# Patient Record
Sex: FEMALE | Race: WHITE | Employment: OTHER | URBAN - NONMETROPOLITAN AREA
[De-identification: names, ages, dates, MRNs, and addresses within clinical notes are randomized per-mention and may not be internally consistent; named-entity substitution may affect disease eponyms.]

---

## 2018-06-19 ENCOUNTER — HOSPITAL ENCOUNTER (OUTPATIENT)
Dept: WOMENS IMAGING | Age: 83
Discharge: HOME | End: 2018-06-19
Payer: MEDICARE

## 2018-06-19 DIAGNOSIS — Z12.31 VISIT FOR SCREENING MAMMOGRAM: ICD-10-CM

## 2018-06-19 DIAGNOSIS — Z78.0 ASYMPTOMATIC MENOPAUSAL STATE: ICD-10-CM

## 2018-06-19 PROCEDURE — 77067 SCR MAMMO BI INCL CAD: CPT

## 2018-06-19 PROCEDURE — 77080 DXA BONE DENSITY AXIAL: CPT

## 2018-06-22 ENCOUNTER — HOSPITAL ENCOUNTER (OUTPATIENT)
Dept: WOMENS IMAGING | Age: 83
Discharge: HOME | End: 2018-06-22
Payer: MEDICARE

## 2018-06-22 DIAGNOSIS — R92.8 ABNORMAL MAMMOGRAM: ICD-10-CM

## 2018-06-22 PROCEDURE — 76642 ULTRASOUND BREAST LIMITED: CPT

## 2018-06-22 PROCEDURE — G0279 TOMOSYNTHESIS, MAMMO: HCPCS

## 2020-09-18 ENCOUNTER — TRANSCRIBE ORDERS (OUTPATIENT)
Dept: ADMINISTRATIVE | Facility: HOSPITAL | Age: 85
End: 2020-09-18

## 2020-09-18 DIAGNOSIS — Z12.31 ENCOUNTER FOR SCREENING MAMMOGRAM FOR MALIGNANT NEOPLASM OF BREAST: Primary | ICD-10-CM

## 2020-09-18 DIAGNOSIS — Z78.0 POSTMENOPAUSAL STATUS: ICD-10-CM

## 2020-09-29 ENCOUNTER — APPOINTMENT (OUTPATIENT)
Dept: CT IMAGING | Facility: HOSPITAL | Age: 85
End: 2020-09-29

## 2020-09-29 ENCOUNTER — HOSPITAL ENCOUNTER (INPATIENT)
Facility: HOSPITAL | Age: 85
LOS: 1 days | Discharge: HOME | End: 2020-10-01
Attending: EMERGENCY MEDICINE | Admitting: FAMILY MEDICINE

## 2020-09-29 ENCOUNTER — APPOINTMENT (OUTPATIENT)
Dept: GENERAL RADIOLOGY | Facility: HOSPITAL | Age: 85
End: 2020-09-29

## 2020-09-29 DIAGNOSIS — I26.93 SINGLE SUBSEGMENTAL PULMONARY EMBOLISM WITHOUT ACUTE COR PULMONALE (HCC): ICD-10-CM

## 2020-09-29 DIAGNOSIS — R41.82 ALTERED MENTAL STATUS, UNSPECIFIED ALTERED MENTAL STATUS TYPE: ICD-10-CM

## 2020-09-29 DIAGNOSIS — R26.9 GAIT ABNORMALITY: ICD-10-CM

## 2020-09-29 DIAGNOSIS — R55 SYNCOPE, UNSPECIFIED SYNCOPE TYPE: Primary | ICD-10-CM

## 2020-09-29 LAB
ALBUMIN SERPL-MCNC: 4.4 G/DL (ref 3.5–5.2)
ALBUMIN/GLOB SERPL: 1.3 G/DL
ALP SERPL-CCNC: 58 U/L (ref 39–117)
ALT SERPL W P-5'-P-CCNC: 8 U/L (ref 1–33)
ANION GAP SERPL CALCULATED.3IONS-SCNC: 14 MMOL/L (ref 5–15)
AST SERPL-CCNC: 17 U/L (ref 1–32)
BASOPHILS # BLD AUTO: 0.06 10*3/MM3 (ref 0–0.2)
BASOPHILS NFR BLD AUTO: 0.7 % (ref 0–1.5)
BILIRUB SERPL-MCNC: 0.5 MG/DL (ref 0–1.2)
BILIRUB UR QL STRIP: NEGATIVE
BUN SERPL-MCNC: 21 MG/DL (ref 8–23)
BUN/CREAT SERPL: 21.2 (ref 7–25)
CALCIUM SPEC-SCNC: 10.2 MG/DL (ref 8.6–10.5)
CHLORIDE SERPL-SCNC: 99 MMOL/L (ref 98–107)
CLARITY UR: CLEAR
CO2 SERPL-SCNC: 25 MMOL/L (ref 22–29)
COLOR UR: YELLOW
CREAT SERPL-MCNC: 0.99 MG/DL (ref 0.57–1)
D-LACTATE SERPL-SCNC: 2.9 MMOL/L (ref 0.5–2)
DEPRECATED RDW RBC AUTO: 43.4 FL (ref 37–54)
EOSINOPHIL # BLD AUTO: 0.07 10*3/MM3 (ref 0–0.4)
EOSINOPHIL NFR BLD AUTO: 0.8 % (ref 0.3–6.2)
ERYTHROCYTE [DISTWIDTH] IN BLOOD BY AUTOMATED COUNT: 14.6 % (ref 12.3–15.4)
GFR SERPL CREATININE-BSD FRML MDRD: 53 ML/MIN/1.73
GLOBULIN UR ELPH-MCNC: 3.5 GM/DL
GLUCOSE SERPL-MCNC: 150 MG/DL (ref 65–99)
GLUCOSE UR STRIP-MCNC: NEGATIVE MG/DL
HCT VFR BLD AUTO: 41.2 % (ref 34–46.6)
HGB BLD-MCNC: 13.3 G/DL (ref 12–15.9)
HGB UR QL STRIP.AUTO: NEGATIVE
HOLD SPECIMEN: NORMAL
IMM GRANULOCYTES # BLD AUTO: 0.04 10*3/MM3 (ref 0–0.05)
IMM GRANULOCYTES NFR BLD AUTO: 0.5 % (ref 0–0.5)
INR PPP: 1.04 (ref 0.91–1.09)
KETONES UR QL STRIP: ABNORMAL
LACTATE HOLD SPECIMEN: NORMAL
LEUKOCYTE ESTERASE UR QL STRIP.AUTO: NEGATIVE
LYMPHOCYTES # BLD AUTO: 1.3 10*3/MM3 (ref 0.7–3.1)
LYMPHOCYTES NFR BLD AUTO: 14.9 % (ref 19.6–45.3)
MAGNESIUM SERPL-MCNC: 2 MG/DL (ref 1.6–2.4)
MCH RBC QN AUTO: 26.1 PG (ref 26.6–33)
MCHC RBC AUTO-ENTMCNC: 32.3 G/DL (ref 31.5–35.7)
MCV RBC AUTO: 80.9 FL (ref 79–97)
MONOCYTES # BLD AUTO: 0.85 10*3/MM3 (ref 0.1–0.9)
MONOCYTES NFR BLD AUTO: 9.8 % (ref 5–12)
NEUTROPHILS NFR BLD AUTO: 6.38 10*3/MM3 (ref 1.7–7)
NEUTROPHILS NFR BLD AUTO: 73.3 % (ref 42.7–76)
NITRITE UR QL STRIP: NEGATIVE
NRBC BLD AUTO-RTO: 0 /100 WBC (ref 0–0.2)
PH UR STRIP.AUTO: 6 [PH] (ref 5–8)
PLATELET # BLD AUTO: 295 10*3/MM3 (ref 140–450)
PMV BLD AUTO: 10.3 FL (ref 6–12)
POTASSIUM SERPL-SCNC: 3.6 MMOL/L (ref 3.5–5.2)
PROT SERPL-MCNC: 7.9 G/DL (ref 6–8.5)
PROT UR QL STRIP: NEGATIVE
PROTHROMBIN TIME: 13.2 SECONDS (ref 11.9–14.6)
RBC # BLD AUTO: 5.09 10*6/MM3 (ref 3.77–5.28)
SARS-COV-2 RDRP RESP QL NAA+PROBE: NOT DETECTED
SODIUM SERPL-SCNC: 138 MMOL/L (ref 136–145)
SP GR UR STRIP: 1.03 (ref 1–1.03)
TROPONIN T SERPL-MCNC: <0.01 NG/ML (ref 0–0.03)
TROPONIN T SERPL-MCNC: <0.01 NG/ML (ref 0–0.03)
UROBILINOGEN UR QL STRIP: ABNORMAL
WBC # BLD AUTO: 8.7 10*3/MM3 (ref 3.4–10.8)
WHOLE BLOOD HOLD SPECIMEN: NORMAL
WHOLE BLOOD HOLD SPECIMEN: NORMAL

## 2020-09-29 PROCEDURE — 85610 PROTHROMBIN TIME: CPT | Performed by: PHYSICIAN ASSISTANT

## 2020-09-29 PROCEDURE — 71045 X-RAY EXAM CHEST 1 VIEW: CPT

## 2020-09-29 PROCEDURE — 83036 HEMOGLOBIN GLYCOSYLATED A1C: CPT | Performed by: INTERNAL MEDICINE

## 2020-09-29 PROCEDURE — 25010000002 ONDANSETRON PER 1 MG: Performed by: PHYSICIAN ASSISTANT

## 2020-09-29 PROCEDURE — 87635 SARS-COV-2 COVID-19 AMP PRB: CPT | Performed by: PHYSICIAN ASSISTANT

## 2020-09-29 PROCEDURE — 83605 ASSAY OF LACTIC ACID: CPT | Performed by: PHYSICIAN ASSISTANT

## 2020-09-29 PROCEDURE — 85025 COMPLETE CBC W/AUTO DIFF WBC: CPT | Performed by: PHYSICIAN ASSISTANT

## 2020-09-29 PROCEDURE — 87040 BLOOD CULTURE FOR BACTERIA: CPT | Performed by: PHYSICIAN ASSISTANT

## 2020-09-29 PROCEDURE — 83735 ASSAY OF MAGNESIUM: CPT | Performed by: PHYSICIAN ASSISTANT

## 2020-09-29 PROCEDURE — 93005 ELECTROCARDIOGRAM TRACING: CPT | Performed by: EMERGENCY MEDICINE

## 2020-09-29 PROCEDURE — 93005 ELECTROCARDIOGRAM TRACING: CPT

## 2020-09-29 PROCEDURE — 93010 ELECTROCARDIOGRAM REPORT: CPT | Performed by: INTERNAL MEDICINE

## 2020-09-29 PROCEDURE — 80053 COMPREHEN METABOLIC PANEL: CPT | Performed by: PHYSICIAN ASSISTANT

## 2020-09-29 PROCEDURE — 81003 URINALYSIS AUTO W/O SCOPE: CPT | Performed by: PHYSICIAN ASSISTANT

## 2020-09-29 PROCEDURE — 71275 CT ANGIOGRAPHY CHEST: CPT

## 2020-09-29 PROCEDURE — 0 IOPAMIDOL PER 1 ML: Performed by: PHYSICIAN ASSISTANT

## 2020-09-29 PROCEDURE — 99285 EMERGENCY DEPT VISIT HI MDM: CPT

## 2020-09-29 PROCEDURE — 84484 ASSAY OF TROPONIN QUANT: CPT | Performed by: PHYSICIAN ASSISTANT

## 2020-09-29 PROCEDURE — 70450 CT HEAD/BRAIN W/O DYE: CPT

## 2020-09-29 PROCEDURE — P9612 CATHETERIZE FOR URINE SPEC: HCPCS

## 2020-09-29 RX ORDER — LOVASTATIN 40 MG/1
40 TABLET ORAL NIGHTLY
COMMUNITY

## 2020-09-29 RX ORDER — SODIUM CHLORIDE 0.9 % (FLUSH) 0.9 %
10 SYRINGE (ML) INJECTION AS NEEDED
Status: DISCONTINUED | OUTPATIENT
Start: 2020-09-29 | End: 2020-10-01 | Stop reason: HOSPADM

## 2020-09-29 RX ORDER — LEVOTHYROXINE SODIUM 0.1 MG/1
100 TABLET ORAL DAILY
COMMUNITY

## 2020-09-29 RX ORDER — ONDANSETRON 2 MG/ML
4 INJECTION INTRAMUSCULAR; INTRAVENOUS ONCE
Status: COMPLETED | OUTPATIENT
Start: 2020-09-29 | End: 2020-09-29

## 2020-09-29 RX ORDER — MELOXICAM 7.5 MG/1
7.5 TABLET ORAL DAILY
COMMUNITY
End: 2020-10-01 | Stop reason: HOSPADM

## 2020-09-29 RX ORDER — MEMANTINE HYDROCHLORIDE 5 MG/1
5 TABLET ORAL 2 TIMES DAILY
COMMUNITY

## 2020-09-29 RX ADMIN — ONDANSETRON HYDROCHLORIDE 4 MG: 2 SOLUTION INTRAMUSCULAR; INTRAVENOUS at 22:26

## 2020-09-29 RX ADMIN — SODIUM CHLORIDE 1000 ML: 9 INJECTION, SOLUTION INTRAVENOUS at 22:26

## 2020-09-29 RX ADMIN — IOPAMIDOL 59 ML: 755 INJECTION, SOLUTION INTRAVENOUS at 21:48

## 2020-09-30 ENCOUNTER — APPOINTMENT (OUTPATIENT)
Dept: ULTRASOUND IMAGING | Facility: HOSPITAL | Age: 85
End: 2020-09-30

## 2020-09-30 PROBLEM — R55 NEAR SYNCOPE: Status: ACTIVE | Noted: 2020-09-30

## 2020-09-30 PROBLEM — E03.9 HYPOTHYROIDISM: Status: ACTIVE | Noted: 2020-09-30

## 2020-09-30 PROBLEM — F01.50 VASCULAR DEMENTIA WITHOUT BEHAVIORAL DISTURBANCE: Chronic | Status: ACTIVE | Noted: 2020-09-30

## 2020-09-30 PROBLEM — I26.99 ACUTE PULMONARY EMBOLISM (HCC): Status: ACTIVE | Noted: 2020-09-30

## 2020-09-30 PROBLEM — E87.20 LACTIC ACIDOSIS: Status: ACTIVE | Noted: 2020-09-30

## 2020-09-30 LAB
ALBUMIN SERPL-MCNC: 3.8 G/DL (ref 3.5–5.2)
ALBUMIN/GLOB SERPL: 1.3 G/DL
ALP SERPL-CCNC: 50 U/L (ref 39–117)
ALT SERPL W P-5'-P-CCNC: 6 U/L (ref 1–33)
ANION GAP SERPL CALCULATED.3IONS-SCNC: 10 MMOL/L (ref 5–15)
AST SERPL-CCNC: 14 U/L (ref 1–32)
BASOPHILS # BLD AUTO: 0.04 10*3/MM3 (ref 0–0.2)
BASOPHILS NFR BLD AUTO: 0.3 % (ref 0–1.5)
BILIRUB SERPL-MCNC: 0.4 MG/DL (ref 0–1.2)
BUN SERPL-MCNC: 20 MG/DL (ref 8–23)
BUN/CREAT SERPL: 24.4 (ref 7–25)
CALCIUM SPEC-SCNC: 9.4 MG/DL (ref 8.6–10.5)
CHLORIDE SERPL-SCNC: 103 MMOL/L (ref 98–107)
CHOLEST SERPL-MCNC: 163 MG/DL (ref 0–200)
CK SERPL-CCNC: 69 U/L (ref 20–180)
CO2 SERPL-SCNC: 28 MMOL/L (ref 22–29)
CREAT SERPL-MCNC: 0.82 MG/DL (ref 0.57–1)
D-LACTATE SERPL-SCNC: 2.1 MMOL/L (ref 0.5–2)
DEPRECATED RDW RBC AUTO: 44.2 FL (ref 37–54)
EOSINOPHIL # BLD AUTO: 0.01 10*3/MM3 (ref 0–0.4)
EOSINOPHIL NFR BLD AUTO: 0.1 % (ref 0.3–6.2)
ERYTHROCYTE [DISTWIDTH] IN BLOOD BY AUTOMATED COUNT: 14.7 % (ref 12.3–15.4)
ERYTHROCYTE [SEDIMENTATION RATE] IN BLOOD: 14 MM/HR (ref 0–20)
FOLATE SERPL-MCNC: 6.8 NG/ML (ref 4.78–24.2)
GFR SERPL CREATININE-BSD FRML MDRD: 66 ML/MIN/1.73
GLOBULIN UR ELPH-MCNC: 3 GM/DL
GLUCOSE SERPL-MCNC: 119 MG/DL (ref 65–99)
HBA1C MFR BLD: 5.8 % (ref 4.8–5.6)
HCT VFR BLD AUTO: 36.6 % (ref 34–46.6)
HDLC SERPL-MCNC: 70 MG/DL (ref 40–60)
HGB BLD-MCNC: 11.5 G/DL (ref 12–15.9)
IMM GRANULOCYTES # BLD AUTO: 0.05 10*3/MM3 (ref 0–0.05)
IMM GRANULOCYTES NFR BLD AUTO: 0.4 % (ref 0–0.5)
LDLC SERPL CALC-MCNC: 83 MG/DL (ref 0–100)
LDLC/HDLC SERPL: 1.19 {RATIO}
LYMPHOCYTES # BLD AUTO: 1 10*3/MM3 (ref 0.7–3.1)
LYMPHOCYTES NFR BLD AUTO: 8.1 % (ref 19.6–45.3)
MCH RBC QN AUTO: 25.7 PG (ref 26.6–33)
MCHC RBC AUTO-ENTMCNC: 31.4 G/DL (ref 31.5–35.7)
MCV RBC AUTO: 81.7 FL (ref 79–97)
MONOCYTES # BLD AUTO: 0.99 10*3/MM3 (ref 0.1–0.9)
MONOCYTES NFR BLD AUTO: 8 % (ref 5–12)
NEUTROPHILS NFR BLD AUTO: 10.33 10*3/MM3 (ref 1.7–7)
NEUTROPHILS NFR BLD AUTO: 83.1 % (ref 42.7–76)
NRBC BLD AUTO-RTO: 0 /100 WBC (ref 0–0.2)
NT-PROBNP SERPL-MCNC: 750.1 PG/ML (ref 0–1800)
PHOSPHATE SERPL-MCNC: 3.7 MG/DL (ref 2.5–4.5)
PLATELET # BLD AUTO: 260 10*3/MM3 (ref 140–450)
PMV BLD AUTO: 10.7 FL (ref 6–12)
POTASSIUM SERPL-SCNC: 4.1 MMOL/L (ref 3.5–5.2)
PROT SERPL-MCNC: 6.8 G/DL (ref 6–8.5)
RBC # BLD AUTO: 4.48 10*6/MM3 (ref 3.77–5.28)
SODIUM SERPL-SCNC: 141 MMOL/L (ref 136–145)
T4 FREE SERPL-MCNC: 1.77 NG/DL (ref 0.93–1.7)
TRIGL SERPL-MCNC: 50 MG/DL (ref 0–150)
TROPONIN T SERPL-MCNC: <0.01 NG/ML (ref 0–0.03)
TSH SERPL DL<=0.05 MIU/L-ACNC: 0.07 UIU/ML (ref 0.27–4.2)
VIT B12 BLD-MCNC: 252 PG/ML (ref 211–946)
VLDLC SERPL-MCNC: 10 MG/DL
WBC # BLD AUTO: 12.42 10*3/MM3 (ref 3.4–10.8)

## 2020-09-30 PROCEDURE — 25810000003 SODIUM CHLORIDE 0.9 % WITH KCL 20 MEQ 20-0.9 MEQ/L-% SOLUTION: Performed by: INTERNAL MEDICINE

## 2020-09-30 PROCEDURE — 84439 ASSAY OF FREE THYROXINE: CPT | Performed by: INTERNAL MEDICINE

## 2020-09-30 PROCEDURE — 85651 RBC SED RATE NONAUTOMATED: CPT | Performed by: INTERNAL MEDICINE

## 2020-09-30 PROCEDURE — 84100 ASSAY OF PHOSPHORUS: CPT | Performed by: INTERNAL MEDICINE

## 2020-09-30 PROCEDURE — 93880 EXTRACRANIAL BILAT STUDY: CPT

## 2020-09-30 PROCEDURE — 83880 ASSAY OF NATRIURETIC PEPTIDE: CPT | Performed by: INTERNAL MEDICINE

## 2020-09-30 PROCEDURE — 84484 ASSAY OF TROPONIN QUANT: CPT | Performed by: INTERNAL MEDICINE

## 2020-09-30 PROCEDURE — 80053 COMPREHEN METABOLIC PANEL: CPT | Performed by: INTERNAL MEDICINE

## 2020-09-30 PROCEDURE — 93010 ELECTROCARDIOGRAM REPORT: CPT | Performed by: INTERNAL MEDICINE

## 2020-09-30 PROCEDURE — 93880 EXTRACRANIAL BILAT STUDY: CPT | Performed by: SURGERY

## 2020-09-30 PROCEDURE — 93005 ELECTROCARDIOGRAM TRACING: CPT | Performed by: INTERNAL MEDICINE

## 2020-09-30 PROCEDURE — 82550 ASSAY OF CK (CPK): CPT | Performed by: INTERNAL MEDICINE

## 2020-09-30 PROCEDURE — 82746 ASSAY OF FOLIC ACID SERUM: CPT | Performed by: INTERNAL MEDICINE

## 2020-09-30 PROCEDURE — 93970 EXTREMITY STUDY: CPT | Performed by: SURGERY

## 2020-09-30 PROCEDURE — 84443 ASSAY THYROID STIM HORMONE: CPT | Performed by: INTERNAL MEDICINE

## 2020-09-30 PROCEDURE — 25010000002 ENOXAPARIN PER 10 MG: Performed by: INTERNAL MEDICINE

## 2020-09-30 PROCEDURE — 85025 COMPLETE CBC W/AUTO DIFF WBC: CPT | Performed by: INTERNAL MEDICINE

## 2020-09-30 PROCEDURE — 82607 VITAMIN B-12: CPT | Performed by: INTERNAL MEDICINE

## 2020-09-30 PROCEDURE — 93970 EXTREMITY STUDY: CPT

## 2020-09-30 PROCEDURE — 80061 LIPID PANEL: CPT | Performed by: INTERNAL MEDICINE

## 2020-09-30 RX ORDER — ACETAMINOPHEN 325 MG/1
650 TABLET ORAL EVERY 4 HOURS PRN
Status: DISCONTINUED | OUTPATIENT
Start: 2020-09-30 | End: 2020-10-01 | Stop reason: HOSPADM

## 2020-09-30 RX ORDER — SODIUM CHLORIDE AND POTASSIUM CHLORIDE 150; 900 MG/100ML; MG/100ML
75 INJECTION, SOLUTION INTRAVENOUS CONTINUOUS
Status: DISCONTINUED | OUTPATIENT
Start: 2020-09-30 | End: 2020-10-01 | Stop reason: HOSPADM

## 2020-09-30 RX ORDER — NITROGLYCERIN 0.4 MG/1
0.4 TABLET SUBLINGUAL
Status: DISCONTINUED | OUTPATIENT
Start: 2020-09-30 | End: 2020-10-01 | Stop reason: HOSPADM

## 2020-09-30 RX ORDER — SODIUM CHLORIDE 0.9 % (FLUSH) 0.9 %
10 SYRINGE (ML) INJECTION AS NEEDED
Status: DISCONTINUED | OUTPATIENT
Start: 2020-09-30 | End: 2020-10-01 | Stop reason: HOSPADM

## 2020-09-30 RX ORDER — ATORVASTATIN CALCIUM 10 MG/1
10 TABLET, FILM COATED ORAL DAILY
Status: DISCONTINUED | OUTPATIENT
Start: 2020-09-30 | End: 2020-10-01 | Stop reason: HOSPADM

## 2020-09-30 RX ORDER — ACETAMINOPHEN 650 MG/1
650 SUPPOSITORY RECTAL EVERY 4 HOURS PRN
Status: DISCONTINUED | OUTPATIENT
Start: 2020-09-30 | End: 2020-10-01 | Stop reason: HOSPADM

## 2020-09-30 RX ORDER — SODIUM CHLORIDE 0.9 % (FLUSH) 0.9 %
10 SYRINGE (ML) INJECTION EVERY 12 HOURS SCHEDULED
Status: DISCONTINUED | OUTPATIENT
Start: 2020-09-30 | End: 2020-10-01 | Stop reason: HOSPADM

## 2020-09-30 RX ORDER — ASPIRIN 325 MG
325 TABLET ORAL DAILY
Status: DISCONTINUED | OUTPATIENT
Start: 2020-09-30 | End: 2020-09-30

## 2020-09-30 RX ORDER — ONDANSETRON 2 MG/ML
4 INJECTION INTRAMUSCULAR; INTRAVENOUS EVERY 6 HOURS PRN
Status: DISCONTINUED | OUTPATIENT
Start: 2020-09-30 | End: 2020-10-01 | Stop reason: HOSPADM

## 2020-09-30 RX ORDER — MEMANTINE HYDROCHLORIDE 5 MG/1
5 TABLET ORAL 2 TIMES DAILY
Status: DISCONTINUED | OUTPATIENT
Start: 2020-09-30 | End: 2020-10-01 | Stop reason: HOSPADM

## 2020-09-30 RX ORDER — LEVOTHYROXINE SODIUM 0.1 MG/1
100 TABLET ORAL
Status: DISCONTINUED | OUTPATIENT
Start: 2020-09-30 | End: 2020-10-01 | Stop reason: HOSPADM

## 2020-09-30 RX ORDER — ONDANSETRON 4 MG/1
4 TABLET, FILM COATED ORAL EVERY 6 HOURS PRN
Status: DISCONTINUED | OUTPATIENT
Start: 2020-09-30 | End: 2020-10-01 | Stop reason: HOSPADM

## 2020-09-30 RX ADMIN — APIXABAN 10 MG: 5 TABLET, FILM COATED ORAL at 22:15

## 2020-09-30 RX ADMIN — SODIUM CHLORIDE, PRESERVATIVE FREE 10 ML: 5 INJECTION INTRAVENOUS at 22:15

## 2020-09-30 RX ADMIN — LEVOTHYROXINE SODIUM 100 MCG: 100 TABLET ORAL at 05:13

## 2020-09-30 RX ADMIN — ENOXAPARIN SODIUM 60 MG: 60 INJECTION SUBCUTANEOUS at 05:13

## 2020-09-30 RX ADMIN — ASPIRIN 325 MG: 325 TABLET ORAL at 09:27

## 2020-09-30 RX ADMIN — POTASSIUM CHLORIDE AND SODIUM CHLORIDE 75 ML/HR: 900; 150 INJECTION, SOLUTION INTRAVENOUS at 05:44

## 2020-09-30 RX ADMIN — MEMANTINE HYDROCHLORIDE 5 MG: 5 TABLET, FILM COATED ORAL at 22:15

## 2020-09-30 RX ADMIN — MEMANTINE HYDROCHLORIDE 5 MG: 5 TABLET, FILM COATED ORAL at 09:27

## 2020-09-30 RX ADMIN — ATORVASTATIN CALCIUM 10 MG: 10 TABLET, FILM COATED ORAL at 09:27

## 2020-10-01 ENCOUNTER — APPOINTMENT (OUTPATIENT)
Dept: CARDIOLOGY | Facility: HOSPITAL | Age: 85
End: 2020-10-01

## 2020-10-01 VITALS
DIASTOLIC BLOOD PRESSURE: 77 MMHG | HEIGHT: 65 IN | HEART RATE: 72 BPM | TEMPERATURE: 98.1 F | OXYGEN SATURATION: 99 % | BODY MASS INDEX: 22.26 KG/M2 | RESPIRATION RATE: 16 BRPM | WEIGHT: 133.6 LBS | SYSTOLIC BLOOD PRESSURE: 156 MMHG

## 2020-10-01 LAB
BH CV ECHO MEAS - AO MAX PG (FULL): 8.4 MMHG
BH CV ECHO MEAS - AO MAX PG: 13.8 MMHG
BH CV ECHO MEAS - AO MEAN PG (FULL): 6 MMHG
BH CV ECHO MEAS - AO MEAN PG: 10 MMHG
BH CV ECHO MEAS - AO ROOT AREA (BSA CORRECTED): 1.9
BH CV ECHO MEAS - AO ROOT AREA: 8 CM^2
BH CV ECHO MEAS - AO ROOT DIAM: 3.2 CM
BH CV ECHO MEAS - AO V2 MAX: 186 CM/SEC
BH CV ECHO MEAS - AO V2 MEAN: 154 CM/SEC
BH CV ECHO MEAS - AO V2 VTI: 49.1 CM
BH CV ECHO MEAS - AVA(I,A): 2.1 CM^2
BH CV ECHO MEAS - AVA(I,D): 2.1 CM^2
BH CV ECHO MEAS - AVA(V,A): 2 CM^2
BH CV ECHO MEAS - AVA(V,D): 2 CM^2
BH CV ECHO MEAS - BSA(HAYCOCK): 1.7 M^2
BH CV ECHO MEAS - BSA: 1.7 M^2
BH CV ECHO MEAS - BZI_BMI: 22.1 KILOGRAMS/M^2
BH CV ECHO MEAS - BZI_METRIC_HEIGHT: 165.1 CM
BH CV ECHO MEAS - BZI_METRIC_WEIGHT: 60.3 KG
BH CV ECHO MEAS - EDV(CUBED): 63 ML
BH CV ECHO MEAS - EDV(MOD-SP4): 50.5 ML
BH CV ECHO MEAS - EDV(TEICH): 69.2 ML
BH CV ECHO MEAS - EF(CUBED): 72.1 %
BH CV ECHO MEAS - EF(MOD-SP4): 65.7 %
BH CV ECHO MEAS - EF(TEICH): 64.4 %
BH CV ECHO MEAS - ESV(CUBED): 17.6 ML
BH CV ECHO MEAS - ESV(MOD-SP4): 17.3 ML
BH CV ECHO MEAS - ESV(TEICH): 24.6 ML
BH CV ECHO MEAS - FS: 34.7 %
BH CV ECHO MEAS - IVS/LVPW: 0.86
BH CV ECHO MEAS - IVSD: 1.2 CM
BH CV ECHO MEAS - LA DIMENSION: 3.2 CM
BH CV ECHO MEAS - LA/AO: 1
BH CV ECHO MEAS - LAT PEAK E' VEL: 6.4 CM/SEC
BH CV ECHO MEAS - LV DIASTOLIC VOL/BSA (35-75): 30.4 ML/M^2
BH CV ECHO MEAS - LV MASS(C)D: 162.2 GRAMS
BH CV ECHO MEAS - LV MASS(C)DI: 97.5 GRAMS/M^2
BH CV ECHO MEAS - LV MAX PG: 5.5 MMHG
BH CV ECHO MEAS - LV MEAN PG: 4 MMHG
BH CV ECHO MEAS - LV SYSTOLIC VOL/BSA (12-30): 10.4 ML/M^2
BH CV ECHO MEAS - LV V1 MAX: 117 CM/SEC
BH CV ECHO MEAS - LV V1 MEAN: 89.3 CM/SEC
BH CV ECHO MEAS - LV V1 VTI: 32.9 CM
BH CV ECHO MEAS - LVIDD: 4 CM
BH CV ECHO MEAS - LVIDS: 2.6 CM
BH CV ECHO MEAS - LVLD AP4: 6.9 CM
BH CV ECHO MEAS - LVLS AP4: 5.5 CM
BH CV ECHO MEAS - LVOT AREA (M): 3.1 CM^2
BH CV ECHO MEAS - LVOT AREA: 3.1 CM^2
BH CV ECHO MEAS - LVOT DIAM: 2 CM
BH CV ECHO MEAS - LVPWD: 1.3 CM
BH CV ECHO MEAS - MED PEAK E' VEL: 5.7 CM/SEC
BH CV ECHO MEAS - MV A MAX VEL: 129 CM/SEC
BH CV ECHO MEAS - MV DEC SLOPE: 404 CM/SEC^2
BH CV ECHO MEAS - MV DEC TIME: 0.24 SEC
BH CV ECHO MEAS - MV E MAX VEL: 96.7 CM/SEC
BH CV ECHO MEAS - MV E/A: 0.75
BH CV ECHO MEAS - RAP SYSTOLE: 10 MMHG
BH CV ECHO MEAS - RVSP: 43.4 MMHG
BH CV ECHO MEAS - SI(AO): 237.4 ML/M^2
BH CV ECHO MEAS - SI(CUBED): 27.3 ML/M^2
BH CV ECHO MEAS - SI(LVOT): 62.1 ML/M^2
BH CV ECHO MEAS - SI(MOD-SP4): 20 ML/M^2
BH CV ECHO MEAS - SI(TEICH): 26.8 ML/M^2
BH CV ECHO MEAS - SV(AO): 394.9 ML
BH CV ECHO MEAS - SV(CUBED): 45.5 ML
BH CV ECHO MEAS - SV(LVOT): 103.4 ML
BH CV ECHO MEAS - SV(MOD-SP4): 33.2 ML
BH CV ECHO MEAS - SV(TEICH): 44.6 ML
BH CV ECHO MEAS - TR MAX VEL: 289 CM/SEC
BH CV ECHO MEASUREMENTS AVERAGE E/E' RATIO: 15.98
D-LACTATE SERPL-SCNC: 0.8 MMOL/L (ref 0.5–2)
DEPRECATED RDW RBC AUTO: 44.5 FL (ref 37–54)
ERYTHROCYTE [DISTWIDTH] IN BLOOD BY AUTOMATED COUNT: 14.9 % (ref 12.3–15.4)
HCT VFR BLD AUTO: 33.2 % (ref 34–46.6)
HGB BLD-MCNC: 10.5 G/DL (ref 12–15.9)
LEFT ATRIUM VOLUME INDEX: 21.3 ML/M2
LEFT ATRIUM VOLUME: 35.3 CM3
MAXIMAL PREDICTED HEART RATE: 134 BPM
MCH RBC QN AUTO: 25.7 PG (ref 26.6–33)
MCHC RBC AUTO-ENTMCNC: 31.6 G/DL (ref 31.5–35.7)
MCV RBC AUTO: 81.4 FL (ref 79–97)
PLATELET # BLD AUTO: 223 10*3/MM3 (ref 140–450)
PMV BLD AUTO: 10.4 FL (ref 6–12)
RBC # BLD AUTO: 4.08 10*6/MM3 (ref 3.77–5.28)
STRESS TARGET HR: 114 BPM
WBC # BLD AUTO: 6.74 10*3/MM3 (ref 3.4–10.8)

## 2020-10-01 PROCEDURE — 85027 COMPLETE CBC AUTOMATED: CPT | Performed by: NURSE PRACTITIONER

## 2020-10-01 PROCEDURE — 93356 MYOCRD STRAIN IMG SPCKL TRCK: CPT

## 2020-10-01 PROCEDURE — 97165 OT EVAL LOW COMPLEX 30 MIN: CPT

## 2020-10-01 PROCEDURE — 97161 PT EVAL LOW COMPLEX 20 MIN: CPT

## 2020-10-01 PROCEDURE — 25810000003 SODIUM CHLORIDE 0.9 % WITH KCL 20 MEQ 20-0.9 MEQ/L-% SOLUTION: Performed by: INTERNAL MEDICINE

## 2020-10-01 PROCEDURE — 93306 TTE W/DOPPLER COMPLETE: CPT | Performed by: INTERNAL MEDICINE

## 2020-10-01 PROCEDURE — 93356 MYOCRD STRAIN IMG SPCKL TRCK: CPT | Performed by: INTERNAL MEDICINE

## 2020-10-01 PROCEDURE — 93306 TTE W/DOPPLER COMPLETE: CPT

## 2020-10-01 PROCEDURE — 83605 ASSAY OF LACTIC ACID: CPT | Performed by: NURSE PRACTITIONER

## 2020-10-01 RX ORDER — METOPROLOL SUCCINATE 25 MG/1
12.5 TABLET, EXTENDED RELEASE ORAL
Status: DISCONTINUED | OUTPATIENT
Start: 2020-10-01 | End: 2020-10-01 | Stop reason: HOSPADM

## 2020-10-01 RX ORDER — METOPROLOL SUCCINATE 25 MG/1
12.5 TABLET, EXTENDED RELEASE ORAL
Qty: 15 TABLET | Refills: 0 | Status: SHIPPED | OUTPATIENT
Start: 2020-10-02 | End: 2020-11-01

## 2020-10-01 RX ADMIN — ATORVASTATIN CALCIUM 10 MG: 10 TABLET, FILM COATED ORAL at 09:04

## 2020-10-01 RX ADMIN — METOPROLOL SUCCINATE 12.5 MG: 25 TABLET, EXTENDED RELEASE ORAL at 12:03

## 2020-10-01 RX ADMIN — POTASSIUM CHLORIDE AND SODIUM CHLORIDE 75 ML/HR: 900; 150 INJECTION, SOLUTION INTRAVENOUS at 02:05

## 2020-10-01 RX ADMIN — APIXABAN 10 MG: 5 TABLET, FILM COATED ORAL at 09:04

## 2020-10-01 RX ADMIN — LEVOTHYROXINE SODIUM 100 MCG: 100 TABLET ORAL at 05:55

## 2020-10-01 RX ADMIN — MEMANTINE HYDROCHLORIDE 5 MG: 5 TABLET, FILM COATED ORAL at 09:04

## 2020-10-01 NOTE — PLAN OF CARE
Problem: Adult Inpatient Plan of Care  Goal: Plan of Care Review  Outcome: Ongoing, Progressing  Flowsheets (Taken 10/1/2020 1032)  Progress: no change  Plan of Care Reviewed With:   patient   spouse  Outcome Summary: OT eval completed. Pt awake and alert in fowlers, no apparent distress. Pt demos good static and dynamic sitting balance with all testing and ADL task. S for LB dressing. Demos 5/5 strength and good endurance amb in felix with no exacerbation of symptoms. She is mildly unsteady with mobility but self corrects appropriately to avoid LOB. Toilets and performs hand washing standing at sink with S. Pt is at her functional baseline with no further needs for skilled OT services. ANticiapte d/c home w assist.

## 2020-10-01 NOTE — THERAPY EVALUATION
Patient Name: Dina Michaels  : 1934    MRN: [id number]                              Today's Date: 10/1/2020       Admit Date: 2020    Visit Dx:     ICD-10-CM ICD-9-CM   1. Syncope, unspecified syncope type  R55 780.2   2. Altered mental status, unspecified altered mental status type  R41.82 780.97   3. Single subsegmental pulmonary embolism without acute cor pulmonale (CMS/HCC)  I26.93 415.19   4. Gait abnormality  R26.9 781.2     Patient Active Problem List   Diagnosis   • AMS (altered mental status)   • Vascular dementia without behavioral disturbance (CMS/HCC)   • Near syncope   • Acute pulmonary embolism (CMS/HCC)   • Hypothyroidism   • Lactic acidosis     Past Medical History:   Diagnosis Date   • Cancer (CMS/HCC)      History reviewed. No pertinent surgical history.  General Information     Row Name 10/01/20 1025          Physical Therapy Time and Intention    Document Type  evaluation;other (see comments) see MAR; pt admitted w/ AMS  -     Mode of Treatment  physical therapy  -     Row Name 10/01/20 1025          General Information    Patient Profile Reviewed  yes  -JE     Prior Level of Function  dependent:;driving has rwx but does not use this device  -     Existing Precautions/Restrictions  fall  -     Barriers to Rehab  hearing deficit;cognitive status  -     Row Name 10/01/20 1025          Living Environment    Lives With  spouse has tub shower combo  -     Row Name 10/01/20 1025          Home Main Entrance    Number of Stairs, Main Entrance  five  -     Stair Railings, Main Entrance  railings on both sides of stairs  -     Row Name 10/01/20 1025          Stairs Within Home, Primary    Number of Stairs, Within Home, Primary  none  -     Row Name 10/01/20 1025          Cognition    Orientation Status (Cognition)  oriented to;person;place;disoriented to;situation;time  -     Row Name 10/01/20 1025          Safety Issues, Functional Mobility    Safety Issues Affecting  Function (Mobility)  at risk behavior observed;safety precaution awareness  -     Impairments Affecting Function (Mobility)  balance [place]  -     Row Name 10/01/20 1025          Relationship/Environment    Name(s) of Who Lives With Patient  Bill  -       User Key  (r) = Recorded By, (t) = Taken By, (c) = Cosigned By    Initials Name Provider Type    Marie Garcia, PT Physical Therapist        Mobility     Row Name 10/01/20 1025          Bed Mobility    Bed Mobility  supine-sit;sit-supine  -SABINA     Supine-Sit [place] (Bed Mobility)  standby assist  -SABINA     Sit-Supine [place] (Bed Mobility)  standby assist  -     Row Name 10/01/20 1025          Transfers    Comment (Transfers)  on/off toilet w/ CGA to SBA  -     Row Name 10/01/20 1025          Sit-Stand Transfer    Sit-Stand [place] (Transfers)  standby assist;verbal cues  -     Row Name 10/01/20 1025          Gait/Stairs (Locomotion)    [place] Level (Gait)  contact guard;verbal cues  -     Distance in Feet (Gait)  150 ft  -     Deviations/Abnormal Patterns (Gait)  gait speed decreased;stride length decreased;other (see comments) BELEM varied w/ mild veering from straight path at times; no LOB  -     [place] Level (Stairs)  contact guard;verbal cues  -     Handrail Location (Stairs)  left side (ascending)  -     Number of Steps (Stairs)  5  -     Ascending Technique (Stairs)  step-over-step  -     Descending Technique (Stairs)  step-over-step;step-to-step  -       User Key  (r) = Recorded By, (t) = Taken By, (c) = Cosigned By    Initials Name Provider Type    Marie Garcia, PT Physical Therapist        Obj/Interventions     Row Name 10/01/20 1025          Range of Motion Comprehensive    Comment, General Range of Motion  AROM all 4 extremities WFLS  -     Row Name 10/01/20 1025          Strength Comprehensive (MMT)    Comment, General Manual Muscle Testing (MMT) Assessment  defer to OT for formal UE  strength assessment, however moves B UEs against gravity w/out difficulty; B LEs grossly 4+/5  -     Row Name 10/01/20 1025          Motor Skills    Therapeutic Exercise  other (see comments) education for repeated sit/stand exercise progressing from B UE assist to single UE assist and w/out UE assist when safely appropriate for hip and thigh strengthening  -     Row Name 10/01/20 1025          Balance    Balance Assessment  sitting static balance;sitting dynamic balance;standing static balance;standing dynamic balance  -     Static Sitting Balance  WFL  -     Dynamic Sitting Balance  WFL  -     Static Standing Balance  mild impairment;unsupported;standing  -     Dynamic Standing Balance  mild impairment;unsupported;standing  -     Comment, Balance  BELEM w/ gait varied from narrow w/ neer scissoring to wider w/ mild veering at times; no LOB  -     Row Name 10/01/20 1025          Sensory Assessment (Somatosensory)    Sensory Assessment (Somatosensory)  other (see comments) no reported or noted c/os N/T  -       User Key  (r) = Recorded By, (t) = Taken By, (c) = Cosigned By    Initials Name Provider Type    Marie Garcia, PT Physical Therapist        Goals/Plan     Row Name 10/01/20 1025          Bed Mobility Goal 1 (PT)    [place] Level/Cues Needed (Bed Mobility Goal 1, PT)  independent  -     Time Frame (Bed Mobility Goal 1, PT)  long term goal (LTG);10 days  -     Progress/Outcomes (Bed Mobility Goal 1, PT)  goal ongoing  -     Row Name 10/01/20 1025          Transfer Goal 1 (PT)    Activity/Assistive Device (Transfer Goal 1, PT)  sit-to-stand/stand-to-sit;bed-to-chair/chair-to-bed  -     [place] Level/Cues Needed (Transfer Goal 1, PT)  independent  -     Time Frame (Transfer Goal 1, PT)  long term goal (LTG);10 days  -     Progress/Outcome (Transfer Goal 1, PT)  goal ongoing  -     Row Name 10/01/20 1025          Gait Training Goal 1 (PT)    Activity/Assistive Device  (Gait Training Goal 1, PT)  gait (walking locomotion);decrease fall risk;diminish gait deviation;improve balance and speed;increase endurance/gait distance;other (see comments) w/ consistent BELEM w/out veering from straight path  -JE     [place] Level (Gait Training Goal 1, PT)  standby assist  -JE     Distance (Gait Training Goal 1, PT)  250 ft  -JE     Time Frame (Gait Training Goal 1, PT)  long term goal (LTG);10 days  -JE     Progress/Outcome (Gait Training Goal 1, PT)  goal ongoing  -     Row Name 10/01/20 1025          Stairs Goal 1 (PT)    Activity/Assistive Device (Stairs Goal 1, PT)  ascending stairs;descending stairs;using handrail, left;using handrail, right;step-over step;decrease fall risk;improve balance and speed  -     [place] Level/Cues Needed (Stairs Goal 1, PT)  standby assist  -JE     Number of Stairs (Stairs Goal 1, PT)  5  -JE     Time Frame (Stairs Goal 1, PT)  long term goal (LTG);10 days  -JE     Progress/Outcome (Stairs Goal 1, PT)  goal ongoing  -     Row Name 10/01/20 1025          Patient Education Goal (PT)    Activity (Patient Education Goal, PT)  HEP for LE strengthening and balance  -JE     [place]/Cues/Accuracy (Memory Goal 2, PT)  demonstrates adequately;verbalizes understanding;other (see comments) w/ caregiver assist  -JE     Time Frame (Patient Education Goal, PT)  long term goal (LTG);10 days  -JE     Progress/Outcome (Patient Education Goal, PT)  goal ongoing  -       User Key  (r) = Recorded By, (t) = Taken By, (c) = Cosigned By    Initials Name Provider Type    Marie Garcia, PT Physical Therapist        Clinical Impression     Row Name 10/01/20 1025          Pain    Additional Documentation  Pain Scale: Numbers Pre/Post-Treatment (Group)  -     Row Name 10/01/20 1025          Pain Scale: Numbers Pre/Post-Treatment    Pretreatment Pain Rating  0/10 - no pain  -     Posttreatment Pain Rating  0/10 - no pain  -     Row Name 10/01/20 1028           Plan of Care Review    Plan of Care Reviewed With  patient;spouse  -     Progress  improving  -     Outcome Summary  PT eval completed.  Pt very agreeable and pleasant w/ supportive spouse present.  Pt performs bed mobility/tfers from bed w/ SBA, on/off toilet w/ SB to CGA.  Pt performed gait 150 ft w/ CGA to include ascend/descending 5 steps w/ 1 HR w/ CGA.  Pt w/ noted mild gait deficits w/ increase fall risk.  Pt was aware of her unsteadiness reporting she felt a little off balance and feels like this is due to being in the bed more.  Pt's spouse reports pt is usually very cautious.  Pt will benefit from continued PT services to improve balance, safety awareness, activity tolerance, and I w/ functional mobility reducing fall risk.  Recommend home w/ assist at discharge.  -     Row Name 10/01/20 1025          Therapy Assessment/Plan (PT)    Patient/Family Therapy Goals Statement (PT)  return home  -     Rehab Potential (PT)  good, to achieve stated therapy goals  -     Criteria for Skilled Interventions Met (PT)  yes;meets criteria;skilled treatment is necessary  -     Predicted Duration of Therapy Intervention (PT)  until discharge or goals achieved  -     Row Name 10/01/20 1025          Vital Signs    O2 Delivery Pre Treatment  room air  -     O2 Delivery Intra Treatment  room air  -     O2 Delivery Post Treatment  room air  -     Pre Patient Position  Supine  -     Intra Patient Position  Standing  -     Post Patient Position  Supine  -     Row Name 10/01/20 1025          Positioning and Restraints    Pre-Treatment Position  in bed  -     Post Treatment Position  bed  -     In Bed  fowlers;call light within reach;encouraged to call for assist;exit alarm on;with family/caregiver;side rails up x2  -       User Key  (r) = Recorded By, (t) = Taken By, (c) = Cosigned By    Initials Name Provider Type    Marie Garcia, PT Physical Therapist        Outcome Measures     Row Name  10/01/20 1025          How much help from another person do you currently need...    Turning from your back to your side while in flat bed without using bedrails?  3  -JE     Moving from lying on back to sitting on the side of a flat bed without bedrails?  3  -JE     Moving to and from a bed to a chair (including a wheelchair)?  3  -JE     Standing up from a chair using your arms (e.g., wheelchair, bedside chair)?  3  -JE     Climbing 3-5 steps with a railing?  3  -JE     To walk in hospital room?  3  -JE     AM-PAC 6 Clicks Score (PT)  18  -     Row Name 10/01/20 1025          Functional Assessment    Outcome Measure Options  AM-PAC 6 Clicks Basic Mobility (PT)  -       User Key  (r) = Recorded By, (t) = Taken By, (c) = Cosigned By    Initials Name Provider Type    Marie Garcia, PT Physical Therapist        Physical Therapy Education                 Title: PT OT SLP Therapies (Done)     Topic: Physical Therapy (Done)     Point: Mobility training (Done)     Learning Progress Summary           Patient Acceptance, E,TB,CARMELO, TASNEEM,MARIANN,NR by  at 10/1/2020 1241    Comment: Education re: purpose of PT/importance of activity; education re: gait deficits & increase fall risk; education for safety/falls prevention w/ mob; education for repeated sit to/from stand w/ B/single & w/out UE assist as appropriate for LE strengthening                   Point: Home exercise program (Done)     Learning Progress Summary           Patient Acceptance, E,TB,D, TASNEEM,MARIANN,NR by  at 10/1/2020 1241    Comment: Education re: purpose of PT/importance of activity; education re: gait deficits & increase fall risk; education for safety/falls prevention w/ mob; education for repeated sit to/from stand w/ B/single & w/out UE assist as appropriate for LE strengthening                   Point: Precautions (Done)     Learning Progress Summary           Patient Acceptance, E,TB,D, TASNEEM,MARIANN,NR by  at 10/1/2020 1241    Comment: Education re: purpose  of PT/importance of activity; education re: gait deficits & increase fall risk; education for safety/falls prevention w/ mob; education for repeated sit to/from stand w/ B/single & w/out UE assist as appropriate for LE strengthening                               User Key     Initials Effective Dates Name Provider Type Silvia MUHAMMAD 08/02/18 -  Marie Griffith, PT Physical Therapist PT              PT Recommendation and Plan  Planned Therapy Interventions (PT): balance training, bed mobility training, gait training, home exercise program, patient/family education, stair training, strengthening, transfer training, other (see comments)(safety/falls prevention)  Plan of Care Reviewed With: patient, spouse  Progress: improving  Outcome Summary: PT eval completed.  Pt very agreeable and pleasant w/ supportive spouse present.  Pt performs bed mobility/tfers from bed w/ SBA, on/off toilet w/ SB to CGA.  Pt performed gait 150 ft w/ CGA to include ascend/descending 5 steps w/ 1 HR w/ CGA.  Pt w/ noted mild gait deficits w/ increase fall risk.  Pt was aware of her unsteadiness reporting she felt a little off balance and feels like this is due to being in the bed more.  Pt's spouse reports pt is usually very cautious.  Pt will benefit from continued PT services to improve balance, safety awareness, activity tolerance, and I w/ functional mobility reducing fall risk.  Recommend home w/ assist at discharge.     Time Calculation:   PT Charges     Row Name 10/01/20 1242             Time Calculation    Start Time  1025  -      Stop Time  1107  -      Time Calculation (min)  42 min  -      PT Received On  10/01/20  -      PT Goal Re-Cert Due Date  10/11/20  -        User Key  (r) = Recorded By, (t) = Taken By, (c) = Cosigned By    Initials Name Provider Type    Marie Garcia, PT Physical Therapist        Therapy Charges for Today     Code Description Service Date Service Provider Modifiers Qty    [id number]  PT  EVAL LOW COMPLEXITY 3 10/1/2020 Marie Griffith, PT GP 1          PT G-Codes  Outcome Measure Options: AM-PAC 6 Clicks Basic Mobility (PT)  AM-PAC 6 Clicks Score (PT): 18    Marie Griffith, PT  10/1/2020

## 2020-10-01 NOTE — PLAN OF CARE
Goal Outcome Evaluation:  Plan of Care Reviewed With: patient  Progress: improving  Outcome Summary: Pt disoriented to time only. Pleasant and cooperative. Denies any discomfort. Up x 1 assist. VSS. Tele-nsr. . bed alarm on. Safety maintained. Plan for dc home today.

## 2020-10-01 NOTE — PLAN OF CARE
Problem: Adult Inpatient Plan of Care  Goal: Plan of Care Review  Recent Flowsheet Documentation  Taken 10/1/2020 1025 by Marie Griffith, PT  Progress: improving  Plan of Care Reviewed With:   patient   spouse  Outcome Summary: PT eval completed.  Pt very agreeable and pleasant w/ supportive spouse present.  Pt performs bed mobility/tfers from bed w/ SBA, on/off toilet w/ SB to CGA.  Pt performed gait 150 ft w/ CGA to include ascend/descending 5 steps w/ 1 HR w/ CGA.  Pt w/ noted mild gait deficits w/ increase fall risk.  Pt was aware of her unsteadiness reporting she felt a little off balance and feels like this is due to being in the bed more.  Pt's spouse reports pt is usually very cautious.  Pt will benefit from continued PT services to improve balance, safety awareness, activity tolerance, and I w/ functional mobility reducing fall risk.  Recommend home w/ assist at discharge.

## 2020-10-01 NOTE — THERAPY DISCHARGE NOTE
Acute Care - Occupational Therapy Discharge  [facility]    Patient Name: Dina Michaels  : 1934    MRN: [id number]                              Today's Date: 10/1/2020       Admit Date: 2020    Visit Dx:     ICD-10-CM ICD-9-CM   1. Syncope, unspecified syncope type  R55 780.2   2. Altered mental status, unspecified altered mental status type  R41.82 780.97   3. Single subsegmental pulmonary embolism without acute cor pulmonale (CMS/HCC)  I26.93 415.19   4. Gait abnormality  R26.9 781.2     Patient Active Problem List   Diagnosis   • AMS (altered mental status)   • Vascular dementia without behavioral disturbance (CMS/HCC)   • Near syncope   • Acute pulmonary embolism (CMS/HCC)   • Hypothyroidism   • Lactic acidosis     Past Medical History:   Diagnosis Date   • Cancer (CMS/HCC)      History reviewed. No pertinent surgical history.  General Information     Row Name 10/01/20 1027          OT Time and Intention    Document Type  evaluation  -MW     Mode of Treatment  occupational therapy  -     Row Name 10/01/20 1027          General Information    Patient Profile Reviewed  yes  -MW     Prior Level of Function  independent:;dressing;bathing;all household mobility assists with dishes, some cleaning, never driven  -MW     Existing Precautions/Restrictions  fall  -MW     Barriers to Rehab  hearing deficit  -     Row Name 10/01/20 1027          Occupational Profile    Reason for Services/Referral (Occupational Profile)  s/p episode of syncope at home, vascular dementia, increased memory impairment over last couple of months  -MW     Environmental Supports and Barriers (Occupational Profile)  RW - not previously using; tub shower, has tub bench if needed  -     Row Name 10/01/20 1027          Living Environment    Lives With  spouse  -     Row Name 10/01/20 1027          Home Main Entrance    Number of Stairs, Main Entrance  five  -MW     Stair Railings, Main Entrance  railings on both sides of stairs   "-     Row Name 10/01/20 1027          Stairs Within Home, Primary    Number of Stairs, Within Home, Primary  none  -     Row Name 10/01/20 1027          Cognition    Orientation Status (Cognition)  oriented to;person;place;verbal cues/prompts needed for orientation \"fall\" - unble to state month or year  -     Row Name 10/01/20 1027          Safety Issues, Functional Mobility    Impairments Affecting Function (Mobility)  balance hard of hearing  -     Comment, Safety Issues/Impairments (Mobility)  demos good insight to self and deficits  -     Row Name 10/01/20 1027          Relationship/Environment    Name(s) of Who Lives With Patient  Bill  -       User Key  (r) = Recorded By, (t) = Taken By, (c) = Cosigned By    Initials Name Provider Type     Siria Alan, OTR/L Occupational Therapist        Mobility/ADL's     Row Name 10/01/20 1027          Bed Mobility    Bed Mobility  supine-sit;sit-supine  -     Supine-Sit [place] (Bed Mobility)  standby assist  -     Sit-Supine [place] (Bed Mobility)  standby assist  -     Row Name 10/01/20 1027          Transfers    Transfers  sit-stand transfer  -     Sit-Stand [place] (Transfers)  standby assist  -     Row Name 10/01/20 1027          Functional Mobility    Functional Mobility- Ind. Level  contact guard assist  -     Functional Mobility- Comment  amb to end of felix and back to room, some scissoring and unsteadiness obs but pt demos good self awareness for self correction with no LOB  -     Row Name 10/01/20 1027          Activities of Daily Living    BADL Assessment/Intervention  lower body dressing;toileting  -     Row Name 10/01/20 1027          Lower Body Dressing Assessment/Training    [place] Level (Lower Body Dressing)  don;socks;supervision  -     Position (Lower Body Dressing)  edge of bed sitting  -     Row Name 10/01/20 1027          Toileting Assessment/Training    [place] Level (Toileting)  " supervision  -MW     Assistive Devices (Toileting)  commode  -MW     Position (Toileting)  supported sitting;unsupported standing  -MW       User Key  (r) = Recorded By, (t) = Taken By, (c) = Cosigned By    Initials Name Provider Type    Siria Berman, OTR/L Occupational Therapist        Obj/Interventions     Row Name 10/01/20 1027          Sensory Assessment (Somatosensory)    Sensory Assessment (Somatosensory)  sensation intact  -MW     Row Name 10/01/20 1027          Vision Assessment/Intervention    Visual Impairment/Limitations  corrective lenses full-time  -MW     Row Name 10/01/20 1027          Range of Motion Comprehensive    Comment, General Range of Motion  AROM WFL BUE  -MW     Row Name 10/01/20 1027          Strength Comprehensive (MMT)    Comment, General Manual Muscle Testing (MMT) Assessment  BUE 5/5  -MW     Row Name 10/01/20 1027          Balance    Balance Assessment  sitting static balance;sitting dynamic balance;standing static balance  -MW     Static Sitting Balance  WFL  -MW     Dynamic Sitting Balance  WFL  -MW     Static Standing Balance  mild impairment  -MW       User Key  (r) = Recorded By, (t) = Taken By, (c) = Cosigned By    Initials Name Provider Type    Siria Berman, OTR/L Occupational Therapist        Goals/Plan    No documentation.       Clinical Impression     Row Name 10/01/20 1032          Pain Assessment    Additional Documentation  Pain Scale: Numbers Pre/Post-Treatment (Group)  -MW     Row Name 10/01/20 1032          Pain Scale: Numbers Pre/Post-Treatment    Pretreatment Pain Rating  0/10 - no pain  -MW     Posttreatment Pain Rating  0/10 - no pain  -MW     Pre/Posttreatment Pain Comment  no c/o subjectively  -MW     [facility] Name 10/01/20 1032          Plan of Care Review    Plan of Care Reviewed With  patient;spouse  -MW     Progress  no change  -MW     Outcome Summary  OT eval completed. Pt awake and alert in fowlers, no apparent distress. Pt demos good static and  dynamic sitting balance with all testing and ADL task. S for LB dressing. Demos 5/5 strength and good endurance amb in felix with no exacerbation of symptoms. She is mildly unsteady with mobility but self corrects appropriately to avoid LOB. Toilets and performs hand washing standing at sink with S. Pt is at her functional baseline with no further needs for skilled OT services. ANticiapte d/c home w assist.  -     Row Name 10/01/20 1032          Therapy Assessment/Plan (OT)    Criteria for Skilled Therapeutic Interventions Met (OT)  no;no problems identified which require skilled intervention  -     Therapy Frequency (OT)  evaluation only  -     Row Name 10/01/20 1032          Therapy Plan Review/Discharge Plan (OT)    Anticipated Discharge Disposition (OT)  home with 24/7 care;home with home health  -     Row Name 10/01/20 1032          Vital Signs    O2 Delivery Pre Treatment  room air  -MW     O2 Delivery Intra Treatment  room air  -MW     O2 Delivery Post Treatment  room air  -     Row Name 10/01/20 1032          Positioning and Restraints    Pre-Treatment Position  in bed  -MW     Post Treatment Position  bed  -MW     In Bed  notified nsg;fowlers;call light within reach;encouraged to call for assist;with family/caregiver  -       User Key  (r) = Recorded By, (t) = Taken By, (c) = Cosigned By    Initials Name Provider Type    MW Siria Alan, OTR/L Occupational Therapist        Outcome Measures     Row Name 10/01/20 1027          How much help from another is currently needed...    Putting on and taking off regular lower body clothing?  3  -MW     Bathing (including washing, rinsing, and drying)  3  -MW     Toileting (which includes using toilet bed pan or urinal)  3  -MW     Putting on and taking off regular upper body clothing  4  -MW     Taking care of personal grooming (such as brushing teeth)  4  -MW     Eating meals  4  -MW     AM-PAC 6 Clicks Score (OT)  21  -MW     Row Name 10/01/20 1029           Functional Assessment    Outcome Measure Options  AM-PAC 6 Clicks Daily Activity (OT)  -       User Key  (r) = Recorded By, (t) = Taken By, (c) = Cosigned By    Initials Name Provider Type     Siria Alan OTR/L Occupational Therapist        Occupational Therapy Education                 Title: PT OT SLP Therapies (Done)     Topic: Occupational Therapy (Done)     Point: ADL training (Done)     Description:   Instruct learner(s) on proper safety adaptation and remediation techniques during self care or transfers.   Instruct in proper use of assistive devices.              Learning Progress Summary           Patient Acceptance, E, VU by  at 10/1/2020 1420                   Point: Home exercise program (Done)     Description:   Instruct learner(s) on appropriate technique for monitoring, assisting and/or progressing therapeutic exercises/activities.              Learning Progress Summary           Patient Acceptance, E, VU by  at 10/1/2020 1420                               User Key     Initials Effective Dates Name Provider Type Discipline     08/28/18 -  Siria Alan OTR/L Occupational Therapist OT              OT Recommendation and Plan  Retired Outcome Summary/Treatment Plan (OT)  Anticipated Discharge Disposition (OT): home with 24/7 care, home with home health  Therapy Frequency (OT): evaluation only  Plan of Care Review  Plan of Care Reviewed With: patient, spouse  Progress: no change  Outcome Summary: OT eval completed. Pt awake and alert in fowlers, no apparent distress. Pt demos good static and dynamic sitting balance with all testing and ADL task. S for LB dressing. Demos 5/5 strength and good endurance amb in felix with no exacerbation of symptoms. She is mildly unsteady with mobility but self corrects appropriately to avoid LOB. Toilets and performs hand washing standing at sink with S. Pt is at her functional baseline with no further needs for skilled OT services. ANticiapte  d/c home w assist.  Plan of Care Reviewed With: patient, spouse  Outcome Summary: OT eval completed. Pt awake and alert in fowlers, no apparent distress. Pt demos good static and dynamic sitting balance with all testing and ADL task. S for LB dressing. Demos 5/5 strength and good endurance amb in felix with no exacerbation of symptoms. She is mildly unsteady with mobility but self corrects appropriately to avoid LOB. Toilets and performs hand washing standing at sink with S. Pt is at her functional baseline with no further needs for skilled OT services. ANticiapte d/c home w assist.     Time Calculation:   Time Calculation- OT     Row Name 10/01/20 1420             Time Calculation- OT    OT Start Time  1020  -MW      OT Stop Time  1100  -MW      OT Time Calculation (min)  40 min  -MW      OT Received On  10/01/20  -MW        User Key  (r) = Recorded By, (t) = Taken By, (c) = Cosigned By    Initials Name Provider Type    MW Siria Alan, OTR/L Occupational Therapist        Therapy Charges for Today     Code Description Service Date Service Provider Modifiers Qty    [id number]  OT EVAL LOW COMPLEXITY 3 10/1/2020 Siria Alan OTR/L GO 1               iSria Alan OTR/JOE  10/1/2020

## 2020-10-01 NOTE — DISCHARGE INSTR - ACTIVITY
2.  Check blood pressure at home and keep a log of readings to bring to follow-up apointment with primary care provider.

## 2020-10-01 NOTE — DISCHARGE SUMMARY
[facility] Medicine Services  DISCHARGE SUMMARY       Date of Admission: 9/29/2020  Date of Discharge:  10/1/2020  Primary Care Physician: Benedicto Hebert MD    Presenting Problem/History of Present Illness:  AMS (altered mental status) [R41.82]  AMS (altered mental status) [R41.82]     Final Discharge Diagnoses:  Active Hospital Problems    Diagnosis   • **AMS (altered mental status)   • Vascular dementia without behavioral disturbance (CMS/HCC)   • Near syncope   • Acute pulmonary embolism (CMS/HCC)   • Hypothyroidism   • Lactic acidosis     Consults: None.    Procedures Performed: None.    Pertinent Test Results:   Lab Results (last 72 hours)     Procedure Component Value Units Date/Time    Lactic Acid, Plasma [[id number]]  (Normal) Collected: 10/01/20 0639    Specimen: Blood Updated: 10/01/20 0703     Lactate 0.8 mmol/L     CBC (No Diff) [[id number]]  (Abnormal) Collected: 10/01/20 0639    Specimen: Blood Updated: 10/01/20 0651     WBC 6.74 10*3/mm3      RBC 4.08 10*6/mm3      Hemoglobin 10.5 g/dL      Hematocrit 33.2 %      MCV 81.4 fL      MCH 25.7 pg      MCHC 31.6 g/dL      RDW 14.9 %      RDW-SD 44.5 fl      MPV 10.4 fL      Platelets 223 10*3/mm3     Blood Culture - Blood, Arm, Right [[id number]] Collected: 09/29/20 2036    Specimen: Blood from Arm, Right Updated: 09/30/20 2100     Blood Culture No growth at 24 hours    Blood Culture - Blood, Arm, Right [[id number]] Collected: 09/29/20 1949    Specimen: Blood from Arm, Right Updated: 09/30/20 2015     Blood Culture No growth at 24 hours    Vitamin B12 [[id number]]  (Normal) Collected: 09/30/20 0557    Specimen: Blood Updated: 09/30/20 1229     Vitamin B-12 252 pg/mL     Narrative:      Results may be falsely increased if patient taking Biotin.      Folate [[id number]]  (Normal) Collected: 09/30/20 0557    Specimen: Blood Updated: 09/30/20 1229     Folate 6.80 ng/mL     Narrative:      Results may be falsely increased if  patient taking Biotin.      TSH [[id number]]  (Abnormal) Collected: 09/30/20 0557    Specimen: Blood Updated: 09/30/20 0639     TSH 0.065 uIU/mL     T4, Free [[id number]]  (Abnormal) Collected: 09/30/20 0557    Specimen: Blood Updated: 09/30/20 0638     Free T4 1.77 ng/dL     Narrative:      Results may be falsely increased if patient taking Biotin.      Lipid Panel [[id number]]  (Abnormal) Collected: 09/30/20 0557    Specimen: Blood Updated: 09/30/20 0637     Total Cholesterol 163 mg/dL      Triglycerides 50 mg/dL      HDL Cholesterol 70 mg/dL      LDL Cholesterol  83 mg/dL      VLDL Cholesterol 10 mg/dL      LDL/HDL Ratio 1.19    Narrative:      Cholesterol Reference Ranges  (U.S. Department of Health and Human Services ATP III Classifications)    Desirable          <200 mg/dL  Borderline High    200-239 mg/dL  High Risk          >240 mg/dL      Triglyceride Reference Ranges  (U.S. Department of Health and Human Services ATP III Classifications)    Normal           <150 mg/dL  Borderline High  150-199 mg/dL  High             200-499 mg/dL  Very High        >500 mg/dL    HDL Reference Ranges  (U.S. Department of Health and Human Services ATP III Classifcations)    Low     <40 mg/dl (major risk factor for CHD)  High    >60 mg/dl ('negative' risk factor for CHD)        LDL Reference Ranges  (U.S. Department of Health and Human Services ATP III Classifcations)    Optimal          <100 mg/dL  Near Optimal     100-129 mg/dL  Borderline High  130-159 mg/dL  High             160-189 mg/dL  Very High        >189 mg/dL    Sedimentation Rate [[id number]]  (Normal) Collected: 09/30/20 0557    Specimen: Blood Updated: 09/30/20 0633     Sed Rate 14 mm/hr     Comprehensive Metabolic Panel [[id number]]  (Abnormal) Collected: 09/30/20 0557    Specimen: Blood Updated: 09/30/20 0633     Glucose 119 mg/dL      BUN 20 mg/dL      Creatinine 0.82 mg/dL      Sodium 141 mmol/L      Potassium 4.1 mmol/L      Chloride 103 mmol/L      CO2 28.0  mmol/L      Calcium 9.4 mg/dL      Total Protein 6.8 g/dL      Albumin 3.80 g/dL      ALT (SGPT) 6 U/L      AST (SGOT) 14 U/L      Alkaline Phosphatase 50 U/L      Total Bilirubin 0.4 mg/dL      eGFR Non African Amer 66 mL/min/1.73      Globulin 3.0 gm/dL      A/G Ratio 1.3 g/dL      BUN/Creatinine Ratio 24.4     Anion Gap 10.0 mmol/L     Narrative:      GFR Normal >60  Chronic Kidney Disease <60  Kidney Failure <15      CK [[id number]]  (Normal) Collected: 09/30/20 0557    Specimen: Blood Updated: 09/30/20 0633     Creatine Kinase 69 U/L     Phosphorus [[id number]]  (Normal) Collected: 09/30/20 0557    Specimen: Blood Updated: 09/30/20 0630     Phosphorus 3.7 mg/dL     Troponin [[id number]]  (Normal) Collected: 09/30/20 0557    Specimen: Blood Updated: 09/30/20 0628     Troponin T <0.010 ng/mL     Narrative:      Troponin T Reference Range:  <= 0.03 ng/mL-   Negative for AMI  >0.03 ng/mL-     Abnormal for myocardial necrosis.  Clinicians would have to utilize clinical acumen, EKG, Troponin and serial changes to determine if it is an Acute Myocardial Infarction or myocardial injury due to an underlying chronic condition.       Results may be falsely decreased if patient taking Biotin.      BNP [[id number]]  (Normal) Collected: 09/30/20 0557    Specimen: Blood Updated: 09/30/20 0628     proBNP 750.1 pg/mL     Narrative:      Among patients with dyspnea, NT-proBNP is highly sensitive for the detection of acute congestive heart failure. In addition NT-proBNP of <300 pg/ml effectively rules out acute congestive heart failure with 99% negative predictive value.    Results may be falsely decreased if patient taking Biotin.      CBC Auto Differential [[id number]]  (Abnormal) Collected: 09/30/20 0557    Specimen: Blood Updated: 09/30/20 0615     WBC 12.42 10*3/mm3      RBC 4.48 10*6/mm3      Hemoglobin 11.5 g/dL      Hematocrit 36.6 %      MCV 81.7 fL      MCH 25.7 pg      MCHC 31.4 g/dL      RDW 14.7 %      RDW-SD 44.2 fl       MPV 10.7 fL      Platelets 260 10*3/mm3      Neutrophil % 83.1 %      Lymphocyte % 8.1 %      Monocyte % 8.0 %      Eosinophil % 0.1 %      Basophil % 0.3 %      Immature Grans % 0.4 %      Neutrophils, Absolute 10.33 10*3/mm3      Lymphocytes, Absolute 1.00 10*3/mm3      Monocytes, Absolute 0.99 10*3/mm3      Eosinophils, Absolute 0.01 10*3/mm3      Basophils, Absolute 0.04 10*3/mm3      Immature Grans, Absolute 0.05 10*3/mm3      nRBC 0.0 /100 WBC     Hemoglobin A1c [[id number]]  (Abnormal) Collected: 09/29/20 1949    Specimen: Blood Updated: 09/30/20 0415     Hemoglobin A1C 5.80 %     Narrative:      Hemoglobin A1C Ranges:    Increased Risk for Diabetes  5.7% to 6.4%  Diabetes                     >= 6.5%  Diabetic Goal                < 7.0%    Lactic Acid, Reflex [[id number]]  (Abnormal) Collected: 09/29/20 2358    Specimen: Blood Updated: 09/30/20 0020     Lactate 2.1 mmol/L     Urinalysis With Culture If Indicated - Urine, Catheter [[id number]]  (Abnormal) Collected: 09/29/20 2319    Specimen: Urine, Catheter Updated: 09/29/20 2336     Color, UA Yellow     Appearance, UA Clear     pH, UA 6.0     Specific Gravity, UA 1.027     Glucose, UA Negative     Ketones, UA Trace     Bilirubin, UA Negative     Blood, UA Negative     Protein, UA Negative     Leuk Esterase, UA Negative     Nitrite, UA Negative     Urobilinogen, UA 1.0 E.U./dL    Narrative:      Urine microscopic not indicated.    Lactic Acid, Reflex Timer (This will reflex a repeat order 3-3:15 hours after ordered.) [[id number]] Collected: 09/29/20 1949    Specimen: Blood Updated: 09/29/20 2330     Hold Tube Hold for add-ons.     Comment: Auto resulted.       COVID PRE-OP / PRE-PROCEDURE SCREENING ORDER (NO ISOLATION) - Swab, Nasal Cavity [[id number]]  (Normal) Collected: 09/29/20 2230    Specimen: Swab from Nasal Cavity Updated: 09/29/20 2259    Narrative:      The following orders were created for panel order COVID PRE-OP / PRE-PROCEDURE SCREENING ORDER (NO  ISOLATION) - Swab, Nasal Cavity.  Procedure                               Abnormality         Status                     ---------                               -----------         ------                     COVID-19, ABBOTT IN-HOUS...[[id number]]  Normal              Final result                 Please view results for these tests on the individual orders.    COVID-19, ABBOTT IN-HOUSE,NP Swab (NO TRANSPORT MEDIA) 2 HR TAT - Swab, Nasal Cavity [[id number]]  (Normal) Collected: 09/29/20 2230    Specimen: Swab from Nasal Cavity Updated: 09/29/20 2259     COVID19 Not Detected    Narrative:      Fact sheet for providers: https://www.fda.gov/media/239367/download     Fact sheet for patients: https://www.fda.gov/media/661956/download    Troponin [[id number]]  (Normal) Collected: 09/29/20 2201    Specimen: Blood Updated: 09/29/20 2223     Troponin T <0.010 ng/mL     Narrative:      Troponin T Reference Range:  <= 0.03 ng/mL-   Negative for AMI  >0.03 ng/mL-     Abnormal for myocardial necrosis.  Clinicians would have to utilize clinical acumen, EKG, Troponin and serial changes to determine if it is an Acute Myocardial Infarction or myocardial injury due to an underlying chronic condition.       Results may be falsely decreased if patient taking Biotin.      [place] Draw [[id number]] Collected: 09/29/20 1949    Specimen: Blood Updated: 09/29/20 2100    Narrative:      The following orders were created for panel order [place] Draw.  Procedure                               Abnormality         Status                     ---------                               -----------         ------                     Light Blue Top[[id number]]                                    Final result               Green Top (Gel)[[id number]]                                   Final result               Lavender Top[[id number]]                                      Final result               Red Top[[id number]]                                           Final  result               [place] Blood Culture Bot...[[id number]]                      Final result               Gray Top - Ice[[id number]]                                    Final result                 Please view results for these tests on the individual orders.    [place] Blood Culture Bottle Set [[id number]] Collected: 09/29/20 1949    Specimen: Blood from Arm, Right Updated: 09/29/20 2100     Extra Tube Hold for add-ons.     Comment: Auto resulted.       Light Blue Top [[id number]] Collected: 09/29/20 1949    Specimen: Blood Updated: 09/29/20 2100     Extra Tube hold for add-on     Comment: Auto resulted       Lavender Top [[id number]] Collected: 09/29/20 1949    Specimen: Blood Updated: 09/29/20 2100     Extra Tube hold for add-on     Comment: Auto resulted       Red Top [[id number]] Collected: 09/29/20 1949    Specimen: Blood Updated: 09/29/20 2100     Extra Tube Hold for add-ons.     Comment: Auto resulted.       Green Top (Gel) [[id number]] Collected: 09/29/20 1949    Specimen: Blood Updated: 09/29/20 2100     Extra Tube Hold for add-ons.     Comment: Auto resulted.       Gray Top - Ice [[id number]] Collected: 09/29/20 1949    Specimen: Blood Updated: 09/29/20 2100     Extra Tube Hold for add-ons.     Comment: Auto resulted.       Protime-INR [[id number]]  (Normal) Collected: 09/29/20 2036    Specimen: Blood Updated: 09/29/20 2050     Protime 13.2 Seconds      INR 1.04    Comprehensive Metabolic Panel [[id number]]  (Abnormal) Collected: 09/29/20 1949    Specimen: Blood Updated: 09/29/20 2028     Glucose 150 mg/dL      BUN 21 mg/dL      Creatinine 0.99 mg/dL      Sodium 138 mmol/L      Potassium 3.6 mmol/L      Comment: Slight hemolysis detected by analyzer. Results may be affected.        Chloride 99 mmol/L      CO2 25.0 mmol/L      Calcium 10.2 mg/dL      Total Protein 7.9 g/dL      Albumin 4.40 g/dL      ALT (SGPT) 8 U/L      AST (SGOT) 17 U/L      Comment: Slight hemolysis detected by analyzer. Results may be affected.           Alkaline Phosphatase 58 U/L      Total Bilirubin 0.5 mg/dL      eGFR Non African Amer 53 mL/min/1.73      Globulin 3.5 gm/dL      A/G Ratio 1.3 g/dL      BUN/Creatinine Ratio 21.2     Anion Gap 14.0 mmol/L     Narrative:      GFR Normal >60  Chronic Kidney Disease <60  Kidney Failure <15      Lactic Acid, Plasma [[id number]]  (Abnormal) Collected: 09/29/20 1949    Specimen: Blood Updated: 09/29/20 2027     Lactate 2.9 mmol/L     Magnesium [[id number]]  (Normal) Collected: 09/29/20 1949    Specimen: Blood Updated: 09/29/20 2021     Magnesium 2.0 mg/dL     Troponin [[id number]]  (Normal) Collected: 09/29/20 1949    Specimen: Blood Updated: 09/29/20 2020     Troponin T <0.010 ng/mL     Narrative:      Troponin T Reference Range:  <= 0.03 ng/mL-   Negative for AMI  >0.03 ng/mL-     Abnormal for myocardial necrosis.  Clinicians would have to utilize clinical acumen, EKG, Troponin and serial changes to determine if it is an Acute Myocardial Infarction or myocardial injury due to an underlying chronic condition.       Results may be falsely decreased if patient taking Biotin.      CBC & Differential [[id number]]  (Abnormal) Collected: 09/29/20 1949    Specimen: Blood Updated: 09/29/20 2001    Narrative:      The following orders were created for panel order CBC & Differential.  Procedure                               Abnormality         Status                     ---------                               -----------         ------                     CBC Auto Differential[[id number]]        Abnormal            Final result                 Please view results for these tests on the individual orders.    CBC Auto Differential [[id number]]  (Abnormal) Collected: 09/29/20 1949    Specimen: Blood Updated: 09/29/20 2001     WBC 8.70 10*3/mm3      RBC 5.09 10*6/mm3      Hemoglobin 13.3 g/dL      Hematocrit 41.2 %      MCV 80.9 fL      MCH 26.1 pg      MCHC 32.3 g/dL      RDW 14.6 %      RDW-SD 43.4 fl      MPV 10.3 fL      Platelets  295 10*3/mm3      Neutrophil % 73.3 %      Lymphocyte % 14.9 %      Monocyte % 9.8 %      Eosinophil % 0.8 %      Basophil % 0.7 %      Immature Grans % 0.5 %      Neutrophils, Absolute 6.38 10*3/mm3      Lymphocytes, Absolute 1.30 10*3/mm3      Monocytes, Absolute 0.85 10*3/mm3      Eosinophils, Absolute 0.07 10*3/mm3      Basophils, Absolute 0.06 10*3/mm3      Immature Grans, Absolute 0.04 10*3/mm3      nRBC 0.0 /100 WBC         Imaging Results (Last 72 Hours)     Procedure Component Value Units Date/Time    US Venous Doppler Lower Extremity Bilateral (duplex) [[id number]] Collected: 09/30/20 1425     Updated: 09/30/20 1428    Narrative:      History: PE       Impression:      Impression: There is no evidence of deep venous thrombosis or  superficial thrombophlebitis of right or left lower extremities.     Comments: Bilateral lower extremity venous duplex exam was performed  using color Doppler flow, Doppler waveform analysis, and grayscale  imaging, with and without compression. There is no evidence of deep  venous thrombosis in the common femoral, superficial femoral, popliteal,  peroneal, anterior tibial, and posterior tibial veins bilaterally. No  thrombus is identified in the saphenofemoral junctions and greater  saphenous veins bilaterally.         This report was finalized on 09/30/2020 14:25 by Dr. Olayinka Monterroso MD.    US Carotid Bilateral [[id number]] Collected: 09/30/20 1416     Updated: 09/30/20 1420    Narrative:      History: Carotid occlusive disease       Impression:      Impression:  1. There is less than 50% stenosis of the right internal carotid artery.  2. There is 50-69% stenosis of the left internal carotid artery.  3. Antegrade flow is demonstrated in bilateral vertebral arteries.     Comments: Bilateral carotid vertebral arterial duplex scan was  performed.     Grayscale imaging shows intimal thickening and calcified elements at the  carotid bifurcation. The right internal carotid artery  peak systolic  velocity is 108 cm/sec. The end-diastolic velocity is 29.9 cm/sec. The  right ICA/CCA ratio is approximately 1.14 . These findings correlate  with less than 50% stenosis of the right internal carotid artery.     Grayscale imaging shows intimal thickening and calcified elements at the  carotid bifurcation. The left internal carotid artery peak systolic  velocity is 273 cm/sec. The end-diastolic velocity is 37.4 cm/sec. The  left ICA/CCA ratio is approximately 2.29 . These findings correlate with  50-69% stenosis of the left internal carotid artery.     Antegrade flow is demonstrated in bilateral vertebral arteries.  There is greater than 50% stenosis of the left external carotid artery.  This report was finalized on 09/30/2020 14:17 by Dr. Olayinka Monterroso MD.    CT Head Without Contrast [[id number]] Collected: 09/29/20 2158     Updated: 09/29/20 2244    Narrative:      CT HEAD WO CONTRAST- 9/29/2020 9:42 PM CDT     HISTORY: sudden syncope followed by [facility]      COMPARISON: None     DOSE LENGTH PRODUCT: 513 mGy cm. Automated exposure control was also  utilized to decrease patient radiation dose.     TECHNIQUE: Axial images of brain obtained without contrast.     FINDINGS:  There is mild cerebral volume loss. 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 Mild chronic  small vessel ischemic changes. Physiologic calcification left basal  ganglia. No intracranial hemorrhage or mass effect. No convincing acute  signs of ischemia. No extra-axial hematoma or subarachnoid hemorrhage.     Mucosal thickening and air-fluid level left sphenoid sinus. Correlation  for left sphenoid sinusitis recommended. Partial opacification inferior  left mastoid air cells. No depressed skull fracture. Bony calvarium  appears intact.       Impression:      1.No acute intracranial process. Old ischemic changes/lacunar type  infarct along the frontal horn of the left lateral ventricle.  Mild  chronic small vessel ischemia and mild cerebral volume loss.   2. Left sphenoid sinusitis.   This report was finalized on 09/29/2020 22:00 by Dr. Afia Renner MD.    CT Angiogram Chest [[id number]] Collected: 09/29/20 2201     Updated: 09/29/20 2244    Narrative:      CT ANGIOGRAM CHEST- 9/29/2020 9:42 PM CDT     HISTORY: Altered mental status, syncope      COMPARISON: None     DOSE LENGTH PRODUCT: 72 mGy cm. Automated exposure control was also  utilized to decrease patient radiation dose.     TECHNIQUE: Axial images the chest are obtained following IV contrast.  2-D and maximal intensity projection images reconstructed and reviewed.     FINDINGS:  There are subsegmental right middle lobe pulmonary emboli. No  central pulmonary emboli present. RV:LV ratio measures 1.3, however is  likely falsely elevated due to left ventricular contraction and/or  ventricular hypertrophy. Mild thoracic aortic calcification with  moderate coronary calcification. No pericardial or pleural effusions.  Calcified mediastinal and right hilar lymph nodes.     Images the upper abdomen demonstrate no suspicious adrenal nodules.     No pulmonary consolidation. 5 mm subpleural left lower lobe pulmonary  nodule, nonspecific. Calcified granuloma right lower lobe. Dependent  atelectasis. No pneumothorax.     Degenerative change of the thoracic spine.       Impression:      1. Subsegmental right middle lobe pulmonary emboli. No additional  pulmonary emboli identified. No evidence of right ventricular heart  strain. Findings discussed with Dr. Irvin in the ER at 10:13 PM on  9/29/2020.  2. 5 mm nonspecific subpleural left lower lobe nodule. Calcified  granuloma right lower lobe. Follow-up CT chest in 12 months could be  performed to document stability of the left lower lobe nodule if no  outside studies to document long-term stability.  This report was finalized on 09/29/2020 22:13 by Dr. Afia Renner MD.    XR Chest 1 View [[id number]]  "Collected: 09/29/20 2101     Updated: 09/29/20 2105    Narrative:      HISTORY: Syncope, altered mental status     CXR: Frontal view the chest obtained     COMPARISON: 4/1/2013     FINDINGS: Lungs are hyperinflated. No acute consolidation. Calcified  right hilar lymph nodes. Cardiomediastinal contours unremarkable. No  pleural effusion or pneumothorax. Left hand projects over the left  costophrenic sulcus. Mild degenerative change of the thoracic spine.       Impression:      1. Hyperinflated lungs with no convincing acute pulmonary process.  This report was finalized on 09/29/2020 21:01 by Dr. Afia Renner MD.        Chief Complaint on Day of Discharge: Overall, patient reports feeling well.  She is neurologically back at baseline per her  Bill.  She denies shortness of breath, chest pain or pressure prior.  She feels ready to return home.    Hospital Course:  The patient is a 86 y.o. female who presented to [facility] with altered mental status.  She has a past medical history significant for hypothyroidism, hyperlipidemia, osteoporosis, and non-hodgkin's lymphoma.  Her  Bill provided the history of present illness as the patient does not recall recent events.  The  reports that she has had progressively worsening short-term memory and was recently placed on Namenda by her primary care provider.  Otherwise, patient was in her usual state of health until the day of admit.  She had just finished eating dinner with her  when she suddenly slumped over.  He reports that she did not lose consciousness but was very lethargic and difficult to arouse.  EMS was activated.  Her  tells me that she had a similar occurence in February 2020 and it was noted that her blood pressure was low during that time.  She has not had difficulty with her blood pressure since.  She was recently seen by her primary care provider Dr. Hebert on 9/16/2020 and was told that \"everything was okay\" " "and labs were told to be \"okay.\"  Upon evaluation in the emergency department, CT of the head shows chronic microvascular changes and old ischemic changes/lacunar type infarcts along the frontal horn of the left lateral ventricle.  Also mild cerebral volume loss.  CT scan of the chest shows a subsegmental right middle lobe pulmonary embolism without any additional pulmonary emboli identified.  No evidence of right ventricular heart strain.  She was admitted to the hospitalist service for further evaluation and management.    She was started on full anticoagulation with Lovenox.  She has been transitioned to Eliquis.  She does not have a history of bleeds.  Serial troponins negative.  BNP normal.  CK 69.  Venous Doppler lower extremities negative for thrombi.  Transthoracic echocardiogram today preliminary result showed ejection fraction of 65% and RVSP of 43.  She has remained normal sinus rhythm on continuous telemetry.  Orthostatic vitals were obtained and were negative.  Carotid ultrasound showed less than 50% stenosis of the right internal carotid artery, 50 to 69% stenosis left internal carotid artery, antegrade flow.  Hemoglobin A1c 5.8%.  LDL 83.  She is on Lipitor on outpatient basis.  Neurologically, she is back to baseline.  She is alert and oriented to person, place, and situation.  She is disoriented to time and does not recall recent events leading up to hospitalization.  She denies dizziness, syncope or near syncope.  Blood cultures with no growth thus far.  She has remained afebrile and no leukocytosis.  Lactic acidosis -2.9 on admission with reflex 0.8.  Chest x-ray negative for acute pulmonary process.  Urinalysis unremarkable.  She has a history of hypothyroidism on Synthroid.  TSH - 0.065 and free T4 -1.77.  She will need repeat thyroid function tests in 4 to 6 weeks per her primary care provider.  CT angiogram of the chest showed 5 mm nonspecific subpleural left lower lobe nodule. Calcified " "granuloma right lower lobe. Follow-up CT chest in 12 months is recommended per her primary care provider.  Her blood pressure has been elevated during hospitalization.  She will be started on low-dose Toprol-XL.  I have instructed her  [place] to check her blood pressure at home and keep a log of readings to bring to follow-up appointment with primary care provider.  Overall, patient reports feeling well.  She has denied shortness of breath, chest pain or pressure.  She is ambulating with minimal assistance.  She is appropriate for discharge home today.  She is to follow-up with her primary care provider Dr. Hebert within 1 week.    Condition on Discharge:  Medically stable.    Physical Exam on Discharge:  /87 (BP Location: Right arm, Patient Position: Sitting) Comment: will notify the nurse  Pulse 72   Temp 98.5 °F (36.9 °C) (Oral)   Resp 16   Ht 165.1 cm (65\")   Wt 60.6 kg (133 lb 9.6 oz)   SpO2 100%   BMI 22.23 kg/m²   Physical Exam  Constitutional:       Appearance: She is well-developed.      Comments: Chronically ill-appearing.  Tolerating room air.   Jefferson at bedside.  Discussed with her nurse Bing DORADO:      Head: Normocephalic and atraumatic.   Eyes:      General: No scleral icterus.     Conjunctiva/sclera: Conjunctivae normal.      Pupils: Pupils are equal, round, and reactive to light.   Neck:      Musculoskeletal: Neck supple.      Vascular: No JVD.   Cardiovascular:      Rate and Rhythm: Normal rate and regular rhythm.      Heart sounds: Normal heart sounds. No murmur. No friction rub. No gallop.       Comments: Sinus 71-88  Pulmonary:      Effort: Pulmonary effort is normal.      Breath sounds: Normal breath sounds. No wheezing or rales.   Abdominal:      General: Bowel sounds are normal. There is no distension.      Palpations: Abdomen is soft. There is no mass.      Tenderness: There is no abdominal tenderness. There is no guarding or rebound.   Musculoskeletal: Normal range " of motion.   Lymphadenopathy:      Cervical: No cervical adenopathy.   Skin:     General: Skin is warm and dry.   Neurological:      Mental Status: She is alert and oriented to person and place.      Cranial Nerves: No cranial nerve deficit.   Psychiatric:         Behavior: Behavior normal.     Discharge Disposition:    Home    Discharge Medications:     Discharge Medications      New Medications      Instructions Start Date   Eliquis DVT/PE Starter Pack tablet   5 mg, Oral, Take As Directed      metoprolol succinate XL 25 MG 24 hr tablet  Commonly known as: TOPROL-XL   12.5 mg, Oral, Every 24 Hours Scheduled   Start Date: October 2, 2020        Continue These Medications      Instructions Start Date   levothyroxine 100 MCG tablet  Commonly known as: SYNTHROID, LEVOTHROID   100 mcg, Oral, Daily      memantine 5 MG tablet  Commonly known as: NAMENDA   5 mg, Oral, 2 Times Daily      Mevacor 40 MG tablet  Generic drug: lovastatin   40 mg, Oral, Nightly         Stop These Medications    meloxicam 7.5 MG tablet  Commonly known as: MOBIC          Discharge Diet:   Diet Instructions     Diet: Regular, Cardiac      Discharge Diet:  Regular  Cardiac           Activity at Discharge:   Activity Instructions     Activity as Tolerated          Discharge Care Plan/Instructions:   1.  Follow-up with her primary care provider Dr. Hebert within 1 week  2.  Check blood pressure at home and keep a log of readings to bring to follow-up apointment with primary care provider.  3.  CT angiogram of the chest showed 5 mm nonspecific subpleural left lower lobe nodule. Calcified granuloma right lower lobe. Follow-up CT chest in 12 months is recommended per her primary care provider.   4.  She will need repeat thyroid function tests in 4 to 6 weeks per her primary care provider.    Test Results Pending at Discharge: Blood cultures with no growth thus far, will follow until completion.    Electronically signed by EDGARDO Edge, 10/01/20,  15:07 CDT.    Time: 35 minutes.    .I personally evaluated and examined the patient in conjunction with EDGARDO Chairez and agree with the assessment, treatment plan, and disposition of the patient as recorded by her. My history, exam, and further recommendations are: I have reviewed and agree with the plans.KT.      Electronically signed by Kayden Clemente MD, 10/1/2020, 17:24 CDT.

## 2020-10-02 ENCOUNTER — READMISSION MANAGEMENT (OUTPATIENT)
Dept: CALL CENTER | Facility: HOSPITAL | Age: 85
End: 2020-10-02

## 2020-10-02 NOTE — PAYOR COMM NOTE
"AR HOME 10-1-20  [id number]    Behzad Katz (86 y.o. Female)     Date of Birth Social Security Number Address Home Phone MRN    [id number]  [id number] [facility] [address] [phone] [id number]    [employer] Mar[employer] Status          M[employer]onism        Admission Date Admission Type Admitting Provider Attending Provider Department, Room/Bed    9/29/20 Emergency Kayden Clemente MD  [facility] 3A, 325/1    Discharge Date Discharge Disposition Discharge Destination        10/1/2020 Home or Self Care              Attending Provider: (none)   Allergies: Nisoldipine Er    Isolation: None   Infection: None   Code Status: Prior    Ht: 165.1 cm (65\")   Wt: 60.6 kg (133 lb 9.6 oz)    Admission Cmt: None   Principal Problem: AMS (altered mental status) [R41.82]                 Active Insurance as of 9/29/2020     Primary Coverage     Payor Plan Insurance Group Employer/Plan Group    HUMANA MEDICARE REPLACEMENT HUMANA MEDICARE REPLACEMENT [id number]     Payor Plan Address Payor Plan Phone Number Payor Plan Fax Number Effective Dates    [address] [address] [phone]  1/1/2018 - None Entered    [facility] [id number]       Subscriber Name Subscriber Birth Date Member ID       BEHZAD KATZ 1934 [id number]                 Emergency Contacts      (Rel.) Home Phone Work Phone Mobile Phone    OLLIE KATZ (Spouse) [phone] -- [phone]               Discharge Summary      Kayden Clemente MD at 10/01/20 0754              [facility]  DISCHARGE SUMMARY       Date of Admission: 9/29/2020  Date of Discharge:  10/1/2020  Primary Care Physician: Benedicto Hebert MD    Presenting Problem/History of Present Illness:  AMS (altered mental status) [R41.82]  AMS (altered mental status) [R41.82]     Final Discharge Diagnoses:  Active Hospital Problems    Diagnosis   • **AMS (altered mental status)   • Vascular dementia without behavioral " disturbance (CMS/HCC)   • Near syncope   • Acute pulmonary embolism (CMS/HCC)   • Hypothyroidism   • Lactic acidosis     Consults: None.    Procedures Performed: None.    Pertinent Test Results:   Lab Results (last 72 hours)     Procedure Component Value Units Date/Time    Lactic Acid, Plasma [[id number]]  (Normal) Collected: 10/01/20 0639    Specimen: Blood Updated: 10/01/20 0703     Lactate 0.8 mmol/L     CBC (No Diff) [[id number]]  (Abnormal) Collected: 10/01/20 0639    Specimen: Blood Updated: 10/01/20 0651     WBC 6.74 10*3/mm3      RBC 4.08 10*6/mm3      Hemoglobin 10.5 g/dL      Hematocrit 33.2 %      MCV 81.4 fL      MCH 25.7 pg      MCHC 31.6 g/dL      RDW 14.9 %      RDW-SD 44.5 fl      MPV 10.4 fL      Platelets 223 10*3/mm3     Blood Culture - Blood, Arm, Right [[id number]] Collected: 09/29/20 2036    Specimen: Blood from Arm, Right Updated: 09/30/20 2100     Blood Culture No growth at 24 hours    Blood Culture - Blood, Arm, Right [[id number]] Collected: 09/29/20 1949    Specimen: Blood from Arm, Right Updated: 09/30/20 2015     Blood Culture No growth at 24 hours    Vitamin B12 [[id number]]  (Normal) Collected: 09/30/20 0557    Specimen: Blood Updated: 09/30/20 1229     Vitamin B-12 252 pg/mL     Narrative:      Results may be falsely increased if patient taking Biotin.      Folate [[id number]]  (Normal) Collected: 09/30/20 0557    Specimen: Blood Updated: 09/30/20 1229     Folate 6.80 ng/mL     Narrative:      Results may be falsely increased if patient taking Biotin.      TSH [[id number]]  (Abnormal) Collected: 09/30/20 0557    Specimen: Blood Updated: 09/30/20 0639     TSH 0.065 uIU/mL     T4, Free [[id number]]  (Abnormal) Collected: 09/30/20 0557    Specimen: Blood Updated: 09/30/20 0638     Free T4 1.77 ng/dL     Narrative:      Results may be falsely increased if patient taking Biotin.      Lipid Panel [[id number]]  (Abnormal) Collected: 09/30/20 0557    Specimen: Blood Updated: 09/30/20 0637     Total  Cholesterol 163 mg/dL      Triglycerides 50 mg/dL      HDL Cholesterol 70 mg/dL      LDL Cholesterol  83 mg/dL      VLDL Cholesterol 10 mg/dL      LDL/HDL Ratio 1.19    Narrative:      Cholesterol Reference Ranges  (U.S. Department of Health and Human Services ATP III Classifications)    Desirable          <200 mg/dL  Borderline High    200-239 mg/dL  High Risk          >240 mg/dL      Triglyceride Reference Ranges  (U.S. Department of Health and Human Services ATP III Classifications)    Normal           <150 mg/dL  Borderline High  150-199 mg/dL  High             200-499 mg/dL  Very High        >500 mg/dL    HDL Reference Ranges  (U.S. Department of Health and Human Services ATP III Classifcations)    Low     <40 mg/dl (major risk factor for CHD)  High    >60 mg/dl ('negative' risk factor for CHD)        LDL Reference Ranges  (U.S. Department of Health and Human Services ATP III Classifcations)    Optimal          <100 mg/dL  Near Optimal     100-129 mg/dL  Borderline High  130-159 mg/dL  High             160-189 mg/dL  Very High        >189 mg/dL    Sedimentation Rate [[id number]]  (Normal) Collected: 09/30/20 0557    Specimen: Blood Updated: 09/30/20 0633     Sed Rate 14 mm/hr     Comprehensive Metabolic Panel [[id number]]  (Abnormal) Collected: 09/30/20 0557    Specimen: Blood Updated: 09/30/20 0633     Glucose 119 mg/dL      BUN 20 mg/dL      Creatinine 0.82 mg/dL      Sodium 141 mmol/L      Potassium 4.1 mmol/L      Chloride 103 mmol/L      CO2 28.0 mmol/L      Calcium 9.4 mg/dL      Total Protein 6.8 g/dL      Albumin 3.80 g/dL      ALT (SGPT) 6 U/L      AST (SGOT) 14 U/L      Alkaline Phosphatase 50 U/L      Total Bilirubin 0.4 mg/dL      eGFR Non African Amer 66 mL/min/1.73      Globulin 3.0 gm/dL      A/G Ratio 1.3 g/dL      BUN/Creatinine Ratio 24.4     Anion Gap 10.0 mmol/L     Narrative:      GFR Normal >60  Chronic Kidney Disease <60  Kidney Failure <15      CK [[id number]]  (Normal) Collected: 09/30/20  0557    Specimen: Blood Updated: 09/30/20 0633     Creatine Kinase 69 U/L     Phosphorus [[id number]]  (Normal) Collected: 09/30/20 0557    Specimen: Blood Updated: 09/30/20 0630     Phosphorus 3.7 mg/dL     Troponin [[id number]]  (Normal) Collected: 09/30/20 0557    Specimen: Blood Updated: 09/30/20 0628     Troponin T <0.010 ng/mL     Narrative:      Troponin T Reference Range:  <= 0.03 ng/mL-   Negative for AMI  >0.03 ng/mL-     Abnormal for myocardial necrosis.  Clinicians would have to utilize clinical acumen, EKG, Troponin and serial changes to determine if it is an Acute Myocardial Infarction or myocardial injury due to an underlying chronic condition.       Results may be falsely decreased if patient taking Biotin.      BNP [[id number]]  (Normal) Collected: 09/30/20 0557    Specimen: Blood Updated: 09/30/20 0628     proBNP 750.1 pg/mL     Narrative:      Among patients with dyspnea, NT-proBNP is highly sensitive for the detection of acute congestive heart failure. In addition NT-proBNP of <300 pg/ml effectively rules out acute congestive heart failure with 99% negative predictive value.    Results may be falsely decreased if patient taking Biotin.      CBC Auto Differential [[id number]]  (Abnormal) Collected: 09/30/20 0557    Specimen: Blood Updated: 09/30/20 0615     WBC 12.42 10*3/mm3      RBC 4.48 10*6/mm3      Hemoglobin 11.5 g/dL      Hematocrit 36.6 %      MCV 81.7 fL      MCH 25.7 pg      MCHC 31.4 g/dL      RDW 14.7 %      RDW-SD 44.2 fl      MPV 10.7 fL      Platelets 260 10*3/mm3      Neutrophil % 83.1 %      Lymphocyte % 8.1 %      Monocyte % 8.0 %      Eosinophil % 0.1 %      Basophil % 0.3 %      Immature Grans % 0.4 %      Neutrophils, Absolute 10.33 10*3/mm3      Lymphocytes, Absolute 1.00 10*3/mm3      Monocytes, Absolute 0.99 10*3/mm3      Eosinophils, Absolute 0.01 10*3/mm3      Basophils, Absolute 0.04 10*3/mm3      Immature Grans, Absolute 0.05 10*3/mm3      nRBC 0.0 /100 WBC     Hemoglobin  A1c [[id number]]  (Abnormal) Collected: 09/29/20 1949    Specimen: Blood Updated: 09/30/20 0415     Hemoglobin A1C 5.80 %     Narrative:      Hemoglobin A1C Ranges:    Increased Risk for Diabetes  5.7% to 6.4%  Diabetes                     >= 6.5%  Diabetic Goal                < 7.0%    Lactic Acid, Reflex [[id number]]  (Abnormal) Collected: 09/29/20 2358    Specimen: Blood Updated: 09/30/20 0020     Lactate 2.1 mmol/L     Urinalysis With Culture If Indicated - Urine, Catheter [[id number]]  (Abnormal) Collected: 09/29/20 2319    Specimen: Urine, Catheter Updated: 09/29/20 2336     Color, UA Yellow     Appearance, UA Clear     pH, UA 6.0     Specific Gravity, UA 1.027     Glucose, UA Negative     Ketones, UA Trace     Bilirubin, UA Negative     Blood, UA Negative     Protein, UA Negative     Leuk Esterase, UA Negative     Nitrite, UA Negative     Urobilinogen, UA 1.0 E.U./dL    Narrative:      Urine microscopic not indicated.    Lactic Acid, Reflex Timer (This will reflex a repeat order 3-3:15 hours after ordered.) [[id number]] Collected: 09/29/20 1949    Specimen: Blood Updated: 09/29/20 2330     Hold Tube Hold for add-ons.     Comment: Auto resulted.       COVID PRE-OP / PRE-PROCEDURE SCREENING ORDER (NO ISOLATION) - Swab, Nasal Cavity [[id number]]  (Normal) Collected: 09/29/20 2230    Specimen: Swab from Nasal Cavity Updated: 09/29/20 2259    Narrative:      The following orders were created for panel order COVID PRE-OP / PRE-PROCEDURE SCREENING ORDER (NO ISOLATION) - Swab, Nasal Cavity.  Procedure                               Abnormality         Status                     ---------                               -----------         ------                     COVID-19, ABBOTT IN-HOUS...[[id number]]  Normal              Final result                 Please view results for these tests on the individual orders.    COVID-19, ABBOTT IN-HOUSE,NP Swab (NO TRANSPORT MEDIA) 2 HR TAT - Swab, Nasal Cavity [[id number]]  (Normal)  Collected: 09/29/20 2230    Specimen: Swab from Nasal Cavity Updated: 09/29/20 2259     COVID19 Not Detected    Narrative:      Fact sheet for providers: https://www.fda.gov/media/914191/download     Fact sheet for patients: https://www.fda.gov/media/009542/download    Troponin [[id number]]  (Normal) Collected: 09/29/20 2201    Specimen: Blood Updated: 09/29/20 2223     Troponin T <0.010 ng/mL     Narrative:      Troponin T Reference Range:  <= 0.03 ng/mL-   Negative for AMI  >0.03 ng/mL-     Abnormal for myocardial necrosis.  Clinicians would have to utilize clinical acumen, EKG, Troponin and serial changes to determine if it is an Acute Myocardial Infarction or myocardial injury due to an underlying chronic condition.       Results may be falsely decreased if patient taking Biotin.      [place] Draw [[id number]] Collected: 09/29/20 1949    Specimen: Blood Updated: 09/29/20 2100    Narrative:      The following orders were created for panel order [place] Draw.  Procedure                               Abnormality         Status                     ---------                               -----------         ------                     Light Blue Top[[id number]]                                    Final result               Green Top (Gel)[[id number]]                                   Final result               Lavender Top[[id number]]                                      Final result               Red Top[[id number]]                                           Final result               [place] Blood Culture Bot...[[id number]]                      Final result               Gray Top - Ice[[id number]]                                    Final result                 Please view results for these tests on the individual orders.    [facility] Blood Culture Bottle Set [[id number]] Collected: 09/29/20 1949    Specimen: Blood from Arm, Right Updated: 09/29/20 2100     Extra Tube Hold for add-ons.     Comment: Auto resulted.       Light Blue Top  [[id number]] Collected: 09/29/20 1949    Specimen: Blood Updated: 09/29/20 2100     Extra Tube hold for add-on     Comment: Auto resulted       Lavender Top [[id number]] Collected: 09/29/20 1949    Specimen: Blood Updated: 09/29/20 2100     Extra Tube hold for add-on     Comment: Auto resulted       Red Top [[id number]] Collected: 09/29/20 1949    Specimen: Blood Updated: 09/29/20 2100     Extra Tube Hold for add-ons.     Comment: Auto resulted.       Green Top (Gel) [[id number]] Collected: 09/29/20 1949    Specimen: Blood Updated: 09/29/20 2100     Extra Tube Hold for add-ons.     Comment: Auto resulted.       Gray Top - Ice [[id number]] Collected: 09/29/20 1949    Specimen: Blood Updated: 09/29/20 2100     Extra Tube Hold for add-ons.     Comment: Auto resulted.       Protime-INR [[id number]]  (Normal) Collected: 09/29/20 2036    Specimen: Blood Updated: 09/29/20 2050     Protime 13.2 Seconds      INR 1.04    Comprehensive Metabolic Panel [[id number]]  (Abnormal) Collected: 09/29/20 1949    Specimen: Blood Updated: 09/29/20 2028     Glucose 150 mg/dL      BUN 21 mg/dL      Creatinine 0.99 mg/dL      Sodium 138 mmol/L      Potassium 3.6 mmol/L      Comment: Slight hemolysis detected by analyzer. Results may be affected.        Chloride 99 mmol/L      CO2 25.0 mmol/L      Calcium 10.2 mg/dL      Total Protein 7.9 g/dL      Albumin 4.40 g/dL      ALT (SGPT) 8 U/L      AST (SGOT) 17 U/L      Comment: Slight hemolysis detected by analyzer. Results may be affected.        Alkaline Phosphatase 58 U/L      Total Bilirubin 0.5 mg/dL      eGFR Non African Amer 53 mL/min/1.73      Globulin 3.5 gm/dL      A/G Ratio 1.3 g/dL      BUN/Creatinine Ratio 21.2     Anion Gap 14.0 mmol/L     Narrative:      GFR Normal >60  Chronic Kidney Disease <60  Kidney Failure <15      Lactic Acid, Plasma [[id number]]  (Abnormal) Collected: 09/29/20 1949    Specimen: Blood Updated: 09/29/20 2027     Lactate 2.9 mmol/L     Magnesium [[id number]]  (Normal)  Collected: 09/29/20 1949    Specimen: Blood Updated: 09/29/20 2021     Magnesium 2.0 mg/dL     Troponin [[id number]]  (Normal) Collected: 09/29/20 1949    Specimen: Blood Updated: 09/29/20 2020     Troponin T <0.010 ng/mL     Narrative:      Troponin T Reference Range:  <= 0.03 ng/mL-   Negative for AMI  >0.03 ng/mL-     Abnormal for myocardial necrosis.  Clinicians would have to utilize clinical acumen, EKG, Troponin and serial changes to determine if it is an Acute Myocardial Infarction or myocardial injury due to an underlying chronic condition.       Results may be falsely decreased if patient taking Biotin.      CBC & Differential [[id number]]  (Abnormal) Collected: 09/29/20 1949    Specimen: Blood Updated: 09/29/20 2001    Narrative:      The following orders were created for panel order CBC & Differential.  Procedure                               Abnormality         Status                     ---------                               -----------         ------                     CBC Auto Differential[[id number]]        Abnormal            Final result                 Please view results for these tests on the individual orders.    CBC Auto Differential [[id number]]  (Abnormal) Collected: 09/29/20 1949    Specimen: Blood Updated: 09/29/20 2001     WBC 8.70 10*3/mm3      RBC 5.09 10*6/mm3      Hemoglobin 13.3 g/dL      Hematocrit 41.2 %      MCV 80.9 fL      MCH 26.1 pg      MCHC 32.3 g/dL      RDW 14.6 %      RDW-SD 43.4 fl      MPV 10.3 fL      Platelets 295 10*3/mm3      Neutrophil % 73.3 %      Lymphocyte % 14.9 %      Monocyte % 9.8 %      Eosinophil % 0.8 %      Basophil % 0.7 %      Immature Grans % 0.5 %      Neutrophils, Absolute 6.38 10*3/mm3      Lymphocytes, Absolute 1.30 10*3/mm3      Monocytes, Absolute 0.85 10*3/mm3      Eosinophils, Absolute 0.07 10*3/mm3      Basophils, Absolute 0.06 10*3/mm3      Immature Grans, Absolute 0.04 10*3/mm3      nRBC 0.0 /100 WBC         Imaging Results (Last 72 Hours)       Procedure Component Value Units Date/Time    US Venous Doppler Lower Extremity Bilateral (duplex) [[id number]] Collected: 09/30/20 1425     Updated: 09/30/20 1428    Narrative:      History: PE       Impression:      Impression: There is no evidence of deep venous thrombosis or  superficial thrombophlebitis of right or left lower extremities.     Comments: Bilateral lower extremity venous duplex exam was performed  using color Doppler flow, Doppler waveform analysis, and grayscale  imaging, with and without compression. There is no evidence of deep  venous thrombosis in the common femoral, superficial femoral, popliteal,  peroneal, anterior tibial, and posterior tibial veins bilaterally. No  thrombus is identified in the saphenofemoral junctions and greater  saphenous veins bilaterally.         This report was finalized on 09/30/2020 14:25 by Dr. Olayinka Monterroso MD.    US Carotid Bilateral [[id number]] Collected: 09/30/20 1416     Updated: 09/30/20 1420    Narrative:      History: Carotid occlusive disease       Impression:      Impression:  1. There is less than 50% stenosis of the right internal carotid artery.  2. There is 50-69% stenosis of the left internal carotid artery.  3. Antegrade flow is demonstrated in bilateral vertebral arteries.     Comments: Bilateral carotid vertebral arterial duplex scan was  performed.     Grayscale imaging shows intimal thickening and calcified elements at the  carotid bifurcation. The right internal carotid artery peak systolic  velocity is 108 cm/sec. The end-diastolic velocity is 29.9 cm/sec. The  right ICA/CCA ratio is approximately 1.14 . These findings correlate  with less than 50% stenosis of the right internal carotid artery.     Grayscale imaging shows intimal thickening and calcified elements at the  carotid bifurcation. The left internal carotid artery peak systolic  velocity is 273 cm/sec. The end-diastolic velocity is 37.4 cm/sec. The  left ICA/CCA ratio is  approximately 2.29 . These findings correlate with  50-69% stenosis of the left internal carotid artery.     Antegrade flow is demonstrated in bilateral vertebral arteries.  There is greater than 50% stenosis of the left external carotid artery.  This report was finalized on 09/30/2020 14:17 by Dr. Olayinka Monterroso MD.    CT Head Without Contrast [[id number]] Collected: 09/29/20 2158     Updated: 09/29/20 2244    Narrative:      CT HEAD WO CONTRAST- 9/29/2020 9:42 PM CDT     HISTORY: sudden syncope followed by [facility]      COMPARISON: None     DOSE LENGTH PRODUCT: 513 mGy cm. Automated exposure control was also  utilized to decrease patient radiation dose.     TECHNIQUE: Axial images of brain obtained without contrast.     FINDINGS:  There is mild cerebral volume loss. 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 Mild chronic  small vessel ischemic changes. Physiologic calcification left basal  ganglia. No intracranial hemorrhage or mass effect. No convincing acute  signs of ischemia. No extra-axial hematoma or subarachnoid hemorrhage.     Mucosal thickening and air-fluid level left sphenoid sinus. Correlation  for left sphenoid sinusitis recommended. Partial opacification inferior  left mastoid air cells. No depressed skull fracture. Bony calvarium  appears intact.       Impression:      1.No acute intracranial process. Old ischemic changes/lacunar type  infarct along the frontal horn of the left lateral ventricle. Mild  chronic small vessel ischemia and mild cerebral volume loss.   2. Left sphenoid sinusitis.   This report was finalized on 09/29/2020 22:00 by Dr. Afia Renner MD.    CT Angiogram Chest [[id number]] Collected: 09/29/20 2201     Updated: 09/29/20 2244    Narrative:      CT ANGIOGRAM CHEST- 9/29/2020 9:42 PM CDT     HISTORY: Altered mental status, syncope      COMPARISON: None     DOSE LENGTH PRODUCT: 72 mGy cm. Automated exposure control was also  utilized to  decrease patient radiation dose.     TECHNIQUE: Axial images the chest are obtained following IV contrast.  2-D and maximal intensity projection images reconstructed and reviewed.     FINDINGS:  There are subsegmental right middle lobe pulmonary emboli. No  central pulmonary emboli present. RV:LV ratio measures 1.3, however is  likely falsely elevated due to left ventricular contraction and/or  ventricular hypertrophy. Mild thoracic aortic calcification with  moderate coronary calcification. No pericardial or pleural effusions.  Calcified mediastinal and right hilar lymph nodes.     Images the upper abdomen demonstrate no suspicious adrenal nodules.     No pulmonary consolidation. 5 mm subpleural left lower lobe pulmonary  nodule, nonspecific. Calcified granuloma right lower lobe. Dependent  atelectasis. No pneumothorax.     Degenerative change of the thoracic spine.       Impression:      1. Subsegmental right middle lobe pulmonary emboli. No additional  pulmonary emboli identified. No evidence of right ventricular heart  strain. Findings discussed with Dr. Irvin in the ER at 10:13 PM on  9/29/2020.  2. 5 mm nonspecific subpleural left lower lobe nodule. Calcified  granuloma right lower lobe. Follow-up CT chest in 12 months could be  performed to document stability of the left lower lobe nodule if no  outside studies to document long-term stability.  This report was finalized on 09/29/2020 22:13 by Dr. Afia Renner MD.    XR Chest 1 View [[id number]] Collected: 09/29/20 2101     Updated: 09/29/20 2105    Narrative:      HISTORY: Syncope, altered mental status     CXR: Frontal view the chest obtained     COMPARISON: 4/1/2013     FINDINGS: Lungs are hyperinflated. No acute consolidation. Calcified  right hilar lymph nodes. Cardiomediastinal contours unremarkable. No  pleural effusion or pneumothorax. Left hand projects over the left  costophrenic sulcus. Mild degenerative change of the thoracic spine.        "Impression:      1. Hyperinflated lungs with no convincing acute pulmonary process.  This report was finalized on 09/29/2020 21:01 by Dr. Afia Renner MD.        Chief Complaint on Day of Discharge: Overall, patient reports feeling well.  She is neurologically back at baseline per her  Bill.  She denies shortness of breath, chest pain or pressure prior.  She feels ready to return home.    Hospital Course:  The patient is a 86 y.o. female who presented to [facility] with altered mental status.  She has a past medical history significant for hypothyroidism, hyperlipidemia, osteoporosis, and non-hodgkin's lymphoma.  Her  Bill provided the history of present illness as the patient does not recall recent events.  The  reports that she has had progressively worsening short-term memory and was recently placed on Namenda by her primary care provider.  Otherwise, patient was in her usual state of health until the day of admit.  She had just finished eating dinner with her  when she suddenly slumped over.  He reports that she did not lose consciousness but was very lethargic and difficult to arouse.  EMS was activated.  Her  tells me that she had a similar occurence in February 2020 and it was noted that her blood pressure was low during that time.  She has not had difficulty with her blood pressure since.  She was recently seen by her primary care provider Dr. Hebert on 9/16/2020 and was told that \"everything was okay\" and labs were told to be \"okay.\"  Upon evaluation in the emergency department, CT of the head shows chronic microvascular changes and old ischemic changes/lacunar type infarcts along the frontal horn of the left lateral ventricle.  Also mild cerebral volume loss.  CT scan of the chest shows a subsegmental right middle lobe pulmonary embolism without any additional pulmonary emboli identified.  No evidence of right ventricular heart strain.  She was admitted to " the hospitalist service for further evaluation and management.    She was started on full anticoagulation with Lovenox.  She has been transitioned to Eliquis.  She does not have a history of bleeds.  Serial troponins negative.  BNP normal.  CK 69.  Venous Doppler lower extremities negative for thrombi.  Transthoracic echocardiogram today preliminary result showed ejection fraction of 65% and RVSP of 43.  She has remained normal sinus rhythm on continuous telemetry.  Orthostatic vitals were obtained and were negative.  Carotid ultrasound showed less than 50% stenosis of the right internal carotid artery, 50 to 69% stenosis left internal carotid artery, antegrade flow.  Hemoglobin A1c 5.8%.  LDL 83.  She is on Lipitor on outpatient basis.  Neurologically, she is back to baseline.  She is alert and oriented to person, place, and situation.  She is disoriented to time and does not recall recent events leading up to hospitalization.  She denies dizziness, syncope or near syncope.  Blood cultures with no growth thus far.  She has remained afebrile and no leukocytosis.  Lactic acidosis -2.9 on admission with reflex 0.8.  Chest x-ray negative for acute pulmonary process.  Urinalysis unremarkable.  She has a history of hypothyroidism on Synthroid.  TSH - 0.065 and free T4 -1.77.  She will need repeat thyroid function tests in 4 to 6 weeks per her primary care provider.  CT angiogram of the chest showed 5 mm nonspecific subpleural left lower lobe nodule. Calcified granuloma right lower lobe. Follow-up CT chest in 12 months is recommended per her primary care provider.  Her blood pressure has been elevated during hospitalization.  She will be started on low-dose Toprol-XL.  I have instructed her  Bill to check her blood pressure at home and keep a log of readings to bring to follow-up appointment with primary care provider.  Overall, patient reports feeling well.  She has denied shortness of breath, chest pain or  "pressure.  She is ambulating with minimal assistance.  She is appropriate for discharge home today.  She is to follow-up with her primary care provider Dr. Hebert within 1 week.    Condition on Discharge:  Medically stable.    Physical Exam on Discharge:  /87 (BP Location: Right arm, Patient Position: Sitting) Comment: will notify the nurse  Pulse 72   Temp 98.5 °F (36.9 °C) (Oral)   Resp 16   Ht 165.1 cm (65\")   Wt 60.6 kg (133 lb 9.6 oz)   SpO2 100%   BMI 22.23 kg/m²   Physical Exam  Constitutional:       Appearance: She is well-developed.      Comments: Chronically ill-appearing.  Tolerating room air.   Bill at bedside.  Discussed with her nurse Bing DORADO:      Head: Normocephalic and atraumatic.   Eyes:      General: No scleral icterus.     Conjunctiva/sclera: Conjunctivae normal.      Pupils: Pupils are equal, round, and reactive to light.   Neck:      Musculoskeletal: Neck supple.      Vascular: No JVD.   Cardiovascular:      Rate and Rhythm: Normal rate and regular rhythm.      Heart sounds: Normal heart sounds. No murmur. No friction rub. No gallop.       Comments: Sinus 71-88  Pulmonary:      Effort: Pulmonary effort is normal.      Breath sounds: Normal breath sounds. No wheezing or rales.   Abdominal:      General: Bowel sounds are normal. There is no distension.      Palpations: Abdomen is soft. There is no mass.      Tenderness: There is no abdominal tenderness. There is no guarding or rebound.   Musculoskeletal: Normal range of motion.   Lymphadenopathy:      Cervical: No cervical adenopathy.   Skin:     General: Skin is warm and dry.   Neurological:      Mental Status: She is alert and oriented to person and place.      Cranial Nerves: No cranial nerve deficit.   Psychiatric:         Behavior: Behavior normal.     Discharge Disposition:    Home    Discharge Medications:     Discharge Medications      New Medications      Instructions Start Date   Eliquis DVT/PE Starter Pack " tablet   5 mg, Oral, Take As Directed      metoprolol succinate XL 25 MG 24 hr tablet  Commonly known as: TOPROL-XL   12.5 mg, Oral, Every 24 Hours Scheduled   Start Date: October 2, 2020        Continue These Medications      Instructions Start Date   levothyroxine 100 MCG tablet  Commonly known as: SYNTHROID, LEVOTHROID   100 mcg, Oral, Daily      memantine 5 MG tablet  Commonly known as: NAMENDA   5 mg, Oral, 2 Times Daily      Mevacor 40 MG tablet  Generic drug: lovastatin   40 mg, Oral, Nightly         Stop These Medications    meloxicam 7.5 MG tablet  Commonly known as: MOBIC          Discharge Diet:   Diet Instructions     Diet: Regular, Cardiac      Discharge Diet:  Regular  Cardiac           Activity at Discharge:   Activity Instructions     Activity as Tolerated          Discharge Care Plan/Instructions:   1.  Follow-up with her primary care provider Dr. Hebert within 1 week  2.  Check blood pressure at home and keep a log of readings to bring to follow-up apointment with primary care provider.  3.  CT angiogram of the chest showed 5 mm nonspecific subpleural left lower lobe nodule. Calcified granuloma right lower lobe. Follow-up CT chest in 12 months is recommended per her primary care provider.   4.  She will need repeat thyroid function tests in 4 to 6 weeks per her primary care provider.    Test Results Pending at Discharge: Blood cultures with no growth thus far, will follow until completion.    Electronically signed by EDGARDO Edge, 10/01/20, 15:07 CDT.    Time: 35 minutes.    .I personally evaluated and examined the patient in conjunction with EDGARDO Chairez and agree with the assessment, treatment plan, and disposition of the patient as recorded by her. My history, exam, and further recommendations are: I have reviewed and agree with the plans.KT.      Electronically signed by Kayden Clemente MD, 10/1/2020, 17:24 CDT.      Electronically signed by Kayden Clemente MD at 10/01/20 3246

## 2020-10-02 NOTE — OUTREACH NOTE
Prep Survey      Responses   [facility] facility patient discharged from?  [place]   Is LACE score < 7 ?  No   Eligibility  Readm Mgmt   Discharge diagnosis  AMS, acute PE, vascular dementia   Does the patient have one of the following disease processes/diagnoses(primary or secondary)?  Other   Does the patient have Home health ordered?  No   Is there a DME ordered?  No   Comments regarding appointments  needs f/u appt with PCP   Medication alerts for this patient  started on eliquis   Prep survey completed?  Yes          Susan Garcia RN

## 2020-10-02 NOTE — THERAPY DISCHARGE NOTE
Acute Care - Physical Therapy Discharge Summary  [facility]       Patient Name: Dina Michaels  : 1934  MRN: [id number]    Today's Date: 10/2/2020                 Admit Date: 2020      PT Recommendation and Plan    Visit Dx:    ICD-10-CM ICD-9-CM   1. Syncope, unspecified syncope type  R55 780.2   2. Altered mental status, unspecified altered mental status type  R41.82 780.97   3. Single subsegmental pulmonary embolism without acute cor pulmonale (CMS/HCC)  I26.93 415.19   4. Gait abnormality  R26.9 781.2               Rehab Goal Summary     Row Name 10/02/20 1347             Bed Mobility Goal 1 (PT)    [place] Level/Cues Needed (Bed Mobility Goal 1, PT)  independent  -AB      Time Frame (Bed Mobility Goal 1, PT)  long term goal (LTG);10 days  -AB      Progress/Outcomes (Bed Mobility Goal 1, PT)  goal not met  -AB         Transfer Goal 1 (PT)    Activity/Assistive Device (Transfer Goal 1, PT)  sit-to-stand/stand-to-sit;bed-to-chair/chair-to-bed  -AB      [place] Level/Cues Needed (Transfer Goal 1, PT)  independent  -AB      Time Frame (Transfer Goal 1, PT)  long term goal (LTG);10 days  -AB      Progress/Outcome (Transfer Goal 1, PT)  goal not met  -AB         Gait Training Goal 1 (PT)    Activity/Assistive Device (Gait Training Goal 1, PT)  gait (walking locomotion);decrease fall risk;diminish gait deviation;improve balance and speed;increase endurance/gait distance;other (see comments) w/ consistent BELEM w/out veering from straight path  -AB      [place] Level (Gait Training Goal 1, PT)  standby assist  -AB      Distance (Gait Training Goal 1, PT)  250 ft  -AB      Time Frame (Gait Training Goal 1, PT)  long term goal (LTG);10 days  -AB      Progress/Outcome (Gait Training Goal 1, PT)  goal not met  -AB         Stairs Goal 1 (PT)    Activity/Assistive Device (Stairs Goal 1, PT)  ascending stairs;descending stairs;using handrail, left;using handrail, right;step-over step;decrease fall  risk;improve balance and speed  -AB      [place] Level/Cues Needed (Stairs Goal 1, PT)  standby assist  -AB      Number of Stairs (Stairs Goal 1, PT)  5  -AB      Time Frame (Stairs Goal 1, PT)  long term goal (LTG);10 days  -AB      Progress/Outcome (Stairs Goal 1, PT)  goal not met  -AB         Patient Education Goal (PT)    Activity (Patient Education Goal, PT)  HEP for LE strengthening and balance  -AB      [place]/Cues/Accuracy (Memory Goal 2, PT)  demonstrates adequately;verbalizes understanding;other (see comments) w/ caregiver assist  -AB      Time Frame (Patient Education Goal, PT)  long term goal (LTG);10 days  -AB      Progress/Outcome (Patient Education Goal, PT)  goal not met  -AB        User Key  (r) = Recorded By, (t) = Taken By, (c) = Cosigned By    Initials Name Provider Type Discipline    Jodi Gonzales PTA Physical Therapy Assistant PT              PT Discharge Summary  Anticipated Discharge Disposition (PT): home with assist  Reason for Discharge: Discharge from facility  Outcomes Achieved: Refer to plan of care for updates on goals achieved, Discharge from facility occurred on same date as evluation  Discharge Destination: Home with assist      Jodi Armstrong PTA   10/2/2020

## 2020-10-04 LAB
BACTERIA SPEC AEROBE CULT: NORMAL
BACTERIA SPEC AEROBE CULT: NORMAL

## 2020-10-06 ENCOUNTER — READMISSION MANAGEMENT (OUTPATIENT)
Dept: CALL CENTER | Facility: HOSPITAL | Age: 85
End: 2020-10-06

## 2020-10-06 NOTE — OUTREACH NOTE
Medical Week 1 Survey      Responses   [facility] patient discharged from?  [place]   Does the patient have one of the following disease processes/diagnoses(primary or secondary)?  Other   Week 1 attempt successful?  Yes   Call start time  1709   Rescheduled  Rescheduled-pt requested   Discharge diagnosis  AMS, acute PE, vascular dementia          Radha Muniz RN

## 2020-10-08 ENCOUNTER — READMISSION MANAGEMENT (OUTPATIENT)
Dept: CALL CENTER | Facility: HOSPITAL | Age: 85
End: 2020-10-08

## 2020-10-08 NOTE — OUTREACH NOTE
Medical Week 1 Survey      Responses   [facility] patient discharged from?  [place]   Does the patient have one of the following disease processes/diagnoses(primary or secondary)?  Other   Week 1 attempt successful?  No   Unsuccessful attempts  Attempt 2          Angelique Barrios RN

## 2020-10-29 ENCOUNTER — NURSE TRIAGE (OUTPATIENT)
Dept: CALL CENTER | Facility: HOSPITAL | Age: 85
End: 2020-10-29

## 2020-10-29 NOTE — TELEPHONE ENCOUNTER
"    Reason for Disposition  • [1] Caller requesting NON-URGENT health information AND [2] PCP's office is the best resource    Additional Information  • Negative: [1] Caller is not with the adult (patient) AND [2] reporting urgent symptoms  • Negative: Lab result questions  • Negative: Medication questions  • Negative: Caller can't be reached by phone  • Negative: Caller has already spoken to PCP or another triager  • Negative: RN needs further essential information from caller in order to complete triage  • Negative: Requesting regular office appointment    Answer Assessment - Initial Assessment Questions  1. REASON FOR CALL or QUESTION: \"What is your reason for calling today?\" or \"How can I best help you?\" or \"What question do you have that I can help answer?\"      Calling that wife is out of medication that was prescribed at hospital stay. Explained that this would need to be refilled by PCP.    Protocols used: INFORMATION ONLY CALL - NO TRIAGE-ADULT-      "

## 2021-04-07 ENCOUNTER — TRANSCRIBE ORDERS (OUTPATIENT)
Dept: ADMINISTRATIVE | Facility: HOSPITAL | Age: 86
End: 2021-04-07

## 2021-04-07 ENCOUNTER — HOSPITAL ENCOUNTER (OUTPATIENT)
Dept: GENERAL RADIOLOGY | Facility: HOSPITAL | Age: 86
Discharge: HOME | End: 2021-04-07
Admitting: PHYSICIAN ASSISTANT

## 2021-04-07 DIAGNOSIS — M25.572 PAIN IN LEFT ANKLE AND JOINTS OF LEFT FOOT: ICD-10-CM

## 2021-04-07 DIAGNOSIS — M25.572 PAIN IN LEFT ANKLE AND JOINTS OF LEFT FOOT: Primary | ICD-10-CM

## 2021-04-07 PROCEDURE — 73610 X-RAY EXAM OF ANKLE: CPT

## 2022-12-05 ENCOUNTER — HOSPITAL ENCOUNTER (OUTPATIENT)
Age: 87
Setting detail: OBSERVATION
Discharge: LEFT BEFORE DISCHARGE | End: 2022-12-05
Attending: EMERGENCY MEDICINE | Admitting: HOSPITALIST
Payer: MEDICARE

## 2022-12-05 ENCOUNTER — APPOINTMENT (OUTPATIENT)
Dept: CT IMAGING | Age: 87
End: 2022-12-05
Payer: MEDICARE

## 2022-12-05 ENCOUNTER — APPOINTMENT (OUTPATIENT)
Dept: GENERAL RADIOLOGY | Age: 87
End: 2022-12-05
Payer: MEDICARE

## 2022-12-05 VITALS
OXYGEN SATURATION: 100 % | HEART RATE: 70 BPM | BODY MASS INDEX: 25.82 KG/M2 | DIASTOLIC BLOOD PRESSURE: 62 MMHG | SYSTOLIC BLOOD PRESSURE: 126 MMHG | RESPIRATION RATE: 20 BRPM | TEMPERATURE: 97.6 F | WEIGHT: 160 LBS

## 2022-12-05 DIAGNOSIS — R41.82 ALTERED MENTAL STATUS, UNSPECIFIED ALTERED MENTAL STATUS TYPE: ICD-10-CM

## 2022-12-05 DIAGNOSIS — R55 SYNCOPE AND COLLAPSE: Primary | ICD-10-CM

## 2022-12-05 PROBLEM — G45.9 TIA (TRANSIENT ISCHEMIC ATTACK): Status: ACTIVE | Noted: 2022-12-05

## 2022-12-05 LAB
ALBUMIN SERPL-MCNC: 3.8 G/DL (ref 3.5–5.2)
ALP BLD-CCNC: 53 U/L (ref 35–104)
ALT SERPL-CCNC: 11 U/L (ref 5–33)
ANION GAP SERPL CALCULATED.3IONS-SCNC: 13 MMOL/L (ref 7–19)
AST SERPL-CCNC: 18 U/L (ref 5–32)
BASOPHILS ABSOLUTE: 0.1 K/UL (ref 0–0.2)
BASOPHILS RELATIVE PERCENT: 1.1 % (ref 0–1)
BILIRUB SERPL-MCNC: <0.2 MG/DL (ref 0.2–1.2)
BILIRUBIN URINE: NEGATIVE
BLOOD, URINE: NEGATIVE
BUN BLDV-MCNC: 25 MG/DL (ref 8–23)
CALCIUM SERPL-MCNC: 9.3 MG/DL (ref 8.8–10.2)
CHLORIDE BLD-SCNC: 100 MMOL/L (ref 98–111)
CLARITY: CLEAR
CO2: 25 MMOL/L (ref 22–29)
COLOR: YELLOW
CREAT SERPL-MCNC: 1 MG/DL (ref 0.5–0.9)
EOSINOPHILS ABSOLUTE: 0.2 K/UL (ref 0–0.6)
EOSINOPHILS RELATIVE PERCENT: 2.5 % (ref 0–5)
GFR SERPL CREATININE-BSD FRML MDRD: 54 ML/MIN/{1.73_M2}
GLUCOSE BLD-MCNC: 104 MG/DL (ref 74–109)
GLUCOSE URINE: NEGATIVE MG/DL
HCT VFR BLD CALC: 41.1 % (ref 37–47)
HEMOGLOBIN: 13.2 G/DL (ref 12–16)
IMMATURE GRANULOCYTES #: 0 K/UL
KETONES, URINE: NEGATIVE MG/DL
LEUKOCYTE ESTERASE, URINE: NEGATIVE
LYMPHOCYTES ABSOLUTE: 1.9 K/UL (ref 1.1–4.5)
LYMPHOCYTES RELATIVE PERCENT: 20.7 % (ref 20–40)
MAGNESIUM: 1.9 MG/DL (ref 1.6–2.4)
MCH RBC QN AUTO: 28 PG (ref 27–31)
MCHC RBC AUTO-ENTMCNC: 32.1 G/DL (ref 33–37)
MCV RBC AUTO: 87.1 FL (ref 81–99)
MONOCYTES ABSOLUTE: 1.1 K/UL (ref 0–0.9)
MONOCYTES RELATIVE PERCENT: 11.9 % (ref 0–10)
NEUTROPHILS ABSOLUTE: 5.7 K/UL (ref 1.5–7.5)
NEUTROPHILS RELATIVE PERCENT: 63.4 % (ref 50–65)
NITRITE, URINE: NEGATIVE
PDW BLD-RTO: 14.4 % (ref 11.5–14.5)
PH UA: 6.5 (ref 5–8)
PLATELET # BLD: 243 K/UL (ref 130–400)
PMV BLD AUTO: 10.5 FL (ref 9.4–12.3)
POTASSIUM SERPL-SCNC: 4.2 MMOL/L (ref 3.5–5)
PROTEIN UA: NEGATIVE MG/DL
RBC # BLD: 4.72 M/UL (ref 4.2–5.4)
SARS-COV-2, NAAT: NOT DETECTED
SODIUM BLD-SCNC: 138 MMOL/L (ref 136–145)
SPECIFIC GRAVITY UA: 1.02 (ref 1–1.03)
TOTAL PROTEIN: 6.5 G/DL (ref 6.6–8.7)
TROPONIN: <0.01 NG/ML (ref 0–0.03)
TSH REFLEX FT4: 7.7 UIU/ML (ref 0.35–5.5)
UROBILINOGEN, URINE: 1 E.U./DL
WBC # BLD: 9 K/UL (ref 4.8–10.8)

## 2022-12-05 PROCEDURE — 71045 X-RAY EXAM CHEST 1 VIEW: CPT | Performed by: RADIOLOGY

## 2022-12-05 PROCEDURE — 70450 CT HEAD/BRAIN W/O DYE: CPT | Performed by: RADIOLOGY

## 2022-12-05 PROCEDURE — G0378 HOSPITAL OBSERVATION PER HR: HCPCS

## 2022-12-05 PROCEDURE — 36415 COLL VENOUS BLD VENIPUNCTURE: CPT

## 2022-12-05 PROCEDURE — 93005 ELECTROCARDIOGRAM TRACING: CPT | Performed by: EMERGENCY MEDICINE

## 2022-12-05 PROCEDURE — 2580000003 HC RX 258: Performed by: EMERGENCY MEDICINE

## 2022-12-05 PROCEDURE — 96360 HYDRATION IV INFUSION INIT: CPT

## 2022-12-05 PROCEDURE — 80053 COMPREHEN METABOLIC PANEL: CPT

## 2022-12-05 PROCEDURE — 85025 COMPLETE CBC W/AUTO DIFF WBC: CPT

## 2022-12-05 PROCEDURE — 84484 ASSAY OF TROPONIN QUANT: CPT

## 2022-12-05 PROCEDURE — 99285 EMERGENCY DEPT VISIT HI MDM: CPT

## 2022-12-05 PROCEDURE — 87635 SARS-COV-2 COVID-19 AMP PRB: CPT

## 2022-12-05 PROCEDURE — 83036 HEMOGLOBIN GLYCOSYLATED A1C: CPT

## 2022-12-05 PROCEDURE — 71045 X-RAY EXAM CHEST 1 VIEW: CPT

## 2022-12-05 PROCEDURE — 83735 ASSAY OF MAGNESIUM: CPT

## 2022-12-05 PROCEDURE — 70450 CT HEAD/BRAIN W/O DYE: CPT

## 2022-12-05 PROCEDURE — 84443 ASSAY THYROID STIM HORMONE: CPT

## 2022-12-05 PROCEDURE — 81003 URINALYSIS AUTO W/O SCOPE: CPT

## 2022-12-05 RX ORDER — EZETIMIBE 10 MG/1
10 TABLET ORAL DAILY
Status: DISCONTINUED | OUTPATIENT
Start: 2022-12-06 | End: 2022-12-06 | Stop reason: HOSPADM

## 2022-12-05 RX ORDER — ENOXAPARIN SODIUM 100 MG/ML
40 INJECTION SUBCUTANEOUS DAILY
OUTPATIENT
Start: 2022-12-06

## 2022-12-05 RX ORDER — HYDROCORTISONE ACETATE 25 MG/1
25 SUPPOSITORY RECTAL 2 TIMES DAILY PRN
Status: DISCONTINUED | OUTPATIENT
Start: 2022-12-05 | End: 2022-12-06 | Stop reason: HOSPADM

## 2022-12-05 RX ORDER — ONDANSETRON 2 MG/ML
4 INJECTION INTRAMUSCULAR; INTRAVENOUS EVERY 6 HOURS PRN
OUTPATIENT
Start: 2022-12-05

## 2022-12-05 RX ORDER — SODIUM CHLORIDE, SODIUM LACTATE, POTASSIUM CHLORIDE, AND CALCIUM CHLORIDE .6; .31; .03; .02 G/100ML; G/100ML; G/100ML; G/100ML
1000 INJECTION, SOLUTION INTRAVENOUS ONCE
Status: COMPLETED | OUTPATIENT
Start: 2022-12-05 | End: 2022-12-05

## 2022-12-05 RX ORDER — CALCIUM CARBONATE 200(500)MG
500 TABLET,CHEWABLE ORAL 3 TIMES DAILY PRN
OUTPATIENT
Start: 2022-12-05

## 2022-12-05 RX ORDER — POLYETHYLENE GLYCOL 3350 17 G/17G
17 POWDER, FOR SOLUTION ORAL DAILY PRN
OUTPATIENT
Start: 2022-12-05

## 2022-12-05 RX ORDER — ONDANSETRON 4 MG/1
4 TABLET, ORALLY DISINTEGRATING ORAL EVERY 8 HOURS PRN
OUTPATIENT
Start: 2022-12-05

## 2022-12-05 RX ORDER — LABETALOL HYDROCHLORIDE 5 MG/ML
10 INJECTION, SOLUTION INTRAVENOUS EVERY 10 MIN PRN
OUTPATIENT
Start: 2022-12-05

## 2022-12-05 RX ORDER — SODIUM CHLORIDE 9 MG/ML
INJECTION, SOLUTION INTRAVENOUS CONTINUOUS
OUTPATIENT
Start: 2022-12-05

## 2022-12-05 RX ORDER — ASPIRIN 300 MG/1
300 SUPPOSITORY RECTAL DAILY
OUTPATIENT
Start: 2022-12-06

## 2022-12-05 RX ORDER — ROSUVASTATIN CALCIUM 20 MG/1
40 TABLET, COATED ORAL NIGHTLY
OUTPATIENT
Start: 2022-12-05

## 2022-12-05 RX ORDER — LEVOTHYROXINE SODIUM 0.1 MG/1
100 TABLET ORAL
Status: DISCONTINUED | OUTPATIENT
Start: 2022-12-06 | End: 2022-12-06 | Stop reason: HOSPADM

## 2022-12-05 RX ORDER — MECOBALAMIN 5000 MCG
5 TABLET,DISINTEGRATING ORAL NIGHTLY PRN
OUTPATIENT
Start: 2022-12-06

## 2022-12-05 RX ORDER — ASPIRIN 81 MG/1
81 TABLET ORAL DAILY
OUTPATIENT
Start: 2022-12-06

## 2022-12-05 RX ADMIN — SODIUM CHLORIDE, SODIUM LACTATE, POTASSIUM CHLORIDE, AND CALCIUM CHLORIDE 1000 ML: 600; 310; 30; 20 INJECTION, SOLUTION INTRAVENOUS at 21:47

## 2022-12-06 LAB — HBA1C MFR BLD: 5.5 % (ref 4–6)

## 2022-12-06 NOTE — ED PROVIDER NOTES
[facility]  eMERGENCY dEPARTMENT eNCOUnter      Pt Name: Norman Marks  MRN: [id number]  [place] 1934  Date of evaluation: 12/5/2022  Provider: Marisol Roy MD    CHIEF COMPLAINT       Chief Complaint   Patient presents with    Loss of Consciousness     PT had loss of consciousness at home after dinner, PT had one episode of vomiting, PT alert to person, disoriented to place, time, situation          HISTORY OF PRESENT ILLNESS   (Location/Symptom, Timing/Onset,Context/Setting, Quality, Duration, Modifying Factors, Severity)  Note limiting factors. Norman Marks is a 80 y.o. female who presents to the emergency department after a syncopal event. Patient supposedly was sitting down eating dinner and had a syncopal event and then vomited and has been confused since. Per report this was witnessed by family there was no seizure activity. She is pleasantly confused currently alert to person and place but not to year. She denies any pain anywhere. HPI    NursingNotes were reviewed.     REVIEW OF SYSTEMS    (2-9 systems for level 4, 10 or more for level 5)     Review of Systems   Unable to perform ROS: Mental status change          PAST MEDICALHISTORY     Past Medical History:   Diagnosis Date    Colon polyps     Diverticulitis     prior colon    Diverticulosis     sigmoid    Elevated lipids     Hypothyroidism     Lymphoma [facility])          SURGICAL HISTORY       Past Surgical History:   Procedure Laterality Date    COLONOSCOPY  09-    Dr Burr Me    COLONOSCOPY  7-23-08    Dr Burr Me    COLONOSCOPY  5-31-13    Dr Juan Pablo Duncan ([address] [place] [address])      KNEE ARTHROSCOPY Left     PIOTR AND BSO (CERVIX REMOVED)      TONSILLECTOMY           CURRENT MEDICATIONS     Discharge Medication List as of 12/6/2022 12:25 AM        CONTINUE these medications which have NOT CHANGED    Details   losartan-hydrochlorothiazide (HYZAAR) 100-12.5 MG per tablet Historical Med      naproxen (NAPROSYN) 500 MG tablet Historical Med      hydrocortisone (ANUSOL-HC) 25 MG suppository Historical Med      olmesartan-hydrochlorothiazide (BENICAR HCT) 40-12.5 MG per tablet Take 1 tablet by mouth daily. Historical Med      ezetimibe (ZETIA) 10 MG tablet Take 10 mg by mouth daily. Historical Med      aspirin 81 MG EC tablet Take 81 mg by mouth daily. Historical Med      levothyroxine (SYNTHROID) 100 MCG tablet Historical Med             ALLERGIES     Sular [nisoldipine er]    FAMILY HISTORY       Family History   Problem Relation Age of Onset    Breast Cancer Sister           SOCIAL HISTORY       Social History     Socioeconomic History    Marital status:    Tobacco Use    Smoking status: Never   Substance and Sexual Activity    Alcohol use: Yes     Comment: occ wine    Drug use: No       SCREENINGS    [place] Coma Scale  Eye Opening: Spontaneous  Best Verbal Response: Oriented  Best Motor Response: Obeys commands  Patricia Coma Scale Score: 15        PHYSICAL EXAM    (up to 7 for level 4, 8 or more for level 5)     ED Triage Vitals [12/05/22 2030]   BP Temp Temp Source Heart Rate Resp SpO2 Height Weight   120/68 97.5 °F (36.4 °C) Oral 68 20 98 % -- 160 lb (72.6 kg)       Physical Exam  Vitals and nursing note reviewed. Constitutional:       General: She is not in acute distress. Appearance: She is well-developed. She is not ill-appearing or diaphoretic. HENT:      Head: Normocephalic and atraumatic. Right Ear: External ear normal.      Left Ear: External ear normal.      Nose: Nose normal.      Mouth/Throat:      Mouth: Mucous membranes are moist.   Eyes:      Extraocular Movements: Extraocular movements intact. Conjunctiva/sclera: Conjunctivae normal.      Pupils: Pupils are equal, round, and reactive to light. Neck:      Trachea: No tracheal deviation. Cardiovascular:      Rate and Rhythm: Normal rate and regular rhythm. Pulses: Normal pulses. Heart sounds: Normal heart sounds.  No murmur heard.  Pulmonary:      Effort: No respiratory distress. Breath sounds: Normal breath sounds. No wheezing or rales. Abdominal:      Palpations: Abdomen is soft. Tenderness: There is no abdominal tenderness. Musculoskeletal:         General: Normal range of motion. Cervical back: Normal range of motion. Skin:     General: Skin is warm and dry. Neurological:      Mental Status: She is alert. GCS: GCS eye subscore is 4. GCS verbal subscore is 4. GCS motor subscore is 6. Cranial Nerves: No dysarthria or facial asymmetry. Sensory: Sensation is intact. Motor: No weakness or abnormal muscle tone. Coordination: Coordination is intact. DIAGNOSTIC RESULTS     EKG: All EKG's areinterpreted by the Emergency Department Physician who either signs or Co-signs this chart in the absence of a cardiologist.    67 normal sinus rhythm some hyperacute T waves noted in the anterior and lateral leads  no obvious elevations or depressions old EKGs for comparison nondiagnostic EKG    RADIOLOGY:  Non-plain film images such as CT, Ultrasound and MRI are read by the radiologist. Plain radiographic images are visualized and preliminarily interpreted bythe emergency physician with the below findings:      CT HEAD WO CONTRAST   Final Result   1. Age-appropriate brain parenchymal atrophy and chronic ischemic changes due to microangiopathy. 2. Few small eccentric calcified plaques in cavernous segment of bilateral ICA. 3. No acute intracranial abnormality. Recommendation: Follow up as clinically indicated. All CT scans at this facility utilize dose modulation, iterative reconstruction, and/or weight based dosing when appropriate to reduce radiation dose to as low as reasonably achievable. Electronically Signed by Jean-Paul Reyes DO at 05-Dec-2022 11:07:39 PM               XR CHEST PORTABLE   Final Result   No acute pulmonary process. COPD.     Recommendation: Follow up as clinically indicated. Electronically Signed by Citlaly Fernandez MD, SA CERTIFIED at [id number] 10:25:49 PM                       LABS:  Labs Reviewed   CBC WITH AUTO DIFFERENTIAL - Abnormal; Notable for the following components:       Result Value    MCHC 32.1 (*)     Monocytes % 11.9 (*)     Basophils % 1.1 (*)     Monocytes Absolute 1.10 (*)     All other components within normal limits   COMPREHENSIVE METABOLIC PANEL - Abnormal; Notable for the following components:    BUN 25 (*)     Creatinine 1.0 (*)     Est, Glom Filt Rate 54 (*)     Total Protein 6.5 (*)     All other components within normal limits   TSH WITH REFLEX TO FT4 - Abnormal; Notable for the following components:    TSH Reflex FT4 7.70 (*)     All other components within normal limits   COVID-19, RAPID   TROPONIN   URINALYSIS WITH REFLEX TO CULTURE   HEMOGLOBIN A1C   MAGNESIUM       All other labs were within normal range or not returned as of this dictation. EMERGENCY DEPARTMENT COURSE and DIFFERENTIAL DIAGNOSIS/MDM:   Vitals:    Vitals:    12/05/22 2030 12/05/22 2230   BP: 120/68 126/62   Pulse: 68 70   Resp: 20 20   Temp: 97.5 °F (36.4 °C) 97.6 °F (36.4 °C)   TempSrc: Oral    SpO2: 98% 100%   Weight: 160 lb (72.6 kg)        MDM     Amount and/or Complexity of Data Reviewed  Clinical lab tests: ordered and reviewed  Tests in the radiology section of CPT®: ordered and reviewed  Independent visualization of images, tracings, or specimens: yes      Patient presents after a syncopal event at home and is pleasantly confused here. There is no seizure activity. Laboratory evaluation as well as CT imaging and chest x-ray no obvious acute findings. Mental status changes occurred after the syncopal event I am told. Nonfocal neurologic exam here mild confusion will follow commands and is alert to person and place on my evaluation. Discussed case with Dr. Mari Mei for admission.   Later in the patient's stay ultimately  arrived and supposedly this had happened in the past and he wished to take her home as he feels she likely has beginning stages of dementia and they did not wish to have a repeat work-up. I was unaware of this as the department was extremely busy but Dr. Kassandra Goodman discussed with them and ultimately they signed [address] and left. CONSULTS:  IP CONSULT TO NEUROLOGY    PROCEDURES:  Unless otherwise noted below, none     Procedures    FINAL IMPRESSION      1. Syncope and collapse    2. Altered mental status, unspecified altered mental status type          DISPOSITION/PLAN   DISPOSITION [place] 12/05/2022 11:24:59 PM      PATIENT REFERRED TO:  No follow-up provider specified.     DISCHARGE MEDICATIONS:  Discharge Medication List as of 12/6/2022 12:25 AM             (Please note that portions of this note were completed with a voice recognition program.  Efforts were made to edit thedictations but occasionally words are mis-transcribed.)    Bekah Jackson MD (electronically signed)  Attending Emergency Physician        Bev Thomas MD  12/06/22 9132

## 2022-12-06 NOTE — ED NOTES
Dr. Lynda Thomas at the bedside      [facility], [address] [place] [address]  12/05/22 2082

## 2022-12-06 NOTE — ED NOTES
PT requesting to leave, states she does not want to be admitted.  Dr. Zavaleta Stands aware     [facility]  12/05/22 6899

## 2022-12-06 NOTE — SIGNIFICANT EVENT
Informed that Patient wants to leave AMA    Patient seen and examined at the bedside in the ED  Patient  states that they had the exact same thing happen two years ago and they do not want a repeat work up  Patient smiled and nodded when asked if that is what she also wants  She was not oriented to day or date,  states that she has beginning dementia but he knows what she wants    They were asked to please come back if they change their mind      Patient NOT discharged, rather left AMA  AMA papers signed by

## 2022-12-06 NOTE — PLAN OF CARE
EP Sign Out:  Witnessed syncope  Head CT still pending      Preliminary Assessments & Plans: (ordered)    Syncope with TIA suspected as part of differential:  \"admit\" to [facility] under hospitalist team  consult neuro in AM  PT/OT/SpeechTherapy as per protocol  NPO until/unless passes RN swallow screen, then Cardiac Diet with diabetic modifier for now   HbA1c, TSH, Lipid Panel all to be added on  Fall Precuations  Bed Alarm  ASA and Statin as per protocol  Defer on MRI to Neuro  Labetalol PRN for now, will allow permissive HTN  TTE in AM to look for thromboembolic causes  Carotid US to look for stenotic disease as indicated as CTA not having been done    Chronic Medical Problems:  Continue home regimen as indicated    Supportive and Prophylactic Txx:  DVT PPx: Lovenox SQ  GI (PUD) PPx: not indicated  PT: as per above

## 2022-12-06 NOTE — ED NOTES
Called the patient per request of  Dr. Rossy Cartwright at [phone]. I talked to her  Mike Daniel ( her POA) and let him know that the patient's TSH was slightly evaluated and to please make sure they follow up with her primary care doctor. Mike Daniel stated understanding and said he would make her an appointment.       [facility]  12/06/22 0030       [facility]  12/06/22 0030

## 2022-12-07 LAB
EKG P AXIS: 67 DEGREES
EKG P-R INTERVAL: 160 MS
EKG Q-T INTERVAL: 444 MS
EKG QRS DURATION: 76 MS
EKG QTC CALCULATION (BAZETT): 454 MS
EKG T AXIS: 56 DEGREES

## 2023-04-13 ENCOUNTER — APPOINTMENT (OUTPATIENT)
Dept: GENERAL RADIOLOGY | Facility: HOSPITAL | Age: 88
DRG: 378 | End: 2023-04-13
Payer: MEDICARE

## 2023-04-13 ENCOUNTER — HOSPITAL ENCOUNTER (INPATIENT)
Facility: HOSPITAL | Age: 88
LOS: 2 days | Discharge: HOME | DRG: 378 | End: 2023-04-15
Attending: EMERGENCY MEDICINE | Admitting: INTERNAL MEDICINE
Payer: MEDICARE

## 2023-04-13 ENCOUNTER — APPOINTMENT (OUTPATIENT)
Dept: CT IMAGING | Facility: HOSPITAL | Age: 88
DRG: 378 | End: 2023-04-13
Payer: MEDICARE

## 2023-04-13 DIAGNOSIS — M25.551 RIGHT HIP PAIN: ICD-10-CM

## 2023-04-13 DIAGNOSIS — K92.2 GASTROINTESTINAL HEMORRHAGE, UNSPECIFIED GASTROINTESTINAL HEMORRHAGE TYPE: Primary | ICD-10-CM

## 2023-04-13 LAB
ABO GROUP BLD: NORMAL
ANION GAP SERPL CALCULATED.3IONS-SCNC: 13 MMOL/L (ref 5–15)
BASOPHILS # BLD AUTO: 0.08 10*3/MM3 (ref 0–0.2)
BASOPHILS NFR BLD AUTO: 1.2 % (ref 0–1.5)
BLD GP AB SCN SERPL QL: NEGATIVE
BUN SERPL-MCNC: 39 MG/DL (ref 8–23)
BUN/CREAT SERPL: 38.6 (ref 7–25)
CALCIUM SPEC-SCNC: 8.5 MG/DL (ref 8.6–10.5)
CHLORIDE SERPL-SCNC: 108 MMOL/L (ref 98–107)
CO2 SERPL-SCNC: 19 MMOL/L (ref 22–29)
CREAT SERPL-MCNC: 1.01 MG/DL (ref 0.57–1)
DEPRECATED RDW RBC AUTO: 48.9 FL (ref 37–54)
EGFRCR SERPLBLD CKD-EPI 2021: 53.3 ML/MIN/1.73
EOSINOPHIL # BLD AUTO: 0.07 10*3/MM3 (ref 0–0.4)
EOSINOPHIL NFR BLD AUTO: 1 % (ref 0.3–6.2)
ERYTHROCYTE [DISTWIDTH] IN BLOOD BY AUTOMATED COUNT: 15.3 % (ref 12.3–15.4)
GLUCOSE SERPL-MCNC: 139 MG/DL (ref 65–99)
HCT VFR BLD AUTO: 21.8 % (ref 34–46.6)
HCT VFR BLD AUTO: 25 % (ref 34–46.6)
HGB BLD-MCNC: 6.5 G/DL (ref 12–15.9)
HGB BLD-MCNC: 7.7 G/DL (ref 12–15.9)
HOLD SPECIMEN: NORMAL
HOLD SPECIMEN: NORMAL
IMM GRANULOCYTES # BLD AUTO: 0.06 10*3/MM3 (ref 0–0.05)
IMM GRANULOCYTES NFR BLD AUTO: 0.9 % (ref 0–0.5)
INR PPP: 1.06 (ref 0.91–1.09)
LYMPHOCYTES # BLD AUTO: 1.14 10*3/MM3 (ref 0.7–3.1)
LYMPHOCYTES NFR BLD AUTO: 16.9 % (ref 19.6–45.3)
MCH RBC QN AUTO: 26.4 PG (ref 26.6–33)
MCHC RBC AUTO-ENTMCNC: 29.8 G/DL (ref 31.5–35.7)
MCV RBC AUTO: 88.6 FL (ref 79–97)
MONOCYTES # BLD AUTO: 0.58 10*3/MM3 (ref 0.1–0.9)
MONOCYTES NFR BLD AUTO: 8.6 % (ref 5–12)
NEUTROPHILS NFR BLD AUTO: 4.81 10*3/MM3 (ref 1.7–7)
NEUTROPHILS NFR BLD AUTO: 71.4 % (ref 42.7–76)
NRBC BLD AUTO-RTO: 0 /100 WBC (ref 0–0.2)
PLATELET # BLD AUTO: 194 10*3/MM3 (ref 140–450)
PMV BLD AUTO: 10.2 FL (ref 6–12)
POTASSIUM SERPL-SCNC: 4.3 MMOL/L (ref 3.5–5.2)
PROTHROMBIN TIME: 14 SECONDS (ref 11.8–14.8)
RBC # BLD AUTO: 2.46 10*6/MM3 (ref 3.77–5.28)
RH BLD: POSITIVE
SODIUM SERPL-SCNC: 140 MMOL/L (ref 136–145)
T&S EXPIRATION DATE: NORMAL
WBC NRBC COR # BLD: 6.74 10*3/MM3 (ref 3.4–10.8)
WHOLE BLOOD HOLD COAG: NORMAL
WHOLE BLOOD HOLD SPECIMEN: NORMAL

## 2023-04-13 PROCEDURE — 85025 COMPLETE CBC W/AUTO DIFF WBC: CPT | Performed by: EMERGENCY MEDICINE

## 2023-04-13 PROCEDURE — 36415 COLL VENOUS BLD VENIPUNCTURE: CPT

## 2023-04-13 PROCEDURE — 86900 BLOOD TYPING SEROLOGIC ABO: CPT | Performed by: EMERGENCY MEDICINE

## 2023-04-13 PROCEDURE — 25510000001 IOPAMIDOL 61 % SOLUTION: Performed by: EMERGENCY MEDICINE

## 2023-04-13 PROCEDURE — 25010000002 FENTANYL CITRATE (PF) 50 MCG/ML SOLUTION: Performed by: EMERGENCY MEDICINE

## 2023-04-13 PROCEDURE — 80048 BASIC METABOLIC PNL TOTAL CA: CPT | Performed by: EMERGENCY MEDICINE

## 2023-04-13 PROCEDURE — 73560 X-RAY EXAM OF KNEE 1 OR 2: CPT

## 2023-04-13 PROCEDURE — 99285 EMERGENCY DEPT VISIT HI MDM: CPT

## 2023-04-13 PROCEDURE — 73502 X-RAY EXAM HIP UNI 2-3 VIEWS: CPT

## 2023-04-13 PROCEDURE — 86901 BLOOD TYPING SEROLOGIC RH(D): CPT

## 2023-04-13 PROCEDURE — 85014 HEMATOCRIT: CPT | Performed by: INTERNAL MEDICINE

## 2023-04-13 PROCEDURE — 36430 TRANSFUSION BLD/BLD COMPNT: CPT

## 2023-04-13 PROCEDURE — 74177 CT ABD & PELVIS W/CONTRAST: CPT

## 2023-04-13 PROCEDURE — 86901 BLOOD TYPING SEROLOGIC RH(D): CPT | Performed by: EMERGENCY MEDICINE

## 2023-04-13 PROCEDURE — 85610 PROTHROMBIN TIME: CPT | Performed by: EMERGENCY MEDICINE

## 2023-04-13 PROCEDURE — 85018 HEMOGLOBIN: CPT | Performed by: INTERNAL MEDICINE

## 2023-04-13 PROCEDURE — 86850 RBC ANTIBODY SCREEN: CPT | Performed by: EMERGENCY MEDICINE

## 2023-04-13 PROCEDURE — 86900 BLOOD TYPING SEROLOGIC ABO: CPT

## 2023-04-13 PROCEDURE — 73552 X-RAY EXAM OF FEMUR 2/>: CPT

## 2023-04-13 PROCEDURE — 86920 COMPATIBILITY TEST SPIN: CPT

## 2023-04-13 PROCEDURE — 25010000002 ONDANSETRON PER 1 MG: Performed by: EMERGENCY MEDICINE

## 2023-04-13 PROCEDURE — P9016 RBC LEUKOCYTES REDUCED: HCPCS

## 2023-04-13 RX ORDER — LEVOTHYROXINE SODIUM 0.1 MG/1
100 TABLET ORAL
Status: DISCONTINUED | OUTPATIENT
Start: 2023-04-14 | End: 2023-04-15 | Stop reason: HOSPADM

## 2023-04-13 RX ORDER — SODIUM CHLORIDE 0.9 % (FLUSH) 0.9 %
10 SYRINGE (ML) INJECTION EVERY 12 HOURS SCHEDULED
Status: DISCONTINUED | OUTPATIENT
Start: 2023-04-13 | End: 2023-04-15 | Stop reason: HOSPADM

## 2023-04-13 RX ORDER — HYDROCORTISONE ACETATE 25 MG/1
25 SUPPOSITORY RECTAL 2 TIMES DAILY
Status: DISCONTINUED | OUTPATIENT
Start: 2023-04-13 | End: 2023-04-15 | Stop reason: HOSPADM

## 2023-04-13 RX ORDER — MELOXICAM 7.5 MG/1
7.5 TABLET ORAL DAILY PRN
COMMUNITY
End: 2023-04-15 | Stop reason: HOSPADM

## 2023-04-13 RX ORDER — MEMANTINE HYDROCHLORIDE 5 MG/1
5 TABLET ORAL 2 TIMES DAILY
Status: DISCONTINUED | OUTPATIENT
Start: 2023-04-13 | End: 2023-04-15 | Stop reason: HOSPADM

## 2023-04-13 RX ORDER — METOPROLOL SUCCINATE 25 MG/1
12.5 TABLET, EXTENDED RELEASE ORAL
Status: DISCONTINUED | OUTPATIENT
Start: 2023-04-13 | End: 2023-04-15 | Stop reason: HOSPADM

## 2023-04-13 RX ORDER — SODIUM CHLORIDE 9 MG/ML
40 INJECTION, SOLUTION INTRAVENOUS AS NEEDED
Status: DISCONTINUED | OUTPATIENT
Start: 2023-04-13 | End: 2023-04-15 | Stop reason: HOSPADM

## 2023-04-13 RX ORDER — SODIUM CHLORIDE, SODIUM LACTATE, POTASSIUM CHLORIDE, CALCIUM CHLORIDE 600; 310; 30; 20 MG/100ML; MG/100ML; MG/100ML; MG/100ML
50 INJECTION, SOLUTION INTRAVENOUS CONTINUOUS
Status: DISCONTINUED | OUTPATIENT
Start: 2023-04-13 | End: 2023-04-15 | Stop reason: HOSPADM

## 2023-04-13 RX ORDER — SODIUM CHLORIDE 0.9 % (FLUSH) 0.9 %
10 SYRINGE (ML) INJECTION AS NEEDED
Status: DISCONTINUED | OUTPATIENT
Start: 2023-04-13 | End: 2023-04-13 | Stop reason: SDUPTHER

## 2023-04-13 RX ORDER — ALENDRONATE SODIUM 70 MG/1
70 TABLET ORAL
COMMUNITY

## 2023-04-13 RX ORDER — FENTANYL CITRATE 50 UG/ML
50 INJECTION, SOLUTION INTRAMUSCULAR; INTRAVENOUS ONCE
Status: COMPLETED | OUTPATIENT
Start: 2023-04-13 | End: 2023-04-13

## 2023-04-13 RX ORDER — ACETAMINOPHEN 325 MG/1
650 TABLET ORAL EVERY 4 HOURS PRN
Status: DISCONTINUED | OUTPATIENT
Start: 2023-04-13 | End: 2023-04-15 | Stop reason: HOSPADM

## 2023-04-13 RX ORDER — ATORVASTATIN CALCIUM 10 MG/1
10 TABLET, FILM COATED ORAL DAILY
Status: DISCONTINUED | OUTPATIENT
Start: 2023-04-14 | End: 2023-04-15 | Stop reason: HOSPADM

## 2023-04-13 RX ORDER — ONDANSETRON 2 MG/ML
4 INJECTION INTRAMUSCULAR; INTRAVENOUS EVERY 6 HOURS PRN
Status: DISCONTINUED | OUTPATIENT
Start: 2023-04-13 | End: 2023-04-15 | Stop reason: HOSPADM

## 2023-04-13 RX ORDER — SODIUM CHLORIDE 0.9 % (FLUSH) 0.9 %
10 SYRINGE (ML) INJECTION AS NEEDED
Status: DISCONTINUED | OUTPATIENT
Start: 2023-04-13 | End: 2023-04-15 | Stop reason: HOSPADM

## 2023-04-13 RX ORDER — ONDANSETRON 2 MG/ML
4 INJECTION INTRAMUSCULAR; INTRAVENOUS ONCE
Status: COMPLETED | OUTPATIENT
Start: 2023-04-13 | End: 2023-04-13

## 2023-04-13 RX ADMIN — IOPAMIDOL 100 ML: 612 INJECTION, SOLUTION INTRAVENOUS at 10:55

## 2023-04-13 RX ADMIN — POLYETHYLENE GLYCOL 3350, SODIUM SULFATE ANHYDROUS, SODIUM BICARBONATE, SODIUM CHLORIDE, POTASSIUM CHLORIDE 3000 ML: 236; 22.74; 6.74; 5.86; 2.97 POWDER, FOR SOLUTION ORAL at 17:56

## 2023-04-13 RX ADMIN — HYDROCORTISONE ACETATE 25 MG: 25 SUPPOSITORY RECTAL at 20:15

## 2023-04-13 RX ADMIN — SODIUM CHLORIDE, POTASSIUM CHLORIDE, SODIUM LACTATE AND CALCIUM CHLORIDE 50 ML/HR: 600; 310; 30; 20 INJECTION, SOLUTION INTRAVENOUS at 15:27

## 2023-04-13 RX ADMIN — METOPROLOL SUCCINATE 12.5 MG: 25 TABLET, EXTENDED RELEASE ORAL at 15:27

## 2023-04-13 RX ADMIN — MEMANTINE HYDROCHLORIDE 5 MG: 5 TABLET, FILM COATED ORAL at 20:15

## 2023-04-13 RX ADMIN — ACETAMINOPHEN 650 MG: 325 TABLET ORAL at 15:48

## 2023-04-13 RX ADMIN — FENTANYL CITRATE 50 MCG: 50 INJECTION INTRAMUSCULAR; INTRAVENOUS at 12:07

## 2023-04-13 RX ADMIN — ONDANSETRON 4 MG: 2 INJECTION INTRAMUSCULAR; INTRAVENOUS at 12:03

## 2023-04-13 RX ADMIN — Medication 10 ML: at 20:16

## 2023-04-13 RX ADMIN — ACETAMINOPHEN 650 MG: 325 TABLET ORAL at 23:40

## 2023-04-13 NOTE — H&P (VIEW-ONLY)
[facility]  Inpatient Consult Note  Today's date:  04/13/23    Dina CALVERT Link  1934       Referring Provider: No Known Provider  Primary Physician: Benedicto Hebert MD   Primary Gastroenterologist: Dr. Bubba Asher    Date of Admission: 4/13/2023  Date of Service:  04/13/23    Reason for Consultation/Chief Complaint: Hematochezia; anemia    History of present illness:    Patient is a very sweet 89-year-old female without significant past medical history who presents with 2 days of painless  bright red blood per rectum.  Initially it was mixed in with stool and then she had an episode of bloody incontinence, and decided to come to the hospital.  Last episode at 9 AM today.  She is not taking blood thinners.  No history of same.  No NSAIDs/aspirin use.  She had 2 colonoscopies within the last decade with the last one approximately 3 years ago without significant findings.   unsure whether diverticulosis was noted.  Denies upper GI symptoms.  No abdominal pain.  Hgb of 6.5 with MCV of 88.6, compared with 14.4 on 12/5/2022.  BUN/creatinine of 39/1.01.  CT abdomen/pelvis without acute abnormality.  Few scattered diverticula were noted within a colon full of stool.  Patient denies CP/SOB/fever.    Past Medical History:   Diagnosis Date   • Cancer        History reviewed. No pertinent surgical history.     Allergies   Allergen Reactions   • Nisoldipine Er Other (See Comments)     Feelings of passing out, tired       Medications Prior to Admission   Medication Sig Dispense Refill Last Dose   • Apixaban (Eliquis DVT/PE Starter Pack) tablet Take two 5 mg tablets by mouth every 12 hours for 7 days. Followed by one 5 mg tablet every 12 hours. 74 tablet 0    • levothyroxine (SYNTHROID, LEVOTHROID) 100 MCG tablet Take 100 mcg by mouth Daily.      • lovastatin (Mevacor) 40 MG tablet Take 40 mg by mouth Every Night.      • memantine (NAMENDA) 5 MG tablet Take 5 mg by mouth 2 (Two) Times a  Day.      • metoprolol succinate XL (TOPROL-XL) 25 MG 24 hr tablet Take 0.5 tablets by mouth Daily for 30 days. 15 tablet 0        Hospital Medications (active)       Dose Frequency Start End    acetaminophen (TYLENOL) tablet 650 mg 650 mg Every 4 Hours PRN 4/13/2023     Admin Instructions: Based on patient request - if ordered for moderate or severe pain, provider allows for administration of a medication prescribed for a lower pain scale.  Do not exceed 4 grams of acetaminophen in a 24 hr period. Max dose of 2gm for AST/ALT greater than 120 units/L    If given for fever, use fever parameter: fever greater than 100.4 °F.    If given for pain, use the following pain scale:   Mild Pain = Pain Score of 1-3, CPOT 1-2  Moderate Pain = Pain Score of 4-6, CPOT 3-4  Severe Pain = Pain Score of 7-10, CPOT 5-8    Route: Oral    atorvastatin (LIPITOR) tablet 10 mg 10 mg Daily 4/14/2023     Admin Instructions: Avoid grapefruit juice.    Route: Oral    hydrocortisone (ANUSOL-HC) suppository 25 mg 25 mg 2 Times Daily 4/13/2023     Route: Rectal    lactated ringers infusion 50 mL/hr Continuous 4/13/2023     Route: Intravenous    levothyroxine (SYNTHROID, LEVOTHROID) tablet 100 mcg 100 mcg Every Early Morning 4/14/2023     Admin Instructions: Take on empty stomach.    Route: Oral    memantine (NAMENDA) tablet 5 mg 5 mg 2 Times Daily 4/13/2023     Route: Oral    metoprolol succinate XL (TOPROL-XL) 24 hr tablet 12.5 mg 12.5 mg Every 24 Hours Scheduled 4/13/2023     Admin Instructions: Do not crush or chew.    Route: Oral    ondansetron (ZOFRAN) injection 4 mg 4 mg Every 6 Hours PRN 4/13/2023     Admin Instructions: If BOTH ondansetron (ZOFRAN) and promethazine (PHENERGAN) are ordered use ondansetron first and THEN promethazine IF ondansetron is ineffective.    Route: Intravenous    sodium chloride 0.9 % flush 10 mL 10 mL Every 12 Hours Scheduled 4/13/2023     Route: Intravenous    sodium chloride 0.9 % flush 10 mL 10 mL As Needed  4/13/2023     Route: Intravenous    sodium chloride 0.9 % infusion 40 mL 40 mL As Needed 4/13/2023     Admin Instructions: Following administration of an IV intermittent medication, flush line with 40mL NS at 100mL/hr.    Route: Intravenous          Social History     Tobacco Use   • Smoking status: Never   • Smokeless tobacco: Never   Substance Use Topics   • Alcohol use: Yes        Past Family History:  History reviewed. No pertinent family history.    Review of Systems:  12 point inspection performed with pertinent positives in HPI.      Physical Exam:  Temp:  [97.4 °F (36.3 °C)-98.6 °F (37 °C)] 97.6 °F (36.4 °C)  Heart Rate:  [78-86] 81  Resp:  [13-20] 18  BP: (103-155)/(43-98) 155/78  Body mass index is 24.21 kg/m².    Intake/Output Summary (Last 24 hours) at 4/13/2023 1605  Last data filed at 4/13/2023 1449  Gross per 24 hour   Intake 300 ml   Output --   Net 300 ml     I/O this shift:  In: 300 [Blood:300]  Out: -     General appearance:   HEENT: Nonicteric sclerae.  Moist oral mucosa.  PERRLA.  EOMI.    Lungs: Clear to auscultation bilaterally.    Heart: Regular rate and rhythm.    Abdomen: Soft, nondistended, nontender to palpation, with normoactive bowel sounds.  Extremities: No cyanosis, edema or pulse deficits.  Skin: No rash or jaundice.    Results Review:  Lab Results (last 24 hours)     Procedure Component Value Units Date/Time    [place] Draw [[id number]] Collected: 04/13/23 0857    Specimen: Blood Updated: 04/13/23 1002    Narrative:      The following orders were created for panel order [place] Draw.  Procedure                               Abnormality         Status                     ---------                               -----------         ------                     Green Top (Gel)[[id number]]                                  Final result               Lavender Top[[id number]]                                     Final result               Red Top[[id number]]                                           Final result               Light Blue Top[[id number]]                                   Final result                 Please view results for these tests on the individual orders.    Green Top (Gel) [[id number]] Collected: 04/13/23 0857    Specimen: Blood Updated: 04/13/23 1002     Extra Tube Hold for add-ons.     Comment: Auto resulted.       Lavender Top [[id number]] Collected: 04/13/23 0857    Specimen: Blood Updated: 04/13/23 1002     Extra Tube hold for add-on     Comment: Auto resulted       Red Top [[id number]] Collected: 04/13/23 0857    Specimen: Blood Updated: 04/13/23 1002     Extra Tube Hold for add-ons.     Comment: Auto resulted.       Light Blue Top [[id number]] Collected: 04/13/23 0857    Specimen: Blood Updated: 04/13/23 1001     Extra Tube Hold for add-ons.     Comment: Auto resulted       Basic Metabolic Panel [[id number]]  (Abnormal) Collected: 04/13/23 0857    Specimen: Blood Updated: 04/13/23 0934     Glucose 139 mg/dL      BUN 39 mg/dL      Creatinine 1.01 mg/dL      Sodium 140 mmol/L      Potassium 4.3 mmol/L      Chloride 108 mmol/L      CO2 19.0 mmol/L      Calcium 8.5 mg/dL      BUN/Creatinine Ratio 38.6     Anion Gap 13.0 mmol/L      eGFR 53.3 mL/min/1.73     Narrative:      GFR Normal >60  Chronic Kidney Disease <60  Kidney Failure <15    The GFR formula is only valid for adults with stable renal function between ages 18 and 70.    Protime-INR [[id number]]  (Normal) Collected: 04/13/23 0857    Specimen: Blood Updated: 04/13/23 0920     Protime 14.0 Seconds      INR 1.06    CBC & Differential [[id number]]  (Abnormal) Collected: 04/13/23 0857    Specimen: Blood Updated: 04/13/23 0917    Narrative:      The following orders were created for panel order CBC & Differential.  Procedure                               Abnormality         Status                     ---------                               -----------         ------                     CBC Auto Differential[[id number]]        Abnormal             Final result                 Please view results for these tests on the individual orders.    CBC Auto Differential [[id number]]  (Abnormal) Collected: 04/13/23 0857    Specimen: Blood Updated: 04/13/23 0917     WBC 6.74 10*3/mm3      RBC 2.46 10*6/mm3      Hemoglobin 6.5 g/dL      Hematocrit 21.8 %      MCV 88.6 fL      MCH 26.4 pg      MCHC 29.8 g/dL      RDW 15.3 %      RDW-SD 48.9 fl      MPV 10.2 fL      Platelets 194 10*3/mm3      Neutrophil % 71.4 %      Lymphocyte % 16.9 %      Monocyte % 8.6 %      Eosinophil % 1.0 %      Basophil % 1.2 %      Immature Grans % 0.9 %      Neutrophils, Absolute 4.81 10*3/mm3      Lymphocytes, Absolute 1.14 10*3/mm3      Monocytes, Absolute 0.58 10*3/mm3      Eosinophils, Absolute 0.07 10*3/mm3      Basophils, Absolute 0.08 10*3/mm3      Immature Grans, Absolute 0.06 10*3/mm3      nRBC 0.0 /100 WBC           Radiology Review:  Imaging Results (Last 72 Hours)     Procedure Component Value Units Date/Time    XR Knee 1 or 2 View Right [[id number]] Collected: 04/13/23 1430     Updated: 04/13/23 1434    Narrative:      EXAMINATION: XR KNEE 1 OR 2 VW RIGHT-     4/13/2023 2:11 PM CDT     HISTORY: severe pain; K92.2-Gastrointestinal hemorrhage, unspecified;  M25.551-Pain in right hip     Right knee, 2 views.     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     Diffuse tibiofemoral chondrocalcinosis.     Diffuse arterial calcification.     Summary:  1. Degenerative joint changes greatest at the lateral and anterior  compartment.  2. No fracture  This report was finalized on 04/13/2023 14:31 by Dr. Michael Barry MD.    XR Femur 2 View Right [[id number]] Collected: 04/13/23 1430     Updated: 04/13/23 1433    Narrative:      EXAMINATION: XR FEMUR 2 VW RIGHT-     4/13/2023 2:10 PM CDT     HISTORY: severe pain; K92.2-Gastrointestinal hemorrhage, unspecified;  M25.551-Pain in right hip     Right femur, 2 views.     No femur fracture  is seen.     The hip and knee are normal, to the extent visualized.     No soft tissue abnormality is seen.       Impression:      1. No acute bony abnormality.              This report was finalized on 04/13/2023 14:30 by Dr. Michael Barry MD.    CT Abdomen Pelvis With Contrast [[id number]] Collected: 04/13/23 1107     Updated: 04/13/23 1113    Narrative:      EXAMINATION: CT ABDOMEN PELVIS W CONTRAST-      4/13/2023 10:52 AM CDT     HISTORY: Abdominal pain, acute, nonlocalized. Rectal bleeding. Diarrhea.     In order to have a CT radiation dose as low as reasonably achievable  Automated Exposure Control was utilized for adjustment of the mA and/or  KV according to patient size.     DLP in mGycm= 531.     Abdomen/pelvis CT with IV contrast.  Axial, sagittal, and coronal sequences.     No comparison.     Heart size is normal.  There is no acute abnormality at the lung bases.     Normal CT appearance of the liver, gallbladder, pancreas, spleen,  adrenal glands, and kidneys.     Aortic calcification with no aneurysm.     No small bowel dilation.  There is moderate fecal material throughout the colon.  A few scattered sigmoid diverticula.  No diverticulitis or colitis.     Summary:  1. No acute abnormality is seen.                                   This report was finalized on 04/13/2023 11:10 by Dr. Michael Barry MD.    XR Hip With or Without Pelvis 2 - 3 View Right [[id number]] Collected: 04/13/23 1057     Updated: 04/13/23 1101    Narrative:      EXAMINATION: XR HIP W OR WO PELVIS 2-3 VIEW RIGHT-     4/13/2023 10:17 AM CDT     HISTORY: Pelvis and right hip pain.     Pelvis and right hip, 3 views.     Osteopenia.     The pelvic ring is intact.   No pubic symphysis or sacroiliac joint diastasis.     The right proximal femur and acetabulum are intact.   Normal appearance of the hip joint space.  Soft tissues are appropriate.     Summary:  1. No acute bony abnormality is seen.        This report was finalized on  04/13/2023 10:57 by Dr. Michael Barry MD.          Impression/Plan:  Patient Active Problem List   Diagnosis Code   • AMS (altered mental status) R41.82   • Vascular dementia without behavioral disturbance F01.50   • Near syncope R55   • Acute pulmonary embolism I26.99   • Hypothyroidism E03.9   • Lactic acidosis E87.20   • Gastrointestinal hemorrhage, unspecified gastrointestinal hemorrhage type K92.2       Hematochezia/suspected acute blood loss anemia.  Diverticulosis noted on CT.  Suspect diverticular bleed.  Elevated BUN possibly secondary to dehydration but consider upper GI source especially with hemoglobin level.    -Transfuse as per protocol  -EGD/colonoscopy in a.m.  -Clear liquid diet    Risk-benefit and alternatives were discussed with the patient in the presence of her  and they elect to have the procedure performed.  All questions were answered to their satisfaction.    Bubba Asher MD  04/13/23   16:05 CDT

## 2023-04-13 NOTE — H&P
[facility] Medicine Services  HISTORY AND PHYSICAL    Date of Admission: 4/13/2023  Primary Care Physician: Benedicto Hebert MD    Subjective   Primary Historian:  who goes by the name Will  Chief Complaint: Rectal bleeding    History of Present Illness  I am asked to admit this 89-year-old woman for GI bleed.  Primary care provider on record is Dr. Benedicto Hebert.  ER requested admission for GI bleed and right hip pain.    She is hemodynamically stable and not tachycardic.  Her hemoglobin is 6.5 while hematocrit 21.8.  MCV 89.  BUN 39 with creatinine of 1.01.  BUN/creatinine ratio 38.6.  CO2 of 19.  In order to transfuse 1 unit of packed RBC has been in place.  There is no record of prior colonoscopy or endoscopy in our hospital.  Based on medication listed she has prior history of VTE in 2020 and dementia.  Other record indicates hypothyroidism, internal hemorrhoids    Prior hospitalization dating back to September 29 to October 1, 2020 indicates admission for altered mental status, vascular dementia, near syncope, acute pulmonary embolism, hypothyroidism and lactic acidosis.      CT of the abdomen and pelvis showed no acute abnormality seen.  There is moderate fecal material throughout the colon.  Presence of sigmoid diverticula without diverticulitis.    X-ray of hip with and without pelvis showed no acute abnormality.  Intact right proximal femur and acetabulum.  Osteopenia    Patient unable to give any historical details due to dementia  Has been showed me this morning the track of blood she created as she went to the bathroom.  There were blood clots.  It appears to be acid bright red blood per rectum.    There has not been any mention of abdominal pain, rectal pain.  He does not know of prior episodes of bleeding.  He told me that she is not on any anticoagulant or other medication  He does not have good grasp of patient's medications.    Bleeding has been going  on for the past 3 days.  Started as spotting  No mention of any constipation      Review of Systems   Unable to give any historical details due to dementia   states that no known fall    Past Medical History:   Past Medical History:   Diagnosis Date   • Cancer     said he likely had throat cancer but he is not certain.  He told me also that she has had colonoscopy.  He thought it was here.  I did not see any record    Past Surgical History:  is not quite sure  Social History:  reports that she has never smoked. She has never used smokeless tobacco. She reports current alcohol use. She reports that she does not use drugs.    Family History: The  does not know.  Patient unable to give any historical details due to dementia  Allergies:  Allergies   Allergen Reactions   • Nisoldipine Er Other (See Comments)     Feelings of passing out, tired       Medications:  Prior to Admission medications    Medication Sig Start Date End Date Taking? Authorizing Provider   Apixaban (Eliquis DVT/PE Starter Pack) tablet Take two 5 mg tablets by mouth every 12 hours for 7 days. Followed by one 5 mg tablet every 12 hours. 10/1/20   Radha Moran APRN   levothyroxine (SYNTHROID, LEVOTHROID) 100 MCG tablet Take 100 mcg by mouth Daily.    Provider, MD Jv   lovastatin (Mevacor) 40 MG tablet Take 40 mg by mouth Every Night.    ProviderJv MD   memantine (NAMENDA) 5 MG tablet Take 5 mg by mouth 2 (Two) Times a Day.    ProviderJv MD   metoprolol succinate XL (TOPROL-XL) 25 MG 24 hr tablet Take 0.5 tablets by mouth Daily for 30 days. 10/2/20 11/1/20  Radha Moran APRN     I have utilized all available immediate resources to obtain, update, or review the patient's current medications (including all prescriptions, over-the-counter products, herbals, cannabis/cannabidiol products, and vitamin/mineral/dietary (nutritional) supplements).    Objective     Vital Signs: /62   Pulse 78    "Temp 97.4 °F (36.3 °C)   Resp 17   Ht 167.6 cm (66\")   Wt 68 kg (150 lb)   SpO2 100%   BMI 24.21 kg/m²   Physical Exam   Patient would intermittently smile.  She has blank face most of the time  No distress  Receiving 1 unit of blood  I did not do a rectal exam but I did look at her bottom in the presence of ER nurse.  Patient noted to have a huge external hemorrhoid.  Does not appear to have any thrombosed hemorrhoid.  Obvious blood noted (bright red blood).  No obvious bleeding at present time  Even her feet has dried blood (bright red in color  The smell is not something typical of an upper GI bleed  Normocephalic and atraumatic head  Anicteric sclera  No thyromegaly  Lungs clear to auscultation with good air exchange  S1-S2 regular rate and rhythm no gallop or murmur  Soft abdomen, nontender, no organomegaly  No cyanosis  Positive edema  She has a drying but still raw appearing without any discharge skin erosion on left leg anteriorly probably the size of a dollar.  No gross surrounding erythema  She has difficulty bending/flexing her knee joint, she has pain on palpation of her right thigh and hip joint.  There is no gross bruise in this area  She was able to tolerate turning her to the right side when I examine her bottom.  She did not complain of any significant discomfort on right hip.    Results Reviewed:  Lab Results (last 24 hours)     Procedure Component Value Units Date/Time    [place] Draw [[id number]] Collected: 04/13/23 0857    Specimen: Blood Updated: 04/13/23 1002    Narrative:      The following orders were created for panel order [place] Draw.  Procedure                               Abnormality         Status                     ---------                               -----------         ------                     Green Top (Gel)[[id number]]                                  Final result               Lavender Top[[id number]]                                     Final result               Red " Top[[id number]]                                          Final result               Light Blue Top[[id number]]                                   Final result                 Please view results for these tests on the individual orders.    Green Top (Gel) [[id number]] Collected: 04/13/23 0857    Specimen: Blood Updated: 04/13/23 1002     Extra Tube Hold for add-ons.     Comment: Auto resulted.       Lavender Top [[id number]] Collected: 04/13/23 0857    Specimen: Blood Updated: 04/13/23 1002     Extra Tube hold for add-on     Comment: Auto resulted       Red Top [[id number]] Collected: 04/13/23 0857    Specimen: Blood Updated: 04/13/23 1002     Extra Tube Hold for add-ons.     Comment: Auto resulted.       Light Blue Top [[id number]] Collected: 04/13/23 0857    Specimen: Blood Updated: 04/13/23 1001     Extra Tube Hold for add-ons.     Comment: Auto resulted       Basic Metabolic Panel [[id number]]  (Abnormal) Collected: 04/13/23 0857    Specimen: Blood Updated: 04/13/23 0934     Glucose 139 mg/dL      BUN 39 mg/dL      Creatinine 1.01 mg/dL      Sodium 140 mmol/L      Potassium 4.3 mmol/L      Chloride 108 mmol/L      CO2 19.0 mmol/L      Calcium 8.5 mg/dL      BUN/Creatinine Ratio 38.6     Anion Gap 13.0 mmol/L      eGFR 53.3 mL/min/1.73     Narrative:      GFR Normal >60  Chronic Kidney Disease <60  Kidney Failure <15    The GFR formula is only valid for adults with stable renal function between ages 18 and 70.    Protime-INR [[id number]]  (Normal) Collected: 04/13/23 0857    Specimen: Blood Updated: 04/13/23 0920     Protime 14.0 Seconds      INR 1.06    CBC & Differential [[id number]]  (Abnormal) Collected: 04/13/23 0857    Specimen: Blood Updated: 04/13/23 0917    Narrative:      The following orders were created for panel order CBC & Differential.  Procedure                               Abnormality         Status                     ---------                               -----------         ------                      CBC Auto Differential[[id number]]        Abnormal            Final result                 Please view results for these tests on the individual orders.    CBC Auto Differential [[id number]]  (Abnormal) Collected: 04/13/23 0857    Specimen: Blood Updated: 04/13/23 0917     WBC 6.74 10*3/mm3      RBC 2.46 10*6/mm3      Hemoglobin 6.5 g/dL      Hematocrit 21.8 %      MCV 88.6 fL      MCH 26.4 pg      MCHC 29.8 g/dL      RDW 15.3 %      RDW-SD 48.9 fl      MPV 10.2 fL      Platelets 194 10*3/mm3      Neutrophil % 71.4 %      Lymphocyte % 16.9 %      Monocyte % 8.6 %      Eosinophil % 1.0 %      Basophil % 1.2 %      Immature Grans % 0.9 %      Neutrophils, Absolute 4.81 10*3/mm3      Lymphocytes, Absolute 1.14 10*3/mm3      Monocytes, Absolute 0.58 10*3/mm3      Eosinophils, Absolute 0.07 10*3/mm3      Basophils, Absolute 0.08 10*3/mm3      Immature Grans, Absolute 0.06 10*3/mm3      nRBC 0.0 /100 WBC         Imaging Results (Last 24 Hours)     Procedure Component Value Units Date/Time    CT Abdomen Pelvis With Contrast [[id number]] Collected: 04/13/23 1107     Updated: 04/13/23 1113    Narrative:      EXAMINATION: CT ABDOMEN PELVIS W CONTRAST-      4/13/2023 10:52 AM CDT     HISTORY: Abdominal pain, acute, nonlocalized. Rectal bleeding. Diarrhea.     In order to have a CT radiation dose as low as reasonably achievable  Automated Exposure Control was utilized for adjustment of the mA and/or  KV according to patient size.     DLP in mGycm= 531.     Abdomen/pelvis CT with IV contrast.  Axial, sagittal, and coronal sequences.     No comparison.     Heart size is normal.  There is no acute abnormality at the lung bases.     Normal CT appearance of the liver, gallbladder, pancreas, spleen,  adrenal glands, and kidneys.     Aortic calcification with no aneurysm.     No small bowel dilation.  There is moderate fecal material throughout the colon.  A few scattered sigmoid diverticula.  No diverticulitis or colitis.      Summary:  1. No acute abnormality is seen.                                   This report was finalized on 04/13/2023 11:10 by Dr. Michael Barry MD.    XR Hip With or Without Pelvis 2 - 3 View Right [[id number]] Collected: 04/13/23 1057     Updated: 04/13/23 1101    Narrative:      EXAMINATION: XR HIP W OR WO PELVIS 2-3 VIEW RIGHT-     4/13/2023 10:17 AM CDT     HISTORY: Pelvis and right hip pain.     Pelvis and right hip, 3 views.     Osteopenia.     The pelvic ring is intact.   No pubic symphysis or sacroiliac joint diastasis.     The right proximal femur and acetabulum are intact.   Normal appearance of the hip joint space.  Soft tissues are appropriate.     Summary:  1. No acute bony abnormality is seen.        This report was finalized on 04/13/2023 10:57 by Dr. Michael Barry MD.        I have personally reviewed and interpreted the radiology studies and ECG obtained at time of admission.     Assessment / Plan   Assessment:   Active Hospital Problems    Diagnosis    • **Gastrointestinal hemorrhage, unspecified gastrointestinal hemorrhage type            Medical Decision Making  Number and Complexity of problems  · Anemia of acute blood loss secondary to below stated suspected conditions  · Bright red blood per rectum in the setting of diverticulosis (considering diverticular bleed) and possibly  with coexisting with a hemorrhoidal bleed.  Both are painless bleeding.  · Vascular dementia  · Right hip, thigh pain with negative x-ray-requested for femur x-ray  · Left leg wound-no gross signs of systemic infection.  Will consult wound care nurse      Treatment Plan  The patient will be admitted to my service here at [facility].  Agree with transfusion of 1 unit  Serial H&H  Monitor for any active bleeding and transfuse as needed to maintain hemoglobin greater than 7  I think it would not be unreasonable to do a clear liquid diet  GI consult  Verify if any home medications care pharmacist/nurse  Wound  care nurse regarding the left leg wound which I think with only require local management  Anusol HC  Supportive care  Avoidance of constipation  Other plans per orders  Avoidance of constipation     Conditions and Status       Fair    MDM Data  External documents reviewed: Attempt to look at prior colonoscopy but had not seen any document (procedure) nor from care everywhere  Cardiac tracing (EKG, telemetry) interpretation: telemetry showed normal sinus rhythm rate of 87  Radiology interpretation: Reviewed  Labs reviewed: Yes  Any tests that were considered but not ordered: None     Decision rules/scores evaluated (example [id number], [place], etc): None  Discussed with: ER nurse and  will     Care Planning  Shared decision making:   Code status and discussions: Full code  Disposition  Social Determinants of Health that impact treatment or disposition none identified at this time  Estimated length of stay is to be determined.     I confirmed that the patient's advanced care plan is present, code status is documented, and a surrogate decision maker is listed in the patient's medical record.     The patient's surrogate decision maker is  will  The patient was seen and examined by me on  Electronically signed by Art Sanchez MD, 04/13/23, 12:43 CDT.

## 2023-04-13 NOTE — ED PROVIDER NOTES
Subjective   History of Present Illness  89-year-old patient with a lower GI bleeding    Rectal Bleeding  Quality:  Bright red  Amount:  Moderate  Timing:  Constant  Chronicity:  New  Context: not anal penetration, not diarrhea, not hemorrhoids and not rectal injury    Similar prior episodes: no    Relieved by:  Nothing  Worsened by:  Nothing  Ineffective treatments:  None tried  Associated symptoms: abdominal pain and light-headedness    Associated symptoms: no dizziness, no epistaxis, no hematemesis and no loss of consciousness    Risk factors: no anticoagulant use, no hx of colorectal cancer, no hx of IBD and no liver disease        Review of Systems   Constitutional: Negative.    HENT: Negative.  Negative for nosebleeds.    Eyes: Negative.    Respiratory: Negative.    Cardiovascular: Negative.    Gastrointestinal: Positive for abdominal pain and hematochezia. Negative for hematemesis.   Endocrine: Negative.    Genitourinary: Negative.    Skin: Negative.    Neurological: Positive for light-headedness. Negative for dizziness and loss of consciousness.   Hematological: Negative.    All other systems reviewed and are negative.      Past Medical History:   Diagnosis Date   • Cancer        Allergies   Allergen Reactions   • Nisoldipine Er Other (See Comments)     Feelings of passing out, tired       No past surgical history on file.    No family history on file.    Social History     Socioeconomic History   • Marital status:    Tobacco Use   • Smoking status: Never   • Smokeless tobacco: Never   Vaping Use   • Vaping Use: Never used   Substance and Sexual Activity   • Alcohol use: Yes   • Drug use: Never           Objective   Physical Exam  Vitals and nursing note reviewed. Exam conducted with a chaperone present.   Constitutional:       General: She is awake. She is not in acute distress.     Appearance: Normal appearance. She is well-developed. She is not toxic-appearing.   HENT:      Head: Normocephalic and  atraumatic.      Nose:      Right Nostril: No epistaxis.      Left Nostril: No epistaxis.   Eyes:      General: Lids are normal. No scleral icterus.     Conjunctiva/sclera: Conjunctivae normal.      Pupils: Pupils are equal, round, and reactive to light.   Neck:      Vascular: No hepatojugular reflux or JVD.   Cardiovascular:      Rate and Rhythm: Normal rate and regular rhythm.      Chest Wall: PMI is not displaced.      Pulses: Normal pulses. No decreased pulses.      Heart sounds: Normal heart sounds. No murmur heard.  Pulmonary:      Effort: Pulmonary effort is normal. No accessory muscle usage or respiratory distress.      Breath sounds: Normal breath sounds. No decreased breath sounds or wheezing.   Abdominal:      General: Abdomen is flat. Bowel sounds are normal. There is no distension or abdominal bruit.      Palpations: Abdomen is soft. There is no shifting dullness, fluid wave, mass or pulsatile mass.      Tenderness: There is abdominal tenderness in the right lower quadrant and left lower quadrant. There is no right CVA tenderness, left CVA tenderness, guarding or rebound.      Hernia: No hernia is present.      Comments: Patient complaining of right hip pain neuro vas examination negative.  Femoral pulse are palpable.  Distal pulse are palpable.   Musculoskeletal:         General: Normal range of motion.      Cervical back: Normal range of motion and neck supple. No rigidity.      Right lower leg: No edema.      Left lower leg: No edema.   Skin:     General: Skin is warm and dry.      Capillary Refill: Capillary refill takes less than 2 seconds.      Coloration: Skin is not cyanotic, jaundiced, mottled or pale.   Neurological:      General: No focal deficit present.      Mental Status: She is alert and oriented to person, place, and time. Mental status is at baseline. She is confused.      GCS: GCS eye subscore is 4. GCS verbal subscore is 5. GCS motor subscore is 6.      Cranial Nerves: No cranial  nerve deficit.      Sensory: Sensation is intact.      Motor: Motor function is intact.      Deep Tendon Reflexes: Reflexes are normal and symmetric.   Psychiatric:         Behavior: Behavior normal. Behavior is cooperative.         Procedures           ED Course  ED Course as of 04/13/23 1236   Thu Apr 13, 2023   1234 Patient came in with the GI bleeding has got low hemoglobin with elevated BUN.  No anion gap has got a RTA was given blood is going be admitted she has been having right hip pain her hip x-rays are negative there is no evidence of any rash on the hip femoral pulse are palpable there is no tenderness of the thigh there is no evidence cellulitis we will give her some pain medication. [TS]      ED Course User Index  [TS] Toro Nunez MD                                           Medical Decision Making  DD   mesenteric lymphadenitis, diverticulitis, intussusception, small bowel obstruction, adhesions, bowel ischemia,intraabdominal abscess, spontaneous bacterial peritonitis, hernia, urinary tract infection, ureterolithiasis,acute appendicitis, abdominal aortic aneurysm, pneumonia, porphyria and diabetic ketoacidosis as a possible cause of abdominal pain in this patient. This is a partial list of diagnoses considered.      Gastrointestinal hemorrhage, unspecified gastrointestinal hemorrhage type: acute illness or injury     Details: Significant GI bleeding with low hemoglobin was transfused 1 unit of blood and IV fluids hemodynamically she is stable at this time.  Right hip pain: acute illness or injury     Details: Having some right hip pain no history of trauma no history of fall.  The etiology just under to mind x-rays are negative there is no rashes no cellulitis neuro vas examination is negative.  Amount and/or Complexity of Data Reviewed  Labs: ordered.     Details: Labs reviewed  Radiology: ordered.     Details: CT scan of the abdomen pelvis report was negative.  Discussion of management or test  interpretation with external provider(s): I have discussed this case with the hospitalist    Risk  Decision regarding hospitalization.    Risk Details: Patient will be admitted to medicine service for further evaluation and treatment of condition.  I have discussed with family members.        Final diagnoses:   Gastrointestinal hemorrhage, unspecified gastrointestinal hemorrhage type   Right hip pain       ED Disposition  ED Disposition     ED Disposition   Decision to Admit    Condition   --    Comment   Level of Care: Telemetry [5]   Diagnosis: Gastrointestinal hemorrhage, unspecified gastrointestinal hemorrhage type [[id number]]   Admitting Physician: EVELIO SOLIS [[id number]]   Attending Physician: EVELIO SOLIS [[id number]]   Certification: I Certify That Inpatient Hospital Services Are Medically Necessary For Greater Than 2 Midnights               No follow-up provider specified.       Medication List      No changes were made to your prescriptions during this visit.          Toro Nunez MD  04/13/23 9631

## 2023-04-13 NOTE — PLAN OF CARE
Goal Outcome Evaluation:  Plan of Care Reviewed With: patient        Progress: no change  Outcome Evaluation: Admit from ER with GI bleed. She co pain in right hip, medication given. No bleeding noted. 1 unit of blood given. monitor H&H. She will have endo and c-scope in am. cont to monitor.

## 2023-04-13 NOTE — CONSULTS
[facility]  Inpatient Consult Note  Today's date:  04/13/23    Dina CALVERT Link  1934       Referring Provider: No Known Provider  Primary Physician: Benedicto Hebert MD   Primary Gastroenterologist: Dr. Bubba Asher    Date of Admission: 4/13/2023  Date of Service:  04/13/23    Reason for Consultation/Chief Complaint: Hematochezia; anemia    History of present illness:    Patient is a very sweet 89-year-old female without significant past medical history who presents with 2 days of painless  bright red blood per rectum.  Initially it was mixed in with stool and then she had an episode of bloody incontinence, and decided to come to the hospital.  Last episode at 9 AM today.  She is not taking blood thinners.  No history of same.  No NSAIDs/aspirin use.  She had 2 colonoscopies within the last decade with the last one approximately 3 years ago without significant findings.   unsure whether diverticulosis was noted.  Denies upper GI symptoms.  No abdominal pain.  Hgb of 6.5 with MCV of 88.6, compared with 14.4 on 12/5/2022.  BUN/creatinine of 39/1.01.  CT abdomen/pelvis without acute abnormality.  Few scattered diverticula were noted within a colon full of stool.  Patient denies CP/SOB/fever.    Past Medical History:   Diagnosis Date   • Cancer        History reviewed. No pertinent surgical history.     Allergies   Allergen Reactions   • Nisoldipine Er Other (See Comments)     Feelings of passing out, tired       Medications Prior to Admission   Medication Sig Dispense Refill Last Dose   • Apixaban (Eliquis DVT/PE Starter Pack) tablet Take two 5 mg tablets by mouth every 12 hours for 7 days. Followed by one 5 mg tablet every 12 hours. 74 tablet 0    • levothyroxine (SYNTHROID, LEVOTHROID) 100 MCG tablet Take 100 mcg by mouth Daily.      • lovastatin (Mevacor) 40 MG tablet Take 40 mg by mouth Every Night.      • memantine (NAMENDA) 5 MG tablet Take 5 mg by mouth 2 (Two) Times a  Day.      • metoprolol succinate XL (TOPROL-XL) 25 MG 24 hr tablet Take 0.5 tablets by mouth Daily for 30 days. 15 tablet 0        Hospital Medications (active)       Dose Frequency Start End    acetaminophen (TYLENOL) tablet 650 mg 650 mg Every 4 Hours PRN 4/13/2023     Admin Instructions: Based on patient request - if ordered for moderate or severe pain, provider allows for administration of a medication prescribed for a lower pain scale.  Do not exceed 4 grams of acetaminophen in a 24 hr period. Max dose of 2gm for AST/ALT greater than 120 units/L    If given for fever, use fever parameter: fever greater than 100.4 °F.    If given for pain, use the following pain scale:   Mild Pain = Pain Score of 1-3, CPOT 1-2  Moderate Pain = Pain Score of 4-6, CPOT 3-4  Severe Pain = Pain Score of 7-10, CPOT 5-8    Route: Oral    atorvastatin (LIPITOR) tablet 10 mg 10 mg Daily 4/14/2023     Admin Instructions: Avoid grapefruit juice.    Route: Oral    hydrocortisone (ANUSOL-HC) suppository 25 mg 25 mg 2 Times Daily 4/13/2023     Route: Rectal    lactated ringers infusion 50 mL/hr Continuous 4/13/2023     Route: Intravenous    levothyroxine (SYNTHROID, LEVOTHROID) tablet 100 mcg 100 mcg Every Early Morning 4/14/2023     Admin Instructions: Take on empty stomach.    Route: Oral    memantine (NAMENDA) tablet 5 mg 5 mg 2 Times Daily 4/13/2023     Route: Oral    metoprolol succinate XL (TOPROL-XL) 24 hr tablet 12.5 mg 12.5 mg Every 24 Hours Scheduled 4/13/2023     Admin Instructions: Do not crush or chew.    Route: Oral    ondansetron (ZOFRAN) injection 4 mg 4 mg Every 6 Hours PRN 4/13/2023     Admin Instructions: If BOTH ondansetron (ZOFRAN) and promethazine (PHENERGAN) are ordered use ondansetron first and THEN promethazine IF ondansetron is ineffective.    Route: Intravenous    sodium chloride 0.9 % flush 10 mL 10 mL Every 12 Hours Scheduled 4/13/2023     Route: Intravenous    sodium chloride 0.9 % flush 10 mL 10 mL As Needed  4/13/2023     Route: Intravenous    sodium chloride 0.9 % infusion 40 mL 40 mL As Needed 4/13/2023     Admin Instructions: Following administration of an IV intermittent medication, flush line with 40mL NS at 100mL/hr.    Route: Intravenous          Social History     Tobacco Use   • Smoking status: Never   • Smokeless tobacco: Never   Substance Use Topics   • Alcohol use: Yes        Past Family History:  History reviewed. No pertinent family history.    Review of Systems:  12 point inspection performed with pertinent positives in HPI.      Physical Exam:  Temp:  [97.4 °F (36.3 °C)-98.6 °F (37 °C)] 97.6 °F (36.4 °C)  Heart Rate:  [78-86] 81  Resp:  [13-20] 18  BP: (103-155)/(43-98) 155/78  Body mass index is 24.21 kg/m².    Intake/Output Summary (Last 24 hours) at 4/13/2023 1605  Last data filed at 4/13/2023 1449  Gross per 24 hour   Intake 300 ml   Output --   Net 300 ml     I/O this shift:  In: 300 [Blood:300]  Out: -     General appearance:   HEENT: Nonicteric sclerae.  Moist oral mucosa.  PERRLA.  EOMI.    Lungs: Clear to auscultation bilaterally.    Heart: Regular rate and rhythm.    Abdomen: Soft, nondistended, nontender to palpation, with normoactive bowel sounds.  Extremities: No cyanosis, edema or pulse deficits.  Skin: No rash or jaundice.    Results Review:  Lab Results (last 24 hours)     Procedure Component Value Units Date/Time    [place] Draw [[id number]] Collected: 04/13/23 0857    Specimen: Blood Updated: 04/13/23 1002    Narrative:      The following orders were created for panel order [place] Draw.  Procedure                               Abnormality         Status                     ---------                               -----------         ------                     Green Top (Gel)[[id number]]                                  Final result               Lavender Top[[id number]]                                     Final result               Red Top[[id number]]                                           Final result               Light Blue Top[[id number]]                                   Final result                 Please view results for these tests on the individual orders.    Green Top (Gel) [[id number]] Collected: 04/13/23 0857    Specimen: Blood Updated: 04/13/23 1002     Extra Tube Hold for add-ons.     Comment: Auto resulted.       Lavender Top [[id number]] Collected: 04/13/23 0857    Specimen: Blood Updated: 04/13/23 1002     Extra Tube hold for add-on     Comment: Auto resulted       Red Top [[id number]] Collected: 04/13/23 0857    Specimen: Blood Updated: 04/13/23 1002     Extra Tube Hold for add-ons.     Comment: Auto resulted.       Light Blue Top [[id number]] Collected: 04/13/23 0857    Specimen: Blood Updated: 04/13/23 1001     Extra Tube Hold for add-ons.     Comment: Auto resulted       Basic Metabolic Panel [[id number]]  (Abnormal) Collected: 04/13/23 0857    Specimen: Blood Updated: 04/13/23 0934     Glucose 139 mg/dL      BUN 39 mg/dL      Creatinine 1.01 mg/dL      Sodium 140 mmol/L      Potassium 4.3 mmol/L      Chloride 108 mmol/L      CO2 19.0 mmol/L      Calcium 8.5 mg/dL      BUN/Creatinine Ratio 38.6     Anion Gap 13.0 mmol/L      eGFR 53.3 mL/min/1.73     Narrative:      GFR Normal >60  Chronic Kidney Disease <60  Kidney Failure <15    The GFR formula is only valid for adults with stable renal function between ages 18 and 70.    Protime-INR [[id number]]  (Normal) Collected: 04/13/23 0857    Specimen: Blood Updated: 04/13/23 0920     Protime 14.0 Seconds      INR 1.06    CBC & Differential [[id number]]  (Abnormal) Collected: 04/13/23 0857    Specimen: Blood Updated: 04/13/23 0917    Narrative:      The following orders were created for panel order CBC & Differential.  Procedure                               Abnormality         Status                     ---------                               -----------         ------                     CBC Auto Differential[[id number]]        Abnormal             Final result                 Please view results for these tests on the individual orders.    CBC Auto Differential [[id number]]  (Abnormal) Collected: 04/13/23 0857    Specimen: Blood Updated: 04/13/23 0917     WBC 6.74 10*3/mm3      RBC 2.46 10*6/mm3      Hemoglobin 6.5 g/dL      Hematocrit 21.8 %      MCV 88.6 fL      MCH 26.4 pg      MCHC 29.8 g/dL      RDW 15.3 %      RDW-SD 48.9 fl      MPV 10.2 fL      Platelets 194 10*3/mm3      Neutrophil % 71.4 %      Lymphocyte % 16.9 %      Monocyte % 8.6 %      Eosinophil % 1.0 %      Basophil % 1.2 %      Immature Grans % 0.9 %      Neutrophils, Absolute 4.81 10*3/mm3      Lymphocytes, Absolute 1.14 10*3/mm3      Monocytes, Absolute 0.58 10*3/mm3      Eosinophils, Absolute 0.07 10*3/mm3      Basophils, Absolute 0.08 10*3/mm3      Immature Grans, Absolute 0.06 10*3/mm3      nRBC 0.0 /100 WBC           Radiology Review:  Imaging Results (Last 72 Hours)     Procedure Component Value Units Date/Time    XR Knee 1 or 2 View Right [[id number]] Collected: 04/13/23 1430     Updated: 04/13/23 1434    Narrative:      EXAMINATION: XR KNEE 1 OR 2 VW RIGHT-     4/13/2023 2:11 PM CDT     HISTORY: severe pain; K92.2-Gastrointestinal hemorrhage, unspecified;  M25.551-Pain in right hip     Right knee, 2 views.     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     Diffuse tibiofemoral chondrocalcinosis.     Diffuse arterial calcification.     Summary:  1. Degenerative joint changes greatest at the lateral and anterior  compartment.  2. No fracture  This report was finalized on 04/13/2023 14:31 by Dr. Michael Barry MD.    XR Femur 2 View Right [[id number]] Collected: 04/13/23 1430     Updated: 04/13/23 1433    Narrative:      EXAMINATION: XR FEMUR 2 VW RIGHT-     4/13/2023 2:10 PM CDT     HISTORY: severe pain; K92.2-Gastrointestinal hemorrhage, unspecified;  M25.551-Pain in right hip     Right femur, 2 views.     No femur fracture  is seen.     The hip and knee are normal, to the extent visualized.     No soft tissue abnormality is seen.       Impression:      1. No acute bony abnormality.              This report was finalized on 04/13/2023 14:30 by Dr. Michael Barry MD.    CT Abdomen Pelvis With Contrast [[id number]] Collected: 04/13/23 1107     Updated: 04/13/23 1113    Narrative:      EXAMINATION: CT ABDOMEN PELVIS W CONTRAST-      4/13/2023 10:52 AM CDT     HISTORY: Abdominal pain, acute, nonlocalized. Rectal bleeding. Diarrhea.     In order to have a CT radiation dose as low as reasonably achievable  Automated Exposure Control was utilized for adjustment of the mA and/or  KV according to patient size.     DLP in mGycm= 531.     Abdomen/pelvis CT with IV contrast.  Axial, sagittal, and coronal sequences.     No comparison.     Heart size is normal.  There is no acute abnormality at the lung bases.     Normal CT appearance of the liver, gallbladder, pancreas, spleen,  adrenal glands, and kidneys.     Aortic calcification with no aneurysm.     No small bowel dilation.  There is moderate fecal material throughout the colon.  A few scattered sigmoid diverticula.  No diverticulitis or colitis.     Summary:  1. No acute abnormality is seen.                                   This report was finalized on 04/13/2023 11:10 by Dr. Michael Barry MD.    XR Hip With or Without Pelvis 2 - 3 View Right [[id number]] Collected: 04/13/23 1057     Updated: 04/13/23 1101    Narrative:      EXAMINATION: XR HIP W OR WO PELVIS 2-3 VIEW RIGHT-     4/13/2023 10:17 AM CDT     HISTORY: Pelvis and right hip pain.     Pelvis and right hip, 3 views.     Osteopenia.     The pelvic ring is intact.   No pubic symphysis or sacroiliac joint diastasis.     The right proximal femur and acetabulum are intact.   Normal appearance of the hip joint space.  Soft tissues are appropriate.     Summary:  1. No acute bony abnormality is seen.        This report was finalized on  04/13/2023 10:57 by Dr. Michael Barry MD.          Impression/Plan:  Patient Active Problem List   Diagnosis Code   • AMS (altered mental status) R41.82   • Vascular dementia without behavioral disturbance F01.50   • Near syncope R55   • Acute pulmonary embolism I26.99   • Hypothyroidism E03.9   • Lactic acidosis E87.20   • Gastrointestinal hemorrhage, unspecified gastrointestinal hemorrhage type K92.2       Hematochezia/suspected acute blood loss anemia.  Diverticulosis noted on CT.  Suspect diverticular bleed.  Elevated BUN possibly secondary to dehydration but consider upper GI source especially with hemoglobin level.    -Transfuse as per protocol  -EGD/colonoscopy in a.m.  -Clear liquid diet    Risk-benefit and alternatives were discussed with the patient in the presence of her  and they elect to have the procedure performed.  All questions were answered to their satisfaction.    Bubba Asher MD  04/13/23   16:05 CDT

## 2023-04-14 ENCOUNTER — ANESTHESIA (OUTPATIENT)
Dept: GASTROENTEROLOGY | Facility: HOSPITAL | Age: 88
DRG: 378 | End: 2023-04-14
Payer: MEDICARE

## 2023-04-14 ENCOUNTER — ANESTHESIA EVENT (OUTPATIENT)
Dept: GASTROENTEROLOGY | Facility: HOSPITAL | Age: 88
DRG: 378 | End: 2023-04-14
Payer: MEDICARE

## 2023-04-14 PROBLEM — D50.0 NORMOCYTIC ANEMIA DUE TO BLOOD LOSS: Status: ACTIVE | Noted: 2023-04-14

## 2023-04-14 LAB
BH BB BLOOD EXPIRATION DATE: NORMAL
BH BB BLOOD TYPE BARCODE: 6200
BH BB DISPENSE STATUS: NORMAL
BH BB PRODUCT CODE: NORMAL
BH BB UNIT NUMBER: NORMAL
CROSSMATCH INTERPRETATION: NORMAL
HCT VFR BLD AUTO: 21.3 % (ref 34–46.6)
HCT VFR BLD AUTO: 23.5 % (ref 34–46.6)
HCT VFR BLD AUTO: 24.1 % (ref 34–46.6)
HCT VFR BLD AUTO: 24.4 % (ref 34–46.6)
HGB BLD-MCNC: 6.6 G/DL (ref 12–15.9)
HGB BLD-MCNC: 7.5 G/DL (ref 12–15.9)
HGB BLD-MCNC: 7.6 G/DL (ref 12–15.9)
HGB BLD-MCNC: 7.7 G/DL (ref 12–15.9)
UNIT  ABO: NORMAL
UNIT  RH: NORMAL

## 2023-04-14 PROCEDURE — 45378 DIAGNOSTIC COLONOSCOPY: CPT | Performed by: INTERNAL MEDICINE

## 2023-04-14 PROCEDURE — 85018 HEMOGLOBIN: CPT | Performed by: INTERNAL MEDICINE

## 2023-04-14 PROCEDURE — 0DB68ZX EXCISION OF STOMACH, VIA NATURAL OR ARTIFICIAL OPENING ENDOSCOPIC, DIAGNOSTIC: ICD-10-PCS | Performed by: INTERNAL MEDICINE

## 2023-04-14 PROCEDURE — 85014 HEMATOCRIT: CPT | Performed by: INTERNAL MEDICINE

## 2023-04-14 PROCEDURE — 88305 TISSUE EXAM BY PATHOLOGIST: CPT | Performed by: INTERNAL MEDICINE

## 2023-04-14 PROCEDURE — 0DJD8ZZ INSPECTION OF LOWER INTESTINAL TRACT, VIA NATURAL OR ARTIFICIAL OPENING ENDOSCOPIC: ICD-10-PCS | Performed by: INTERNAL MEDICINE

## 2023-04-14 PROCEDURE — 86677 HELICOBACTER PYLORI ANTIBODY: CPT | Performed by: INTERNAL MEDICINE

## 2023-04-14 PROCEDURE — 25010000002 PROPOFOL 10 MG/ML EMULSION: Performed by: NURSE ANESTHETIST, CERTIFIED REGISTERED

## 2023-04-14 PROCEDURE — 43239 EGD BIOPSY SINGLE/MULTIPLE: CPT | Performed by: INTERNAL MEDICINE

## 2023-04-14 RX ORDER — PANTOPRAZOLE SODIUM 40 MG/1
40 TABLET, DELAYED RELEASE ORAL
Status: DISCONTINUED | OUTPATIENT
Start: 2023-04-14 | End: 2023-04-15 | Stop reason: HOSPADM

## 2023-04-14 RX ORDER — SUCRALFATE ORAL 1 G/10ML
1 SUSPENSION ORAL
Status: DISCONTINUED | OUTPATIENT
Start: 2023-04-14 | End: 2023-04-15 | Stop reason: HOSPADM

## 2023-04-14 RX ORDER — SODIUM CHLORIDE 0.9 % (FLUSH) 0.9 %
10 SYRINGE (ML) INJECTION EVERY 12 HOURS SCHEDULED
Status: DISCONTINUED | OUTPATIENT
Start: 2023-04-14 | End: 2023-04-14 | Stop reason: HOSPADM

## 2023-04-14 RX ORDER — SODIUM CHLORIDE 9 MG/ML
100 INJECTION, SOLUTION INTRAVENOUS CONTINUOUS
Status: DISCONTINUED | OUTPATIENT
Start: 2023-04-14 | End: 2023-04-15 | Stop reason: HOSPADM

## 2023-04-14 RX ORDER — SODIUM CHLORIDE 9 MG/ML
40 INJECTION, SOLUTION INTRAVENOUS AS NEEDED
Status: DISCONTINUED | OUTPATIENT
Start: 2023-04-14 | End: 2023-04-14 | Stop reason: HOSPADM

## 2023-04-14 RX ORDER — LIDOCAINE HYDROCHLORIDE 20 MG/ML
INJECTION, SOLUTION EPIDURAL; INFILTRATION; INTRACAUDAL; PERINEURAL AS NEEDED
Status: DISCONTINUED | OUTPATIENT
Start: 2023-04-14 | End: 2023-04-14 | Stop reason: SURG

## 2023-04-14 RX ORDER — SODIUM CHLORIDE 0.9 % (FLUSH) 0.9 %
10 SYRINGE (ML) INJECTION AS NEEDED
Status: DISCONTINUED | OUTPATIENT
Start: 2023-04-14 | End: 2023-04-14 | Stop reason: HOSPADM

## 2023-04-14 RX ORDER — PROPOFOL 10 MG/ML
VIAL (ML) INTRAVENOUS AS NEEDED
Status: DISCONTINUED | OUTPATIENT
Start: 2023-04-14 | End: 2023-04-14 | Stop reason: SURG

## 2023-04-14 RX ADMIN — MEMANTINE HYDROCHLORIDE 5 MG: 5 TABLET, FILM COATED ORAL at 08:46

## 2023-04-14 RX ADMIN — LIDOCAINE HYDROCHLORIDE 40 MG: 20 INJECTION, SOLUTION EPIDURAL; INFILTRATION; INTRACAUDAL; PERINEURAL at 13:41

## 2023-04-14 RX ADMIN — Medication 10 ML: at 21:00

## 2023-04-14 RX ADMIN — ATORVASTATIN CALCIUM 10 MG: 10 TABLET, FILM COATED ORAL at 08:46

## 2023-04-14 RX ADMIN — ACETAMINOPHEN 650 MG: 325 TABLET ORAL at 09:41

## 2023-04-14 RX ADMIN — LEVOTHYROXINE SODIUM 100 MCG: 100 TABLET ORAL at 05:24

## 2023-04-14 RX ADMIN — SODIUM CHLORIDE, POTASSIUM CHLORIDE, SODIUM LACTATE AND CALCIUM CHLORIDE 50 ML/HR: 600; 310; 30; 20 INJECTION, SOLUTION INTRAVENOUS at 09:41

## 2023-04-14 RX ADMIN — PANTOPRAZOLE SODIUM 40 MG: 40 TABLET, DELAYED RELEASE ORAL at 17:33

## 2023-04-14 RX ADMIN — PROPOFOL INJECTABLE EMULSION 50 MG: 10 INJECTION, EMULSION INTRAVENOUS at 13:51

## 2023-04-14 RX ADMIN — PROPOFOL INJECTABLE EMULSION 50 MG: 10 INJECTION, EMULSION INTRAVENOUS at 13:41

## 2023-04-14 RX ADMIN — HYDROCORTISONE ACETATE 25 MG: 25 SUPPOSITORY RECTAL at 08:47

## 2023-04-14 RX ADMIN — PROPOFOL INJECTABLE EMULSION 50 MG: 10 INJECTION, EMULSION INTRAVENOUS at 13:47

## 2023-04-14 RX ADMIN — METOPROLOL SUCCINATE 12.5 MG: 25 TABLET, EXTENDED RELEASE ORAL at 08:46

## 2023-04-14 RX ADMIN — SODIUM CHLORIDE 100 ML/HR: 9 INJECTION, SOLUTION INTRAVENOUS at 12:21

## 2023-04-14 RX ADMIN — PROPOFOL INJECTABLE EMULSION 50 MG: 10 INJECTION, EMULSION INTRAVENOUS at 13:56

## 2023-04-14 RX ADMIN — ACETAMINOPHEN 650 MG: 325 TABLET ORAL at 19:26

## 2023-04-14 RX ADMIN — SUCRALFATE 1 G: 1 SUSPENSION ORAL at 20:58

## 2023-04-14 RX ADMIN — ACETAMINOPHEN 650 MG: 325 TABLET ORAL at 23:38

## 2023-04-14 RX ADMIN — POLYETHYLENE GLYCOL 3350, SODIUM SULFATE ANHYDROUS, SODIUM BICARBONATE, SODIUM CHLORIDE, POTASSIUM CHLORIDE 1000 ML: 236; 22.74; 6.74; 5.86; 2.97 POWDER, FOR SOLUTION ORAL at 05:25

## 2023-04-14 RX ADMIN — MEMANTINE HYDROCHLORIDE 5 MG: 5 TABLET, FILM COATED ORAL at 20:58

## 2023-04-14 NOTE — ANESTHESIA PREPROCEDURE EVALUATION
Anesthesia Evaluation     Patient summary reviewed and Nursing notes reviewed   no history of anesthetic complications:  NPO Solid Status: > 6 hours             Airway   Mallampati: II  TM distance: >3 FB  Dental      Pulmonary    (+) pulmonary embolism (2020 ),   Cardiovascular   Exercise tolerance: poor (<4 METS)    (-) pacemaker, past MI, cardiac stents, CABG      Neuro/Psych  (+) psychiatric history, dementia,    (-) seizures, CVA  GI/Hepatic/Renal/Endo    (+)  GI bleeding , thyroid problem     Musculoskeletal     Abdominal    Substance History      OB/GYN          Other      history of cancer                    Anesthesia Plan    ASA 3 - emergent     MAC     (Acute GIB requiring blood transfusion )  intravenous induction           CODE STATUS:    Level Of Support Discussed With: Next of Kin (If No Surrogate)  Code Status (Patient has no pulse and is not breathing): CPR (Attempt to Resuscitate)  Medical Interventions (Patient has pulse or is breathing): Full Support

## 2023-04-14 NOTE — CONSULTS
[facility]  INPATIENT WOUND & OSTOMY CONSULTATION    Today's Date: 04/14/23    Patient Name: Dina Michaels  MRN: [id number]  CSN: [id number]  PCP: Benedicto Hebert MD  Referring Provider:   Consulting Provider (From admission, onward)    Start Ordered     Status Ordering Provider    04/13/23 1610 04/13/23 1610  Inpatient Wound Care Provider Consult  Once        Specialty:  Wound Care  Provider:  Verna Lyons APRN Acknowledged PUERTOLLANO, GLENN RIEGO         Attending Provider: Art Sanchez*  Length of Stay: 1    SUBJECTIVE   Chief Complaint: Wound of LLE    HPI: Dina Michaels, a 89 y.o.female, presents with a past medical history of cancer, dementia, pulmonary embolism, hypothyroidism.  A full past medical history as listed below.  Patient presented to the hospital due to rectal bleeding and found to have a GI bleed.    Inpatient wound care consulted due to wound of left lower extremity.  Patient's  is in the room provides history.  He is he provides care at home.  He states he covers it with antibiotic ointment at night and then leaves open to air during the day.      Visit Dx:    ICD-10-CM ICD-9-CM   1. Gastrointestinal hemorrhage, unspecified gastrointestinal hemorrhage type  K92.2 578.9   2. Right hip pain  M25.551 719.45       Hospital Problem List:     Gastrointestinal hemorrhage, unspecified gastrointestinal hemorrhage type    Vascular dementia without behavioral disturbance    Hypothyroidism    Normocytic anemia due to blood loss      History:   Past Medical History:   Diagnosis Date   • Cancer      History reviewed. No pertinent surgical history.  Social History     Socioeconomic History   • Marital status:    Tobacco Use   • Smoking status: Never   • Smokeless tobacco: Never   Vaping Use   • Vaping Use: Never used   Substance and Sexual Activity   • Alcohol use: Yes   • Drug use: Never     History reviewed. No pertinent family  history.    Allergies:  Allergies   Allergen Reactions   • Nisoldipine Er Other (See Comments)     Feelings of passing out, tired       Medications:    Current Facility-Administered Medications:   •  acetaminophen (TYLENOL) tablet 650 mg, 650 mg, Oral, Q4H PRN, Art Sanchez MD, 650 mg at 04/14/23 0941  •  atorvastatin (LIPITOR) tablet 10 mg, 10 mg, Oral, Daily, Art Sanchez MD, 10 mg at 04/14/23 0846  •  hydrocortisone (ANUSOL-HC) suppository 25 mg, 25 mg, Rectal, BID, Art Sanchez MD, 25 mg at 04/14/23 0847  •  lactated ringers infusion, 50 mL/hr, Intravenous, Continuous, Art Sanchez MD, Last Rate: 50 mL/hr at 04/14/23 0941, 50 mL/hr at 04/14/23 0941  •  levothyroxine (SYNTHROID, LEVOTHROID) tablet 100 mcg, 100 mcg, Oral, Q AM, Art Sanchez MD, 100 mcg at 04/14/23 0524  •  memantine (NAMENDA) tablet 5 mg, 5 mg, Oral, BID, Art Sanchez MD, 5 mg at 04/14/23 0846  •  metoprolol succinate XL (TOPROL-XL) 24 hr tablet 12.5 mg, 12.5 mg, Oral, Q24H, Art Sanchez MD, 12.5 mg at 04/14/23 0846  •  ondansetron (ZOFRAN) injection 4 mg, 4 mg, Intravenous, Q6H PRN, Art Sanchez MD  •  pantoprazole (PROTONIX) EC tablet 40 mg, 40 mg, Oral, BID AC, Bubba Asher MD  •  sodium chloride 0.9 % flush 10 mL, 10 mL, Intravenous, Q12H, Art Sanchez MD, 10 mL at 04/13/23 2016  •  sodium chloride 0.9 % flush 10 mL, 10 mL, Intravenous, PRN, Art Sanchez MD  •  sodium chloride 0.9 % infusion 40 mL, 40 mL, Intravenous, PRN, Art Sanchez MD  •  sodium chloride 0.9 % infusion, 100 mL/hr, Intravenous, Continuous, Artie Helms MD, Stopped at 04/14/23 1429    Review of Systems:   Review of Systems   Unable to perform ROS: Dementia         OBJECTIVE     Vitals:    04/14/23 1440   BP: 141/88   Pulse: 77   Resp: 17   Temp:    SpO2: 100%       PHYSICAL EXAM:   Physical Exam  Vitals and nursing note  reviewed.   Constitutional:       General: She is awake.      Appearance: She is normal weight.   HENT:      Head: Normocephalic and atraumatic.   Eyes:      General: Lids are normal. Gaze aligned appropriately.   Cardiovascular:      Rate and Rhythm: Normal rate and regular rhythm.   Abdominal:      General: Abdomen is flat.      Palpations: Abdomen is soft.   Musculoskeletal:      Cervical back: Normal range of motion and neck supple.   Skin:     General: Skin is warm and dry.      Findings: Wound present.      Comments: Wound present on left lower leg.  Irregular edges attached to wound bed.  Subcutaneous tissue and scant amount of slough noted to the wound bed.  Periwound area slightly erythemic with edema to lower legs.  Small amount of serosanguineous drainage.   Neurological:      Mental Status: She is alert. She is disoriented and confused.      Motor: Weakness present.   Psychiatric:         Attention and Perception: She is inattentive.         Behavior: Behavior is cooperative.            Results Review:  Lab Results (last 48 hours)     Procedure Component Value Units Date/Time    Hemoglobin & Hematocrit, Blood [[id number]]  (Abnormal) Collected: 04/14/23 0746    Specimen: Blood Updated: 04/14/23 0759     Hemoglobin 7.6 g/dL      Hematocrit 24.1 %     Hemoglobin & Hematocrit, Blood [[id number]]  (Abnormal) Collected: 04/14/23 0005    Specimen: Blood Updated: 04/14/23 0011     Hemoglobin 7.7 g/dL      Hematocrit 24.4 %     Hemoglobin & Hematocrit, Blood [[id number]]  (Abnormal) Collected: 04/13/23 1609    Specimen: Blood Updated: 04/13/23 1656     Hemoglobin 7.7 g/dL      Hematocrit 25.0 %     [place] Draw [[id number]] Collected: 04/13/23 0857    Specimen: Blood Updated: 04/13/23 1002    Narrative:      The following orders were created for panel order [place] Draw.  Procedure                               Abnormality         Status                     ---------                               -----------          ------                     Green Top (Gel)[[id number]]                                  Final result               Lavender Top[[id number]]                                     Final result               Red Top[[id number]]                                          Final result               Light Blue Top[[id number]]                                   Final result                 Please view results for these tests on the individual orders.    Green Top (Gel) [[id number]] Collected: 04/13/23 0857    Specimen: Blood Updated: 04/13/23 1002     Extra Tube Hold for add-ons.     Comment: Auto resulted.       Lavender Top [[id number]] Collected: 04/13/23 0857    Specimen: Blood Updated: 04/13/23 1002     Extra Tube hold for add-on     Comment: Auto resulted       Red Top [[id number]] Collected: 04/13/23 0857    Specimen: Blood Updated: 04/13/23 1002     Extra Tube Hold for add-ons.     Comment: Auto resulted.       Light Blue Top [[id number]] Collected: 04/13/23 0857    Specimen: Blood Updated: 04/13/23 1001     Extra Tube Hold for add-ons.     Comment: Auto resulted       Basic Metabolic Panel [[id number]]  (Abnormal) Collected: 04/13/23 0857    Specimen: Blood Updated: 04/13/23 0934     Glucose 139 mg/dL      BUN 39 mg/dL      Creatinine 1.01 mg/dL      Sodium 140 mmol/L      Potassium 4.3 mmol/L      Chloride 108 mmol/L      CO2 19.0 mmol/L      Calcium 8.5 mg/dL      BUN/Creatinine Ratio 38.6     Anion Gap 13.0 mmol/L      eGFR 53.3 mL/min/1.73     Narrative:      GFR Normal >60  Chronic Kidney Disease <60  Kidney Failure <15    The GFR formula is only valid for adults with stable renal function between ages 18 and 70.    Protime-INR [[id number]]  (Normal) Collected: 04/13/23 0857    Specimen: Blood Updated: 04/13/23 0920     Protime 14.0 Seconds      INR 1.06    CBC & Differential [[id number]]  (Abnormal) Collected: 04/13/23 0857    Specimen: Blood Updated: 04/13/23 0917    Narrative:      The following orders were created for  panel order CBC & Differential.  Procedure                               Abnormality         Status                     ---------                               -----------         ------                     CBC Auto Differential[[id number]]        Abnormal            Final result                 Please view results for these tests on the individual orders.    CBC Auto Differential [[id number]]  (Abnormal) Collected: 04/13/23 0857    Specimen: Blood Updated: 04/13/23 0917     WBC 6.74 10*3/mm3      RBC 2.46 10*6/mm3      Hemoglobin 6.5 g/dL      Hematocrit 21.8 %      MCV 88.6 fL      MCH 26.4 pg      MCHC 29.8 g/dL      RDW 15.3 %      RDW-SD 48.9 fl      MPV 10.2 fL      Platelets 194 10*3/mm3      Neutrophil % 71.4 %      Lymphocyte % 16.9 %      Monocyte % 8.6 %      Eosinophil % 1.0 %      Basophil % 1.2 %      Immature Grans % 0.9 %      Neutrophils, Absolute 4.81 10*3/mm3      Lymphocytes, Absolute 1.14 10*3/mm3      Monocytes, Absolute 0.58 10*3/mm3      Eosinophils, Absolute 0.07 10*3/mm3      Basophils, Absolute 0.08 10*3/mm3      Immature Grans, Absolute 0.06 10*3/mm3      nRBC 0.0 /100 WBC         Imaging Results (Last 72 Hours)     Procedure Component Value Units Date/Time    XR Knee 1 or 2 View Right [[id number]] Collected: 04/13/23 1430     Updated: 04/13/23 1434    Narrative:      EXAMINATION: XR KNEE 1 OR 2 VW RIGHT-     4/13/2023 2:11 PM CDT     HISTORY: severe pain; K92.2-Gastrointestinal hemorrhage, unspecified;  M25.551-Pain in right hip     Right knee, 2 views.     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     Diffuse tibiofemoral chondrocalcinosis.     Diffuse arterial calcification.     Summary:  1. Degenerative joint changes greatest at the lateral and anterior  compartment.  2. No fracture  This report was finalized on 04/13/2023 14:31 by Dr. Michael Barry MD.    XR Femur 2 View Right [[id number]] Collected: 04/13/23 1430     Updated:  04/13/23 1433    Narrative:      EXAMINATION: XR FEMUR 2 VW RIGHT-     4/13/2023 2:10 PM CDT     HISTORY: severe pain; K92.2-Gastrointestinal hemorrhage, unspecified;  M25.551-Pain in right hip     Right femur, 2 views.     No femur fracture is seen.     The hip and knee are normal, to the extent visualized.     No soft tissue abnormality is seen.       Impression:      1. No acute bony abnormality.              This report was finalized on 04/13/2023 14:30 by Dr. Michael Barry MD.    CT Abdomen Pelvis With Contrast [[id number]] Collected: 04/13/23 1107     Updated: 04/13/23 1113    Narrative:      EXAMINATION: CT ABDOMEN PELVIS W CONTRAST-      4/13/2023 10:52 AM CDT     HISTORY: Abdominal pain, acute, nonlocalized. Rectal bleeding. Diarrhea.     In order to have a CT radiation dose as low as reasonably achievable  Automated Exposure Control was utilized for adjustment of the mA and/or  KV according to patient size.     DLP in mGycm= 531.     Abdomen/pelvis CT with IV contrast.  Axial, sagittal, and coronal sequences.     No comparison.     Heart size is normal.  There is no acute abnormality at the lung bases.     Normal CT appearance of the liver, gallbladder, pancreas, spleen,  adrenal glands, and kidneys.     Aortic calcification with no aneurysm.     No small bowel dilation.  There is moderate fecal material throughout the colon.  A few scattered sigmoid diverticula.  No diverticulitis or colitis.     Summary:  1. No acute abnormality is seen.                                   This report was finalized on 04/13/2023 11:10 by Dr. Michael Barry MD.    XR Hip With or Without Pelvis 2 - 3 View Right [[id number]] Collected: 04/13/23 1057     Updated: 04/13/23 1101    Narrative:      EXAMINATION: XR HIP W OR WO PELVIS 2-3 VIEW RIGHT-     4/13/2023 10:17 AM CDT     HISTORY: Pelvis and right hip pain.     Pelvis and right hip, 3 views.     Osteopenia.     The pelvic ring is intact.   No pubic symphysis or sacroiliac  joint diastasis.     The right proximal femur and acetabulum are intact.   Normal appearance of the hip joint space.  Soft tissues are appropriate.     Summary:  1. No acute bony abnormality is seen.        This report was finalized on 04/13/2023 10:57 by Dr. Michael Barry MD.             ASSESSMENT/PLAN       Examination and evaluation of wound(s) was performed.    DIAGNOSIS:   Open wound of left lower leg with fat layer exposed  Edema of left lower leg  Vascular dementia without behavioral disturbance    PLAN:   Orders placed for wound care and elevation as listed below.     Suggested outpatient wound care after discharge.  Patient's  would like to manage wound at home with new orders for a week.  Instructed him to call [facility] if any needs arise after discharge.       Start     Ordered    04/14/23 2000  Wound Care  Every 12 Hours        Question Answer Comment   Wound Locations Left anterior lower leg    Wound Care Instructions Clean with NS.  Apply therahoney to wound bed.  Cover with Vaseline gauze and wrap with Kerlix.  Avoid adhesive to skin.    Cleanse Normal Saline    Intervention Thera Honey    Intervention Vaseline Gauze    Dressing: Gauze Roll        04/14/23 1601    04/14/23 1602  Elevate Extremity  Continuous        Comments: Elevate LLE above heart level when possible   Question:  Elevate Extremity  Answer:  LLE    04/14/23 1601                 Discussed findings and treatment plan including risks, benefits, and treatment options with patient's  in detail. Patient agreed with treatment plan.      This document has been electronically signed by EDGARDO Apodaca on 4/14/2023 15:58 CDT

## 2023-04-14 NOTE — PLAN OF CARE
Goal Outcome Evaluation:  Plan of Care Reviewed With: patient        Progress: no change  Outcome Evaluation: She co pain, tylenol given with relief. Endo and c-scope done today. No bleeding noted. Cont to monitor H&H. Wound to left lower leg, wound care to see. cont to monitor.

## 2023-04-14 NOTE — OP NOTE
ESOPHAGOGASTRODUODENOSCOPY    Date:  4/14/2023    Indications: Anemia; hematochezia       Procedure: ESOPHAGOGASTRODUODENOSCOPY     Sedation: As per anesthesia.    Surgeon: Bubba Asher MD    Anesthesia: Monitored Anesthesia Care     Procedure  Description: After informed consent was obtained, a timeout was called in the endoscopy suite to confirm the correct patient and appropriate procedure.  Thereafter with the patient in the left lateral position, esophagus was intubated under direct vision with adult Olympus gastroscope.  Thereafter scope was slowly advanced into the second portion of the duodenum under direct vision.  Careful examination of the duodenum, stomach and esophagus was performed while slowly withdrawing the scope.  Retroflex examination of the gastric cardia and fundus were also performed.    Findings:   Esophagus:  Normal esophageal body mucosa.  No esophageal varices.  Essentially normal Z-line.  Small hiatal hernia.  No Theresa-Mondragon tear or esophagitis endoscopically.    Stomach:  Scattered erythema otherwise no concerning lesions such as ulcers/masses/large polyps.  No blood seen in the stomach.  Status post biopsies to rule out H. pylori/pathology.    Duodenum:  Duodenal bulb with approximately 1 cm clean-based ulcer with 2 tiny flat pigmented spots but no protuberances.  Essentially normal-appearing edges.  No bleeding with scope or water pump provocation.  No interventions performed.  No blood seen in the duodenum visualized.    Complications: No immediate complications.    Recommendations:   -Follow-up biopsies.  -H. pylori antibody.  -Colonoscopy to follow.  -Pantoprazole 40 mg p.o. twice daily 30 minutes before breakfast and dinner x8 weeks.  -Carafate 1 g p.o. every 6 hours for 8 weeks  -Hold aspirin for 8 weeks and restart as indicated and if necessary      Post-Op Diagnosis Codes:     * Gastrointestinal hemorrhage, unspecified gastrointestinal hemorrhage type [K92.2]    Bubba Asher,  MD

## 2023-04-14 NOTE — PLAN OF CARE
Goal Outcome Evaluation:              Outcome Evaluation: NTN evaluation complete. No NTN-related concerns at this time. Known left lower leg wound. Pt NPO for GI series; will monitor for diet advancement. NTN following per protocol.

## 2023-04-14 NOTE — PAYOR COMM NOTE
"4/14/23. [facility] [phone]  [phone]    ER ADMIT TO INPATIENT ON 4/13/23.    WEB SITE AND SYSTEM DOWN. I CALLED AND GAVE INFORMATION ON PATIENT.    UNABLE TO GIVE PENDING AUTH NUMBER DUE TO SYSTEM IS DOWN. SHE WILL ENTER THE INFORMATION.            Behzad Michaels (89 y.o. Female)     Date of Birth   1934    Social Security Number       Address   [address] [facility] [address]    Home Phone   [phone]    MRN   [id number]       [facility]    Marital Status                               Admission Date   4/13/23    Admission Type   Emergency    Admitting Provider   Art Sanchez MD    Attending Provider   Art Sanchez MD    Department, Room/Bed   [facility] 4B, 412/1       Discharge Date       Discharge Disposition       Discharge Destination                               Attending Provider: Art Sanchez MD    Allergies: Nisoldipine Er    Isolation: None   Infection: None   Code Status: CPR    Ht: 167.6 cm (66\")   Wt: 68 kg (150 lb)    Admission Cmt: None   Principal Problem: Gastrointestinal hemorrhage, unspecified gastrointestinal hemorrhage type [K92.2]                 Active Insurance as of 4/13/2023     Primary Coverage     Payor Plan Insurance Group Employer/Plan Group    HUMANA MEDICARE REPLACEMENT HUMANA MEDICARE REPLACEMENT [id number]     Payor Plan Address Payor Plan Phone Number Payor Plan Fax Number Effective Dates    [address] [address] [phone]  1/1/2021 - None Entered    [facility] [address]       Subscriber Name Subscriber Birth Date Member ID       BEHZAD MICHAELS 1934 [id number]                 Emergency Contacts      (Rel.) Home Phone Work Phone Mobile Phone    OLLIE MICHAELS (Spouse) [phone] -- [phone]           [facility] Encounter Date/Time: 4/13/2023 0918   Hospital Account: [id number]    MRN: [id number]   Patient:  Behzad CALVERT Link "   Contact Serial #: [id number]   SSN:          ENCOUNTER             Patient Class: Inpatient   Unit: [address] Service: Medicine     Bed: 412/1   Admitting Provider: Art Sanchez*   Referring Physician: Provider, No Known   Attending Provider: Art Sanchez*   Adm Diagnosis: Gastrointestinal hemorrh*               PATIENT          Name: Behzad Michaels : 1934 (89 yrs)   Address: [facility] [address]  Sex: Female   City: [place] [address]   County: [facility]   Marital Status:  Ethnicity: NOT                                                                              Race: WHITE   Primary Care Provider: Benedicto Hebert MD Patients Phone: Home Phone: [phone]         EMERGENCY CONTACT   Contact Name Legal Guardian? Relationship to Patient Home Phone Work Phone   1. OLLIE MICHAELS  2. *No Contact Specified*      Spouse    [phone]              GUARANTOR            Guarantor: Behzad Michaels     : 1934   Address: [facility] [address] Sex: [place]     [place] [address]     Relation to Patient: Self       Home Phone: [phone]   Guarantor ID: [id number]       Work Phone:     GUARANTOR EMPLOYER   Employer:           Status: RETIRED   COVERAGE          PRIMARY INSURANCE   Payor: HUMANA MEDICARE REPLACEMENT Plan: HUMANA MEDICARE REPLACEMENT   Group Number: [id number] Insurance Type: INDEMNITY   Subscriber Name: BEHZAD MICHAELS Subscriber : 1934   Subscriber ID: [id number] Coverage Address: [address] [address]   Pat. Rel. to Subscriber: Self Coverage Phone: [phone]   SECONDARY INSURANCE   Payor: N/A Plan: N/A   Group Number:   Insurance Type:     Subscriber Name:   Subscriber :     Subscriber ID:   Coverage Address:     Pat. Rel. to Subscriber:   Coverage Phone:        Contact Serial # ([id number])         2023    Chart ID ([id number])           Art Sanchez MD    Physician  Hospitalist  H&P      Signed  Date of Service:  04/13/23 1243  Creation Time:  04/13/23 1243     Signed        [facility]  HISTORY AND PHYSICAL     Date of Admission: 4/13/2023  Primary Care Physician: Benedicto Hebert MD     Subjective   Primary Historian:  who goes by the name Will  Chief Complaint: Rectal bleeding     History of Present Illness  I am asked to admit this 89-year-old woman for GI bleed.  Primary care provider on record is Dr. Benedicto Hebert.  ER requested admission for GI bleed and right hip pain.     She is hemodynamically stable and not tachycardic.  Her hemoglobin is 6.5 while hematocrit 21.8.  MCV 89.  BUN 39 with creatinine of 1.01.  BUN/creatinine ratio 38.6.  CO2 of 19.  In order to transfuse 1 unit of packed RBC has been in place.  There is no record of prior colonoscopy or endoscopy in our hospital.  Based on medication listed she has prior history of VTE in 2020 and dementia.  Other record indicates hypothyroidism, internal hemorrhoids     Prior hospitalization dating back to September 29 to October 1, 2020 indicates admission for altered mental status, vascular dementia, near syncope, acute pulmonary embolism, hypothyroidism and lactic acidosis.        CT of the abdomen and pelvis showed no acute abnormality seen.  There is moderate fecal material throughout the colon.  Presence of sigmoid diverticula without diverticulitis.     X-ray of hip with and without pelvis showed no acute abnormality.  Intact right proximal femur and acetabulum.  Osteopenia     Patient unable to give any historical details due to dementia  Has been showed me this morning the track of blood she created as she went to the bathroom.  There were blood clots.  It appears to be acid bright red blood per rectum.     There has not been any mention of abdominal pain, rectal pain.  He does not know of prior episodes of  "bleeding.  He told me that she is not on any anticoagulant or other medication  He does not have good grasp of patient's medications.     Bleeding has been going on for the past 3 days.  Started as spotting  No mention of any constipation        Review of Systems   Unable to give any historical details due to dementia   states that no known fall     Past Medical History:   Medical History        Past Medical History:   Diagnosis Date   • Cancer         said he likely had throat cancer but he is not certain.  He told me also that she has had colonoscopy.  He thought it was here.  I did not see any record     Past Surgical History:  is not quite sure  Social History:  reports that she has never smoked. She has never used smokeless tobacco. She reports current alcohol use. She reports that she does not use drugs.     Family History: The  does not know.  Patient unable to give any historical details due to dementia  Allergies:        Allergies   Allergen Reactions   • Nisoldipine Er                     Vital Signs: /62   Pulse 78   Temp 97.4 °F (36.3 °C)   Resp 17   Ht 167.6 cm (66\")   Wt 68 kg (150 lb)   SpO2 100%   BMI 24.21 kg/m²   Physical Exam   Patient would intermittently smile.  She has blank face most of the time  No distress  Receiving 1 unit of blood  I did not do a rectal exam but I did look at her bottom in the presence of ER nurse.  Patient noted to have a huge external hemorrhoid.  Does not appear to have any thrombosed hemorrhoid.  Obvious blood noted (bright red blood).  No obvious bleeding at present time  Even her feet has dried blood (bright red in color  The smell is not something typical of an upper GI bleed  Normocephalic and atraumatic head  Anicteric sclera  No thyromegaly  Lungs clear to auscultation with good air exchange  S1-S2 regular rate and rhythm no gallop or murmur  Soft abdomen, nontender, no organomegaly  No cyanosis  Positive edema  She has a " drying but still raw appearing without any discharge skin erosion on left leg anteriorly probably the size of a dollar.  No gross surrounding erythema  She has difficulty bending/flexing her knee joint, she has pain on palpation of her right thigh and hip joint.  There is no gross bruise in this area  She was able to tolerate turning her to the right side when I examine her bottom.  She did not complain of any significant discomfort on right hip.     Results Reviewed:          Lab Results (last 24 hours)      Glucose 139 mg/dL           BUN 39 mg/dL         Creatinine 1.01 mg/dL         Sodium 140 mmol/L         Potassium 4.3 mmol/L         Chloride 108 mmol/L         CO2 19.0 mmol/L         Calcium 8.5 mg/dL         BUN/Creatinine Ratio 38.6       Anion Gap 13.0 mmol/L         eGFR 53.3 mL/min/1.73       Narrative:       GFR Normal >60  Chronic Kidney Disease <60  Kidney Failure <15     The GFR formula is only valid for adults with stable renal function between ages 18 and 70.     Protime-INR [[id number]]  (Normal) Collected: 04/13/23 0857     Specimen: Blood Updated: 04/13/23 0920       Protime 14.0 Seconds         INR 1.06     CBC & Differential [[id number]]  (Abnormal) Collected: 04/13/23 0857     Specimen: Blood Updated: 04/13/23 0917     Narrative:       The following orders were created for panel order CBC & Differential.  Procedure                               Abnormality         Status                     ---------                               -----------         ------                     CBC Auto Differential[[id number]]        Abnormal            Final result                  Please view results for these tests on the individual orders.     CBC Auto Differential [[id number]]  (Abnormal) Collected: 04/13/23 0857     Specimen: Blood Updated: 04/13/23 0917       WBC 6.74 10*3/mm3         RBC 2.46 10*6/mm3         Hemoglobin 6.5 g/dL         Hematocrit 21.8 %         MCV 88.6 fL         MCH 26.4 pg          MCHC 29.8 g/dL         RDW 15.3 %         RDW-SD 48.9 fl         MPV 10.2 fL         Platelets 194 10*3/mm3         Neutrophil % 71.4 %         Lymphocyte % 16.9 %         Monocyte % 8.6 %         Eosinophil % 1.0 %         Basophil % 1.2 %         Immature Grans % 0.9 %         Neutrophils, Absolute 4.81 10*3/mm3         Lymphocytes, Absolute 1.14 10*3/mm3         Monocytes, Absolute 0.58 10*3/mm3         Eosinophils, Absolute 0.07 10*3/mm3         Basophils, Absolute 0.08 10*3/mm3         Immature Grans, Absolute 0.06 10*3/mm3         nRBC 0.0 /100 WBC                    Imaging Results (Last 24 Hours)      Procedure Component Value Units Date/Time     CT Abdomen Pelvis With Contrast [[id number]] Collected: 04/13/23 1107       Updated: 04/13/23 1113     Narrative:       EXAMINATION: CT ABDOMEN PELVIS W CONTRAST-      4/13/2023 10:52 AM CDT     HISTORY: Abdominal pain, acute, nonlocalized. Rectal bleeding. Diarrhea.     In order to have a CT radiation dose as low as reasonably achievable  Automated Exposure Control was utilized for adjustment of the mA and/or  KV according to patient size.     DLP in mGycm= 531.     Abdomen/pelvis CT with IV contrast.  Axial, sagittal, and coronal sequences.     No comparison.     Heart size is normal.  There is no acute abnormality at the lung bases.     Normal CT appearance of the liver, gallbladder, pancreas, spleen,  adrenal glands, and kidneys.     Aortic calcification with no aneurysm.     No small bowel dilation.  There is moderate fecal material throughout the colon.  A few scattered sigmoid diverticula.  No diverticulitis or colitis.     Summary:  1. No acute abnormality is seen.                                   This report was finalized on 04/13/2023 11:10 by Dr. Michael Barry MD.     XR Hip With or Without Pelvis 2 - 3 View Right [[id number]] Collected: 04/13/23 1057       Updated: 04/13/23 1101     Narrative:       EXAMINATION: XR HIP W OR WO PELVIS 2-3 VIEW RIGHT-      4/13/2023 10:17 AM CDT     HISTORY: Pelvis and right hip pain.     Pelvis and right hip, 3 views.     Osteopenia.     The pelvic ring is intact.   No pubic symphysis or sacroiliac joint diastasis.     The right proximal femur and acetabulum are intact.   Normal appearance of the hip joint space.  Soft tissues are appropriate.     Summary:     Summary:  1. No acute bony abnormality is seen.        This report was finalized on 04/13/2023 10:57 by Dr. Michael Barry MD.          I have personally reviewed and interpreted the radiology studies and ECG obtained at time of admission.      Assessment / Plan   Assessment:        Active Hospital Problems     Diagnosis     • **Gastrointestinal hemorrhage, unspecified gastrointestinal hemorrhage type                 Medical Decision Making  Number and Complexity of problems  • Anemia of acute blood loss secondary to below stated suspected conditions  • Bright red blood per rectum in the setting of diverticulosis (considering diverticular bleed) and possibly  with coexisting with a hemorrhoidal bleed.  Both are painless bleeding.  • Vascular dementia  • Right hip, thigh pain with negative x-ray-requested for femur x-ray  • Left leg wound-no gross signs of systemic infection.  Will consult wound care nurse        Treatment Plan  The patient will [facility]  Agree with transfusion of 1 unit  Serial H&H  Monitor for any active bleeding and transfuse as needed to maintain hemoglobin greater than 7  I think it would not be unreasonable to do a clear liquid diet  GI consult  Verify if any home medications care pharmacist/nurse  Wound care nurse regarding the left leg wound which I think with only require local management  An[facility]  Supportive care  Conditions and Status       Fair     [facility] Data  External documents reviewed: Attempt to look at prior colonoscopy but had not seen any document (procedure) nor from care everywhere  Cardiac  tracing (EKG, telemetry) interpretation: telemetry showed normal sinus rhythm rate of 87  Radiology interpretation: Reviewed  Labs reviewed: Yes  Any tests that were considered but not ordered: None     Decision rules/scores evaluated (example [id number], Wells, etc): None  Discussed with: ER nurse and  will     Care Planning  Shared decision making:   Code status and discussions: Full code  Disposition  Social Determinants of Health that impact treatment or disposition none identified at this time  Estimated length of stay is to be determined.      I confirmed that the patient's advanced care plan is present, code status is documented, and a surrogate decision maker is listed in the patient's medical record.      The patient's surrogate decision maker is  will  The patient was seen and examined by me on  Electronically signed by Art Sanchez MD, 04/13/23, 12:43 CDT.         Bubba Asher MD   Physician  Gastroenterology  Consults      Addendum  Date of Service:  04/13/23 1605  Creation Time:  04/13/23 1605  Consult Orders   Inpatient Gastroenterology Consult [[id number]] ordered by Art Sanchez MD at 04/13/23 1345          [facility]  [facility]  Today's date:  04/13/23     Dina CALVERT Link  1934         Referring Provider: No Known Provider  Primary Physician: Benedicto Hebert MD   Primary Gastroenterologist: Dr. Bubba Asher     Date of Admission: 4/13/2023  Date of Service:  04/13/23     Reason for Consultation/Chief Complaint: Hematochezia; anemia     History of present illness:    Patient is a very sweet 89-year-old female without significant past medical history who presents with 2 days of painless  bright red blood per rectum.  Initially it was mixed in with stool and then she had an episode of bloody incontinence, and decided to come to the hospital.  Last episode at 9 AM today.  She  is not taking blood thinners.  No history of same.  No NSAIDs/aspirin use.  She had 2 colonoscopies within the last decade with the last one approximately 3 years ago without significant findings.   unsure whether diverticulosis was noted.  Denies upper GI symptoms.  No abdominal pain.  Hgb of 6.5 with MCV of 88.6, compared with 14.4 on 12/5/2022.  BUN/creatinine of 39/1.01.  CT abdomen/pelvis without acute abnormality.  Few scattered diverticula were noted within a colon full of stool.  Patient denies CP/SOB/fever.     Medical History        Past Medical History:   Diagnosis Date   • Cancer              Surgical History   History reviewed. No pertinent surgical history.               Allergies   Allergen Reactions   • Nisoldipine Er Other (See Comments)       Feelings of passing out, tired         Prescriptions Prior to Admission           Medications Prior to Admission   Medication Sig Dispense Refill Last Dose   • Apixaban (Eliquis DVT/PE Starter Pack) tablet Take two 5 mg tablets by mouth every 12 hours for 7 days. Followed by one 5 mg tablet every 12 hours. 74 tablet 0     • levothyroxine (SYNTHROID, LEVOTHROID) 100 MCG tablet Take 100 mcg by mouth Daily.         • lovastatin (Mevacor) 40 MG tablet Take 40 mg by mouth Every Night.         • memantine (NAMENDA) 5 MG tablet Take 5 mg by mouth 2 (Two) Times a Day.         • metoprolol succinate XL (TOPROL-XL) 25 MG 24 hr tablet Take 0.5 tablets by mouth Daily for 30 days. 15 tablet 0                    Social History           Tobacco Use   • Smoking status: Never   • Smokeless tobacco: Never   Substance Use Topics   • Alcohol use: Yes         Past Family History:  History reviewed. No pertinent family history.     Review of Systems:  12 point inspection performed with pertinent positives in HPI.        Physical Exam:  Temp:  [97.4 °F (36.3 °C)-98.6 °F (37 °C)] 97.6 °F (36.4 °C)  Heart Rate:  [78-86] 81  Resp:  [13-20] 18  BP: (103-155)/(43-98) 155/78  Body  mass index is 24.21 kg/m².     Intake/Output Summary (Last 24 hours) at 4/13/2023 1605  Last data filed at 4/13/2023 1449      Gross per 24 hour   Intake 300 ml   Output --   Net 300 ml      I/O this shift:  In: 300 [Blood:300]  Out: -      General appearance:   HEENT: Nonicteric sclerae.  Moist oral mucosa.  PERRLA.  EOMI.    Lungs: Clear to auscultation bilaterally.    Heart: Regular rate and rhythm.    Abdomen: Soft, nondistended, nontender to palpation, with normoactive bowel sounds.  Extremities: No cyanosis, edema or pulse deficits.  Skin: No rash or jaundice.     Results Review:          Lab Results (last 24 hours)      Procedure Component Value Units Date/Time     [place] Draw [[id number]] Collected: 04/13/23 0857     Specimen: Blood Updated: 04/13/23 1002     Narrative:       The following orders were created for panel order [place] Draw.  Procedure                               Abnormality         Status                     ---------                               -----------         ------                     Green Top (Gel)[[id number]]                                  Final result               Lavender Top[[id number]]                                     Final result               Red Top[[id number]]                                          Final result               Light Blue Top[[id number]]                                   Final result                  Please view results for these tests on the individual orders.     Green Top (Gel) [[id number]] Collected: 04/13/23 0857     Specimen: Blood Updated: 04/13/23 1002       Extra Tube Hold for add-ons.       Comment: Auto resulted.        Lavender Top [[id number]] Collected: 04/13/23 0857     Specimen: Blood Updated: 04/13/23 1002       Extra Tube hold for add-on       Comment: Auto resulted        Red Top [[id number]] Collected: 04/13/23 0857     Specimen: Blood Updated: 04/13/23 1002       Extra Tube Hold for add-ons.       Comment: Auto resulted.        Light  Blue Top [[id number]] Collected: 04/13/23 0857     Specimen: Blood Updated: 04/13/23 1001       Extra Tube Hold for add-ons.       Comment: Auto resulted        Basic Metabolic Panel [[id number]]  (Abnormal) Collected: 04/13/23 0857     Specimen: Blood Updated: 04/13/23 0934       Glucose 139 mg/dL         BUN 39 mg/dL         Creatinine 1.01 mg/dL         Sodium 140 mmol/L         Potassium 4.3 mmol/L         Chloride 108 mmol/L         CO2 19.0 mmol/L         Calcium 8.5 mg/dL         BUN/Creatinine Ratio 38.6       Anion Gap 13.0 mmol/L         eGFR 53.3 mL/min/1.73       Narrative:       GFR Normal >60  Chronic Kidney Disease <60  Kidney Failure <15     The GFR formula is only valid for adults with stable renal function between ages 18 and 70.     Protime-INR [[id number]]  (Normal) Collected: 04/13/23 0857     Specimen: Blood Updated: 04/13/23 0920       Protime 14.0 Seconds         INR 1.06     CBC & Differential [[id number]]  (Abnormal) Collected: 04/13/23 0857     Specimen: Blood Updated: 04/13/23 0917     Narrative:       The following orders were created for panel order CBC & Differential.  Procedure                               Abnormality         Status                     ---------                               -----------         ------                     CBC Auto Differential[[id number]]        Abnormal            Final result                  Please view results for these tests on the individual orders.     CBC Auto Differential [[id number]]  (Abnormal) Collected: 04/13/23 0857     Specimen: Blood Updated: 04/13/23 0917       WBC 6.74 10*3/mm3         RBC 2.46 10*6/mm3         Hemoglobin 6.5 g/dL         Hematocrit 21.8 %         MCV 88.6 fL         MCH 26.4 pg         MCHC 29.8 g/dL         RDW 15.3 %         RDW-SD 48.9 fl         MPV 10.2 fL         Platelets 194 10*3/mm3         Neutrophil % 71.4 %         Lymphocyte % 16.9 %         Monocyte % 8.6 %         Eosinophil % 1.0 %         Basophil % 1.2 %          Immature Grans % 0.9 %         Neutrophils, Absolute 4.81 10*3/mm3         Lymphocytes, Absolute 1.14 10*3/mm3         Monocytes, Absolute 0.58 10*3/mm3         Eosinophils, Absolute 0.07 10*3/mm3         Basophils, Absolute 0.08 10*3/mm3         Immature Grans, Absolute 0.06 10*3/mm3         nRBC 0.0 /100 WBC               Radiology Review:          Imaging Results (Last 72 Hours)      Procedure Component Value Units Date/Time     XR Knee 1 or 2 View Right [[id number]] Collected: 04/13/23 1430       Updated: 04/13/23 1434     Narrative:       EXAMINATION: XR KNEE 1 OR 2 VW RIGHT-     4/13/2023 2:11 PM CDT     HISTORY: severe pain; K92.2-Gastrointestinal hemorrhage, unspecified;  M25.551-Pain in right hip     Right knee, 2 views.     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     Diffuse tibiofemoral chondrocalcinosis.     Diffuse arterial calcification.     Summary:  1. Degenerative joint changes greatest at the lateral and anterior  compartment.  2. No fracture  This report was finalized on 04/13/2023 14:31 by Dr. Michael Barry MD.     XR Femur 2 View Right [[id number]] Collected: 04/13/23 1430       Updated: 04/13/23 1433     Narrative:       EXAMINATION: XR FEMUR 2 VW RIGHT-     4/13/2023 2:10 PM CDT     HISTORY: severe pain; K92.2-Gastrointestinal hemorrhage, unspecified;  M25.551-Pain in right hip     Right femur, 2 views.     No femur fracture is seen.     The hip and knee are normal, to the extent visualized.     No soft tissue abnormality         adrenal glands, and kidneys.     Aortic calcification with no aneurysm.     No small bowel dilation.  There is moderate fecal material throughout the colon.  A few scattered sigmoid diverticula.  No diverticulitis or colitis.     Summary:  1. No acute abnormality is seen.                      Impression/Plan:       Patient Active Problem List   Diagnosis Code   • AMS (altered mental status) R41.82    • Vascular dementia without behavioral disturbance F01.50   • Near syncope R55   • Acute pulmonary embolism I26.99   • Hypothyroidism E03.9   • Lactic acidosis E87.20   • Gastrointestinal hemorrhage, unspecified gastrointestinal hemorrhage type K92.2         Hematochezia/suspected acute blood loss anemia.  Diverticulosis noted on CT.  Suspect diverticular bleed.  Elevated BUN possibly secondary to dehydration but consider upper GI source especially with hemoglobin level.     -Transfuse as per protocol  -EGD/colonoscopy in a.m.  -Clear liquid diet     Risk-benefit and alternatives were discussed with the patient in the presence of her  and they elect to have the procedure performed.  All questions were answered to their satisfaction.     Bubba Asher MD  04/13/23   16:05 CDT  Progress Notes      In Progress  Date of Service:  04/14/23 1214  Creation Time:  04/14/23 1214     In Progress                [facility] Medicine Services  INPATIENT PROGRESS NOTE     Patient Name: Dina Michaels  Date of Admission: 4/13/2023  Today's Date: 04/14/23  Length of Stay: 1  Primary Care Physician: Benedicto Hebert MD     Subjective   Chief Complaint: Follow-up bright red blood per rectum  HPI   Ms. Michaels is an 89-year-old female who presented to [facility] on 4/13 with 2-day history of painless bright red blood per rectum.  She does not take blood thinners.  No NSAID use.  She has never had bleeding like this before.  She had a colonoscopy approximately 3 years ago without significant findings.  In the emergency department she was found to have a hemoglobin of 6.5 with BUN/creatinine of 39/1.01. CT abdomen/pelvis without acute abnormality.  Few scattered diverticula were noted.     Lying in bed with spouse at bedside.  She is very hard of hearing which makes communication difficult.  Her only complaint is that she is hungry.  No nausea, vomiting, abdominal pain.  No further  bloody bowel movements since colon prep.  Hemoglobin stable at 7.6 status post 1 unit PRBC on 4/13.     Review of Systems   All pertinent negatives and positives are as above. All other systems have been reviewed and are negative unless otherwise stated.      Objective    Temp:  [97.2 °F (36.2 °C)-97.6 °F (36.4 °C)] 97.5 °F (36.4 °C)  Heart Rate:  [75-86] 75  Resp:  [17-20] 18  BP: (103-179)/(51-98) 142/64  Physical Exam  Vitals reviewed.   Constitutional:       General: She is not in acute distress.     Appearance: She is not toxic-appearing.      Comments: Lying in bed with spouse at bedside.  No acute distress.  On room air.  Discussed with her nurse April.   HENT:      Head: Normocephalic and atraumatic.      Mouth/Throat:      Mouth: Mucous membranes are moist.      Pharynx: Oropharynx is clear.   Eyes:      Extraocular Movements: Extraocular movements intact.      Conjunctiva/sclera: Conjunctivae normal.      Pupils: Pupils are equal, round, and reactive to light.   Cardiovascular:      Rate and Rhythm: Normal rate and regular rhythm.      Pulses: Normal pulses.      Comments: NSR 76-99  Pulmonary:      Effort: Pulmonary effort is normal. No respiratory distress.      Breath sounds: Normal breath sounds. No wheezing or rhonchi.   Abdominal:      General: Bowel sounds are normal. There is no distension.      Palpations: Abdomen is soft.      Tenderness: There is no abdominal tenderness.   Musculoskeletal:         General: No swelling or tenderness. Normal range of motion.      Cervical back: Normal range of motion and neck supple. No muscular tenderness.      Right lower leg: Edema present.      Left lower leg: Edema present.   Skin:     General: Skin is warm and dry.      Findings: No erythema or rash.   Neurological:      General: No focal deficit present.      Mental Status: She is alert and oriented to person, place, and time.      Cranial Nerves: No cranial nerve deficit.      Motor: No weakness.    Psychiatric:         Mood and Affect: Mood normal.         Behavior: Behavior normal.                aRdha Moran APRN   Nurse Practitioner  Hospitalist  Progress Notes      In Progress  Date of Service:  04/14/23 1214  Creation Time:  04/14/23 1214     In Progress                         Data:           Results from last 7 days   Lab Units 04/14/23  0746 04/14/23  0005 04/13/23  1609 04/13/23  0857   WBC 10*3/mm3  --   --   --  6.74   HEMOGLOBIN g/dL 7.6* 7.7* 7.7* 6.5*   HEMATOCRIT % 24.1* 24.4* 25.0* 21.8*   PLATELETS 10*3/mm3  --   --   --  194              Results from last 7 days   Lab Units 04/13/23  0857   SODIUM mmol/L 140   POTASSIUM mmol/L 4.3   CHLORIDE mmol/L 108*   CO2 mmol/L 19.0*   BUN mg/dL 39*   CREATININE mg/dL 1.01*   CALCIUM mg/dL 8.5*   GLUCOSE mg/dL 139*      Assessment/Plan   Assessment       Active Hospital Problems     Diagnosis     • **Gastrointestinal hemorrhage, unspecified gastrointestinal hemorrhage type     • Normocytic anemia due to blood loss     • Hypothyroidism     • Vascular dementia without behavioral disturbance           Treatment Plan  Gastroenterology, Dr. Asher consulted.  EGD and colonoscopy today.     Hemoglobin 6.5 on admission.  She received 1 unit packed red blood cell with follow-up hemoglobin 7.6.  Hemoglobin in December 2022 was 13.2.     Wound care consult.     SCDs for DVT prophylaxis.     Medical Decision Making  Number and Complexity of problems: 1 acute illness in the form of gastrointestinal hemorrhage/normocytic anemia with need for blood transfusion  Differential Diagnosis: Diverticular bleed     Conditions and Status        Condition is unchanged.     [facility] Data  External documents reviewed: Prior [facility] records  Cardiac tracing (EKG, telemetry) interpretation: Normal sinus rhythm  Radiology interpretation: Interpreted by radiology  Labs reviewed: As above  Any tests that were considered but not ordered: None considered at present     Decision rules/scores  evaluated (example [id number], Wells, etc): None considered at present     Discussed with: Gurdeep, April RN and Dr. Sanchez     Care Planning  Shared decision making: Patient is agreeable to ongoing work-up and treatment  Code status and discussions: Full code with full interventions      Disposition  Social Determinants of Health that impact treatment or disposition: None identified at present  I expect the patient to be discharged to home in 1-2 days.      Electronically signed by EDGARDO Edge, 04/14/23, 12:24 CDT.             Current Facility-Administered Medications   Medication Dose Route Frequency Provider Last Rate Last Admin   • acetaminophen (TYLENOL) tablet 650 mg  650 mg Oral Q4H PRN Art Sanchez MD   650 mg at 04/14/23 0941   • atorvastatin (LIPITOR) tablet 10 mg  10 mg Oral Daily Art Sanchez MD   10 mg at 04/14/23 0846   • hydrocortisone (ANUSOL-HC) suppository 25 mg  25 mg Rectal BID Art Sanchez MD   25 mg at 04/14/23 0847   • lactated ringers infusion  50 mL/hr Intravenous Continuous Art Sanchez MD 50 mL/hr at 04/14/23 0941 50 mL/hr at 04/14/23 0941   • levothyroxine (SYNTHROID, LEVOTHROID) tablet 100 mcg  100 mcg Oral Q AM Art Sanchez MD   100 mcg at 04/14/23 0524   • memantine (NAMENDA) tablet 5 mg  5 mg Oral BID Art Sanchez MD   5 mg at 04/14/23 0846   • metoprolol succinate XL (TOPROL-XL) 24 hr tablet 12.5 mg  12.5 mg Oral Q24H Art Sanchez MD   12.5 mg at 04/14/23 0846   • ondansetron (ZOFRAN) injection 4 mg  4 mg Intravenous Q6H PRN Art Sanchez MD       • sodium chloride 0.9 % flush 10 mL  10 mL Intravenous Q12H Art Sanchez MD   10 mL at 04/13/23 2016   • sodium chloride 0.9 % flush 10 mL  10 mL Intravenous PRN Art Sanchez MD       • sodium chloride 0.9 % infusion 40 mL  40 mL Intravenous PRN Art Sanchez MD       • sodium chloride  0.9 % infusion  100 mL/hr Intravenous Continuous Artie Helms MD   Stopped at 04/14/23 2057

## 2023-04-14 NOTE — PROGRESS NOTES
[facility] Medicine Services  INPATIENT PROGRESS NOTE    Patient Name: Dina Michaels  Date of Admission: 4/13/2023  Today's Date: 04/14/23  Length of Stay: 1  Primary Care Physician: Benedicto Hebert MD    Subjective   Chief Complaint: Follow-up bright red blood per rectum  HPI   Ms. Michaels is an 89-year-old female who presented to [facility] on 4/13 with 2-day history of painless bright red blood per rectum.  She does not take blood thinners.  No NSAID use.  She has never had bleeding like this before.  She had a colonoscopy approximately 3 years ago without significant findings.  In the emergency department she was found to have a hemoglobin of 6.5 with BUN/creatinine of 39/1.01. CT abdomen/pelvis without acute abnormality.  Few scattered diverticula were noted.    Lying in bed with spouse at bedside.  She is very hard of hearing which makes communication difficult.  Her only complaint is that she is hungry.  No nausea, vomiting, abdominal pain.  No further bloody bowel movements since colon prep.  Hemoglobin stable at 7.6 status post 1 unit PRBC on 4/13.    Review of Systems   All pertinent negatives and positives are as above. All other systems have been reviewed and are negative unless otherwise stated.     Objective    Temp:  [97.2 °F (36.2 °C)-97.6 °F (36.4 °C)] 97.5 °F (36.4 °C)  Heart Rate:  [75-86] 75  Resp:  [17-20] 18  BP: (103-179)/(51-98) 142/64  Physical Exam  Vitals reviewed.   Constitutional:       General: She is not in acute distress.     Appearance: She is not toxic-appearing.      Comments: Lying in bed with spouse at bedside.  No acute distress.  On room air.  Discussed with her nurse AprilRusty   HENT:      Head: Normocephalic and atraumatic.      Mouth/Throat:      Mouth: Mucous membranes are moist.      Pharynx: Oropharynx is clear.   Eyes:      Extraocular Movements: Extraocular movements intact.      Conjunctiva/sclera: Conjunctivae normal.       Pupils: Pupils are equal, round, and reactive to light.   Cardiovascular:      Rate and Rhythm: Normal rate and regular rhythm.      Pulses: Normal pulses.      Comments: NSR 76-99  Pulmonary:      Effort: Pulmonary effort is normal. No respiratory distress.      Breath sounds: Normal breath sounds. No wheezing or rhonchi.   Abdominal:      General: Bowel sounds are normal. There is no distension.      Palpations: Abdomen is soft.      Tenderness: There is no abdominal tenderness.   Musculoskeletal:         General: No swelling or tenderness. Normal range of motion.      Cervical back: Normal range of motion and neck supple. No muscular tenderness.      Right lower leg: Edema present.      Left lower leg: Edema present.   Skin:     General: Skin is warm and dry.      Findings: No erythema or rash.   Neurological:      General: No focal deficit present.      Mental Status: She is alert and oriented to person, place, and time.      Cranial Nerves: No cranial nerve deficit.      Motor: No weakness.   Psychiatric:         Mood and Affect: Mood normal.         Behavior: Behavior normal.          states wound is much improved from what it was 2 weeks ago      Results Review:  I have reviewed the labs, radiology results, and diagnostic studies.    Laboratory Data:   Results from last 7 days   Lab Units 04/14/23  0746 04/14/23  0005 04/13/23  1609 04/13/23  0857   WBC 10*3/mm3  --   --   --  6.74   HEMOGLOBIN g/dL 7.6* 7.7* 7.7* 6.5*   HEMATOCRIT % 24.1* 24.4* 25.0* 21.8*   PLATELETS 10*3/mm3  --   --   --  194        Results from last 7 days   Lab Units 04/13/23  0857   SODIUM mmol/L 140   POTASSIUM mmol/L 4.3   CHLORIDE mmol/L 108*   CO2 mmol/L 19.0*   BUN mg/dL 39*   CREATININE mg/dL 1.01*   CALCIUM mg/dL 8.5*   GLUCOSE mg/dL 139*       Culture Data:   Microbiology Results (last 10 days)     ** No results found for the last 240 hours. **        Radiology Data:   Imaging Results (Last 24 Hours)     Procedure  Component Value Units Date/Time    XR Knee 1 or 2 View Right [[id number]] Collected: 04/13/23 1430     Updated: 04/13/23 1434    Narrative:      EXAMINATION: XR KNEE 1 OR 2 VW RIGHT-     4/13/2023 2:11 PM CDT     HISTORY: severe pain; K92.2-Gastrointestinal hemorrhage, unspecified;  M25.551-Pain in right hip     Right knee, 2 views.     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     Diffuse tibiofemoral chondrocalcinosis.     Diffuse arterial calcification.     Summary:  1. Degenerative joint changes greatest at the lateral and anterior  compartment.  2. No fracture  This report was finalized on 04/13/2023 14:31 by Dr. Michael Barry MD.    XR Femur 2 View Right [[id number]] Collected: 04/13/23 1430     Updated: 04/13/23 1433    Narrative:      EXAMINATION: XR FEMUR 2 VW RIGHT-     4/13/2023 2:10 PM CDT     HISTORY: severe pain; K92.2-Gastrointestinal hemorrhage, unspecified;  M25.551-Pain in right hip     Right femur, 2 views.     No femur fracture is seen.     The hip and knee are normal, to the extent visualized.     No soft tissue abnormality is seen.       Impression:      1. No acute bony abnormality.              This report was finalized on 04/13/2023 14:30 by Dr. Michael Barry MD.          I have reviewed the patient's current medications.     Assessment/Plan   Assessment  Active Hospital Problems    Diagnosis    • **Gastrointestinal hemorrhage, unspecified gastrointestinal hemorrhage type    • Normocytic anemia due to blood loss    • Hypothyroidism    • Vascular dementia without behavioral disturbance        Treatment Plan  Gastroenterology, Dr. Asher consulted.  EGD and colonoscopy today.    Hemoglobin 6.5 on admission.  She received 1 unit packed red blood cell with follow-up hemoglobin 7.6.  Hemoglobin in December 2022 was 13.2.    Wound care consult.    SCDs for DVT prophylaxis.    Medical Decision Making  Number and Complexity of problems: 1  acute illness in the form of gastrointestinal hemorrhage/normocytic anemia with need for blood transfusion  Differential Diagnosis: Diverticular bleed    Conditions and Status        Condition is unchanged.     MDM Data  External documents reviewed: Prior epic records  Cardiac tracing (EKG, telemetry) interpretation: Normal sinus rhythm  Radiology interpretation: Interpreted by radiology  Labs reviewed: As above  Any tests that were considered but not ordered: None considered at present     Decision rules/scores evaluated (example [id number], Wells, etc): None considered at present     Discussed with: Analy Mackenzie RN and Dr. Sanchez     Care Planning  Shared decision making: Patient is agreeable to ongoing work-up and treatment  Code status and discussions: Full code with full interventions    Disposition  Social Determinants of Health that impact treatment or disposition: None identified at present  I expect the patient to be discharged to home in 1-2 days.     Electronically signed by EDGARDO Edge, 04/14/23, 12:24 CDT.

## 2023-04-14 NOTE — OP NOTE
COLONOSCOPY    Date:  4/14/2023    Indications: Hematochezia; anemia       Procedure: Colonoscopy.    Sedation: As per anesthesia.    Surgeon: Bubba Asher MD     Anesthesia: Monitored Anesthesia Care     Procedure  Description: After informed consent was obtained, a timeout was called in the endoscopy suite to confirm the correct patient and appropriate procedure.  Thereafter with the patient in the left lateral position, adult Olympus colonoscope was inserted into the rectum.  Thereafter under direct vision scope was slowly advanced into the cecum.  Ileocecal valve and appendiceal orifice were identified and photodocumentation was obtained.  Terminal ileum was entered through the ileocecal valve and photodocumentation was obtained.  Careful examination of the colon was performed while slowly withdrawing the scope.  Retroflex examination of the rectum was also performed.    Findings:   -Normal distal terminal ileum  -Normal-appearing colonic mucosa and vascularity throughout  -Left-sided diverticulosis  -No blood seen in the colon    Complications: No immediate complications.    Recommendations:   -Diet as tolerated  -Plan as per EGD note      Post-Op Diagnosis Codes:     * Gastrointestinal hemorrhage, unspecified gastrointestinal hemorrhage type [K92.2]    Bubba Asher MD

## 2023-04-14 NOTE — ANESTHESIA POSTPROCEDURE EVALUATION
Patient: Dina Michaels    Procedure Summary     Date: 04/14/23 Room / Location: [facility] ENDOSCOPY 2 / [facility] ENDOSCOPY    Anesthesia Start: 1339 Anesthesia Stop: 1416    Procedures:       ESOPHAGOGASTRODUODENOSCOPY WITH ANESTHESIA      COLONOSCOPY WITH ANESTHESIA Diagnosis:       Gastrointestinal hemorrhage, unspecified gastrointestinal hemorrhage type      (Gastrointestinal hemorrhage, unspecified gastrointestinal hemorrhage type [K92.2])    Surgeons: Bubba Asher MD Provider: Nik Rogel CRNA    Anesthesia Type: MAC ASA Status: 3 - Emergent          Anesthesia Type: MAC    Vitals  Vitals Value Taken Time   /105 04/14/23 1441   Temp     Pulse 73 04/14/23 1443   Resp 17 04/14/23 1440   SpO2 98 % 04/14/23 1443   Vitals shown include unvalidated device data.        Post Anesthesia Care and Evaluation    Patient location during evaluation: PHASE II  Patient participation: complete - patient participated  Level of consciousness: awake  Pain management: adequate    Airway patency: patent  Anesthetic complications: No anesthetic complications  Respiratory status: acceptable  Hydration status: acceptable

## 2023-04-14 NOTE — PAYOR COMM NOTE
"4/13/23. [facility] [phone]  [phone]    ER ADMIT ON 4/13/23. INPATIENT ORDER.    FAXING FOR INPATIENT REVIEW.            Behzad Katz (89 y.o. Female)     Date of Birth   1934    Social Security Number       Address   [address] [facility] [address]    Home Phone   [phone]    MRN   [id number]       Restoration   Taoist    Marital Status                               Admission Date   4/13/23    Admission Type   Emergency    Admitting Provider   Art Sanchez MD    Attending Provider   Art Sanchez MD    Department, Room/Bed   [facility] 4B, 412/1       Discharge Date       Discharge Disposition       Discharge Destination                               Attending Provider: Art Sanchez MD    Allergies: Nisoldipine Er    Isolation: None   Infection: None   Code Status: CPR    Ht: 167.6 cm (66\")   Wt: 68 kg (150 lb)    Admission Cmt: None   Principal Problem: Gastrointestinal hemorrhage, unspecified gastrointestinal hemorrhage type [K92.2]                 Active Insurance as of 4/13/2023     Primary Coverage     Payor Plan Insurance Group Employer/Plan Group    HUMANA MEDICARE REPLACEMENT HUMANA MEDICARE REPLACEMENT [id number]     Payor Plan Address Payor Plan Phone Number Payor Plan Fax Number Effective Dates    [address] [address] [phone]  1/1/2021 - None Entered    [facility] [id number]       Subscriber Name Subscriber Birth Date Member ID       BEHZAD KATZ 1934 [id number]                 Emergency Contacts      (Rel.) Home Phone Work Phone Mobile Phone    OLLIE KATZ (Spouse) [phone] -- [phone]            Toro Nunez MD   Physician  Specialty:  Emergency Medicine  ED Provider Notes      Signed  Date of Service:  04/13/23 1008  Creation Time:  04/13/23 1008     Signed        Expand AllCollapse All       Subjective      History of Present Illness  89-year-old patient with " a lower GI bleeding     Rectal Bleeding  Quality:  Bright red  Amount:  Moderate  Timing:  Constant  Chronicity:  New  Context: not anal penetration, not diarrhea, not hemorrhoids and not rectal injury    Similar prior episodes: no    Relieved by:  Nothing  Worsened by:  Nothing  Ineffective treatments:  None tried  Associated symptoms: abdominal pain and light-headedness    Associated symptoms: no dizziness, no epistaxis, no hematemesis and no loss of consciousness    Risk factors: no anticoagulant use, no hx of colorectal cancer, no hx of IBD and no liver disease          Review of Systems   Constitutional: Negative.    HENT: Negative.  Negative for nosebleeds.    Eyes: Negative.    Respiratory: Negative.    Cardiovascular: Negative.    Gastrointestinal: Positive for abdominal pain and hematochezia. Negative for hematemesis.   Endocrine: Negative.    Genitourinary: Negative.    Skin: Negative.    Neurological: Positive for light-headedness. Negative for dizziness and loss of consciousness.   Hematological: Negative.    All other systems reviewed and are negative.        Medical History        Past Medical History:   Diagnosis Date   • Cancer                    Allergies   Allergen Reactions   • Nisoldipine Er Other (See Comments)       Feelings of passing out, tired         Surgical History   No past surgical history on file.        No family history on file.     Social History   Social History           Socioeconomic History   • Marital status:    Tobacco Use   • Smoking status: Never   • Smokeless tobacco: Never   Vaping Use   • Vaping Use: Never used   Substance and Sexual Activity   • Alcohol use: Yes   • Drug use: Never                        Objective      Physical Exam  Vitals and nursing note reviewed. Exam conducted with a chaperone present.   Constitutional:       General: She is awake. She is not in acute distress.     Appearance: Normal appearance. She is well-developed. She is not  toxic-appearing.   HENT:      Head: Normocephalic and atraumatic.      Nose:      Right Nostril: No epistaxis.      Left Nostril: No epistaxis.   Eyes:      General: Lids are normal. No scleral icterus.     Conjunctiva/sclera: Conjunctivae normal.      Pupils: Pupils are equal, round, and reactive to light.   Neck:      Vascular: No hepatojugular reflux or JVD.   Cardiovascular:      Rate and Rhythm: Normal rate and regular rhythm.      Chest Wall: PMI is not displaced.      Pulses: Normal pulses. No decreased pulses.      Heart sounds: Normal heart sounds. No murmur heard.  Pulmonary:      Effort: Pulmonary effort is normal. No accessory muscle usage or respiratory distress.      Breath sounds: Normal breath sounds. No decreased breath sounds or wheezing.   Abdominal:      General: Abdomen is flat. Bowel sounds are normal. There is no distension or abdominal bruit.      Palpations: Abdomen is soft. There is no shifting dullness, fluid wave, mass or pulsatile mass.      Tenderness: There is abdominal tenderness in the right lower quadrant and left lower quadrant. There is no right CVA tenderness, left CVA tenderness, guarding or rebound.      Hernia: No hernia is present.      Comments: Patient complaining of right hip pain neuro vas examination negative.  Femoral pulse are palpable.  Distal pulse are palpable.   Musculoskeletal:         General: Normal range of motion.      Cervical back: Normal range of motion and neck supple. No rigidity.      Right lower leg: No edema.      Left lower leg: No edema.   Skin:     General: Skin is warm and dry.      Capillary Refill: Capillary refill takes less than 2 seconds.      Coloration: Skin is not cyanotic, jaundiced, mottled or pale.   Neurological:      General: No focal deficit present.      Mental Status: She is alert and oriented to person, place, and time. Mental status is at baseline. She is confused.      GCS: GCS eye subscore is 4. GCS verbal subscore is 5. GCS  motor subscore is 6.      Cranial Nerves: No cranial nerve deficit.      Sensory: Sensation is intact.      Motor: Motor function is intact.      Deep Tendon Reflexes: Reflexes are normal and symmetric.   Psychiatric:         Behavior: Behavior normal. Behavior is cooperative.            Procedures                 ED Course      ED Course as of 04/13/23 1236   Thu Apr 13, 2023   1234 Patient came in with the GI bleeding has got low hemoglobin with elevated BUN.  No anion gap has got a RTA was given blood is going be admitted she has been having right hip pain her hip x-rays are negative there is no evidence of any rash on the hip femoral pulse are palpable there is no tenderness of the thigh there is no evidence cellulitis we will give her some pain medication. [TS]       ED Course User Index  [TS] Toro Nunez MD                                    Medical Decision Making  DD   mesenteric lymphadenitis, diverticulitis, intussusception, small bowel obstruction, adhesions, bowel ischemia,intraabdominal abscess, spontaneous bacterial peritonitis, hernia, urinary tract infection, ureterolithiasis,acute appendicitis, abdominal aortic aneurysm, pneumonia, porphyria and diabetic ketoacidosis as a possible cause of abdominal pain in this patient. This is a partial list of diagnoses considered.       Gastrointestinal hemorrhage, unspecified gastrointestinal hemorrhage type: acute illness or injury     Details: Significant GI bleeding with low hemoglobin was transfused 1 unit of blood and IV fluids hemodynamically she is stable at this time.  Right hip pain: acute illness or injury     Details: Having some right hip pain no history of trauma no history of fall.  The etiology just under to mind x-rays are negative there is no rashes no cellulitis neuro vas examination is negative.  Amount and/or Complexity of Data Reviewed  Labs: ordered.     Details: Labs reviewed  Radiology: ordered.     Details: CT scan of the abdomen  pelvis report was negative.  Discussion of management or test interpretation with external provider(s): I have discussed this case with the hospitalist   Risk  Decision regarding hospitalization.     Risk Details: Patient will be admitted to medicine service for further evaluation and treatment of condition.  I have discussed with family members.           Final diagnoses:   Gastrointestinal hemorrhage, unspecified gastrointestinal hemorrhage type   Right hip pain         ED Disposition        ED Disposition      ED Disposition   Decision to Admit    Condition   --    Comment   Level of Care: Telemetry [5]   Diagnosis: Gastrointestinal hemorrhage, unspecified gastrointestinal hemorrhage type [1420064]   Admitting Physician: ART SANCHEZ [[id number]]   Attending Physician: ART SANCHEZ [[id number]]   Certification: I Certify That Inpatient Hospital Services Are Medically Necessary For Greater Than 2 Midnights                   No follow-up provider specified.         Medication List       No changes were made to your prescriptions during this visit.            Toro Nunez MD  04/13/23 1236                   Art Sanchez MD   Physician  Hospitalist  H&P      Signed  Date of Service:  04/13/23 1243  Creation Time:  04/13/23 1243     Signed        [facility] Medicine Services  HISTORY AND PHYSICAL     Date of Admission: 4/13/2023  Primary Care Physician: Benedicto Hebert MD     Subjective   Primary Historian:  who goes by the name Will  Chief Complaint: Rectal bleeding     History of Present Illness  I am asked to admit this 89-year-old woman for GI bleed.  Primary care provider on record is Dr. Benedicto Hebert.  ER requested admission for GI bleed and right hip pain.     She is hemodynamically stable and not tachycardic.  Her hemoglobin is 6.5 while hematocrit 21.8.  MCV 89.  BUN 39 with creatinine of 1.01.   BUN/creatinine ratio 38.6.  CO2 of 19.  In order to transfuse 1 unit of packed RBC has been in place.  There is no record of prior colonoscopy or endoscopy in our hospital.  Based on medication listed she has prior history of VTE in 2020 and dementia.  Other record indicates hypothyroidism, internal hemorrhoids     Prior hospitalization dating back to September 29 to October 1, 2020 indicates admission for altered mental status, vascular dementia, near syncope, acute pulmonary embolism, hypothyroidism and lactic acidosis.        CT of the abdomen and pelvis showed no acute abnormality seen.  There is moderate fecal material throughout the colon.  Presence of sigmoid diverticula without diverticulitis.     X-ray of hip with and without pelvis showed no acute abnormality.  Intact right proximal femur and acetabulum.  Osteopenia     Patient unable to give any historical details due to dementia  Has been showed me this morning the track of blood she created as she went to the bathroom.  There were blood clots.  It appears to be acid bright red blood per rectum.     There has not been any mention of abdominal pain, rectal pain.  He does not know of prior episodes of bleeding.  He told me that she is not on any anticoagulant or other medication  He does not have good grasp of patient's medications.     Bleeding has been going on for the past 3 days.  Started as spotting  No mention of any constipation        Review of Systems   Unable to give any historical details due to dementia   states that no known fall     Past Medical History:   Medical History        Past Medical History:   Diagnosis Date   • Cancer         said he likely had throat cancer but he is not certain.  He told me also that she has had colonoscopy.  He thought it was here.  I did not see any record     Past Surgical History:  is not quite sure  Social History:  reports that she has never smoked. She has never used smokeless tobacco.  "She reports current alcohol use. She reports that she does not use drugs.     Family History: The  does not know.  Patient unable to give any historical details due to dementia  Allergies:        Allergies   Allergen Reactions   • Nisoldipine Er Other (See Comments)       Feelings of passing out, tired                         Objective      Vital Signs: /62   Pulse 78   Temp 97.4 °F (36.3 °C)   Resp 17   Ht 167.6 cm (66\")   Wt 68 kg (150 lb)   SpO2 100%   BMI 24.21 kg/m²   Physical Exam   Patient would intermittently smile.  She has blank face most of the time  No distress  Receiving 1 unit of blood  I did not do a rectal exam but I did look at her bottom in the presence of ER nurse.  Patient noted to have a huge external hemorrhoid.  Does not appear to have any thrombosed hemorrhoid.  Obvious blood noted (bright red blood).  No obvious bleeding at present time  Even her feet has dried blood (bright red in color  The smell is not something typical of an upper GI bleed  Normocephalic and atraumatic head  Anicteric sclera  No thyromegaly  Lungs clear to auscultation with good air exchange  S1-S2 regular rate and rhythm no gallop or murmur  Soft abdomen, nontender, no organomegaly  No cyanosis  Positive edema  She has a drying but still raw appearing without any discharge skin erosion on left leg anteriorly probably the size of a dollar.  No gross surrounding erythema  She has difficulty bending/flexing her knee joint, she has pain on palpation of her right thigh and hip joint.  There is no gross bruise in this area  She was able to tolerate turning her to the right side when I examine her bottom.  She did not complain of any significant discomfort on right hip.     Results Reviewed:          Lab Results (last 24 hours)      Procedure Component Value Units Date/Time     [place] Draw [[id number]] Collected: 04/13/23 0857     Specimen: Blood Updated: 04/13/23 1002     Narrative:       The following " orders were created for panel order [place] Draw.  Procedure                               Abnormality         Status                     ---------                               -----------         ------                     Green Top (Gel)[[id number]]                                  Final result               Lavender Top[[id number]]                                     Final result               Red Top[[id number]]                                          Final result               Light Blue Top[[id number]]                                   Final result                  Please view results for these tests on the individual orders.     Green Top (Gel) [[id number]] Collected: 04/13/23 0857     Specimen: Blood Updated: 04/13/23 1002       Extra Tube Hold for add-ons.       Comment: Auto resulted.        Lavender Top [[id number]] Collected: 04/13/23 0857     Specimen: Blood Updated: 04/13/23 1002       Extra Tube hold for add-on       Comment: Auto resulted        Red Top [[id number]] Collected: 04/13/23 0857     Specimen: Blood Updated: 04/13/23 1002       Extra Tube Hold for add-ons.       Comment: Auto resulted.        Light Blue Top [[id number]] Collected: 04/13/23 0857     Specimen: Blood Updated: 04/13/23 1001       Extra Tube Hold for add-ons.       Comment: Auto resulted        Basic Metabolic Panel [[id number]]  (Abnormal) Collected: 04/13/23 0857     Specimen: Blood Updated: 04/13/23 0934       Glucose 139 mg/dL         BUN 39 mg/dL         Creatinine 1.01 mg/dL         Sodium 140 mmol/L         Potassium 4.3 mmol/L         Chloride 108 mmol/L         CO2 19.0 mmol/L         Calcium 8.5 mg/dL         BUN/Creatinine Ratio 38.6       Anion Gap 13.0 mmol/L         eGFR 53.3 mL/min/1.73       Narrative:       GFR Normal >60  Chronic Kidney Disease <60  Kidney Failure <15     The GFR formula is only valid for adults with stable renal function between ages 18 and 70.     Protime-INR [[id number]]  (Normal) Collected:  04/13/23 0857     Specimen: Blood Updated: 04/13/23 0920       Protime 14.0 Seconds         INR 1.06     CBC & Differential [[id number]]  (Abnormal) Collected: 04/13/23 0857     Specimen: Blood Updated: 04/13/23 0917     Narrative:       The following orders were created for panel order CBC & Differential.  Procedure                               Abnormality         Status                     ---------                               -----------         ------                     CBC Auto Differential[[id number]]        Abnormal            Final result                  Please view results for these tests on the individual orders.     CBC Auto Differential [[id number]]  (Abnormal) Collected: 04/13/23 0857     Specimen: Blood Updated: 04/13/23 0917       WBC 6.74 10*3/mm3         RBC 2.46 10*6/mm3         Hemoglobin 6.5 g/dL         Hematocrit 21.8 %         MCV 88.6 fL         MCH 26.4 pg         MCHC 29.8 g/dL         RDW 15.3 %         RDW-SD 48.9 fl         MPV 10.2 fL         Platelets 194 10*3/mm3         Neutrophil % 71.4 %         Lymphocyte % 16.9 %         Monocyte % 8.6 %         Eosinophil % 1.0 %         Basophil % 1.2 %         Immature Grans % 0.9 %         Neutrophils, Absolute 4.81 10*3/mm3         Lymphocytes, Absolute 1.14 10*3/mm3         Monocytes, Absolute 0.58 10*3/mm3         Eosinophils, Absolute 0.07 10*3/mm3         Basophils, Absolute 0.08 10*3/mm3         Immature Grans, Absolute 0.06 10*3/mm3         nRBC 0.0 /100 WBC                    Imaging Results (Last 24 Hours)      Procedure Component Value Units Date/Time     CT Abdomen Pelvis With Contrast [[id number]] Collected: 04/13/23 1107       Updated: 04/13/23 1113     Narrative:       EXAMINATION: CT ABDOMEN PELVIS W CONTRAST-      4/13/2023 10:52 AM CDT     HISTORY: Abdominal pain, acute, nonlocalized. Rectal bleeding. Diarrhea.     In order to have a CT radiation dose as low as reasonably achievable  Automated Exposure Control was utilized  for adjustment of the mA and/or  KV according to patient size.     DLP in mGycm= 531.     Abdomen/pelvis CT with IV contrast.  Axial, sagittal, and coronal sequences.     No comparison.     Heart size is normal.  There is no acute abnormality at the lung bases.     Normal CT appearance of the liver, gallbladder, pancreas, spleen,  adrenal glands, and kidneys.     Aortic calcification with no aneurysm.     No small bowel dilation.  There is moderate fecal material throughout the colon.  A few scattered sigmoid diverticula.  No diverticulitis or colitis.     Summary:  1. No acute abnormality is seen.                                   This report was finalized on 04/13/2023 11:10 by Dr. Michael Barry MD.     XR Hip With or Without Pelvis 2 - 3 View Right [[id number]] Collected: 04/13/23 1057       Updated: 04/13/23 1101     Narrative:       EXAMINATION: XR HIP W OR WO PELVIS 2-3 VIEW RIGHT-     4/13/2023 10:17 AM CDT     HISTORY: Pelvis and right hip pain.     Pelvis and right hip, 3 views.     Osteopenia.     The pelvic ring is intact.   No pubic symphysis or sacroiliac joint diastasis.     The right proximal femur and acetabulum are intact.   Normal appearance of the hip joint space.  Soft tissues are appropriate.     Summary:  1. No acute bony abnormality is seen.        This report was finalized on 04/13/2023 10:57 by Dr. Michael Barry MD.          I have personally reviewed and interpreted the radiology studies and ECG obtained at time of admission.      Assessment / Plan   Assessment:        Active Hospital Problems     Diagnosis     • **Gastrointestinal hemorrhage, unspecified gastrointestinal hemorrhage type                 Medical Decision Making  Number and Complexity of problems  • Anemia of acute blood loss secondary to below stated suspected conditions  • Bright red blood per rectum in the setting of diverticulosis (considering diverticular bleed) and possibly  with coexisting with a hemorrhoidal bleed.   Both are painless bleeding.  • Vascular dementia  • Right hip, thigh pain with negative x-ray-requested for femur x-ray  • Left leg wound-no gross signs of systemic infection.  Will consult wound care nurse        Treatment Plan  The patient will be admitted to my service here at [facility].  Agree with transfusion of 1 unit  Serial H&H  Monitor for any active bleeding and transfuse as needed to maintain hemoglobin greater than 7  I think it would not be unreasonable to do a clear liquid diet  GI consult  Verify if any home medications care pharmacist/nurse  Wound care nurse regarding the left leg wound which I think with only require local management  Anusol HC  Supportive care  Avoidance of constipation  Other plans per orders  Avoidance of constipation      Conditions and Status       Fair     MDM Data  External documents reviewed: Attempt to look at prior colonoscopy but had not seen any document (procedure) nor from care everywhere  Cardiac tracing (EKG, telemetry) interpretation: telemetry showed normal sinus rhythm rate of 87  Radiology interpretation: Reviewed  Labs reviewed: Yes  Any tests that were considered but not ordered: None     Decision rules/scores evaluated (example [id number], Wells, etc): None  Discussed with: ER nurse and  will     Care Planning  Shared decision making:   Code status and discussions: Full code  Disposition  Social Determinants of Health that impact treatment or disposition none identified at this time  Estimated length of stay is to be determined.      I confirmed that the patient's advanced care plan is present, code status is documented, and a surrogate decision maker is listed in the patient's medical record.      The patient's surrogate decision maker is  will  The patient was seen and examined by me on  Electronically signed by Art Sanchez MD, 04/13/23, 12:43 CDT.               Bubba Asher MD    Physician  Gastroenterology  Consults      Addendum  Date of Service:  04/13/23 1605  Creation Time:  04/13/23 1605  Consult Orders   Inpatient Gastroenterology Consult [[id number]] ordered by Art Sanchez MD at 04/13/23 1345              Bubba Asher MD   Physician  Gastroenterology  Consults      Addendum  Date of Service:  04/13/23 1605  Creation Time:  04/13/23 1605  Consult Orders   Inpatient Gastroenterology Consult [[id number]] ordered by Art Sanchez MD at 04/13/23 1345          Expand AllCollapse All               [facility]  Inpatient Consult Note  Today's date:  04/13/23     Dina CALVERT Link  1934         Referring Provider: No Known Provider  Primary Physician: Benedicto Hebert MD   Primary Gastroenterologist: Dr. Bubba Asher     Date of Admission: 4/13/2023  Date of Service:  04/13/23     Reason for Consultation/Chief Complaint: Hematochezia; anemia     History of present illness:    Patient is a very sweet 89-year-old female without significant past medical history who presents with 2 days of painless  bright red blood per rectum.  Initially it was mixed in with stool and then she had an episode of bloody incontinence, and decided to come to the hospital.  Last episode at 9 AM today.  She is not taking blood thinners.  No history of same.  No NSAIDs/aspirin use.  She had 2 colonoscopies within the last decade with the last one approximately 3 years ago without significant findings.   unsure whether diverticulosis was noted.  Denies upper GI symptoms.  No abdominal pain.  Hgb of 6.5 with MCV of 88.6, compared with 14.4 on 12/5/2022.  BUN/creatinine of 39/1.01.  CT abdomen/pelvis without acute abnormality.  Few scattered diverticula were noted within a colon full of stool.  Patient denies CP/SOB/fever.             History           Tobacco Use   • Smoking status: Never   • Smokeless tobacco: Never   Substance Use Topics   • Alcohol use: Yes          Past Family History:  History reviewed. No pertinent family history.     Review of Systems:  12 point inspection performed with pertinent positives in HPI.        Physical Exam:  Temp:  [97.4 °F (36.3 °C)-98.6 °F (37 °C)] 97.6 °F (36.4 °C)  Heart Rate:  [78-86] 81  Resp:  [13-20] 18  BP: (103-155)/(43-98) 155/78  Body mass index is 24.21 kg/m².     Intake/Output Summary (Last 24 hours) at 4/13/2023 1605  Last data filed at 4/13/2023 1449      Gross per 24 hour   Intake 300 ml   Output --   Net 300 ml      I/O this shift:  In: 300 [Blood:300]  Out: -        General appearance:   HEENT: Nonicteric sclerae.  Moist oral mucosa.  PERRLA.  EOMI.    Lungs: Clear to auscultation bilaterally.    Heart: Regular rate and rhythm.    Abdomen: Soft, nondistended, nontender to palpation, with normoactive bowel sounds.  Extremities: No cyanosis, edema or pulse deficits.  Skin: No rash or jaundice.     for these tests on the individual orders.      CBC Auto Differential [[id number]]  (Abnormal) Collected: 04/13/23 0857     Specimen: Blood Updated: 04/13/23 0917       WBC 6.74 10*3/mm3         RBC 2.46 10*6/mm3         Hemoglobin 6.5 g/dL         Hematocrit 21.8 %         MCV 88.6 fL         MCH 26.4 pg         MCHC 29.8 g/dL         RDW 15.3 %         RDW-SD 48.9 fl         MPV 10.2 fL         Platelets 194 10*3/mm3         Neutrophil % 71.4 %         Lymphocyte % 16.9 %         Monocyte % 8.6 %         Eosinophil % 1.0 %         Basophil % 1.2 %         Immature Grans % 0.9 %         Neutrophils, Absolute 4.81 10*3/mm3         Lymphocytes, Absolute 1.14 10*3/mm3         Monocytes, Absolute 0.58 10*3/mm3         Eosinophils, Absolute 0.07 10*3/mm3         Basophils, Absolute 0.08 10*3/mm3         Immature Grans, Absolute 0.06 10*3/mm3         nRBC 0.0 /100 WBC            Impression/Plan:       Patient Active Problem List   Diagnosis Code   • AMS (altered mental status) R41.82   • Vascular dementia without behavioral  disturbance F01.50   • Near syncope R55   • Acute pulmonary embolism I26.99   • Hypothyroidism E03.9   • Lactic acidosis E87.20   • Gastrointestinal hemorrhage, unspecified gastrointestinal hemorrhage type K92.2         Hematochezia/suspected acute blood loss anemia.  Diverticulosis noted on CT.  Suspect diverticular bleed.  Elevated BUN possibly secondary to dehydration but consider upper GI source especially with hemoglobin level.     -Transfuse as per protocol  -EGD/colonoscopy in a.m.  -Clear liquid diet     Risk-benefit and alternatives were discussed with the patient in the presence of her  and they elect to have the procedure performed.  All questions were answered to their satisfaction.     Bubba Asher MD  04/13/23   16:05 CDT   Notes            Component  Ref Range & Units 07:46  (4/14/23)  [facility] 00:05  (4/14/23)  [facility] 1 d ago  (4/13/23)  [facility] 1 d ago  (4/13/23)  [facility] 4 mo ago  (12/5/22)  [facility] 2 yr ago  (10/1/20)  [facility] 2 yr ago  (9/30/20)  [facility]   Hemoglobin  12.0 - 15.9 g/dL 7.6 Low   7.7 Low   7.7 Low   6.5 Low Critical   13.2 R  10.5 Low   11.5 Low     Hematocrit  34.0 - 46.6 % 24.1 Low   24.4 Low   25.0 Low                    Current Facility-Administered Medications   Medication Dose Route Frequency Provider Last Rate Last Admin   • acetaminophen (TYLENOL) tablet 650 mg  650 mg Oral Q4H PRN Art Sanchez MD   650 mg at 04/14/23 0941   • atorvastatin (LIPITOR) tablet 10 mg  10 mg Oral Daily Art Sanchez MD   10 mg at 04/14/23 0846   • hydrocortisone (ANUSOL-HC) suppository 25 mg  25 mg Rectal BID Art Sanchez MD   25 mg at 04/14/23 0847   • lactated ringers infusion  50 mL/hr Intravenous Continuous Art Sanchez MD 50 mL/hr at 04/14/23 0941 50 mL/hr at 04/14/23 0941   • levothyroxine (SYNTHROID, LEVOTHROID) tablet 100 mcg  100 mcg Oral Q AM Art Sanchez MD    100 mcg at 04/14/23 0524   • memantine (NAMENDA) tablet 5 mg  5 mg Oral BID Art Sanchez MD   5 mg at 04/14/23 0846   • metoprolol succinate XL (TOPROL-XL) 24 hr tablet 12.5 mg  12.5 mg Oral Q24H Art Sanchez MD   12.5 mg at 04/14/23 0846   • ondansetron (ZOFRAN) injection 4 mg  4 mg Intravenous Q6H PRN Art Sanchez MD       • sodium chloride 0.9 % flush 10 mL  10 mL Intravenous Q12H Art Sanchez MD   10 mL at 04/13/23 2016   • sodium chloride 0.9 % flush 10 mL  10 mL Intravenous PRN Art Sanchez MD       • sodium chloride 0.9 % infusion 40 mL  40 mL Intravenous PRN Art Sanchez MD

## 2023-04-14 NOTE — PLAN OF CARE
Problem: Adult Inpatient Plan of Care  Goal: Plan of Care Review  Outcome: Ongoing, Progressing  Flowsheets (Taken 4/14/2023 0319)  Progress: no change  Plan of Care Reviewed With: patient  Outcome Evaluation: Pt disoriented to situation & time. Forgetfulness noted. Pt had at least 14 BMs before midnight. Staff & spouse had to really encourage pt to drink prep, pt did drink 3,000 mL of polyethylene glycol before midnight as ordered. Plans to drink the rest in the AM as ordered. Suppository given as ordered. Pt's spouse left at the beginning of the shift. Pt had to have a sitter from 9:10 PM-12:45 AM for safety risks & fall prevention. Pt was frequently getting out of the bed without help due to having to go to the bathroom. Fecal incontinence noted. Incontinence care provided as needed. Plans for endo & colonoscopy in the AM. Pt has been NPO since midnight. Spouse at bedside currently. Spouse assists with care. Up x 1/standby, pt needs more assistance with ambulating due to the IV pole. IV fluids infusing. Sinus HR: 82-99 on tele. Safety maintained.

## 2023-04-15 ENCOUNTER — READMISSION MANAGEMENT (OUTPATIENT)
Dept: CALL CENTER | Facility: HOSPITAL | Age: 88
End: 2023-04-15
Payer: MEDICARE

## 2023-04-15 VITALS
HEIGHT: 66 IN | DIASTOLIC BLOOD PRESSURE: 70 MMHG | HEART RATE: 70 BPM | SYSTOLIC BLOOD PRESSURE: 142 MMHG | OXYGEN SATURATION: 99 % | RESPIRATION RATE: 16 BRPM | WEIGHT: 150 LBS | BODY MASS INDEX: 24.11 KG/M2 | TEMPERATURE: 98 F

## 2023-04-15 PROBLEM — I10 ESSENTIAL HYPERTENSION: Status: ACTIVE | Noted: 2023-04-15

## 2023-04-15 LAB
HCT VFR BLD AUTO: 26.4 % (ref 34–46.6)
HGB BLD-MCNC: 8.4 G/DL (ref 12–15.9)

## 2023-04-15 PROCEDURE — 86900 BLOOD TYPING SEROLOGIC ABO: CPT

## 2023-04-15 PROCEDURE — P9016 RBC LEUKOCYTES REDUCED: HCPCS

## 2023-04-15 PROCEDURE — 85014 HEMATOCRIT: CPT | Performed by: INTERNAL MEDICINE

## 2023-04-15 PROCEDURE — 36430 TRANSFUSION BLD/BLD COMPNT: CPT

## 2023-04-15 PROCEDURE — 85018 HEMOGLOBIN: CPT | Performed by: INTERNAL MEDICINE

## 2023-04-15 RX ORDER — TRAMADOL HYDROCHLORIDE 50 MG/1
50 TABLET ORAL EVERY 6 HOURS PRN
Status: DISCONTINUED | OUTPATIENT
Start: 2023-04-15 | End: 2023-04-15 | Stop reason: HOSPADM

## 2023-04-15 RX ORDER — PANTOPRAZOLE SODIUM 40 MG/1
40 TABLET, DELAYED RELEASE ORAL
Qty: 60 TABLET | Refills: 0 | Status: SHIPPED | OUTPATIENT
Start: 2023-04-15

## 2023-04-15 RX ORDER — SUCRALFATE ORAL 1 G/10ML
1 SUSPENSION ORAL
Qty: 1200 ML | Refills: 0 | Status: SHIPPED | OUTPATIENT
Start: 2023-04-15 | End: 2023-05-15

## 2023-04-15 RX ORDER — METOPROLOL SUCCINATE 25 MG/1
12.5 TABLET, EXTENDED RELEASE ORAL DAILY
COMMUNITY

## 2023-04-15 RX ORDER — TRAMADOL HYDROCHLORIDE 50 MG/1
50 TABLET ORAL EVERY 6 HOURS PRN
Qty: 6 TABLET | Refills: 0 | Status: SHIPPED | OUTPATIENT
Start: 2023-04-15 | End: 2023-04-22

## 2023-04-15 RX ADMIN — Medication 10 ML: at 09:11

## 2023-04-15 RX ADMIN — MEMANTINE HYDROCHLORIDE 5 MG: 5 TABLET, FILM COATED ORAL at 09:10

## 2023-04-15 RX ADMIN — SUCRALFATE 1 G: 1 SUSPENSION ORAL at 09:10

## 2023-04-15 RX ADMIN — TRAMADOL HYDROCHLORIDE 50 MG: 50 TABLET, COATED ORAL at 14:20

## 2023-04-15 RX ADMIN — PANTOPRAZOLE SODIUM 40 MG: 40 TABLET, DELAYED RELEASE ORAL at 09:11

## 2023-04-15 RX ADMIN — LEVOTHYROXINE SODIUM 100 MCG: 100 TABLET ORAL at 05:16

## 2023-04-15 RX ADMIN — METOPROLOL SUCCINATE 12.5 MG: 25 TABLET, EXTENDED RELEASE ORAL at 09:10

## 2023-04-15 RX ADMIN — ACETAMINOPHEN 650 MG: 325 TABLET ORAL at 05:20

## 2023-04-15 RX ADMIN — ATORVASTATIN CALCIUM 10 MG: 10 TABLET, FILM COATED ORAL at 09:10

## 2023-04-15 NOTE — PLAN OF CARE
Goal Outcome Evaluation: Medicated once for complaints of hip pain this shift. No complaints of rectal bleeding this shift. HGB is 8.4 on this mornings labs. Telemetry running Normal sinus rhythm  rate 76-92. Patient safety to be maintained this shift, continue to monitor and report abnormal to provider.

## 2023-04-15 NOTE — DISCHARGE SUMMARY
[facility] Medicine Services  DISCHARGE SUMMARY       Date of Admission: 4/13/2023  Date of Discharge:  4/15/2023  Primary Care Physician: Benedicto Hebert MD    Presenting Problem/History of Present Illness:  GI bleed/bright red blood per rectum    Final Discharge Diagnoses:  Active Hospital Problems    Diagnosis    • **Gastrointestinal hemorrhage, unspecified gastrointestinal hemorrhage type: Possibly from duodenal versus diverticular bleed    • Essential hypertension    • Normocytic anemia due to blood loss    • Hypothyroidism    • Vascular dementia without behavioral disturbance    In addition to above  Right hip pain-no fracture  Degenerative change right knee.  History of VTE remotely of any anticoagulation.    Consults:  Dr. Asher      Procedures Performed:    Endoscopic findings:   Esophagus:  Normal esophageal body mucosa.  No esophageal varices.  Essentially normal Z-line.  Small hiatal hernia.  No Theresa-Mondragon tear or esophagitis endoscopically.     Stomach:  Scattered erythema otherwise no concerning lesions such as ulcers/masses/large polyps.  No blood seen in the stomach.  Status post biopsies to rule out H. pylori/pathology.     Duodenum:  Duodenal bulb with approximately 1 cm clean-based ulcer with 2 tiny flat pigmented spots but no protuberances.  Essentially normal-appearing edges.  No bleeding with scope or water pump provocation.  No interventions performed.  No blood seen in the duodenum visualized.     Colonoscopy findings:   -Normal distal terminal ileum  -Normal-appearing colonic mucosa and vascularity throughout  -Left-sided diverticulosis  -No blood seen in the colon       Pertinent Test Results:   Results for orders placed during the hospital encounter of 09/29/20    Adult Transthoracic Echo Complete W/ Cont if Necessary Per Protocol    Interpretation Summary  · Left ventricular wall thickness is consistent with mild concentric hypertrophy.  ·  Left ventricular ejection fraction appears to be 61 - 65%.  · Left ventricular diastolic function is consistent with (grade I) impaired relaxation.  · Abnormal global longitudinal LV strain (GLS) = -15.8%.  · Saline test results are negative. No evidence of patent foramen ovale  · Mild pulmonary hypertension is present.      Imaging Results (All)     Procedure Component Value Units Date/Time    XR Knee 1 or 2 View Right [[id number]] Collected: 04/13/23 1430     Updated: 04/13/23 1434    Narrative:      EXAMINATION: XR KNEE 1 OR 2 VW RIGHT-     4/13/2023 2:11 PM CDT     HISTORY: severe pain; K92.2-Gastrointestinal hemorrhage, unspecified;  M25.551-Pain in right hip     Right knee, 2 views.     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     Diffuse tibiofemoral chondrocalcinosis.     Diffuse arterial calcification.     Summary:  1. Degenerative joint changes greatest at the lateral and anterior  compartment.  2. No fracture  This report was finalized on 04/13/2023 14:31 by Dr. Michael Barry MD.    XR Femur 2 View Right [[id number]] Collected: 04/13/23 1430     Updated: 04/13/23 1433    Narrative:      EXAMINATION: XR FEMUR 2 VW RIGHT-     4/13/2023 2:10 PM CDT     HISTORY: severe pain; K92.2-Gastrointestinal hemorrhage, unspecified;  M25.551-Pain in right hip     Right femur, 2 views.     No femur fracture is seen.     The hip and knee are normal, to the extent visualized.     No soft tissue abnormality is seen.       Impression:      1. No acute bony abnormality.              This report was finalized on 04/13/2023 14:30 by Dr. Michael Barry MD.    CT Abdomen Pelvis With Contrast [[id number]] Collected: 04/13/23 1107     Updated: 04/13/23 1113    Narrative:      EXAMINATION: CT ABDOMEN PELVIS W CONTRAST-      4/13/2023 10:52 AM CDT     HISTORY: Abdominal pain, acute, nonlocalized. Rectal bleeding. Diarrhea.     In order to have a CT radiation dose as low as  reasonably achievable  Automated Exposure Control was utilized for adjustment of the mA and/or  KV according to patient size.     DLP in mGycm= 531.     Abdomen/pelvis CT with IV contrast.  Axial, sagittal, and coronal sequences.     No comparison.     Heart size is normal.  There is no acute abnormality at the lung bases.     Normal CT appearance of the liver, gallbladder, pancreas, spleen,  adrenal glands, and kidneys.     Aortic calcification with no aneurysm.     No small bowel dilation.  There is moderate fecal material throughout the colon.  A few scattered sigmoid diverticula.  No diverticulitis or colitis.     Summary:  1. No acute abnormality is seen.                                   This report was finalized on 04/13/2023 11:10 by Dr. Michael Barry MD.    XR Hip With or Without Pelvis 2 - 3 View Right [[id number]] Collected: 04/13/23 1057     Updated: 04/13/23 1101    Narrative:      EXAMINATION: XR HIP W OR WO PELVIS 2-3 VIEW RIGHT-     4/13/2023 10:17 AM CDT     HISTORY: Pelvis and right hip pain.     Pelvis and right hip, 3 views.     Osteopenia.     The pelvic ring is intact.   No pubic symphysis or sacroiliac joint diastasis.     The right proximal femur and acetabulum are intact.   Normal appearance of the hip joint space.  Soft tissues are appropriate.     Summary:  1. No acute bony abnormality is seen.        This report was finalized on 04/13/2023 10:57 by Dr. Michael Barry MD.        LAB RESULTS:      Lab 04/15/23  0824 04/14/23  2322 04/14/23  1546 04/14/23  0746 04/14/23  0005 04/13/23  1609 04/13/23  0857   WBC  --   --   --   --   --   --  6.74   HEMOGLOBIN 8.4* 6.6* 7.5* 7.6* 7.7*   < > 6.5*   HEMATOCRIT 26.4* 21.3* 23.5* 24.1* 24.4*   < > 21.8*   PLATELETS  --   --   --   --   --   --  194   NEUTROS ABS  --   --   --   --   --   --  4.81   IMMATURE GRANS (ABS)  --   --   --   --   --   --  0.06*   LYMPHS ABS  --   --   --   --   --   --  1.14   MONOS ABS  --   --   --   --   --   --  0.58    EOS ABS  --   --   --   --   --   --  0.07   MCV  --   --   --   --   --   --  88.6   PROTIME  --   --   --   --   --   --  14.0    < > = values in this interval not displayed.         Lab 04/13/23  0857   SODIUM 140   POTASSIUM 4.3   CHLORIDE 108*   CO2 19.0*   ANION GAP 13.0   BUN 39*   CREATININE 1.01*   EGFR 53.3*   GLUCOSE 139*   CALCIUM 8.5*             Lab 04/13/23  0857   PROTIME 14.0   INR 1.06             Lab 04/13/23  0857   ABO TYPING A   RH TYPING Positive   ANTIBODY SCREEN Negative         Brief Urine Lab Results  (Last result in the past 365 days)      Color   Clarity   Blood   Leuk Est   Nitrite   Protein   CREAT   Urine HCG        12/05/22 2118 YELLOW   Clear   Negative   Negative  Comment: Culture Urine under CMS guidelines and criteria will auto reflex on a sole  criteria that is based on manual microscopic count of more than 10/hpf for  WBC. If Culture Urine is warranted aside from this criteria, place a  requisition or order within 24 hours of collection.   Negative                 Microbiology Results (last 10 days)     ** No results found for the last 240 hours. **          Hospital Course:     Overnight patient has not had any recurrence of bleeding but hemoglobin dropped to 6.6.  Posttransfusion hemoglobin after today's 1 unit of packed RBC came back up at 8.4.  Probability of lab error considered.  No adverse effect from transfusion experienced by patient immediately.    Patient's  advised on avoidance of constipation.  We also discussed about use of aspirin which he told me basically was for preventive measure.  They were instructed to seek medical attention if any worsening condition.    She is an 89-year-old demented woman who presented with bright red blood per rectum.  In evaluation, patient's hemoglobin was 6.5.  She received a unit of packed RBC.  Her hemoglobin improved to 7.5 however this morning dropped again to 6.6 despite no evidence of active bleeding.  She received a  "unit of packed RBC.  Posttransfusion hemoglobin is 8.4.    Patient underwent EGD and colonoscopy.  Findings and EGD is duodenal ulcer.  Recommendation by GI service to continue PPI 40 mg twice daily, Carafate 1 p.o. every 6 hours for 8 weeks.  Biopsy was done during EGD.  Details of findings as stated above        Discussed with nurse Azar.  No recurrence of bleeding.      Patient also complained of right hip pain.  She had hip x-ray with and without pelvis that showed no acute bony abnormality.  She has degenerative change greatest at the lateral and anterior compartment of right knee.  Femur x-ray no acute abnormality.  Patient prescribed tramadol.    Overall patient is doing better and believed to be medically stable and appropriate for discharge.  GI service cleared the patient for discharge.  It is suspected the bleeding may have been from her duodenal ulcer versus diverticulosis.        Physical Exam on Discharge:  /61 (BP Location: Right arm, Patient Position: Lying)   Pulse 75   Temp 98 °F (36.7 °C) (Oral)   Resp 16   Ht 167.6 cm (66\")   Wt 68 kg (150 lb)   SpO2 100%   BMI 24.21 kg/m²   Physical Exam   Seated on commode  No reported bleeding  GEN: Awake, alert, interactive, in NAD  HEENT: Atraumatic, PERRLA, EOMI, Anicteric, Trachea midline  Lungs: CTAB, no wheezing/rales/rhonchi  Heart: RRR, +S1/s2, no rub  ABD: soft, nt/nd, +BS, no guarding/rebound  Extremities: atraumatic, no cyanosis, no edema  Skin: no rashes or lesions  Neuro: AAOx3, no focal deficits  Psych: normal mood & affect      Condition on Discharge: Stable  Discharge Disposition:      Discharge Medications:     Discharge Medications      New Medications      Instructions Start Date   pantoprazole 40 MG EC tablet  Commonly known as: PROTONIX   40 mg, Oral, 2 Times Daily Before Meals      sucralfate 1 GM/10ML suspension  Commonly known as: CARAFATE   1 g, Oral, 4 Times Daily Before Meals & Nightly      traMADol 50 MG " tablet  Commonly known as: ULTRAM   50 mg, Oral, Every 6 Hours PRN         Continue These Medications      Instructions Start Date   alendronate 70 MG tablet  Commonly known as: FOSAMAX   70 mg, Oral, Every 7 Days      levothyroxine 88 MCG tablet  Commonly known as: SYNTHROID, LEVOTHROID   88 mcg, Oral, Daily      lovastatin 40 MG tablet  Commonly known as: MEVACOR   40 mg, Oral, Nightly      memantine 10 MG tablet  Commonly known as: NAMENDA   10 mg, Oral, 2 Times Daily      metoprolol succinate XL 25 MG 24 hr tablet  Commonly known as: TOPROL-XL   12.5 mg, Oral, Daily, Takes one-half tablet daily         Stop These Medications    meloxicam 7.5 MG tablet  Commonly known as: MOBIC              Discharge Diet:   Diet Instructions     Diet: Cardiac Diets; Healthy Heart (2-3 Na+); Regular Texture (IDDSI 7); Thin (IDDSI 0)      Discharge Diet: Cardiac Diets    Cardiac Diet: Healthy Heart (2-3 Na+)    Texture: Regular Texture (IDDSI 7)    Fluid Consistency: Thin (IDDSI 0)    As tolerated          Activity at Discharge:   Activity Instructions     Gradually Increase Activity Until at Pre-Hospitalization Level            Follow-up Appointments:   PCP within 1 week  GI service further recommendation  Test Results Pending at Discharge: Pathology report from EGD sample.    Electronically signed by Art Sanchez MD, 04/15/23, 14:02 CDT.    Time: > 30  minutes.

## 2023-04-15 NOTE — PROGRESS NOTES
[facility]ology  Inpatient Progress Note  Today's date:  04/15/23    Dina Michaels  1934       Reason for Follow Up: Hematochezia    Subjective:   Patient resting comfortably in bed.  She had a normal bowel movement overnight.  No further hematochezia.  Hemoglobin 6.6 this morning increasing to 8.4 after 1 unit PRBCs.  Only reporting some right-sided hip pain.    Allergies   Allergen Reactions   • Nisoldipine Er Other (See Comments)     Feelings of passing out, tired       Current Facility-Administered Medications:   •  acetaminophen (TYLENOL) tablet 650 mg, 650 mg, Oral, Q4H PRN, Bubba Asher MD, 650 mg at 04/15/23 0520  •  atorvastatin (LIPITOR) tablet 10 mg, 10 mg, Oral, Daily, Bubba Asher MD, 10 mg at 04/15/23 0910  •  hydrocortisone (ANUSOL-HC) suppository 25 mg, 25 mg, Rectal, BID, Bubba Asher MD, 25 mg at 04/14/23 0847  •  lactated ringers infusion, 50 mL/hr, Intravenous, Continuous, Bubba Asher MD, Last Rate: 50 mL/hr at 04/14/23 0941, 50 mL/hr at 04/14/23 0941  •  levothyroxine (SYNTHROID, LEVOTHROID) tablet 100 mcg, 100 mcg, Oral, Q AM, Bubba Asher MD, 100 mcg at 04/15/23 0516  •  memantine (NAMENDA) tablet 5 mg, 5 mg, Oral, BID, Bubba Asher MD, 5 mg at 04/15/23 0910  •  metoprolol succinate XL (TOPROL-XL) 24 hr tablet 12.5 mg, 12.5 mg, Oral, Q24H, Bubba Asher MD, 12.5 mg at 04/15/23 0910  •  ondansetron (ZOFRAN) injection 4 mg, 4 mg, Intravenous, Q6H PRN, Bubba Asher MD  •  pantoprazole (PROTONIX) EC tablet 40 mg, 40 mg, Oral, BID AC, Bubba Asher MD, 40 mg at 04/15/23 0911  •  sodium chloride 0.9 % flush 10 mL, 10 mL, Intravenous, Q12H, Bubba Asher MD, 10 mL at 04/15/23 0911  •  sodium chloride 0.9 % flush 10 mL, 10 mL, Intravenous, PRN, Bubba Asher MD  •  sodium chloride 0.9 % infusion 40 mL, 40 mL, Intravenous, PRN, Bubba Asher MD  •  sodium chloride 0.9 % infusion, 100 mL/hr, Intravenous, Continuous, Bubba Asher MD, Stopped  at 04/14/23 1429  •  sucralfate (CARAFATE) 1 GM/10ML suspension 1 g, 1 g, Oral, 4x Daily AC & at Bedtime, Bubba Asher MD, 1 g at 04/15/23 0910  •  traMADol (ULTRAM) tablet 50 mg, 50 mg, Oral, Q6H PRN, Art Sanchez MD    Review of Systems:   Review of Systems as per HPI    Vital Signs:  Temp:  [97.5 °F (36.4 °C)-99 °F (37.2 °C)] 97.6 °F (36.4 °C)  Heart Rate:  [70-92] 86  Resp:  [14-25] 17  BP: (106-171)/(45-88) 158/60  Body mass index is 24.21 kg/m².     Intake/Output Summary (Last 24 hours) at 4/15/2023 1045  Last data filed at 4/15/2023 0534  Gross per 24 hour   Intake 513.33 ml   Output 250 ml   Net 263.33 ml     No intake/output data recorded.  Physical Exam:  Physical Exam     CV-RRR  Lung-equal expansion bilaterally  ABD-soft; NT/ND    Results Review:   I have reviewed all of the patient's current test results    Results from last 7 days   Lab Units 04/15/23  0824 04/14/23  2322 04/14/23  1546 04/13/23  1609 04/13/23  0857   WBC 10*3/mm3  --   --   --   --  6.74   HEMOGLOBIN g/dL 8.4* 6.6* 7.5*   < > 6.5*   HEMATOCRIT % 26.4* 21.3* 23.5*   < > 21.8*   PLATELETS 10*3/mm3  --   --   --   --  194    < > = values in this interval not displayed.       Results from last 7 days   Lab Units 04/13/23  0857   SODIUM mmol/L 140   POTASSIUM mmol/L 4.3   CHLORIDE mmol/L 108*   CO2 mmol/L 19.0*   BUN mg/dL 39*   CREATININE mg/dL 1.01*   CALCIUM mg/dL 8.5*   GLUCOSE mg/dL 139*       Results from last 7 days   Lab Units 04/13/23  0857   INR  1.06       No results found for: LIPASE    Radiology Review:  Imaging Results (Last 24 Hours)     ** No results found for the last 24 hours. **          Impression/Plan:  Patient Active Problem List   Diagnosis Code   • AMS (altered mental status) R41.82   • Vascular dementia without behavioral disturbance F01.50   • Near syncope R55   • Acute pulmonary embolism I26.99   • Hypothyroidism E03.9   • Lactic acidosis E87.20   • Gastrointestinal hemorrhage, unspecified  gastrointestinal hemorrhage type K92.2   • Normocytic anemia due to blood loss D50.0     Hematochezia/resolved.  Hemoglobin drop to 6.6 most likely lab error.  No signs of active bleeding and posttransfusion hemoglobin of 8.4 appropriately increased from 7.5.  Bleeding may have been from her duodenal ulcer versus diverticulosis.    -Regular diet.  Consider discharge home after lunch if no signs of active bleeding.  Patient and  instructed to come back to the emergency room should they see recurrent hematochezia.  -Follow-up H. pylori antibody and treat as indicated  -Pantoprazole 40 mg p.o. twice daily 30 minutes before breakfast and dinner x8 weeks.  -Carafate 1 g p.o. every 6 hours for 8 weeks  -Hold aspirin for 8 weeks and restart as indicated and if necessary  -Follow-up with GI clinic in 2 to 4 weeks    I offered to refer for a repeat EGD in 4 to 6 weeks, but patient and  would like to consider a watchful waiting approach and be reevaluated only if there are signs of active GI bleeding.    Bubba Asher MD  04/15/23  10:45 CDT

## 2023-04-16 LAB
BH BB BLOOD EXPIRATION DATE: NORMAL
BH BB BLOOD TYPE BARCODE: 6200
BH BB DISPENSE STATUS: NORMAL
BH BB PRODUCT CODE: NORMAL
BH BB UNIT NUMBER: NORMAL
CROSSMATCH INTERPRETATION: NORMAL
H PYLORI IGG SER IA-ACNC: 4.02 INDEX VALUE (ref 0–0.79)
UNIT  ABO: NORMAL
UNIT  RH: NORMAL

## 2023-04-16 NOTE — OUTREACH NOTE
Prep Survey    Flowsheet Row Responses   [facility] facility patient discharged from? [facility] [place]   Is LACE score < 7 ? No   Eligibility Readm Mgmt   Discharge diagnosis **Gastrointestinal hemorrhage   Does the patient have one of the following disease processes/diagnoses(primary or secondary)? Other   Does the patient have Home health ordered? No   Is there a DME ordered? No   Prep survey completed? Yes          KELSEY HAYWOOD - Registered Nurse

## 2023-04-17 LAB
CYTO UR: NORMAL
LAB AP CASE REPORT: NORMAL
Lab: NORMAL
PATH REPORT.FINAL DX SPEC: NORMAL
PATH REPORT.GROSS SPEC: NORMAL

## 2023-04-18 ENCOUNTER — READMISSION MANAGEMENT (OUTPATIENT)
Dept: CALL CENTER | Facility: HOSPITAL | Age: 88
End: 2023-04-18
Payer: MEDICARE

## 2023-04-18 NOTE — OUTREACH NOTE
Medical Week 1 Survey    Flowsheet Row Responses   [facility] patient discharged from? [place]   Does the patient have one of the following disease processes/diagnoses(primary or secondary)? Other   Week 1 attempt successful? Yes   Call start time 1903   Call end time 1915   Discharge diagnosis **Gastrointestinal hemorrhage   Person spoke with today (if not patient) and relationship    Meds reviewed with patient/caregiver? Yes   Is the patient having any side effects they believe may be caused by any medication additions or changes? No   Does the patient have all medications ordered at discharge? Yes   Is the patient taking all medications as directed (includes completed medication regime)? Yes   Does the patient have a primary care provider?  Yes   Does the patient have an appointment with their PCP within 7 days of discharge? Yes   Has the patient kept scheduled appointments due by today? N/A   Psychosocial issues? No   Did the patient receive a copy of their discharge instructions? Yes   Nursing interventions Reviewed instructions with patient   What is the patient's perception of their health status since discharge? New symptoms unrelated to diagnosis   Is the patient/caregiver able to teach back signs and symptoms related to disease process for when to call PCP? Yes   Is the patient/caregiver able to teach back signs and symptoms related to disease process for when to call 911? Yes   Is the patient/caregiver able to teach back the hierarchy of who to call/visit for symptoms/problems? PCP, Specialist, Home health nurse, Urgent Care, ED, 911 Yes   If the patient is a current smoker, are they able to teach back resources for cessation? Not a smoker   Additional teach back comments  states she has a swollen bruise looking area on her other leg now.  Calf is not red or swollen.  States it is more irritation that pain.  Denies any other symptoms and pt is able to ambulate.  Denies chest pains or  sob.  No further issues with rectal bleeding.    Week 1 call completed? Yes   Wrap up additional comments  states he will take her to ED to be evaluated in the am.  If any other symptoms or pain increases, they will go tonight.           Sandie COMBS - Licensed Nurse

## 2023-04-19 ENCOUNTER — APPOINTMENT (OUTPATIENT)
Dept: ULTRASOUND IMAGING | Facility: HOSPITAL | Age: 88
End: 2023-04-19
Payer: MEDICARE

## 2023-04-19 ENCOUNTER — HOSPITAL ENCOUNTER (EMERGENCY)
Facility: HOSPITAL | Age: 88
Discharge: HOME | End: 2023-04-19
Attending: STUDENT IN AN ORGANIZED HEALTH CARE EDUCATION/TRAINING PROGRAM | Admitting: STUDENT IN AN ORGANIZED HEALTH CARE EDUCATION/TRAINING PROGRAM
Payer: MEDICARE

## 2023-04-19 VITALS
HEART RATE: 81 BPM | SYSTOLIC BLOOD PRESSURE: 212 MMHG | OXYGEN SATURATION: 100 % | BODY MASS INDEX: 24.11 KG/M2 | RESPIRATION RATE: 18 BRPM | WEIGHT: 150 LBS | HEIGHT: 66 IN | DIASTOLIC BLOOD PRESSURE: 84 MMHG | TEMPERATURE: 98.2 F

## 2023-04-19 DIAGNOSIS — Z51.89 VISIT FOR WOUND CHECK: Primary | ICD-10-CM

## 2023-04-19 LAB
ALBUMIN SERPL-MCNC: 3.7 G/DL (ref 3.5–5.2)
ALBUMIN/GLOB SERPL: 1.5 G/DL
ALP SERPL-CCNC: 48 U/L (ref 39–117)
ALT SERPL W P-5'-P-CCNC: 14 U/L (ref 1–33)
ANION GAP SERPL CALCULATED.3IONS-SCNC: 11 MMOL/L (ref 5–15)
AST SERPL-CCNC: 20 U/L (ref 1–32)
BASOPHILS # BLD AUTO: 0.04 10*3/MM3 (ref 0–0.2)
BASOPHILS NFR BLD AUTO: 0.4 % (ref 0–1.5)
BILIRUB SERPL-MCNC: 0.3 MG/DL (ref 0–1.2)
BUN SERPL-MCNC: 13 MG/DL (ref 8–23)
BUN/CREAT SERPL: 17.6 (ref 7–25)
CALCIUM SPEC-SCNC: 8.9 MG/DL (ref 8.6–10.5)
CHLORIDE SERPL-SCNC: 106 MMOL/L (ref 98–107)
CO2 SERPL-SCNC: 26 MMOL/L (ref 22–29)
CREAT SERPL-MCNC: 0.74 MG/DL (ref 0.57–1)
DEPRECATED RDW RBC AUTO: 53.8 FL (ref 37–54)
EGFRCR SERPLBLD CKD-EPI 2021: 77.4 ML/MIN/1.73
EOSINOPHIL # BLD AUTO: 0.14 10*3/MM3 (ref 0–0.4)
EOSINOPHIL NFR BLD AUTO: 1.5 % (ref 0.3–6.2)
ERYTHROCYTE [DISTWIDTH] IN BLOOD BY AUTOMATED COUNT: 16.3 % (ref 12.3–15.4)
GLOBULIN UR ELPH-MCNC: 2.5 GM/DL
GLUCOSE SERPL-MCNC: 107 MG/DL (ref 65–99)
HCT VFR BLD AUTO: 30.8 % (ref 34–46.6)
HGB BLD-MCNC: 9.4 G/DL (ref 12–15.9)
IMM GRANULOCYTES # BLD AUTO: 0.07 10*3/MM3 (ref 0–0.05)
IMM GRANULOCYTES NFR BLD AUTO: 0.8 % (ref 0–0.5)
INR PPP: 1.01 (ref 0.91–1.09)
LYMPHOCYTES # BLD AUTO: 0.83 10*3/MM3 (ref 0.7–3.1)
LYMPHOCYTES NFR BLD AUTO: 9.1 % (ref 19.6–45.3)
MCH RBC QN AUTO: 27.9 PG (ref 26.6–33)
MCHC RBC AUTO-ENTMCNC: 30.5 G/DL (ref 31.5–35.7)
MCV RBC AUTO: 91.4 FL (ref 79–97)
MONOCYTES # BLD AUTO: 0.86 10*3/MM3 (ref 0.1–0.9)
MONOCYTES NFR BLD AUTO: 9.4 % (ref 5–12)
NEUTROPHILS NFR BLD AUTO: 7.19 10*3/MM3 (ref 1.7–7)
NEUTROPHILS NFR BLD AUTO: 78.8 % (ref 42.7–76)
NRBC BLD AUTO-RTO: 0 /100 WBC (ref 0–0.2)
PLATELET # BLD AUTO: 238 10*3/MM3 (ref 140–450)
PMV BLD AUTO: 9.6 FL (ref 6–12)
POTASSIUM SERPL-SCNC: 3.7 MMOL/L (ref 3.5–5.2)
PROT SERPL-MCNC: 6.2 G/DL (ref 6–8.5)
PROTHROMBIN TIME: 13.4 SECONDS (ref 11.8–14.8)
RBC # BLD AUTO: 3.37 10*6/MM3 (ref 3.77–5.28)
SODIUM SERPL-SCNC: 143 MMOL/L (ref 136–145)
WBC NRBC COR # BLD: 9.13 10*3/MM3 (ref 3.4–10.8)

## 2023-04-19 PROCEDURE — 93970 EXTREMITY STUDY: CPT

## 2023-04-19 PROCEDURE — 85025 COMPLETE CBC W/AUTO DIFF WBC: CPT | Performed by: STUDENT IN AN ORGANIZED HEALTH CARE EDUCATION/TRAINING PROGRAM

## 2023-04-19 PROCEDURE — 93970 EXTREMITY STUDY: CPT | Performed by: SURGERY

## 2023-04-19 PROCEDURE — 99282 EMERGENCY DEPT VISIT SF MDM: CPT

## 2023-04-19 PROCEDURE — 85610 PROTHROMBIN TIME: CPT | Performed by: STUDENT IN AN ORGANIZED HEALTH CARE EDUCATION/TRAINING PROGRAM

## 2023-04-19 PROCEDURE — 36415 COLL VENOUS BLD VENIPUNCTURE: CPT

## 2023-04-19 PROCEDURE — 80053 COMPREHEN METABOLIC PANEL: CPT | Performed by: STUDENT IN AN ORGANIZED HEALTH CARE EDUCATION/TRAINING PROGRAM

## 2023-04-19 RX ORDER — CLARITHROMYCIN 500 MG/1
500 TABLET, COATED ORAL 2 TIMES DAILY
Qty: 28 TABLET | Refills: 0 | Status: SHIPPED | OUTPATIENT
Start: 2023-04-19 | End: 2023-05-03

## 2023-04-19 RX ORDER — AMOXICILLIN 500 MG/1
1000 CAPSULE ORAL 2 TIMES DAILY
Qty: 56 CAPSULE | Refills: 0 | Status: SHIPPED | OUTPATIENT
Start: 2023-04-19 | End: 2023-05-03

## 2023-04-19 NOTE — DISCHARGE INSTRUCTIONS
Apply wet-to-dry dressings to the legs as discussed.    Take the antibiotics (clarithromycin, amoxicillin) as prescribed to treat the H. pylori infection and follow-up with your doctor to make sure that this clears.    STOP taking the lovastatin while taking the antibiotics, as they have a drug interaction. You can start this again after the antibiotics are done.    Please return if you have severely worsening pain, new concerning symptoms, or if you develop other emergent concerns. Otherwise please follow-up with your primary care doctor regarding your ER visit today.    Telephone Encounter by Nichole Campoverde at 11/13/18 01:56 PM     Author:  Nichole Campoverde Service:  (none) Author Type:  Patient      Filed:  11/13/18 01:58 PM Encounter Date:  11/13/2018 Status:  Signed     :  Nichole Campoverde (Patient )              STACEY HOWARD    Patient Age: 8 year old    ACCT STATUS:   MESSAGE:[CK1.1T]   Ashlee, mother, states patient is having an issue with the medication and would like to speak to a nurse.  Please advise.[CK1.1M]  Message confirmed with caller.[CK1.1T]    Sending/routing [facility] Seq nurse[CK1.1M]   Next and Last Visit with Provider and Department  Next visit with JAS EDEN is on 12/03/2018 at  5:10 PM in [facility]  Next visit with [facility] is on 12/03/2018 at  5:10 PM in [facility]  Last visit with JAS EDEN was on 10/15/2018 at  1:50 PM in [facility]  Last visit with [facility] was on 10/15/2018 at  1:50 PM in [facility]     WEIGHT AND HEIGHT: As of 10/15/2018 weight is 73.4 lbs.(33.294 kg). Height is 4' 7\"(1.397 m).   BMI is 17.06 kg/(m^2) calculated from:     Height 4' 7\" (1.397 m) as of 10/15/18     Weight 73 lb 6.4 oz (33.294 kg) as of 10/15/18      Allergies      Allergen   Reactions   • Omnicef  Hives and Diarrhea     Tolerates augmentin    • Ranitidine-Alcohol  Nausea and Vomiting     Current outpatient prescriptions       Medication  Sig Dispense Refill   • dexmethylphenidate (FOCALIN XR) 5 MG 24 hr Cap Take 1 Cap by mouth every morning. 30 Cap 0   • dexmethylphenidate (FOCALIN) 2.5 MG tablet Take 1 Tab by mouth daily as needed. 30 Tab 0      PHARMACY to use:[CK1.1T] Shown below[CK1.2T]  Pharmacy preference(s) on file: [facility] [address] [place] [address]    CALL BACK INFO:[CK1.1T] Ok to leave response (including medical information) with family member or on answering machine[CK1.2M]  ROUTING:[CK1.1T] Patient's physician/staff[CK1.2M]        PCP: AMINA ELIZABETH MD         INS: Payor: [employer]  PPO/HMO / Plan: N/A / Product Type: *No Product type* / Note: This is the primary coverage, but no account was found for this location or the patient's primary location.   ADDRESS:  [address] [place] [id number][CK1.1T]       Revision History        User Key Date/Time User Provider Type Action    > CK1.2 11/13/18 01:58 PM Nichole Campoverde Patient  Sign     CK1.1 11/13/18 01:56 PM Nichole Campoverde Patient      M - Manual, T - Template

## 2023-04-19 NOTE — ED PROVIDER NOTES
Subjective   History of Present Illness  Patient presents due to coloration changes in her legs.  Started yesterday afternoon.  Her  noted this morning that she had discoloration to her shins bilaterally.  She is up and walking yesterday and seem to be having a better day.  She was admitted for lower GI bleed recently and this has resolved.  Her hospitalist called her ER to say that she has H. Pylori.  She was a little bit confused this morning but the  says this was totally her baseline; he says that she is dealing with dementia.  No falls or head injuries.  No fevers.  No other focal changes.  Eating and drinking normally, urinating defecating normally.  No abdominal pain.  No chest pain or trouble breathing.  She notes right-sided hip pain and he says that she notices whenever she is sitting still and it is better when she is walking.    Review of Systems   Constitutional: Negative for chills and fever.   Respiratory: Negative for cough and shortness of breath.    Cardiovascular: Negative for chest pain and palpitations.   Gastrointestinal: Negative for abdominal pain and vomiting.   Genitourinary: Negative for difficulty urinating and dysuria.   Neurological: Negative for syncope and light-headedness.       Past Medical History:   Diagnosis Date   • Cancer        Allergies   Allergen Reactions   • Nisoldipine Er Other (See Comments)     Feelings of passing out, tired       Past Surgical History:   Procedure Laterality Date   • COLONOSCOPY N/A 4/14/2023    Procedure: COLONOSCOPY WITH ANESTHESIA;  Surgeon: Bubba Asher MD;  Location: [facility] ENDOSCOPY;  Service: Gastroenterology;  Laterality: N/A;  pre gi bleed  post diverticulosis  Dr. Hebert   • ENDOSCOPY N/A 4/14/2023    Procedure: ESOPHAGOGASTRODUODENOSCOPY WITH ANESTHESIA;  Surgeon: Bubba Asher MD;  Location: [facility] ENDOSCOPY;  Service: Gastroenterology;  Laterality: N/A;  pre screen  post gastric bx  Dr Hebert       No family history on  file.    Social History     Socioeconomic History   • Marital status:    Tobacco Use   • Smoking status: Never   • Smokeless tobacco: Never   Vaping Use   • Vaping Use: Never used   Substance and Sexual Activity   • Alcohol use: Yes   • Drug use: Never           Objective   Physical Exam  Vitals reviewed.   Constitutional:       General: She is not in acute distress.  HENT:      Head: Normocephalic and atraumatic.   Eyes:      Extraocular Movements: Extraocular movements intact.      Conjunctiva/sclera: Conjunctivae normal.   Cardiovascular:      Pulses: Normal pulses.      Heart sounds: Normal heart sounds.   Pulmonary:      Effort: Pulmonary effort is normal. No respiratory distress.   Abdominal:      General: Abdomen is flat. There is no distension.   Musculoskeletal:      Cervical back: Normal range of motion and neck supple.      Comments: Bilateral hip tenderness to palpation.  Tolerates range of motion of bilateral hips without difficulty.   Skin:     General: Skin is warm and dry.      Comments: Skin wound LLE is open and  states unchanged  Bruising to anterior bl shins  No warmth, erythema, purulence.   Neurological:      General: No focal deficit present.      Mental Status: She is alert. Mental status is at baseline.   Psychiatric:         Behavior: Behavior normal.         Thought Content: Thought content normal.         Procedures           ED Course                                           MDM  Dina Michaels is a 89 y.o. female with PMH above who presents to the Emergency Department with bl leg wounds. No acute injury. Not grossly infected. NVI; I was easily able to doppler PT and DP pulses.  Will screen for DVT with US     ED Course:   --Laboratory studies reviewed by me and are notable for no focal abnormality.  DVT US negative.  Pt stable on re-eval  Patient and her  were educated on how to dress her wound. The nurses did a wet to dry at bedside and demonstrated this.  I  prescribed and discuss H. Pylori treatment, including that they need to hold the statin while taking treatment.  No further workup indicated at this time. Patient is stable and appropriate for discharge. Patient received strict return precautions per discharge instructions and was instructed to follow-up with their primary care provider regarding their ER visit.      Final diagnosis: leg wound check    All questions answered. Patient/family was understanding and in agreement with today's assessment and plan. The patient was monitored during their stay in the ED and dispositioned without acute event.    Electronically signed by:  Sudeep Forbes MD 4/19/2023 15:39 CDT      Note: Dragon medical dictation software was used in the creation of this note.      Final diagnoses:   Visit for wound check       ED Disposition  ED Disposition     ED Disposition   Discharge    Condition   Stable    Comment   --             No follow-up provider specified.       Medication List      New Prescriptions    amoxicillin 500 MG capsule  Commonly known as: AMOXIL  Take 2 capsules by mouth 2 (Two) Times a Day for 14 days.     clarithromycin 500 MG tablet  Commonly known as: BIAXIN  Take 1 tablet by mouth 2 (Two) Times a Day for 14 days.        Stop    lovastatin 40 MG tablet  Commonly known as: MEVACOR           Where to Get Your Medications      These medications were sent to [facility]/pharmacy #[id number] - [place] - [address] - [phone] PH - [phone] fx [address] [place] [address]    Phone: [phone]   · amoxicillin 500 MG capsule  · clarithromycin 500 MG tablet          Sudeep Forbes MD  04/19/23 [id number]

## 2023-04-26 ENCOUNTER — READMISSION MANAGEMENT (OUTPATIENT)
Dept: CALL CENTER | Facility: HOSPITAL | Age: 88
End: 2023-04-26
Payer: MEDICARE

## 2023-04-26 NOTE — OUTREACH NOTE
Medical Week 2 Survey    Flowsheet Row Responses   [facility] patient discharged from? [place]   Does the patient have one of the following disease processes/diagnoses(primary or secondary)? Other   Week 2 attempt successful? Yes   Call start time 1557   Discharge diagnosis **Gastrointestinal hemorrhage   Call end time 1615   Person spoke with today (if not patient) and relationship    Meds reviewed with patient/caregiver? Yes   Is the patient taking all medications as directed (includes completed medication regime)? No   What is preventing the patient from taking all medications as directed? Side effects   Nursing Interventions Nurse provided patient education, Advised patient to call provider   Does the patient have a primary care provider?  Yes   Does the patient have an appointment with their PCP within 7 days of discharge? No   Nursing Interventions Educated patient on importance of making appointment, Advised patient to make appointment   Has the patient kept scheduled appointments due by today? N/A   Psychosocial issues? No   Did the patient receive a copy of their discharge instructions? Yes   Nursing interventions Reviewed instructions with patient   What is the patient's perception of their health status since discharge? New symptoms unrelated to diagnosis   Is the patient/caregiver able to teach back signs and symptoms related to disease process for when to call PCP? Yes   Is the patient/caregiver able to teach back signs and symptoms related to disease process for when to call 911? Yes   Is the patient/caregiver able to teach back the hierarchy of who to call/visit for symptoms/problems? PCP, Specialist, Home health nurse, Urgent Care, ED, 911 Yes   Week 2 Call Completed? Yes   Wrap up additional comments  reports that after Er visit on 4/19/23 Pt was ordered 2 ABT ( Claritomycin and Amoxiciilin) .  reports yesterday Pt began Talking to people that was not there. This is not  normal  for Pt , however  states that her mom use to do this..  states he stopped giving the ABT to Pt thinking it could be the meds. ADVISED  to call PCP at this time and inform.. If unable to reach PCP Pt will need to seek attention. Denies any SOA  or lethargy.  TASNEEM MCKENNA - Registered Nurse

## 2023-04-27 ENCOUNTER — HOSPITAL ENCOUNTER (OUTPATIENT)
Dept: CT IMAGING | Facility: HOSPITAL | Age: 88
Discharge: HOME | End: 2023-04-27
Payer: MEDICARE

## 2023-04-27 ENCOUNTER — TRANSCRIBE ORDERS (OUTPATIENT)
Dept: ADMINISTRATIVE | Facility: HOSPITAL | Age: 88
End: 2023-04-27
Payer: MEDICARE

## 2023-04-27 DIAGNOSIS — R47.81 SLURRED SPEECH: Primary | ICD-10-CM

## 2023-04-27 DIAGNOSIS — R47.81 SLURRED SPEECH: ICD-10-CM

## 2023-04-27 PROCEDURE — 70450 CT HEAD/BRAIN W/O DYE: CPT

## 2023-05-04 ENCOUNTER — READMISSION MANAGEMENT (OUTPATIENT)
Dept: CALL CENTER | Facility: HOSPITAL | Age: 88
End: 2023-05-04
Payer: MEDICARE

## 2023-05-04 NOTE — OUTREACH NOTE
Medical Week 3 Survey    Flowsheet Row Responses   [facility] patient discharged from? [place]   Does the patient have one of the following disease processes/diagnoses(primary or secondary)? Other   Week 3 attempt successful? No   Unsuccessful attempts Attempt 1  [attempted home and cell]          DEBO GUTIÉRREZ - Registered Nurse

## 2023-05-14 ENCOUNTER — HOSPITAL ENCOUNTER (OUTPATIENT)
Facility: HOSPITAL | Age: 88
LOS: 1 days | Discharge: HOME | End: 2023-05-15
Attending: FAMILY MEDICINE | Admitting: FAMILY MEDICINE
Payer: MEDICARE

## 2023-05-14 ENCOUNTER — APPOINTMENT (OUTPATIENT)
Dept: CT IMAGING | Facility: HOSPITAL | Age: 88
End: 2023-05-14
Payer: MEDICARE

## 2023-05-14 ENCOUNTER — APPOINTMENT (OUTPATIENT)
Dept: GENERAL RADIOLOGY | Facility: HOSPITAL | Age: 88
End: 2023-05-14
Payer: MEDICARE

## 2023-05-14 DIAGNOSIS — R46.89 COGNITIVE AND BEHAVIORAL CHANGES: ICD-10-CM

## 2023-05-14 DIAGNOSIS — G45.9 TIA (TRANSIENT ISCHEMIC ATTACK): Primary | ICD-10-CM

## 2023-05-14 DIAGNOSIS — I10 MALIGNANT HYPERTENSION: ICD-10-CM

## 2023-05-14 DIAGNOSIS — R41.89 COGNITIVE AND BEHAVIORAL CHANGES: ICD-10-CM

## 2023-05-14 DIAGNOSIS — D55.8 OTHER ANEMIA DUE TO ENZYME DISORDER: ICD-10-CM

## 2023-05-14 DIAGNOSIS — Z74.09 IMPAIRED MOBILITY: ICD-10-CM

## 2023-05-14 DIAGNOSIS — R77.8 ELEVATED TROPONIN: ICD-10-CM

## 2023-05-14 LAB
ALBUMIN SERPL-MCNC: 3.4 G/DL (ref 3.5–5.2)
ALBUMIN/GLOB SERPL: 1.1 G/DL
ALP SERPL-CCNC: 105 U/L (ref 39–117)
ALT SERPL W P-5'-P-CCNC: 9 U/L (ref 1–33)
ANION GAP SERPL CALCULATED.3IONS-SCNC: 10 MMOL/L (ref 5–15)
APTT PPP: 28.4 SECONDS (ref 24.1–35)
AST SERPL-CCNC: 15 U/L (ref 1–32)
BASOPHILS # BLD AUTO: 0.09 10*3/MM3 (ref 0–0.2)
BASOPHILS NFR BLD AUTO: 1 % (ref 0–1.5)
BILIRUB SERPL-MCNC: 0.2 MG/DL (ref 0–1.2)
BUN SERPL-MCNC: 15 MG/DL (ref 8–23)
BUN/CREAT SERPL: 16 (ref 7–25)
CALCIUM SPEC-SCNC: 8.8 MG/DL (ref 8.6–10.5)
CHLORIDE SERPL-SCNC: 102 MMOL/L (ref 98–107)
CO2 SERPL-SCNC: 25 MMOL/L (ref 22–29)
CREAT SERPL-MCNC: 0.94 MG/DL (ref 0.57–1)
D-LACTATE SERPL-SCNC: 1.7 MMOL/L (ref 0.5–2)
DEPRECATED RDW RBC AUTO: 51.7 FL (ref 37–54)
EGFRCR SERPLBLD CKD-EPI 2021: 58.1 ML/MIN/1.73
EOSINOPHIL # BLD AUTO: 0.22 10*3/MM3 (ref 0–0.4)
EOSINOPHIL NFR BLD AUTO: 2.4 % (ref 0.3–6.2)
ERYTHROCYTE [DISTWIDTH] IN BLOOD BY AUTOMATED COUNT: 15.2 % (ref 12.3–15.4)
GEN 5 2HR TROPONIN T REFLEX: 19 NG/L
GLOBULIN UR ELPH-MCNC: 3 GM/DL
GLUCOSE SERPL-MCNC: 102 MG/DL (ref 65–99)
HCT VFR BLD AUTO: 30.2 % (ref 34–46.6)
HGB BLD-MCNC: 8.8 G/DL (ref 12–15.9)
HOLD SPECIMEN: NORMAL
HOLD SPECIMEN: NORMAL
IMM GRANULOCYTES # BLD AUTO: 0.11 10*3/MM3 (ref 0–0.05)
IMM GRANULOCYTES NFR BLD AUTO: 1.2 % (ref 0–0.5)
INR PPP: 1.01 (ref 0.91–1.09)
LYMPHOCYTES # BLD AUTO: 1.2 10*3/MM3 (ref 0.7–3.1)
LYMPHOCYTES NFR BLD AUTO: 13.1 % (ref 19.6–45.3)
MCH RBC QN AUTO: 27 PG (ref 26.6–33)
MCHC RBC AUTO-ENTMCNC: 29.1 G/DL (ref 31.5–35.7)
MCV RBC AUTO: 92.6 FL (ref 79–97)
MONOCYTES # BLD AUTO: 1.02 10*3/MM3 (ref 0.1–0.9)
MONOCYTES NFR BLD AUTO: 11.2 % (ref 5–12)
NEUTROPHILS NFR BLD AUTO: 6.49 10*3/MM3 (ref 1.7–7)
NEUTROPHILS NFR BLD AUTO: 71.1 % (ref 42.7–76)
NRBC BLD AUTO-RTO: 0 /100 WBC (ref 0–0.2)
NT-PROBNP SERPL-MCNC: 1309 PG/ML (ref 0–1800)
PLATELET # BLD AUTO: 290 10*3/MM3 (ref 140–450)
PMV BLD AUTO: 9.9 FL (ref 6–12)
POTASSIUM SERPL-SCNC: 4.3 MMOL/L (ref 3.5–5.2)
PROT SERPL-MCNC: 6.4 G/DL (ref 6–8.5)
PROTHROMBIN TIME: 13.4 SECONDS (ref 11.8–14.8)
RBC # BLD AUTO: 3.26 10*6/MM3 (ref 3.77–5.28)
SODIUM SERPL-SCNC: 137 MMOL/L (ref 136–145)
TROPONIN T DELTA: -2 NG/L
TROPONIN T SERPL HS-MCNC: 21 NG/L
WBC NRBC COR # BLD: 9.13 10*3/MM3 (ref 3.4–10.8)
WHOLE BLOOD HOLD COAG: NORMAL
WHOLE BLOOD HOLD SPECIMEN: NORMAL

## 2023-05-14 PROCEDURE — 83036 HEMOGLOBIN GLYCOSYLATED A1C: CPT | Performed by: INTERNAL MEDICINE

## 2023-05-14 PROCEDURE — 85025 COMPLETE CBC W/AUTO DIFF WBC: CPT | Performed by: FAMILY MEDICINE

## 2023-05-14 PROCEDURE — 85610 PROTHROMBIN TIME: CPT | Performed by: FAMILY MEDICINE

## 2023-05-14 PROCEDURE — 96374 THER/PROPH/DIAG INJ IV PUSH: CPT

## 2023-05-14 PROCEDURE — 87040 BLOOD CULTURE FOR BACTERIA: CPT | Performed by: FAMILY MEDICINE

## 2023-05-14 PROCEDURE — 80053 COMPREHEN METABOLIC PANEL: CPT | Performed by: FAMILY MEDICINE

## 2023-05-14 PROCEDURE — 85730 THROMBOPLASTIN TIME PARTIAL: CPT | Performed by: FAMILY MEDICINE

## 2023-05-14 PROCEDURE — 87077 CULTURE AEROBIC IDENTIFY: CPT | Performed by: FAMILY MEDICINE

## 2023-05-14 PROCEDURE — G0378 HOSPITAL OBSERVATION PER HR: HCPCS

## 2023-05-14 PROCEDURE — 87070 CULTURE OTHR SPECIMN AEROBIC: CPT | Performed by: FAMILY MEDICINE

## 2023-05-14 PROCEDURE — 99285 EMERGENCY DEPT VISIT HI MDM: CPT

## 2023-05-14 PROCEDURE — 70450 CT HEAD/BRAIN W/O DYE: CPT

## 2023-05-14 PROCEDURE — 84484 ASSAY OF TROPONIN QUANT: CPT | Performed by: FAMILY MEDICINE

## 2023-05-14 PROCEDURE — 25010000002 ENOXAPARIN PER 10 MG: Performed by: INTERNAL MEDICINE

## 2023-05-14 PROCEDURE — 82607 VITAMIN B-12: CPT | Performed by: CLINICAL NURSE SPECIALIST

## 2023-05-14 PROCEDURE — 87186 SC STD MICRODIL/AGAR DIL: CPT | Performed by: FAMILY MEDICINE

## 2023-05-14 PROCEDURE — 83605 ASSAY OF LACTIC ACID: CPT | Performed by: FAMILY MEDICINE

## 2023-05-14 PROCEDURE — 83880 ASSAY OF NATRIURETIC PEPTIDE: CPT | Performed by: FAMILY MEDICINE

## 2023-05-14 PROCEDURE — 96372 THER/PROPH/DIAG INJ SC/IM: CPT

## 2023-05-14 PROCEDURE — 71045 X-RAY EXAM CHEST 1 VIEW: CPT

## 2023-05-14 PROCEDURE — 93005 ELECTROCARDIOGRAM TRACING: CPT | Performed by: FAMILY MEDICINE

## 2023-05-14 PROCEDURE — 93005 ELECTROCARDIOGRAM TRACING: CPT | Performed by: INTERNAL MEDICINE

## 2023-05-14 PROCEDURE — 82746 ASSAY OF FOLIC ACID SERUM: CPT | Performed by: CLINICAL NURSE SPECIALIST

## 2023-05-14 PROCEDURE — 36415 COLL VENOUS BLD VENIPUNCTURE: CPT

## 2023-05-14 PROCEDURE — 87205 SMEAR GRAM STAIN: CPT | Performed by: FAMILY MEDICINE

## 2023-05-14 RX ORDER — LABETALOL HYDROCHLORIDE 5 MG/ML
10 INJECTION, SOLUTION INTRAVENOUS ONCE
Status: COMPLETED | OUTPATIENT
Start: 2023-05-14 | End: 2023-05-14

## 2023-05-14 RX ORDER — ATORVASTATIN CALCIUM 40 MG/1
80 TABLET, FILM COATED ORAL NIGHTLY
Status: DISCONTINUED | OUTPATIENT
Start: 2023-05-14 | End: 2023-05-15 | Stop reason: HOSPADM

## 2023-05-14 RX ORDER — ENOXAPARIN SODIUM 100 MG/ML
40 INJECTION SUBCUTANEOUS
Status: DISCONTINUED | OUTPATIENT
Start: 2023-05-14 | End: 2023-05-15

## 2023-05-14 RX ORDER — ACETAMINOPHEN 325 MG/1
650 TABLET ORAL EVERY 4 HOURS PRN
Status: DISCONTINUED | OUTPATIENT
Start: 2023-05-14 | End: 2023-05-15 | Stop reason: HOSPADM

## 2023-05-14 RX ORDER — SODIUM CHLORIDE, SODIUM LACTATE, POTASSIUM CHLORIDE, CALCIUM CHLORIDE 600; 310; 30; 20 MG/100ML; MG/100ML; MG/100ML; MG/100ML
50 INJECTION, SOLUTION INTRAVENOUS CONTINUOUS
Status: DISCONTINUED | OUTPATIENT
Start: 2023-05-14 | End: 2023-05-15 | Stop reason: HOSPADM

## 2023-05-14 RX ORDER — LABETALOL HYDROCHLORIDE 5 MG/ML
10 INJECTION, SOLUTION INTRAVENOUS
Status: DISCONTINUED | OUTPATIENT
Start: 2023-05-14 | End: 2023-05-15 | Stop reason: HOSPADM

## 2023-05-14 RX ORDER — SODIUM CHLORIDE 0.9 % (FLUSH) 0.9 %
10 SYRINGE (ML) INJECTION AS NEEDED
Status: DISCONTINUED | OUTPATIENT
Start: 2023-05-14 | End: 2023-05-15 | Stop reason: HOSPADM

## 2023-05-14 RX ORDER — ASPIRIN 300 MG/1
300 SUPPOSITORY RECTAL DAILY
Status: DISCONTINUED | OUTPATIENT
Start: 2023-05-14 | End: 2023-05-15 | Stop reason: HOSPADM

## 2023-05-14 RX ORDER — SODIUM CHLORIDE 9 MG/ML
40 INJECTION, SOLUTION INTRAVENOUS AS NEEDED
Status: DISCONTINUED | OUTPATIENT
Start: 2023-05-14 | End: 2023-05-15 | Stop reason: HOSPADM

## 2023-05-14 RX ORDER — ASPIRIN 81 MG/1
81 TABLET, CHEWABLE ORAL DAILY
Status: DISCONTINUED | OUTPATIENT
Start: 2023-05-14 | End: 2023-05-15 | Stop reason: HOSPADM

## 2023-05-14 RX ORDER — ONDANSETRON 2 MG/ML
4 INJECTION INTRAMUSCULAR; INTRAVENOUS EVERY 6 HOURS PRN
Status: DISCONTINUED | OUTPATIENT
Start: 2023-05-14 | End: 2023-05-15 | Stop reason: HOSPADM

## 2023-05-14 RX ORDER — SODIUM CHLORIDE 0.9 % (FLUSH) 0.9 %
10 SYRINGE (ML) INJECTION EVERY 12 HOURS SCHEDULED
Status: DISCONTINUED | OUTPATIENT
Start: 2023-05-14 | End: 2023-05-15 | Stop reason: HOSPADM

## 2023-05-14 RX ORDER — ACETAMINOPHEN 650 MG/1
650 SUPPOSITORY RECTAL EVERY 4 HOURS PRN
Status: DISCONTINUED | OUTPATIENT
Start: 2023-05-14 | End: 2023-05-15 | Stop reason: HOSPADM

## 2023-05-14 RX ADMIN — ENOXAPARIN SODIUM 40 MG: 100 INJECTION SUBCUTANEOUS at 20:45

## 2023-05-14 RX ADMIN — ATORVASTATIN CALCIUM 80 MG: 40 TABLET, FILM COATED ORAL at 20:45

## 2023-05-14 RX ADMIN — ASPIRIN 81 MG: 81 TABLET, CHEWABLE ORAL at 20:45

## 2023-05-14 RX ADMIN — SODIUM CHLORIDE, POTASSIUM CHLORIDE, SODIUM LACTATE AND CALCIUM CHLORIDE 50 ML/HR: 600; 310; 30; 20 INJECTION, SOLUTION INTRAVENOUS at 19:53

## 2023-05-14 RX ADMIN — LABETALOL HYDROCHLORIDE 10 MG: 5 INJECTION INTRAVENOUS at 17:09

## 2023-05-14 NOTE — PROGRESS NOTES
"Pharmacy Dosing Service  Anticoagulation  Enoxaparin    Assessment:  Current order: Enoxaparin 40 mg SQ every 24 hours.  Current dosage/labs acceptable.     Plan:  Continue current dosage. Continue routine follow-up evaluation.     Subjective:  Dina Michaels is a 89 y.o. female on enoxaparin for VTE prophylaxis.    1. TIA (transient ischemic attack)    2. Malignant hypertension    3. 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   Creatinine   Date Value Ref Range Status   05/14/2023 0.94 0.57 - 1.00 mg/dL Final   04/19/2023 0.74 0.57 - 1.00 mg/dL Final   04/13/2023 1.01 (H) 0.57 - 1.00 mg/dL Final     Lab Results   Component Value Date    INR 1.01 05/14/2023    INR 1.01 04/19/2023    INR 1.06 04/13/2023    PROTIME 13.4 05/14/2023    PROTIME 13.4 04/19/2023    PROTIME 14.0 04/13/2023      Lab Results   Component Value Date    HGB 8.8 (L) 05/14/2023    HGB 9.4 (L) 04/19/2023    HGB 8.4 (L) 04/15/2023      Lab Results   Component Value Date    HCT 30.2 (L) 05/14/2023    HCT 30.8 (L) 04/19/2023    HCT 26.4 (L) 04/15/2023      Lab Results   Component Value Date     05/14/2023     04/19/2023     04/13/2023    No results found for: DDIMER  COVID19   Date Value Ref Range Status   09/29/2020 Not Detected Not Detected - Ref. Range Final         Jose Alvarado, PharmD  05/14/23 18:02 CDT   "

## 2023-05-14 NOTE — H&P
"    [facility] Medicine Services  HISTORY AND PHYSICAL    Date of Admission: 5/14/2023  Primary Care Physician: Benedicto Hebert MD    Subjective   Primary Historian: Omkar  Chief Complaint: Per ER record syncope  History of Present Illness  I am asked admit this 89-year-old woman for concern of stroke.  Patient reportedly presented with left-sided weakness that is now resolved.  Her initial vital signs were 110/58, heart rate 80, respiratory rate 18 and temperature of 98.5.  More recent reading has been as high as 194/77 at 1645H.  Record indicates she takes metoprolol XL 25 mg.  I did not see any other antihypertensive in her regimen.  Neurology has been consulted in the emergency room and requested hospital service for the admission.    You are admitted the patient with concern for TIA, malignant hypertension, elevated troponin (21) and anemia (8.8)  Chest x-ray read as stable chest without acute process.  Clear lungs.  Head CT scan showed no acute intracranial abnormality.  Has not been an EKG done yet.    \"Passed out\"  Sitting outside the patio - she was slumped  Unresponsive  Couldn't with left hand   Left leg was less responsive    ?speech abnormality   --- slur - only last transiently     states been \"delusionla\". I think he meant hallucination when he further described talking to people.   Also had not slept for the last   They spoke to their primary and thought she may be \"allergic to amoxicillin and or clarithromycin)      Had ct of hear on April 27 at [facility] because    Since I saw her last time, she has nothad recurrence of rectal bleeding    Review of Systems   Limited due to dementia  infor were from  Omkar    Past Medical History:   Past Medical History:   Diagnosis Date   • Cancer      Past Surgical History:  Past Surgical History:   Procedure Laterality Date   • COLONOSCOPY N/A 4/14/2023    Procedure: COLONOSCOPY WITH ANESTHESIA;  Surgeon: " Bubba Asher MD;  Location: [facility] ENDOSCOPY;  Service: Gastroenterology;  Laterality: N/A;  pre gi bleed  post diverticulosis  Dr. Hebert   • ENDOSCOPY N/A 4/14/2023    Procedure: ESOPHAGOGASTRODUODENOSCOPY WITH ANESTHESIA;  Surgeon: Bubba Asher MD;  Location: [facility] ENDOSCOPY;  Service: Gastroenterology;  Laterality: N/A;  pre screen  post gastric bx  Dr Hebert     Social History:  reports that she has never smoked. She has never used smokeless tobacco. She reports current alcohol use. She reports that she does not use drugs.    Family History - none known per Bill  Allergies:  Allergies   Allergen Reactions   • Amoxicillin Hallucinations   • Clarithromycin Hallucinations   • Nisoldipine Er Other (See Comments)     Feelings of passing out, tired   • Sular [Nisoldipine] Unknown - Low Severity       Medications:  Prior to Admission medications    Medication Sig Start Date End Date Taking? Authorizing Provider   alendronate (FOSAMAX) 70 MG tablet Take 1 tablet by mouth Every 7 (Seven) Days.    ProviderJv MD   levothyroxine (SYNTHROID, LEVOTHROID) 88 MCG tablet Take 1 tablet by mouth Daily.    ProviderJv MD   memantine (NAMENDA) 10 MG tablet Take 1 tablet by mouth 2 (Two) Times a Day.    ProviderJv MD   metoprolol succinate XL (TOPROL-XL) 25 MG 24 hr tablet Take 12.5 mg by mouth Daily. Takes one-half tablet daily    ProviderJv MD   pantoprazole (PROTONIX) 40 MG EC tablet Take 1 tablet by mouth 2 (Two) Times a Day Before Meals. 4/15/23   Art Sanchez MD   sucralfate (CARAFATE) 1 GM/10ML suspension Take 10 mL by mouth 4 (Four) Times a Day Before Meals & at Bedtime for 30 days. 4/15/23 5/15/23  Art Sanchez MD     I have utilized all available immediate resources to obtain, update, or review the patient's current medications (including all prescriptions, over-the-counter products, herbals, cannabis/cannabidiol products, and  "vitamin/mineral/dietary (nutritional) supplements).    Objective     Vital Signs: BP (!) 193/87   Pulse 76   Temp 98.6 °F (37 °C) (Oral)   Resp 16   Ht 167.6 cm (66\")   Wt 68 kg (150 lb)   SpO2 94%   BMI 24.21 kg/m²   Physical Exam   GEN: Awake, alert, interactive, in NAD, able to identify objects  Presented. Very limited verbal  Interaction coming from her.   No facial asymmetry  Speech doesn't seem to be off   HEENT: Atraumatic, PERRLA, EOMI, Anicteric, Trachea midline  Lungs: CTAB, no wheezing/rales/rhonchi  Heart: RRR, +S1/s2, no rub  ABD: soft, nt/nd, +BS, no guarding/rebound  Extremities: atraumatic, no cyanosis,     Skin:drying skin erosions on legs   Neuro: AAOx3, no focal deficits; dry scaly skin with edema  Psych: normal mood & affect      Results Reviewed:  Lab Results (last 24 hours)     Procedure Component Value Units Date/Time    Lactic Acid, Plasma [[id number]]  (Normal) Collected: 05/14/23 1345    Specimen: Blood Updated: 05/14/23 1703     Lactate 1.7 mmol/L     High Sensitivity Troponin T 2Hr [[id number]]  (Abnormal) Collected: 05/14/23 1541    Specimen: Blood Updated: 05/14/23 1623     HS Troponin T 19 ng/L      Troponin T Delta -2 ng/L     Narrative:      High Sensitive Troponin T Reference Range:  <10.0 ng/L- Negative Female for AMI  <15.0 ng/L- Negative Male for AMI  >=10 - Abnormal Female indicating possible myocardial injury.  >=15 - Abnormal Male indicating possible myocardial injury.   Clinicians would have to utilize clinical acumen, EKG, Troponin, and serial changes to determine if it is an Acute Myocardial Infarction or myocardial injury due to an underlying chronic condition.         Wound Culture - Swab, Leg, Left [[id number]] Collected: 05/14/23 1349    Specimen: Swab from Leg, Left Updated: 05/14/23 1501     Gram Stain Many (4+) Gram negative bacilli      Rare (1+) WBCs seen    [place] Draw [[id number]] Collected: 05/14/23 1329    Specimen: Blood Updated: 05/14/23 1431    " Narrative:      The following orders were created for panel order [place] Draw.  Procedure                               Abnormality         Status                     ---------                               -----------         ------                     Green Top (Gel)[[id number]]                                  Final result               Lavender Top[[id number]]                                     Final result               Red Top[[id number]]                                          Final result               Light Blue Top[[id number]]                                   Final result                 Please view results for these tests on the individual orders.    Green Top (Gel) [[id number]] Collected: 05/14/23 1329    Specimen: Blood Updated: 05/14/23 1431     Extra Tube Hold for add-ons.     Comment: Auto resulted.       Lavender Top [[id number]] Collected: 05/14/23 1329    Specimen: Blood Updated: 05/14/23 1431     Extra Tube hold for add-on     Comment: Auto resulted       Red Top [[id number]] Collected: 05/14/23 1329    Specimen: Blood Updated: 05/14/23 1431     Extra Tube Hold for add-ons.     Comment: Auto resulted.       Light Blue Top [[id number]] Collected: 05/14/23 1329    Specimen: Blood Updated: 05/14/23 1431     Extra Tube Hold for add-ons.     Comment: Auto resulted       Blood Culture - Blood, Arm, Right [[id number]] Collected: 05/14/23 1345    Specimen: Blood from Arm, Right Updated: 05/14/23 1423    Blood Culture - Blood, Arm, Left [[id number]] Collected: 05/14/23 1345    Specimen: Blood from Arm, Left Updated: 05/14/23 1422    Comprehensive Metabolic Panel [[id number]]  (Abnormal) Collected: 05/14/23 1329    Specimen: Blood Updated: 05/14/23 1359     Glucose 102 mg/dL      BUN 15 mg/dL      Creatinine 0.94 mg/dL      Sodium 137 mmol/L      Potassium 4.3 mmol/L      Comment: Slight hemolysis detected by analyzer. Results may be affected.        Chloride 102 mmol/L      CO2 25.0 mmol/L      Calcium 8.8 mg/dL       Total Protein 6.4 g/dL      Albumin 3.4 g/dL      ALT (SGPT) 9 U/L      AST (SGOT) 15 U/L      Alkaline Phosphatase 105 U/L      Total Bilirubin 0.2 mg/dL      Globulin 3.0 gm/dL      A/G Ratio 1.1 g/dL      BUN/Creatinine Ratio 16.0     Anion Gap 10.0 mmol/L      eGFR 58.1 mL/min/1.73     Narrative:      GFR Normal >60  Chronic Kidney Disease <60  Kidney Failure <15    The GFR formula is only valid for adults with stable renal function between ages 18 and 70.    BNP [[id number]]  (Normal) Collected: 05/14/23 1329    Specimen: Blood Updated: 05/14/23 1357     proBNP 1,309.0 pg/mL     Narrative:      Among patients with dyspnea, NT-proBNP is highly sensitive for the detection of acute congestive heart failure. In addition NT-proBNP of <300 pg/ml effectively rules out acute congestive heart failure with 99% negative predictive value.    Results may be falsely decreased if patient taking Biotin.      High Sensitivity Troponin T [[id number]]  (Abnormal) Collected: 05/14/23 1329    Specimen: Blood Updated: 05/14/23 1357     HS Troponin T 21 ng/L     Narrative:      High Sensitive Troponin T Reference Range:  <10.0 ng/L- Negative Female for AMI  <15.0 ng/L- Negative Male for AMI  >=10 - Abnormal Female indicating possible myocardial injury.  >=15 - Abnormal Male indicating possible myocardial injury.   Clinicians would have to utilize clinical acumen, EKG, Troponin, and serial changes to determine if it is an Acute Myocardial Infarction or myocardial injury due to an underlying chronic condition.         Protime-INR [[id number]]  (Normal) Collected: 05/14/23 1329    Specimen: Blood Updated: 05/14/23 1350     Protime 13.4 Seconds      INR 1.01    aPTT [[id number]]  (Normal) Collected: 05/14/23 1329    Specimen: Blood Updated: 05/14/23 1350     PTT 28.4 seconds     CBC & Differential [[id number]]  (Abnormal) Collected: 05/14/23 1329    Specimen: Blood Updated: 05/14/23 1340    Narrative:      The following orders were  created for panel order CBC & Differential.  Procedure                               Abnormality         Status                     ---------                               -----------         ------                     CBC Auto Differential[[id number]]        Abnormal            Final result                 Please view results for these tests on the individual orders.    CBC Auto Differential [[id number]]  (Abnormal) Collected: 05/14/23 1329    Specimen: Blood Updated: 05/14/23 1340     WBC 9.13 10*3/mm3      RBC 3.26 10*6/mm3      Hemoglobin 8.8 g/dL      Hematocrit 30.2 %      MCV 92.6 fL      MCH 27.0 pg      MCHC 29.1 g/dL      RDW 15.2 %      RDW-SD 51.7 fl      MPV 9.9 fL      Platelets 290 10*3/mm3      Neutrophil % 71.1 %      Lymphocyte % 13.1 %      Monocyte % 11.2 %      Eosinophil % 2.4 %      Basophil % 1.0 %      Immature Grans % 1.2 %      Neutrophils, Absolute 6.49 10*3/mm3      Lymphocytes, Absolute 1.20 10*3/mm3      Monocytes, Absolute 1.02 10*3/mm3      Eosinophils, Absolute 0.22 10*3/mm3      Basophils, Absolute 0.09 10*3/mm3      Immature Grans, Absolute 0.11 10*3/mm3      nRBC 0.0 /100 WBC         Imaging Results (Last 24 Hours)     Procedure Component Value Units Date/Time    XR Chest 1 View [[id number]] Collected: 05/14/23 1434     Updated: 05/14/23 1438    Narrative:      Frontal upright radiograph of the chest 5/14/2023 2:23 PM CDT     HISTORY: Syncope     COMPARISON: 09/29/2020.     FINDINGS:   The lungs are clear. The cardiomediastinal silhouette and pulmonary  vascularity are within normal limits.      The osseous structures and surrounding soft tissues demonstrate no acute  abnormality.       Impression:      1. Stable chest exam without acute process.        This report was finalized on 05/14/2023 14:35 by Dr Yoel De La Rosa, .    CT Head Without Contrast [[id number]] Collected: 05/14/23 1405     Updated: 05/14/23 1409    Narrative:      EXAMINATION: CT HEAD WO CONTRAST-      5/14/2023 1:51  PM CDT     HISTORY: Syncope. Confusion and left-sided weakness.     In order to have a CT radiation dose as low as reasonably achievable  Automated Exposure Control was utilized for adjustment of the mA and/or  KV according to patient size.     DLP in mGycm= 684.     Comparison is made with 04/27/2023.     Axial, sagittal, and coronal noncontrast CT imaging of the head.     The visualized paranasal sinuses are clear.     The brain and ventricles have an age appropriate appearance.   Mild atrophy and small vessel disease.  There is no hemorrhage or mass-effect.   No acute infarction is seen.     No calvarial abnormality.       Impression:      1. No acute intracranial abnormality is seen.                                         This report was finalized on 05/14/2023 14:06 by Dr. Michael Barry MD.        I have personally reviewed and interpreted the radiology studies and ECG obtained at time of admission.     Assessment / Plan   Assessment:   Active Hospital Problems    Diagnosis    • **TIA (transient ischemic attack)        Treatment Plan  The patient will be admitted to my service here at [facility]. ***    Medical Decision Making  Number and Complexity of problems: ***  Differential Diagnosis: ***    Conditions and Status        { Condition and Status:75058}     [facility] Data  External documents reviewed: ***  Cardiac tracing (EKG, telemetry) interpretation: ***  Radiology interpretation: ***  Labs reviewed: ***  Any tests that were considered but not ordered: ***     Decision rules/scores evaluated (example [id number], Wells, etc): ***     Discussed with: ***     Care Planning  Shared decision making: ***  Code status and discussions: ***    Disposition  Social Determinants of Health that impact treatment or disposition: ***  Estimated length of stay is ***.     I confirmed that the patient's advanced care plan is present, code status is documented, and a surrogate decision maker is listed in the  patient's medical record.     The patient's surrogate decision maker is ***.     The patient was seen and examined by me on *** at ***.    Electronically signed by Art Sanchez MD, 05/14/23, 17:13 CDT.

## 2023-05-14 NOTE — PLAN OF CARE
Goal Outcome Evaluation:  Plan of Care Reviewed With: patient, spouse        Progress: no change  Outcome Evaluation: Recieved pt from ED with c/o syncope, and left side weakness. Report from Ruma DIXON. Pt alert to person and place. Bilateral legs has scabs and wounds and plus 3 edema.  at bedside.Continue to monitor.

## 2023-05-14 NOTE — ED PROVIDER NOTES
"HPI:    Patient is a 89-year-old white female who presents to the emergency room via EMS after being found on the front porch \"slumping\" in her chair.  The  of the patient states that she has a lot of these \"passing out\" spells but today lasted longer than usual approximately 10 minutes.  Patient initially according to the  had some left-sided weakness and was difficult standing and using her left arm.  He states that this seemed to has not improved or resolved at this point.  He says that she seems to be mildly confused and responding appropriately but slow.  There was no injury.  Patient also has a history of dementia.      REVIEW OF SYSTEMS  CONSTITUTIONAL:  No complaints of fever, chills,or weakness  EYES:  No complaints of discharge   ENT: No complaints of sore throat or ear pain  CARDIOVASCULAR:  No complaints of chest pain, palpitations, or swelling  RESPIRATORY:  No complaints of cough or shortness of breath  GI:  No complaints of abdominal pain, nausea, vomiting, or diarrhea  MUSCULOSKELETAL:  No complaints of back pain  SKIN:  No complaints of rash  NEUROLOGIC:  No complaints of headache, focal weakness, or sensory changes  ENDOCRINE:  No complaints of polyuria or polydipsia  LYMPHATIC:  No complaints of swollen glands  GENITOURINARY: No complaints of urinary frequency or hematuria        PAST MEDICAL HISTORY  Past Medical History:   Diagnosis Date   • Cancer        FAMILY HISTORY  History reviewed. No pertinent family history.    SOCIAL HISTORY  Social History     Socioeconomic History   • Marital status:    Tobacco Use   • Smoking status: Never   • Smokeless tobacco: Never   Vaping Use   • Vaping Use: Never used   Substance and Sexual Activity   • Alcohol use: Yes   • Drug use: Never       IMMUNIZATION HISTORY  Deferred to primary care physician.    SURGICAL HISTORY  Past Surgical History:   Procedure Laterality Date   • COLONOSCOPY N/A 4/14/2023    Procedure: COLONOSCOPY WITH " "ANESTHESIA;  Surgeon: Bubba Asher MD;  Location: [facility] ENDOSCOPY;  Service: Gastroenterology;  Laterality: N/A;  pre gi bleed  post diverticulosis  Dr. Hebert   • ENDOSCOPY N/A 4/14/2023    Procedure: ESOPHAGOGASTRODUODENOSCOPY WITH ANESTHESIA;  Surgeon: Bubba Asher MD;  Location: [facility] ENDOSCOPY;  Service: Gastroenterology;  Laterality: N/A;  pre screen  post gastric bx  Dr Hebert       CURRENT MEDICATIONS    Current Facility-Administered Medications:   •  labetalol (NORMODYNE,TRANDATE) injection 10 mg, 10 mg, Intravenous, Once, Marek Julian Jr., MD    Current Outpatient Medications:   •  alendronate (FOSAMAX) 70 MG tablet, Take 1 tablet by mouth Every 7 (Seven) Days., Disp: , Rfl:   •  levothyroxine (SYNTHROID, LEVOTHROID) 88 MCG tablet, Take 1 tablet by mouth Daily., Disp: , Rfl:   •  memantine (NAMENDA) 10 MG tablet, Take 1 tablet by mouth 2 (Two) Times a Day., Disp: , Rfl:   •  metoprolol succinate XL (TOPROL-XL) 25 MG 24 hr tablet, Take 12.5 mg by mouth Daily. Takes one-half tablet daily, Disp: , Rfl:   •  pantoprazole (PROTONIX) 40 MG EC tablet, Take 1 tablet by mouth 2 (Two) Times a Day Before Meals., Disp: 60 tablet, Rfl: 0  •  sucralfate (CARAFATE) 1 GM/10ML suspension, Take 10 mL by mouth 4 (Four) Times a Day Before Meals & at Bedtime for 30 days., Disp: 1200 mL, Rfl: 0    ALLERGIES  Allergies   Allergen Reactions   • Amoxicillin Hallucinations   • Clarithromycin Hallucinations   • Nisoldipine Er Other (See Comments)     Feelings of passing out, tired   • Sular [Nisoldipine] Unknown - Low Severity       Neuro exam    VITAL SIGNS:   BP (!) 193/87   Pulse 76   Temp 98.6 °F (37 °C) (Oral)   Resp 18   Ht 167.6 cm (66\")   Wt 68 kg (150 lb)   SpO2 (!) 84%   BMI 24.21 kg/m²     Constitutional: Patient is alert and in no distress.  Patient with no current discomfort.    ENT: There is a normal pharynx with no acute erythema or exudate and oral mucosa is moist.  Nose is clear with no " drainage.  Tympanic membranes intact and non-erythemic    Cardiovascular: S1-S2 regular rate and rhythm no murmurs bilateral lower extremity pitting edema.    Respiratory: Patient is clear to auscultation bilaterally with no wheezing or rhonchi.  Chest wall is nontender.  There is no external lesions on the chest.  There is no crepitance    Abdomen: Soft nontender bowel sounds are normal in all 4 quadrants there is no rebound or guarding noted.  There is no abdominal distention or hepatosplenomegaly.    Integumentary: Positive for bilateral lower extremity erythema in the shins as well as an Open left lower anterior shin extremity wound.     Genitourinary: Patient is voiding appropriately.    Neurological: CN's 2-12 grossly intact there is no focal deficits.    [place] Coma Scale: 15    NIH SCORE: NIH Stroke Scale/Score (NIHSS) - MDCalc  Calculated on May 14 2023 3:57 PM  3 points -> NIH Stroke Scale        RADIOLOGY/PROCEDURES      XR Chest 1 View   Final Result   1. Stable chest exam without acute process.           This report was finalized on 05/14/2023 14:35 by Dr Yoel De La Rosa, .      CT Head Without Contrast   Final Result   1. No acute intracranial abnormality is seen.                                                       This report was finalized on 05/14/2023 14:06 by Dr. Michael Barry MD.              FUTURE APPOINTMENTS     No future appointments.       COURSE & MEDICAL DECISION MAKING     While the patient has been here has been noticing that her blood pressures have been slowly trending up.  Initially in the 150s systolic since that time the patient's blood pressure has slowly crept up to 174, 178, 185, and will do labs blood pressure taken at 1640 194/77.  We will give the patient something for her hypertension.  Due to her hypertension and her presentation of her symptoms do feel highly concerned about the patient going home and do not believe she should.  She does seem to meet admission criteria due  to her TIA symptoms as well as her hypertension.  Will call and discuss with neurology.    Discussed case with neurology and spoke to nurse practitioner Jeff Gavin.  He feels that the patient would be better served also by being admitted fully observed and evaluated while in house.  He recommends the patient be placed on telemetry and have a neurology consult and an MRI of the brain tomorrow.  He states that neurology will determine if the patient will need to get an EEG tomorrow.  We will call the hospitalist for admission    Discussed case with hospitalist Dr. Burak Manriquez.  He accepts the patient as a admission to the medical service.  He would like this person to be admitted under Dr. Sanchez      Differential diagnosis is TIA, ischemic stroke, syncope, hypertensive urgency/emergency, carotid obstruction, other      Patient's level of risk: High        CRITICAL CARE    CRITICAL CARE: No    CRITICAL CARE TIME: None      The patient's last clinical visit to PCP in the [facility] electronic old medical record was reviewed by me:     Also Old charts were reviewed per [facility] EMR.  Pertinent details are summarized above.  All laboratory, radiologic, and EKG studies that were performed in the Emergency Department were a necessary part of the evaluation needed to exclude unstable or  emergent medical conditions:     Patient was hemodynamically and neurologically stable in the ED.   Pertinent studies were reviewed as above.     Recent Results (from the past 24 hour(s))   Green Top (Gel)    Collection Time: 05/14/23  1:29 PM   Result Value Ref Range    Extra Tube Hold for add-ons.    Lavender Top    Collection Time: 05/14/23  1:29 PM   Result Value Ref Range    Extra Tube hold for add-on    Red Top    Collection Time: 05/14/23  1:29 PM   Result Value Ref Range    Extra Tube Hold for add-ons.    Light Blue Top    Collection Time: 05/14/23  1:29 PM   Result Value Ref Range    Extra Tube Hold for add-ons.    Comprehensive  Metabolic Panel    Collection Time: 05/14/23  1:29 PM    Specimen: Blood   Result Value Ref Range    Glucose 102 (H) 65 - 99 mg/dL    BUN 15 8 - 23 mg/dL    Creatinine 0.94 0.57 - 1.00 mg/dL    Sodium 137 136 - 145 mmol/L    Potassium 4.3 3.5 - 5.2 mmol/L    Chloride 102 98 - 107 mmol/L    CO2 25.0 22.0 - 29.0 mmol/L    Calcium 8.8 8.6 - 10.5 mg/dL    Total Protein 6.4 6.0 - 8.5 g/dL    Albumin 3.4 (L) 3.5 - 5.2 g/dL    ALT (SGPT) 9 1 - 33 U/L    AST (SGOT) 15 1 - 32 U/L    Alkaline Phosphatase 105 39 - 117 U/L    Total Bilirubin 0.2 0.0 - 1.2 mg/dL    Globulin 3.0 gm/dL    A/G Ratio 1.1 g/dL    BUN/Creatinine Ratio 16.0 7.0 - 25.0    Anion Gap 10.0 5.0 - 15.0 mmol/L    eGFR 58.1 (L) >60.0 mL/min/1.73   Protime-INR    Collection Time: 05/14/23  1:29 PM    Specimen: Blood   Result Value Ref Range    Protime 13.4 11.8 - 14.8 Seconds    INR 1.01 0.91 - 1.09   aPTT    Collection Time: 05/14/23  1:29 PM    Specimen: Blood   Result Value Ref Range    PTT 28.4 24.1 - 35.0 seconds   BNP    Collection Time: 05/14/23  1:29 PM    Specimen: Blood   Result Value Ref Range    proBNP 1,309.0 0.0 - 1,800.0 pg/mL   High Sensitivity Troponin T    Collection Time: 05/14/23  1:29 PM    Specimen: Blood   Result Value Ref Range    HS Troponin T 21 (H) <10 ng/L   CBC Auto Differential    Collection Time: 05/14/23  1:29 PM    Specimen: Blood   Result Value Ref Range    WBC 9.13 3.40 - 10.80 10*3/mm3    RBC 3.26 (L) 3.77 - 5.28 10*6/mm3    Hemoglobin 8.8 (L) 12.0 - 15.9 g/dL    Hematocrit 30.2 (L) 34.0 - 46.6 %    MCV 92.6 79.0 - 97.0 fL    MCH 27.0 26.6 - 33.0 pg    MCHC 29.1 (L) 31.5 - 35.7 g/dL    RDW 15.2 12.3 - 15.4 %    RDW-SD 51.7 37.0 - 54.0 fl    MPV 9.9 6.0 - 12.0 fL    Platelets 290 140 - 450 10*3/mm3    Neutrophil % 71.1 42.7 - 76.0 %    Lymphocyte % 13.1 (L) 19.6 - 45.3 %    Monocyte % 11.2 5.0 - 12.0 %    Eosinophil % 2.4 0.3 - 6.2 %    Basophil % 1.0 0.0 - 1.5 %    Immature Grans % 1.2 (H) 0.0 - 0.5 %    Neutrophils, Absolute  6.49 1.70 - 7.00 10*3/mm3    Lymphocytes, Absolute 1.20 0.70 - 3.10 10*3/mm3    Monocytes, Absolute 1.02 (H) 0.10 - 0.90 10*3/mm3    Eosinophils, Absolute 0.22 0.00 - 0.40 10*3/mm3    Basophils, Absolute 0.09 0.00 - 0.20 10*3/mm3    Immature Grans, Absolute 0.11 (H) 0.00 - 0.05 10*3/mm3    nRBC 0.0 0.0 - 0.2 /100 WBC   Lactic Acid, Plasma    Collection Time: 05/14/23  1:45 PM    Specimen: Blood   Result Value Ref Range    Lactate 1.7 0.5 - 2.0 mmol/L   Wound Culture - Swab, Leg, Left    Collection Time: 05/14/23  1:49 PM    Specimen: Leg, Left; Swab   Result Value Ref Range    Gram Stain Many (4+) Gram negative bacilli     Gram Stain Rare (1+) WBCs seen    High Sensitivity Troponin T 2Hr    Collection Time: 05/14/23  3:41 PM    Specimen: Blood   Result Value Ref Range    HS Troponin T 19 (H) <10 ng/L    Troponin T Delta -2 >=-4 - <+4 ng/L         The patient received:  Medications   labetalol (NORMODYNE,TRANDATE) injection 10 mg (has no administration in time range)              ED Disposition     ED Disposition   Decision to Admit    Condition   --    Comment   Level of Care: Telemetry [5]   Diagnosis: TIA (transient ischemic attack) [401830]   Admitting Physician: EVELIO SOLIS [[id number]]   Attending Physician: EVELIO SOLIS [[id number]]   Certification: I Certify That Inpatient Hospital Services Are Medically Necessary For Greater Than 2 Midnights               This information is consistent with my knowledge of the patient’s controlled substance use history.    Patient evaluate during Coronavirus Pandemic. Isolation practices followed according to [facility][1] policy.     FINAL IMPRESSION   Diagnosis Plan   1. TIA (transient ischemic attack)        2. Malignant hypertension        3. 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

## 2023-05-15 ENCOUNTER — APPOINTMENT (OUTPATIENT)
Dept: MRI IMAGING | Facility: HOSPITAL | Age: 88
End: 2023-05-15
Payer: MEDICARE

## 2023-05-15 ENCOUNTER — READMISSION MANAGEMENT (OUTPATIENT)
Dept: CALL CENTER | Facility: HOSPITAL | Age: 88
End: 2023-05-15
Payer: MEDICARE

## 2023-05-15 ENCOUNTER — APPOINTMENT (OUTPATIENT)
Dept: ULTRASOUND IMAGING | Facility: HOSPITAL | Age: 88
End: 2023-05-15
Payer: MEDICARE

## 2023-05-15 ENCOUNTER — APPOINTMENT (OUTPATIENT)
Dept: CARDIOLOGY | Facility: HOSPITAL | Age: 88
End: 2023-05-15
Payer: MEDICARE

## 2023-05-15 VITALS
TEMPERATURE: 98 F | RESPIRATION RATE: 18 BRPM | HEART RATE: 88 BPM | OXYGEN SATURATION: 99 % | DIASTOLIC BLOOD PRESSURE: 65 MMHG | SYSTOLIC BLOOD PRESSURE: 125 MMHG | WEIGHT: 148 LBS | BODY MASS INDEX: 23.78 KG/M2 | HEIGHT: 66 IN

## 2023-05-15 PROBLEM — Z87.19 HISTORY OF GASTROINTESTINAL BLEEDING: Status: ACTIVE | Noted: 2023-05-15

## 2023-05-15 PROBLEM — I63.9 ACUTE CVA (CEREBROVASCULAR ACCIDENT): Status: ACTIVE | Noted: 2023-05-14

## 2023-05-15 PROBLEM — D64.9 CHRONIC ANEMIA: Status: ACTIVE | Noted: 2023-04-14

## 2023-05-15 LAB
AMMONIA BLD-SCNC: 21 UMOL/L (ref 11–51)
BH CV ECHO MEAS - AO MAX PG: 13.1 MMHG
BH CV ECHO MEAS - AO MEAN PG: 6 MMHG
BH CV ECHO MEAS - AO ROOT DIAM: 3.2 CM
BH CV ECHO MEAS - AO V2 MAX: 181 CM/SEC
BH CV ECHO MEAS - AO V2 VTI: 34 CM
BH CV ECHO MEAS - AVA(I,D): 2.11 CM2
BH CV ECHO MEAS - EDV(CUBED): 29.8 ML
BH CV ECHO MEAS - EDV(MOD-SP2): 75 ML
BH CV ECHO MEAS - EDV(MOD-SP4): 88 ML
BH CV ECHO MEAS - EF(MOD-BP): 78 %
BH CV ECHO MEAS - EF(MOD-SP2): 74.7 %
BH CV ECHO MEAS - EF(MOD-SP4): 80.7 %
BH CV ECHO MEAS - ESV(CUBED): 4.9 ML
BH CV ECHO MEAS - ESV(MOD-SP2): 19 ML
BH CV ECHO MEAS - ESV(MOD-SP4): 17 ML
BH CV ECHO MEAS - FS: 45.2 %
BH CV ECHO MEAS - IVS/LVPW: 1 CM
BH CV ECHO MEAS - IVSD: 1 CM
BH CV ECHO MEAS - LA DIMENSION: 3.2 CM
BH CV ECHO MEAS - LAT PEAK E' VEL: 4.4 CM/SEC
BH CV ECHO MEAS - LV DIASTOLIC VOL/BSA (35-75): 50 CM2
BH CV ECHO MEAS - LV MASS(C)D: 86.2 GRAMS
BH CV ECHO MEAS - LV MAX PG: 10 MMHG
BH CV ECHO MEAS - LV MEAN PG: 4 MMHG
BH CV ECHO MEAS - LV SYSTOLIC VOL/BSA (12-30): 9.7 CM2
BH CV ECHO MEAS - LV V1 MAX: 158 CM/SEC
BH CV ECHO MEAS - LV V1 VTI: 28.2 CM
BH CV ECHO MEAS - LVIDD: 3.1 CM
BH CV ECHO MEAS - LVIDS: 1.7 CM
BH CV ECHO MEAS - LVOT AREA: 2.5 CM2
BH CV ECHO MEAS - LVOT DIAM: 1.8 CM
BH CV ECHO MEAS - LVPWD: 1 CM
BH CV ECHO MEAS - MED PEAK E' VEL: 4.09 CM/SEC
BH CV ECHO MEAS - MV A MAX VEL: 121 CM/SEC
BH CV ECHO MEAS - MV DEC TIME: 0.22 MSEC
BH CV ECHO MEAS - MV E MAX VEL: 87.9 CM/SEC
BH CV ECHO MEAS - MV E/A: 0.73
BH CV ECHO MEAS - SI(MOD-SP2): 31.8 ML/M2
BH CV ECHO MEAS - SI(MOD-SP4): 40.3 ML/M2
BH CV ECHO MEAS - SV(LVOT): 71.8 ML
BH CV ECHO MEAS - SV(MOD-SP2): 56 ML
BH CV ECHO MEAS - SV(MOD-SP4): 71 ML
BH CV ECHO MEAS - TR MAX PG: 25.4 MMHG
BH CV ECHO MEAS - TR MAX VEL: 252 CM/SEC
BH CV ECHO MEASUREMENTS AVERAGE E/E' RATIO: 20.71
BH CV ECHO SHUNT ASSESSMENT PERFORMED (HIDDEN SCRIPTING): 1
CHOLEST SERPL-MCNC: 178 MG/DL (ref 0–200)
FOLATE SERPL-MCNC: 8.33 NG/ML (ref 4.78–24.2)
GLUCOSE BLDC GLUCOMTR-MCNC: 105 MG/DL (ref 70–130)
GLUCOSE BLDC GLUCOMTR-MCNC: 114 MG/DL (ref 70–130)
HBA1C MFR BLD: 5 % (ref 4.8–5.6)
HDLC SERPL-MCNC: 66 MG/DL (ref 40–60)
LDLC SERPL CALC-MCNC: 94 MG/DL (ref 0–100)
LDLC/HDLC SERPL: 1.39 {RATIO}
LEFT ATRIUM VOLUME INDEX: 19.3 ML/M2
LEFT ATRIUM VOLUME: 34 ML
MAGNESIUM SERPL-MCNC: 1.9 MG/DL (ref 1.6–2.4)
MAXIMAL PREDICTED HEART RATE: 131 BPM
QT INTERVAL: 370 MS
QT INTERVAL: 400 MS
QTC INTERVAL: 445 MS
QTC INTERVAL: 458 MS
STRESS TARGET HR: 111 BPM
T4 FREE SERPL-MCNC: 1.32 NG/DL (ref 0.93–1.7)
TRIGL SERPL-MCNC: 100 MG/DL (ref 0–150)
TSH SERPL DL<=0.05 MIU/L-ACNC: 7.77 UIU/ML (ref 0.27–4.2)
VIT B12 BLD-MCNC: 347 PG/ML (ref 211–946)
VLDLC SERPL-MCNC: 18 MG/DL (ref 5–40)

## 2023-05-15 PROCEDURE — 82140 ASSAY OF AMMONIA: CPT | Performed by: CLINICAL NURSE SPECIALIST

## 2023-05-15 PROCEDURE — 97161 PT EVAL LOW COMPLEX 20 MIN: CPT | Performed by: PHYSICAL THERAPIST

## 2023-05-15 PROCEDURE — G0378 HOSPITAL OBSERVATION PER HR: HCPCS

## 2023-05-15 PROCEDURE — 92523 SPEECH SOUND LANG COMPREHEN: CPT

## 2023-05-15 PROCEDURE — 97165 OT EVAL LOW COMPLEX 30 MIN: CPT

## 2023-05-15 PROCEDURE — 93306 TTE W/DOPPLER COMPLETE: CPT | Performed by: EMERGENCY MEDICINE

## 2023-05-15 PROCEDURE — 93880 EXTRACRANIAL BILAT STUDY: CPT

## 2023-05-15 PROCEDURE — 63710000001 ATORVASTATIN 40 MG TABLET: Performed by: INTERNAL MEDICINE

## 2023-05-15 PROCEDURE — A9270 NON-COVERED ITEM OR SERVICE: HCPCS | Performed by: INTERNAL MEDICINE

## 2023-05-15 PROCEDURE — 84439 ASSAY OF FREE THYROXINE: CPT | Performed by: CLINICAL NURSE SPECIALIST

## 2023-05-15 PROCEDURE — 82948 REAGENT STRIP/BLOOD GLUCOSE: CPT

## 2023-05-15 PROCEDURE — 80061 LIPID PANEL: CPT | Performed by: INTERNAL MEDICINE

## 2023-05-15 PROCEDURE — 83735 ASSAY OF MAGNESIUM: CPT | Performed by: CLINICAL NURSE SPECIALIST

## 2023-05-15 PROCEDURE — 93306 TTE W/DOPPLER COMPLETE: CPT

## 2023-05-15 PROCEDURE — 70551 MRI BRAIN STEM W/O DYE: CPT

## 2023-05-15 PROCEDURE — 84443 ASSAY THYROID STIM HORMONE: CPT | Performed by: CLINICAL NURSE SPECIALIST

## 2023-05-15 PROCEDURE — 25510000001 PERFLUTREN 6.52 MG/ML SUSPENSION: Performed by: INTERNAL MEDICINE

## 2023-05-15 RX ORDER — ATORVASTATIN CALCIUM 80 MG/1
80 TABLET, FILM COATED ORAL NIGHTLY
Qty: 30 TABLET | Refills: 0 | Status: SHIPPED | OUTPATIENT
Start: 2023-05-15

## 2023-05-15 RX ORDER — ASPIRIN 81 MG/1
81 TABLET ORAL DAILY
Qty: 30 TABLET | Refills: 0 | Status: SHIPPED | OUTPATIENT
Start: 2023-05-15

## 2023-05-15 RX ORDER — MIRTAZAPINE 15 MG/1
15 TABLET, FILM COATED ORAL NIGHTLY
COMMUNITY

## 2023-05-15 RX ADMIN — PERFLUTREN 1.17 MG: 6.52 INJECTION, SUSPENSION INTRAVENOUS at 11:27

## 2023-05-15 RX ADMIN — ATORVASTATIN CALCIUM 80 MG: 40 TABLET, FILM COATED ORAL at 20:02

## 2023-05-15 RX ADMIN — ASPIRIN 81 MG: 81 TABLET, CHEWABLE ORAL at 09:31

## 2023-05-15 NOTE — PLAN OF CARE
Goal Outcome Evaluation:  Plan of Care Reviewed With: patient, spouse        Progress: improving  Outcome Evaluation: The patient presents alert and oriented to person and place. 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 She demonstrates no L sided weakness and no deficits in sensation or coordination. She is very hard of hearing and requires speaking louding directly to her. She lives at home with her spouse who assists her around the home. She seems to be at or near her baseline function of walking with a walker and walking short distances. PT will continue to work with her to encourage increased activity and to work on standing balance for safe standing activities. Recommend discharge home with assist.

## 2023-05-15 NOTE — THERAPY DISCHARGE NOTE
Acute Care - Speech Language Pathology Initial Evaluation/Discharge  [facility]     Patient Name: Dina Michaels  : 1934  MRN: [id number]  Today's Date: 5/15/2023               Admit Date: 2023     ST attempted administering the Mini Mental Status Examination. Patient was easily confused, asking her  to answer questions. Patient is oriented to person and simple conversational yes/no, object naming, and phrase completions. Patient was unable to complete immediate memory, delayed memory, mental flexibility, convergent/divergent naming tasks, and calculation tasks. Overall, cognitive function was characterized by severe cognitive linguistic impairment which is at baseline for this patient per her  report. No acute dysarthria, dysphagia, or decline from previously known dementia. Impairments were noted in attention, orientation, auditory memory, visual memory, immediate memory, delayed memory, thought organization, problem solving, sequencing, self-regulation, abstraction, mental flexibility, and functional math. Patient  desires to take the patient home today due to desiring the patient to be in a familiar environment. No further ST services will be provided as patient is at baseline for her current ability. ST discussed continued symptoms or services with patient  who refuses continued ST at this time.      Visit Dx:    ICD-10-CM ICD-9-CM   1. TIA (transient ischemic attack)  G45.9 435.9   2. 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 Elevated troponin  R77.8 790.6   4. 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 Cognitive and behavioral changes  R41.89 799.59    R46.89 312.9     Patient Active Problem List   Diagnosis   • AMS (altered mental status)   • Vascular dementia without behavioral disturbance   • Near syncope   • Acute pulmonary embolism   • Hypothyroidism   • Lactic acidosis   • Gastrointestinal hemorrhage, unspecified gastrointestinal hemorrhage type: Possibly  from duodenal versus diverticular bleed   • Normocytic anemia due to blood loss   • Essential hypertension   • TIA (transient ischemic attack)     Past Medical History:   Diagnosis Date   • Cancer      Past Surgical History:   Procedure Laterality Date   • COLONOSCOPY N/A 4/14/2023    Procedure: COLONOSCOPY WITH ANESTHESIA;  Surgeon: Bubba Asher MD;  Location: [facility] ENDOSCOPY;  Service: Gastroenterology;  Laterality: N/A;  pre gi bleed  post diverticulosis  Dr. Hebert   • ENDOSCOPY N/A 4/14/2023    Procedure: ESOPHAGOGASTRODUODENOSCOPY WITH ANESTHESIA;  Surgeon: Bubba Asher MD;  Location: [facility] ENDOSCOPY;  Service: Gastroenterology;  Laterality: N/A;  pre screen  post gastric bx  Dr Hebert       SLP Recommendation and Plan  SLP Diagnosis: severe, cognitive-linguistic disorder (05/15/23 1018)  SLP Diagnosis Comments: see note (05/15/23 1018)     SLC Criteria for Skilled Therapy Interventions Met: baseline status (05/15/23 1018)  Anticipated Discharge Disposition (SLP): home with assist (05/15/23 1018)                       Reason for Discharge: no further expectation of functional progress (05/15/23 1018)                Plan of Care Reviewed With: patient, caregiver, spouse (05/15/23 1316)  Progress: improving (05/15/23 1316)    SLP EVALUATION (last 72 hours)     SLP SLC Evaluation     Row Name 05/15/23 1018                   Communication Assessment/Intervention    Document Type evaluation  -MD        Subjective Information no complaints  -MD        Patient Observations alert;cooperative  -MD        Patient/Family/Caregiver Comments/Observations  present  -MD        Patient Effort good  -MD        Symptoms Noted During/After Treatment none  -MD           General Information    Patient Profile Reviewed yes  -MD        Pertinent History Of Current Problem Patient admitted with possible CVA. Patient had a syncopal episode and left sided weakness.  -MD        Precautions/Limitations, Hearing hearing  impairment, bilaterally  -MD        Prior Level of Function-Communication receptive language impairment;expressive language impairment  -MD        Plans/Goals Discussed with patient;spouse/S.O.  -MD        Barriers to Rehab previous functional deficit  -MD        Patient's Goals for Discharge return to home  -MD        Family Goals for Discharge ability to leave patient alone for short periods in the home  -MD        Standardized Assessment Used MMSE  -MD           Pain    Additional Documentation Pain Scale: Numbers Pre/Post-Treatment (Group)  -MD           Pain Scale: Numbers Pre/Post-Treatment    Pretreatment Pain Rating 0/10 - no pain  -MD        Posttreatment Pain Rating 0/10 - no pain  -MD           Oral Motor Structure and Function    Oral Motor Structure and Function WNL  -MD           Oral Musculature and Cranial Nerve Assessment    Oral Motor General Assessment WFL  -MD           Motor Speech Assessment/Intervention    Motor Speech Function WNL  -MD           Cognitive Assessment Intervention- SLP    Cognitive Function (Cognition) severe impairment  -MD        Orientation Status (Cognition) severe impairment  -MD        Memory (Cognitive) severe impairment  -MD        Attention (Cognitive) severe impairment  -MD        Thought Organization (Cognitive) moderate impairment  -MD        Reasoning (Cognitive) severe impairment  -MD        Problem Solving (Cognitive) severe impairment  -MD        Functional Math (Cognitive) severe impairment  -MD        Executive Function (Cognition) other (see comments)  portion completed, unable to participate  -MD           SLP Evaluation Clinical Impressions    SLP Diagnosis severe;cognitive-linguistic disorder  -MD        SLP Diagnosis Comments see note  -MD        Rehab Potential/Prognosis guarded;other (see comments)  patient  reports the patient is at baseline due to dementia  -MD        SLC Criteria for Skilled Therapy Interventions Met baseline status  -MD         Functional Impact functional impact in ADLs;unable to make medical decisions;unable to care for self;needs 24 hour supervision  -MD           Recommendations    Therapy Frequency (SLP SLC) evaluation only  -MD        Anticipated Discharge Disposition (SLP) home with assist  -MD           SLP Discharge Summary    Discharge Destination home  -MD        Discharge Diagnostic Statement Patient  provides all care in the home. Patient has no dysarthria, dysphagia, or new onset of cognitive communication deficits from CVA.  -MD        Progress Toward Achieving Short/long Term Goals discharge on same date as initial evaluation  -MD        Reason for Discharge no further expectation of functional progress  -MD              User Key  (r) = Recorded By, (t) = Taken By, (c) = Cosigned By    Initials Name Effective Dates    Karen Sanchez, SLP 06/21/22 -                    EDUCATION  The patient has been educated in the following areas:   Cognitive Impairment.                  Time Calculation:    Time Calculation- SLP     Row Name 05/15/23 1324             Time Calculation- SLP    SLP Start Time 1018  -MD      SLP Stop Time 1102  -MD      SLP Time Calculation (min) 44 min  -MD      SLP Received On 05/15/23  -MD      SLP Goal Re-Cert Due Date 05/25/23  -MD         Untimed Charges    99754-IW Eval Speech and Production w/ Language Minutes 44  -MD         Total Minutes    Untimed Charges Total Minutes 44  -MD       Total Minutes 44  -MD            User Key  (r) = Recorded By, (t) = Taken By, (c) = Cosigned By    Initials Name Provider Type    Karen Sanchez, SLP Speech and Language Pathologist                Therapy Charges for Today     Code Description Service Date Service Provider Modifiers Qty    [id number] HC ST EVAL SPEECH AND PROD W LANG  3 5/15/2023 Karen Rainey, SLP GN 1                   SLP Discharge Summary  Anticipated Discharge Disposition (SLP): home with assist  Reason for Discharge: no further  expectation of functional progress  Progress Toward Achieving Short/long Term Goals: discharge on same date as initial evaluation  Discharge Destination: home    Karen Rainey, SLP  5/15/2023

## 2023-05-15 NOTE — PLAN OF CARE
Goal Outcome Evaluation:  Plan of Care Reviewed With: patient, caregiver, spouse        Progress: improving     Cognitive Linguistic informal Evaluation    ST attempted administering the Mini Mental Status Examination. Patient was easily confused, asking her  to answer questions. Patient is oriented to person and simple conversational yes/no, object naming, and phrase completions. Patient was unable to complete immediate memory, delayed memory, mental flexibility, convergent/divergent naming tasks, and calculation tasks. Overall, cognitive function was characterized by severe cognitive linguistic impairment which is at baseline for this patient per her  report. No acute dysarthria, dysphagia, or decline from previously known dementia. Impairments were noted in attention, orientation, auditory memory, visual memory, immediate memory, delayed memory, thought organization, problem solving, sequencing, self-regulation, abstraction, mental flexibility, and functional math. Patient  desires to take the patient home today due to desiring the patient to be in a familiar environment. No further ST services will be provided as patient is at baseline for her current ability. ST discussed continued symptoms or services with patient  who refuses continued ST at this time.   Karen Rainey, SLP 5/15/2023 13:23 CDT

## 2023-05-15 NOTE — CONSULTS
Neurology Consult Note    Referring Provider: Art Noyola MD  Reason for Consultation: stroke      History of present illness:    This is a 89 y.o. right handed female patient with PMH recent GI bleed, dementia, hypertension, hypothyroidism, pulmonary embolism, syncope. History obtained by  who is at the bedside. Patient had taken ASA 81 mg in the past for prevention proposes. Patient has history of syncope that occurred 2 years ago and did not have another episode until 12/2022. Patient recently admitted for GI bleed 4/13-4/15/2023 and received blood transfusion. EGD/colonoscopy shoed duodenal ulcer and PPI recommended as well as Carafate every 6 hours.  ASA 81 mg held at that time. At baseline, patient able to perform ADLs with some assist.  denies dark or BRBPR since discharge in April.    Yesterday, patient was sitting on the front porch while  was doing yard work. He looked over to his wife and noted she was slumped over. When he got to her side she was minimally responsive. He noted left arm and leg was limp. She slowly came around. He denies facial weakness or slurred speech. He called EMS. By time EMS arrived, she was more alert and left arm and leg remained weak. By time patient arrived to [facility] ED she was improving but still left sided weakness. CT head showed no acute process.  Patient continued to improve and returned to baseline. MRI brain this AM shows tiny acute right posterior paraventricular stroke.     LDL 92  A1c %.0  TSH 7.77  T4 - 1.32      Past Medical History:   Diagnosis Date   • Cancer        Allergies   Allergen Reactions   • Amoxicillin Hallucinations   • Clarithromycin Hallucinations   • Nisoldipine Er Other (See Comments)     Feelings of passing out, tired   • Sular [Nisoldipine] Unknown - Low Severity     No current facility-administered medications on file prior to encounter.     Current Outpatient Medications on File Prior to Encounter   Medication Sig    • alendronate (FOSAMAX) 70 MG tablet Take 1 tablet by mouth Every 7 (Seven) Days.   • levothyroxine (SYNTHROID, LEVOTHROID) 88 MCG tablet Take 1 tablet by mouth Daily.   • memantine (NAMENDA) 10 MG tablet Take 1 tablet by mouth 2 (Two) Times a Day.   • metoprolol succinate XL (TOPROL-XL) 25 MG 24 hr tablet Take 12.5 mg by mouth Daily. Takes one-half tablet daily   • pantoprazole (PROTONIX) 40 MG EC tablet Take 1 tablet by mouth 2 (Two) Times a Day Before Meals.   • sucralfate (CARAFATE) 1 GM/10ML suspension Take 10 mL by mouth 4 (Four) Times a Day Before Meals & at Bedtime for 30 days.       Social History     Socioeconomic History   • Marital status:    Tobacco Use   • Smoking status: Never   • Smokeless tobacco: Never   Vaping Use   • Vaping Use: Never used   Substance and Sexual Activity   • Alcohol use: Yes   • Drug use: Never     History reviewed. No pertinent family history.        Vital Signs   Temp:  [98 °F (36.7 °C)-98.6 °F (37 °C)] 98.1 °F (36.7 °C)  Heart Rate:  [76-88] 88  Resp:  [16-18] 18  BP: (110-194)/() 184/88    General Exam:  Head:  Normocephalic, atraumatic  HEENT:  Neck supple  Fundoscopic Exam:  No signs of disc edema  CVS:  Regular rate and rhythm.  No murmurs  Carotid Examination:  No bruits  Lungs:  Clear to auscultation  Abdomen:  Nontender, Nondistended  Extremities:  No signs of peripheral edema  Skin:  No rashes    Neurologic Exam:  General Exam:  Head:  Normocephalic, atraumatic  HEENT:  Neck supple  Fundoscopic Exam:  No signs of disc edema  CVS:  Regular rate and rhythm.  No murmurs  Carotid Examination:  No bruits  Lungs:  Clear to auscultation  Abdomen:  Nontender, nondistended  Extremities:  No signs of peripheral edema  Skin: patient has wounds bilateral lower extremities wrapped with GISSEL dressing.    Neurologic Exam:    Mental Status:    -Alert, Oriented X to person. Stated she is in the hospital but unable to give the name, unable to state city. Could not state  Month. She correctly named her  and relation.  -No word-finding difficulties  -No aphasia  -No dysarthria  -Follows simple and complex commands    CN II:  Visual fields full.  Pupils equally reactive to light  CN III, IV, VI:  Extraocular Muscles full with no signs of nystagmus  CN V:  Facial sensory is symmetric with no asymmetries.  CN VII:  Facial motor symmetric  CN VIII:  Gross hearing intact bilaterally  CN IX:  Palate elevates symmetrically  CN X:  Palate elevates symmetrically  CN XI:  Shoulder shrug symmetric  CN XII:  Tongue protrudes to midline  Motor: (strength out of 5:  1= minimal movement, 2 = movement in plane of gravity, 3 = movement against gravity, 4 = movement against some resistance, 5 = full strength)    -Right Upper Ext: Proximal: 5 Distal: 5  -Left Upper Ext: Proximal: 5 Distal: 5    -Right Lower Ext: Proximal: 5 Distal: 5  -Left Lower Ext: Proximal: 5 Distal: 5    DTR:  -Right   Bicep: 2+ Tricep: 2+ Brachoradialis: 2+   Patella: 2+ Ankle: 2+ Neg Babinski  -Left   Bicep: 2+ Tricep: 2+ Brachoradialis: 2+   Patella: 2+ Ankle: 2+ Neg Babinski    Sensory:  -Intact to light touch, pinprick, temperature, pain, and proprioception    Coordination:  -Finger to nose intact  -Heel to shin intact      Gait  -not attempted for safety reasons.       Results Review:  Lab Results (last 24 hours)     Procedure Component Value Units Date/Time    Wound Culture - Swab, Leg, Left [[id number]] Collected: 05/14/23 1349    Specimen: Swab from Leg, Left Updated: 05/15/23 0937     Wound Culture Culture in progress     Gram Stain Many (4+) Gram negative bacilli      Rare (1+) WBCs seen    Magnesium [[id number]]  (Normal) Collected: 05/15/23 0441    Specimen: Blood Updated: 05/15/23 0848     Magnesium 1.9 mg/dL     TSH [[id number]]  (Abnormal) Collected: 05/15/23 0441    Specimen: Blood Updated: 05/15/23 0848     TSH 7.770 uIU/mL     T4, Free [[id number]]  (Normal) Collected: 05/15/23 0441    Specimen: Blood Updated:  05/15/23 0848     Free T4 1.32 ng/dL     Narrative:      Results may be falsely increased if patient taking Biotin.      POC Glucose Once [[id number]]  (Normal) Collected: 05/15/23 0649    Specimen: Blood Updated: 05/15/23 0700     Glucose 105 mg/dL      Comment: : [id number] Tadeo HeatherMeter ID: [id number]       Hemoglobin A1c [[id number]]  (Normal) Collected: 05/14/23 1329    Specimen: Blood Updated: 05/15/23 0623     Hemoglobin A1C 5.00 %     Narrative:      Hemoglobin A1C Ranges:    Increased Risk for Diabetes  5.7% to 6.4%  Diabetes                     >= 6.5%  Diabetic Goal                < 7.0%    Lipid Panel [[id number]]  (Abnormal) Collected: 05/15/23 0441    Specimen: Blood Updated: 05/15/23 0613     Total Cholesterol 178 mg/dL      Triglycerides 100 mg/dL      HDL Cholesterol 66 mg/dL      LDL Cholesterol  94 mg/dL      VLDL Cholesterol 18 mg/dL      LDL/HDL Ratio 1.39    Narrative:      Cholesterol Reference Ranges  (U.S. Department of Health and Human Services ATP III Classifications)    Desirable          <200 mg/dL  Borderline High    200-239 mg/dL  High Risk          >240 mg/dL      Triglyceride Reference Ranges  (U.S. Department of Health and Human Services ATP III Classifications)    Normal           <150 mg/dL  Borderline High  150-199 mg/dL  High             200-499 mg/dL  Very High        >500 mg/dL    HDL Reference Ranges  (U.S. Department of Health and Human Services ATP III Classifications)    Low     <40 mg/dl (major risk factor for CHD)  High    >60 mg/dl ('negative' risk factor for CHD)        LDL Reference Ranges  (U.S. Department of Health and Human Services ATP III Classifications)    Optimal          <100 mg/dL  Near Optimal     100-129 mg/dL  Borderline High  130-159 mg/dL  High             160-189 mg/dL  Very High        >189 mg/dL    POC Glucose Once [[id number]]  (Normal) Collected: 05/15/23 0007    Specimen: Blood Updated: 05/15/23 0018     Glucose 114 mg/dL      Comment:  : [id number] Tadeo HeatherMeter ID: [id number]       Lactic Acid, Plasma [[id number]]  (Normal) Collected: 05/14/23 1345    Specimen: Blood Updated: 05/14/23 1703     Lactate 1.7 mmol/L     High Sensitivity Troponin T 2Hr [[id number]]  (Abnormal) Collected: 05/14/23 1541    Specimen: Blood Updated: 05/14/23 1623     HS Troponin T 19 ng/L      Troponin T Delta -2 ng/L     Narrative:      High Sensitive Troponin T Reference Range:  <10.0 ng/L- Negative Female for AMI  <15.0 ng/L- Negative Male for AMI  >=10 - Abnormal Female indicating possible myocardial injury.  >=15 - Abnormal Male indicating possible myocardial injury.   Clinicians would have to utilize clinical acumen, EKG, Troponin, and serial changes to determine if it is an Acute Myocardial Infarction or myocardial injury due to an underlying chronic condition.         Jemison Draw [[id number]] Collected: 05/14/23 1329    Specimen: Blood Updated: 05/14/23 1431    Narrative:      The following orders were created for panel order Jemison Draw.  Procedure                               Abnormality         Status                     ---------                               -----------         ------                     Green Top (Gel)[[id number]]                                  Final result               Lavender Top[[id number]]                                     Final result               Red Top[[id number]]                                          Final result               Light Blue Top[[id number]]                                   Final result                 Please view results for these tests on the individual orders.    Green Top (Gel) [[id number]] Collected: 05/14/23 1329    Specimen: Blood Updated: 05/14/23 1431     Extra Tube Hold for add-ons.     Comment: Auto resulted.       Lavender Top [[id number]] Collected: 05/14/23 1329    Specimen: Blood Updated: 05/14/23 1431     Extra Tube hold for add-on     Comment: Auto resulted       Red Top [[id number]]  Collected: 05/14/23 1329    Specimen: Blood Updated: 05/14/23 1431     Extra Tube Hold for add-ons.     Comment: Auto resulted.       Light Blue Top [[id number]] Collected: 05/14/23 1329    Specimen: Blood Updated: 05/14/23 1431     Extra Tube Hold for add-ons.     Comment: Auto resulted       Blood Culture - Blood, Arm, Right [[id number]] Collected: 05/14/23 1345    Specimen: Blood from Arm, Right Updated: 05/14/23 1423    Blood Culture - Blood, Arm, Left [[id number]] Collected: 05/14/23 1345    Specimen: Blood from Arm, Left Updated: 05/14/23 1422    Comprehensive Metabolic Panel [[id number]]  (Abnormal) Collected: 05/14/23 1329    Specimen: Blood Updated: 05/14/23 1359     Glucose 102 mg/dL      BUN 15 mg/dL      Creatinine 0.94 mg/dL      Sodium 137 mmol/L      Potassium 4.3 mmol/L      Comment: Slight hemolysis detected by analyzer. Results may be affected.        Chloride 102 mmol/L      CO2 25.0 mmol/L      Calcium 8.8 mg/dL      Total Protein 6.4 g/dL      Albumin 3.4 g/dL      ALT (SGPT) 9 U/L      AST (SGOT) 15 U/L      Alkaline Phosphatase 105 U/L      Total Bilirubin 0.2 mg/dL      Globulin 3.0 gm/dL      A/G Ratio 1.1 g/dL      BUN/Creatinine Ratio 16.0     Anion Gap 10.0 mmol/L      eGFR 58.1 mL/min/1.73     Narrative:      GFR Normal >60  Chronic Kidney Disease <60  Kidney Failure <15    The GFR formula is only valid for adults with stable renal function between ages 18 and 70.    BNP [[id number]]  (Normal) Collected: 05/14/23 1329    Specimen: Blood Updated: 05/14/23 1357     proBNP 1,309.0 pg/mL     Narrative:      Among patients with dyspnea, NT-proBNP is highly sensitive for the detection of acute congestive heart failure. In addition NT-proBNP of <300 pg/ml effectively rules out acute congestive heart failure with 99% negative predictive value.    Results may be falsely decreased if patient taking Biotin.      High Sensitivity Troponin T [[id number]]  (Abnormal) Collected: 05/14/23 1329    Specimen:  Blood Updated: 05/14/23 1357     HS Troponin T 21 ng/L     Narrative:      High Sensitive Troponin T Reference Range:  <10.0 ng/L- Negative Female for AMI  <15.0 ng/L- Negative Male for AMI  >=10 - Abnormal Female indicating possible myocardial injury.  >=15 - Abnormal Male indicating possible myocardial injury.   Clinicians would have to utilize clinical acumen, EKG, Troponin, and serial changes to determine if it is an Acute Myocardial Infarction or myocardial injury due to an underlying chronic condition.         Protime-INR [[id number]]  (Normal) Collected: 05/14/23 1329    Specimen: Blood Updated: 05/14/23 1350     Protime 13.4 Seconds      INR 1.01    aPTT [[id number]]  (Normal) Collected: 05/14/23 1329    Specimen: Blood Updated: 05/14/23 1350     PTT 28.4 seconds     CBC & Differential [[id number]]  (Abnormal) Collected: 05/14/23 1329    Specimen: Blood Updated: 05/14/23 1340    Narrative:      The following orders were created for panel order CBC & Differential.  Procedure                               Abnormality         Status                     ---------                               -----------         ------                     CBC Auto Differential[[id number]]        Abnormal            Final result                 Please view results for these tests on the individual orders.    CBC Auto Differential [[id number]]  (Abnormal) Collected: 05/14/23 1329    Specimen: Blood Updated: 05/14/23 1340     WBC 9.13 10*3/mm3      RBC 3.26 10*6/mm3      Hemoglobin 8.8 g/dL      Hematocrit 30.2 %      MCV 92.6 fL      MCH 27.0 pg      MCHC 29.1 g/dL      RDW 15.2 %      RDW-SD 51.7 fl      MPV 9.9 fL      Platelets 290 10*3/mm3      Neutrophil % 71.1 %      Lymphocyte % 13.1 %      Monocyte % 11.2 %      Eosinophil % 2.4 %      Basophil % 1.0 %      Immature Grans % 1.2 %      Neutrophils, Absolute 6.49 10*3/mm3      Lymphocytes, Absolute 1.20 10*3/mm3      Monocytes, Absolute 1.02 10*3/mm3      Eosinophils, Absolute  0.22 10*3/mm3      Basophils, Absolute 0.09 10*3/mm3      Immature Grans, Absolute 0.11 10*3/mm3      nRBC 0.0 /100 WBC           .  Imaging Results (Last 24 Hours)     Procedure Component Value Units Date/Time    MRI Brain Without Contrast [[id number]] Collected: 05/15/23 0904     Updated: 05/15/23 0913    Narrative:      EXAMINATION: MRI BRAIN WO CONTRAST-     5/15/2023 8:15 AM CDT     HISTORY: Stroke, follow up; G45.9-Transient cerebral ischemic attack,  unspecified; I10-Essential (primary) hypertension; R77.8-Other specified  abnormalities of plasma proteins; D55.8-Other anemias due to enzyme  disorders     The MR imaging of the brain is performed without intravenous contrast  enhancement.     There is no previous similar study for comparison. Correlation made with  CT scan of the head dated 05/14/2023.     The diffusion-weighted imaging sequence including the ADC map show  evidence of a focus of restricted diffusion in the right posterior  paraventricular white matter. No significant surrounding edema or mass  effect.     There is no evidence of a mass. There is no midline shift.     Moderately dilated ventricles, basal cisterns and cortical sulci  suggesting chronic volume loss.     The orbits are not optimally evaluated due to significant imaging  artifact.     220 gland is normal. The corpus callosum, the brainstem and cerebellum  are normal.     The FLAIR sequence images show focal and diffuse areas of T2 signal in  the periventricular and superficial and deep subcortical white matter,  representing chronic white matter ischemia due 2 chronic occlusive  microangiopathy.     The gradient recall imaging sequence are nondiagnostic due to  significant motion in imaging artifacts.     The visualized paranasal sinuses show moderate mucosal thickening of the  ethmoid sinuses. There is bilateral mastoid effusion, right more than  the left.       Impression:      1. A small focus of acute infarction in the right  posterior  paraventricular white matter.  2. Chronic ischemic and atrophic changes.  3. Chronic sinusitis and bilateral mastoid effusion.        This report was finalized on 05/15/2023 09:10 by Dr. Tito Amato MD.    XR Chest 1 View [[id number]] Collected: 05/14/23 1434     Updated: 05/14/23 1438    Narrative:      Frontal upright radiograph of the chest 5/14/2023 2:23 PM CDT     HISTORY: Syncope     COMPARISON: 09/29/2020.     FINDINGS:   The lungs are clear. The cardiomediastinal silhouette and pulmonary  vascularity are within normal limits.      The osseous structures and surrounding soft tissues demonstrate no acute  abnormality.       Impression:      1. Stable chest exam without acute process.        This report was finalized on 05/14/2023 14:35 by Dr Yoel De La Rosa, .    CT Head Without Contrast [[id number]] Collected: 05/14/23 1405     Updated: 05/14/23 1409    Narrative:      EXAMINATION: CT HEAD WO CONTRAST-      5/14/2023 1:51 PM CDT     HISTORY: Syncope. Confusion and left-sided weakness.     In order to have a CT radiation dose as low as reasonably achievable  Automated Exposure Control was utilized for adjustment of the mA and/or  KV according to patient size.     DLP in mGycm= 684.     Comparison is made with 04/27/2023.     Axial, sagittal, and coronal noncontrast CT imaging of the head.     The visualized paranasal sinuses are clear.     The brain and ventricles have an age appropriate appearance.   Mild atrophy and small vessel disease.  There is no hemorrhage or mass-effect.   No acute infarction is seen.     No calvarial abnormality.       Impression:      1. No acute intracranial abnormality is seen.                                         This report was finalized on 05/14/2023 14:06 by Dr. Michael Barry MD.          MRI brain personally reviewed by me showing small acute right PVWM stroke.      Active Hospital Problems    Diagnosis  POA   • **TIA (transient ischemic attack) [G45.9]  Yes      Impression  1. Syncope with left sided weakness. MRI brain shows tiny right PVWM stroke. IV TPA not considered as patient rapidly improved.  2. Hypertension  3. Dementia  4. Hypothyroidism. Per  synthroid dose recently adjusted.  5. Recent GI bleed with duodenal ulcer.   6. HLD. LDL 94.   7. Wounds bilateral LE.   8. Hypothyroidism    Plan  1. Long discussion with  regarding risk benefits of ASA 81 mg for secondary stroke prevention with patient history of GI bleed. He verbally understands and is agreeable to ASA 81 mg daily.  2. Lipitor 80 mg daily for LDL goal less than 70. Given patient age, continue high dose statin for 30 days and then may discontinue.  3. Carotid duplex scan.  4. Transthoracic echo.  5. OT/PT/ST  6. Once the above testing completed and unremarkable, hopefuly can discharge home as given patient history of dementia,  concerned about patient behavior.  7. Will need f/u neurology in 3-4 weeks.   8. B12 and folate pending.  9. Orthostatic BP    I discussed the patient's findings and my recommendations with patient and family    Medical Decision Making    Number/Complexity of Problems  • Moderate  o 1 undiagnosed new problem with uncertain prognosis -   o 1 acute illness with systemic symptoms -   • High  o 1 acute or chronic illness that pose a threat to life/body function -   High  Acute stroke     MDM Data  • Moderate - 1/3 categories  • Extensive - 2/3 categories    • Category 1: 3 of the following  o Review of external notes  o Review of results  o Ordering of each unique test  o Independent historian  • Category 2:  Independent interpretation of test (ex: imaging)  • Category 3:  Discussion of management with another provider    Extensive  Personally reviewed MRI brain and obtained HPI from        Treatment Plan  • Moderate - Prescription Drug management  • High  o Drug therapy requiring intensive monitoring for toxicity  o Decision regarding hospitalization or  escalation of care  o De-escalate care/DNR decisions  High  CUS  TTE  Labs            Chelsea Werner, APRN  05/15/23  09:56 CDT

## 2023-05-15 NOTE — THERAPY DISCHARGE NOTE
Acute Care - Occupational Therapy Initial Evaluation/Discharge  [facility]     Patient Name: Dina Michaels  : 1934  MRN: [id number]  Today's Date: 5/15/2023  Onset of Illness/Injury or Date of Surgery: 23  Date of Referral to OT: 23  Referring Physician: Dr. Landis      Admit Date: 2023       ICD-10-CM ICD-9-CM   1. TIA (transient ischemic attack)  G45.9 435.9   2. 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 Elevated troponin  R77.8 790.6   4. 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 Cognitive and behavioral changes  R41.89 799.59    R46.89 312.9   6. Impaired mobility  Z74.09 799.89     Patient Active Problem List   Diagnosis   • AMS (altered mental status)   • Vascular dementia without behavioral disturbance   • Near syncope   • Acute pulmonary embolism   • Hypothyroidism   • Lactic acidosis   • Gastrointestinal hemorrhage, unspecified gastrointestinal hemorrhage type: Possibly from duodenal versus diverticular bleed   • Normocytic anemia due to blood loss   • Essential hypertension   • TIA (transient ischemic attack)     Past Medical History:   Diagnosis Date   • Cancer      Past Surgical History:   Procedure Laterality Date   • COLONOSCOPY N/A 2023    Procedure: COLONOSCOPY WITH ANESTHESIA;  Surgeon: Bubba Asher MD;  Location: [facility] ENDOSCOPY;  Service: Gastroenterology;  Laterality: N/A;  pre gi bleed  post diverticulosis  Dr. Hebert   • ENDOSCOPY N/A 2023    Procedure: ESOPHAGOGASTRODUODENOSCOPY WITH ANESTHESIA;  Surgeon: Bubba Asher MD;  Location: [facility] ENDOSCOPY;  Service: Gastroenterology;  Laterality: N/A;  pre screen  post gastric bx  Dr Hebert       OT ASSESSMENT FLOWSHEET (last 12 hours)     OT Evaluation and Treatment     Row Name 05/15/23 1400 05/15/23 1330                OT Time and Intention    Subjective Information -- no complaints  -AC       Document Type -- evaluation  -AC       Mode of Treatment -- occupational therapy  -AC          General  Information    Patient Profile Reviewed -- yes  -AC       Onset of Illness/Injury or Date of Surgery -- 05/14/23  -       Referring Physician -- Dr. Landis  -       Prior Level of Function -- independent:;ADL's;max assist:;home management;cooking;cleaning;driving;shopping  uses rw for amb  -AC       Equipment Currently Used at Home -- shower chair;walker, rolling;grab bar;commode, bedside  -       Pertinent History of Current Functional Problem -- episode of L sided weakness, slumped over in chair, confusion; Dx: acute R CVA  -       Existing Precautions/Restrictions -- fall  -       Barriers to Rehab -- none identified  -          Living Environment    Current Living Arrangements -- home  walk in shower, pt typically uses tub shower  -       People in Home -- spouse  -          Pain Assessment    Pretreatment Pain Rating -- 0/10 - no pain  -AC       Posttreatment Pain Rating -- 0/10 - no pain  -AC          Cognition    Orientation Status (Cognition) -- oriented to;person;place  -       Follows Commands (Cognition) -- follows one-step commands;75-90% accuracy;verbal cues/prompting required  -       Cognitive Function -- memory deficit  -       Memory Deficit (Cognition) -- severe deficit;short-term memory  -       Personal Safety Interventions -- fall prevention program maintained;gait belt;muscle strengthening facilitated;nonskid shoes/slippers when out of bed;supervised activity;toileting scheduled  -          Range of Motion Comprehensive    Comment, General Range of Motion -- WFL AROM BUE  -          Strength Comprehensive (MMT)    Comment, General Manual Muscle Testing (MMT) Assessment -- 4/5 B UE  -          Activities of Daily Living    BADL Assessment/Intervention -- lower body dressing;grooming;toileting  -          Lower Body Dressing Assessment/Training    Nicholas Level (Lower Body Dressing) -- don;doff;pants/bottoms;contact guard assist;shoes/slippers;set up  -        Position (Lower Body Dressing) -- unsupported sitting;supported standing  -          Grooming Assessment/Training    Starke Level (Grooming) -- wash face, hands;standby assist  -       Position (Grooming) -- sink side  -          Toileting Assessment/Training    Starke Level (Toileting) -- toileting skills;standby assist;perform perineal hygiene;adjust/manage clothing;contact guard assist  -       Assistive Devices (Toileting) -- commode  -       Position (Toileting) -- unsupported sitting;supported standing  -AC          BADL Safety/Performance    Impairments, BADL Safety/Performance -- balance;cognition;endurance/activity tolerance;strength  -       Cognitive Impairments, BADL Safety/Performance -- insight into deficits/self-awareness;judgment;problem-solving/reasoning  -          Bed Mobility    Bed Mobility -- supine-sit  -       Supine-Sit Starke (Bed Mobility) -- standby assist  -       Assistive Device (Bed Mobility) -- head of bed elevated  -          Functional Mobility    Functional Mobility- Ind. Level -- contact guard assist  -AC       Functional Mobility- Comment -- in BR, back to bed  -          Transfer Assessment/Treatment    Transfers -- sit-stand transfer;stand-sit transfer;toilet transfer  -          Sit-Stand Transfer    Sit-Stand Starke (Transfers) -- contact guard  -AC          Stand-Sit Transfer    Stand-Sit Starke (Transfers) -- contact guard  -AC          Toilet Transfer    Type (Toilet Transfer) -- sit-stand;stand-sit  -       Starke Level (Toilet Transfer) -- contact guard  -AC       Assistive Device (Toilet Transfer) -- commode;grab bars/safety frame  -          Safety Issues, Functional Mobility    Safety Issues Affecting Function (Mobility) -- insight into deficits/self-awareness;judgment  -       Impairments Affecting Function (Mobility) -- balance;cognition;endurance/activity tolerance;strength  -AC          Motor  Skills    Motor Skills -- coordination;muscle tone;neuro-muscular function  -       Coordination -- WFL;bilateral;upper extremity  -AC       Muscle Tone -- WNL  -AC       Neuromuscular Function -- WFL;bilateral;upper extremity  -AC          Balance    Balance Assessment -- sitting static balance;sitting dynamic balance;standing static balance;standing dynamic balance  -AC       Static Sitting Balance -- standby assist  -AC       Dynamic Sitting Balance -- standby assist  -AC       Position, Sitting Balance -- sitting edge of bed  -AC       Static Standing Balance -- contact guard  -AC       Dynamic Standing Balance -- contact guard  -AC       Comment, Balance -- 1 mild LOB while standing at EOB, pt unsteady and required CGA to regain balance  -AC          Wound 04/13/23 1438 Left lower leg    Wound - Properties Group Placement Date: 04/13/23  -AA Placement Time: 1438  -AA Present on Hospital Admission: Y  -AA Side: Left  -AA Orientation: lower  -AA Location: leg  -AA    Retired Wound - Properties Group Placement Date: 04/13/23  -AA Placement Time: 1438  -AA Present on Hospital Admission: Y  -AA Side: Left  -AA Orientation: lower  -AA Location: leg  -AA    Retired Wound - Properties Group Date first assessed: 04/13/23  -AA Time first assessed: 1438  -AA Present on Hospital Admission: Y  -AA Side: Left  -AA Location: leg  -AA       Wound 05/14/23 1338 Right lower leg    Wound - Properties Group Placement Date: 05/14/23  -DM Placement Time: 1338  -DM Side: Right  -DM Orientation: lower  -DM Location: leg  -DM    Retired Wound - Properties Group Placement Date: 05/14/23  -DM Placement Time: 1338  -DM Side: Right  -DM Orientation: lower  -DM Location: leg  -DM    Retired Wound - Properties Group Date first assessed: 05/14/23  -DM Time first assessed: 1338  -DM Side: Right  -DM Location: leg  -DM       Plan of Care Review    Plan of Care Reviewed With -- patient;spouse  -AC       Progress -- no change  -       Outcome  Evaluation -- OT eval completed.  Pt alert and oriented x2.  Follows 1 step commands WFL with occasional verbal cues.  Pt came to EOB with SBA.  Good sitting balance noted.  Strength in BUE symmetrical at 4/5, ROM intact.  No sensory or coordination deficits.  Pt dons and doffs sandals with set up and simulated don/doff of socks with SBA.  Transferred and amb to BR with CGA.  Slightly unsteady with a mild LOB upon stepping away from EOB.  Pt completed toileting, clothing mgmt and grooming with SBA-CGA.  Pt likely to discharge today.  At this time pt is safe for discharge home with assist from spouse.  Would benefit from OP PT to increase pt's strength and endurance as well as balance training.  OT to sign off at this time.  -AC          Vital Signs    Pre Systolic BP Rehab 158  -AC --       Pre Treatment Diastolic BP 78  -AC --       Intra Systolic BP Rehab 148  -AC --       Intra Treatment Diastolic BP 78  -AC --       Post Systolic BP Rehab 140  -AC --       Post Treatment Diastolic BP 62  -AC --       Pre Patient Position Supine  -AC --       Intra Patient Position Sitting  -AC --       Post Patient Position Standing  -AC --          Positioning and Restraints    Pre-Treatment Position -- in bed  -AC       Post Treatment Position -- bed  -AC       In Bed -- sitting EOB;call light within reach;encouraged to call for assist;with family/caregiver;side rails up x2  -          Therapy Assessment/Plan (OT)    Date of Referral to OT -- 05/14/23  -       Criteria for Skilled Therapeutic Interventions Met (OT) -- no problems identified which require skilled intervention  -       Therapy Frequency (OT) -- evaluation only  -             User Key  (r) = Recorded By, (t) = Taken By, (c) = Cosigned By    Initials Name Effective Dates    AC Harley Rios, OTR/L, CNT 02/03/23 -     Analy Baca RN 08/02/16 -     Gallo Burrows RN 08/02/16 -                 Occupational Therapy Education     Title: PT OT  SLP Therapies (In Progress)     Topic: Occupational Therapy (Done)     Point: ADL training (Done)     Description:   Instruct learner(s) on proper safety adaptation and remediation techniques during self care or transfers.   Instruct in proper use of assistive devices.              Learning Progress Summary           Patient Acceptance, E,TB, VU by  at 5/15/2023 1510                   Point: Home exercise program (Done)     Description:   Instruct learner(s) on appropriate technique for monitoring, assisting and/or progressing therapeutic exercises/activities.              Learning Progress Summary           Patient Acceptance, E,TB, VU by  at 5/15/2023 1510                               User Key     Initials Effective Dates Name Provider Type Discipline     02/03/23 -  Harley Rios, OTR/L, CNT Occupational Therapist OT                OT Recommendation and Plan  Therapy Frequency (OT): evaluation only  Plan of Care Review  Plan of Care Reviewed With: patient, spouse  Progress: no change  Outcome Evaluation: OT eval completed.  Pt alert and oriented x2.  Follows 1 step commands WFL with occasional verbal cues.  Pt came to EOB with SBA.  Good sitting balance noted.  Strength in BUE symmetrical at 4/5, ROM intact.  No sensory or coordination deficits.  Pt dons and doffs sandals with set up and simulated don/doff of socks with SBA.  Transferred and amb to BR with CGA.  Slightly unsteady with a mild LOB upon stepping away from EOB.  Pt completed toileting, clothing mgmt and grooming with SBA-CGA.  Pt likely to discharge today.  At this time pt is safe for discharge home with assist from spouse.  Would benefit from OP PT to increase pt's strength and endurance as well as balance training.  OT to sign off at this time.  Plan of Care Reviewed With: patient, spouse  Outcome Evaluation: OT eval completed.  Pt alert and oriented x2.  Follows 1 step commands WFL with occasional verbal cues.  Pt came to EOB with SBA.   Good sitting balance noted.  Strength in BUE symmetrical at 4/5, ROM intact.  No sensory or coordination deficits.  Pt dons and doffs sandals with set up and simulated don/doff of socks with SBA.  Transferred and amb to BR with CGA.  Slightly unsteady with a mild LOB upon stepping away from EOB.  Pt completed toileting, clothing mgmt and grooming with SBA-CGA.  Pt likely to discharge today.  At this time pt is safe for discharge home with assist from spouse.  Would benefit from OP PT to increase pt's strength and endurance as well as balance training.  OT to sign off at this time.          Outcome Measures     Row Name 05/15/23 1500             How much help from another is currently needed...    Putting on and taking off regular lower body clothing? 3  -AC      Bathing (including washing, rinsing, and drying) 3  -AC      Toileting (which includes using toilet bed pan or urinal) 3  -AC      Putting on and taking off regular upper body clothing 3  -AC      Taking care of personal grooming (such as brushing teeth) 3  -AC      Eating meals 4  -AC      AM-PAC 6 Clicks Score (OT) 19  -AC         Modified Saji Scale    Pre-Stroke Modified Saji Scale 0 - No Symptoms at all.  -AC      Modified Saji Scale 0 - No Symptoms at all.  -AC         Functional Assessment    Outcome Measure Options AM-PAC 6 Clicks Daily Activity (OT);Modified Sharkey  -AC            User Key  (r) = Recorded By, (t) = Taken By, (c) = Cosigned By    Initials Name Provider Type    Harley Garcia OTR/LGAVINO Occupational Therapist                Time Calculation:    Time Calculation- OT     Row Name 05/15/23 1511             Time Calculation- OT    OT Start Time 1330  -AC      OT Stop Time 1430  -      OT Time Calculation (min) 60 min  -      OT Received On 05/15/23  -            User Key  (r) = Recorded By, (t) = Taken By, (c) = Cosigned By    Initials Name Provider Type    Harley Garcia OTR/L, GAVINO Occupational Therapist                 Therapy Charges for Today     Code Description Service Date Service Provider Modifiers Qty    32964278306 HC OT EVAL LOW COMPLEXITY 4 5/15/2023 Harley Rios OTR/L, CNT GO 1               OT Discharge Summary  Anticipated Discharge Disposition (OT): home with assist  Reason for Discharge: At baseline function  Outcomes Achieved: Other  Discharge Destination: Home with assist, Home with outpatient services    ANNAMARIA Handy/L, GAVINO  5/15/2023

## 2023-05-15 NOTE — PLAN OF CARE
Goal Outcome Evaluation:  Plan of Care Reviewed With: patient, spouse        Progress: no change  Outcome Evaluation: OT eval completed.  Pt alert and oriented x2.  Follows 1 step commands WFL with occasional verbal cues.  Pt came to EOB with SBA.  Good sitting balance noted.  Strength in BUE symmetrical at 4/5, ROM intact.  No sensory or coordination deficits.  Pt dons and doffs sandals with set up and simulated don/doff of socks with SBA.  Transferred and amb to BR with CGA.  Slightly unsteady with a mild LOB upon stepping away from EOB.  Pt completed toileting, clothing mgmt and grooming with SBA-CGA.  Pt likely to discharge today.  At this time pt is safe for discharge home with assist from spouse.  Would benefit from OP PT to increase pt's strength and endurance as well as balance training.  OT to sign off at this time.

## 2023-05-15 NOTE — PLAN OF CARE
Goal Outcome Evaluation:  Plan of Care Reviewed With: patient, spouse        Progress: no change  Outcome Evaluation: a/ox2, BL dementia,  at bedside assists with comunication, L sided weakness, HTN, RA, tele monitoring, tolerating PO intake well, passed Bedside swallow, SLP to follow, MRI, Ultrasound and Echo today. IVFs, plans to dc home with .

## 2023-05-15 NOTE — DISCHARGE SUMMARY
[facility] Medicine Services  DISCHARGE SUMMARY       Date of Admission: 5/14/2023  Date of Discharge:  5/15/2023  Primary Care Physician: Benedicto Hebert MD    Presenting Problem/History of Present Illness:  Slumping/syncope    Final Discharge Diagnoses:  Active Hospital Problems    Diagnosis    • **Acute CVA (cerebrovascular accident)    • History of gastrointestinal bleeding    • Essential hypertension    • Chronic anemia    • Vascular dementia without behavioral disturbance        Consults:   #1 Dr. Baltazar    Procedures Performed: none    Pertinent Test Results:   Results for orders placed during the hospital encounter of 05/14/23    Adult Transthoracic Echo Complete W/ Cont if Necessary Per Protocol    Interpretation Summary  •  Left ventricular systolic function is hyperdynamic (EF > 70%). Left ventricular ejection fraction appears to be greater than 70%.  •  Left ventricular diastolic dysfunction is noted.  •  No evidence of a patent foramen ovale. Saline test results are negative for right to left atrial level shunt.  •  Moderate mitral annular calcification is present. There is moderate calcification of the mitral valve posterior leaflet(s).      Imaging Results (All)     Procedure Component Value Units Date/Time    US Carotid Bilateral [[id number]] Collected: 05/15/23 1632     Updated: 05/15/23 1636    Narrative:      History: Carotid occlusive disease       Impression:      Impression:  1. There is less than 50% stenosis of the right internal carotid artery.  2. There is 50-69% stenosis of the left internal carotid artery.  3. Antegrade flow is demonstrated in bilateral vertebral arteries.     Comments: Bilateral carotid vertebral arterial duplex scan was  performed.     Grayscale imaging shows intimal thickening and calcified elements at the  carotid bifurcation. The right internal carotid artery peak systolic  velocity is 85.4 cm/sec. The end-diastolic  velocity is 23.3 cm/sec. The  right ICA/CCA ratio is approximately 1.2 . These findings correlate with  less than 50% stenosis of the right internal carotid artery.     Grayscale imaging shows intimal thickening and calcified elements at the  carotid bifurcation. The left internal carotid artery peak systolic  velocity is 159.2 cm/sec. The end-diastolic velocity is 22 cm/sec. The  left ICA/CCA ratio is approximately 1.6 . These findings correlate with  50-69% stenosis of the left internal carotid artery.     Antegrade flow is demonstrated in bilateral vertebral arteries.  There is greater than 50% stenosis of the left external carotid artery.  This report was finalized on 05/15/2023 16:33 by Dr. Olayinka Monterroso MD.    MRI Brain Without Contrast [[id number]] Collected: 05/15/23 0904     Updated: 05/15/23 0913    Narrative:      EXAMINATION: MRI BRAIN WO CONTRAST-     5/15/2023 8:15 AM CDT     HISTORY: Stroke, follow up; G45.9-Transient cerebral ischemic attack,  unspecified; I10-Essential (primary) hypertension; R77.8-Other specified  abnormalities of plasma proteins; D55.8-Other anemias due to enzyme  disorders     The MR imaging of the brain is performed without intravenous contrast  enhancement.     There is no previous similar study for comparison. Correlation made with  CT scan of the head dated 05/14/2023.     The diffusion-weighted imaging sequence including the ADC map show  evidence of a focus of restricted diffusion in the right posterior  paraventricular white matter. No significant surrounding edema or mass  effect.     There is no evidence of a mass. There is no midline shift.     Moderately dilated ventricles, basal cisterns and cortical sulci  suggesting chronic volume loss.     The orbits are not optimally evaluated due to significant imaging  artifact.     220 gland is normal. The corpus callosum, the brainstem and cerebellum  are normal.     The FLAIR sequence images show focal and diffuse areas of  T2 signal in  the periventricular and superficial and deep subcortical white matter,  representing chronic white matter ischemia due 2 chronic occlusive  microangiopathy.     The gradient recall imaging sequence are nondiagnostic due to  significant motion in imaging artifacts.     The visualized paranasal sinuses show moderate mucosal thickening of the  ethmoid sinuses. There is bilateral mastoid effusion, right more than  the left.       Impression:      1. A small focus of acute infarction in the right posterior  paraventricular white matter.  2. Chronic ischemic and atrophic changes.  3. Chronic sinusitis and bilateral mastoid effusion.        This report was finalized on 05/15/2023 09:10 by Dr. Tito Amato MD.    XR Chest 1 View [[id number]] Collected: 05/14/23 1434     Updated: 05/14/23 1438    Narrative:      Frontal upright radiograph of the chest 5/14/2023 2:23 PM CDT     HISTORY: Syncope     COMPARISON: 09/29/2020.     FINDINGS:   The lungs are clear. The cardiomediastinal silhouette and pulmonary  vascularity are within normal limits.      The osseous structures and surrounding soft tissues demonstrate no acute  abnormality.       Impression:      1. Stable chest exam without acute process.        This report was finalized on 05/14/2023 14:35 by Dr Yoel De La Rosa, .    CT Head Without Contrast [[id number]] Collected: 05/14/23 1405     Updated: 05/14/23 1409    Narrative:      EXAMINATION: CT HEAD WO CONTRAST-      5/14/2023 1:51 PM CDT     HISTORY: Syncope. Confusion and left-sided weakness.     In order to have a CT radiation dose as low as reasonably achievable  Automated Exposure Control was utilized for adjustment of the mA and/or  KV according to patient size.     DLP in mGycm= 684.     Comparison is made with 04/27/2023.     Axial, sagittal, and coronal noncontrast CT imaging of the head.     The visualized paranasal sinuses are clear.     The brain and ventricles have an age appropriate  appearance.   Mild atrophy and small vessel disease.  There is no hemorrhage or mass-effect.   No acute infarction is seen.     No calvarial abnormality.       Impression:      1. No acute intracranial abnormality is seen.                                         This report was finalized on 05/14/2023 14:06 by Dr. Michael Barry MD.        LAB RESULTS:      Lab 05/14/23  1345 05/14/23  1329   WBC  --  9.13   HEMOGLOBIN  --  8.8*   HEMATOCRIT  --  30.2*   PLATELETS  --  290   NEUTROS ABS  --  6.49   IMMATURE GRANS (ABS)  --  0.11*   LYMPHS ABS  --  1.20   MONOS ABS  --  1.02*   EOS ABS  --  0.22   MCV  --  92.6   LACTATE 1.7  --    PROTIME  --  13.4   APTT  --  28.4         Lab 05/15/23  0441 05/14/23  1329   SODIUM  --  137   POTASSIUM  --  4.3   CHLORIDE  --  102   CO2  --  25.0   ANION GAP  --  10.0   BUN  --  15   CREATININE  --  0.94   EGFR  --  58.1*   GLUCOSE  --  102*   CALCIUM  --  8.8   MAGNESIUM 1.9  --    HEMOGLOBIN A1C  --  5.00   TSH 7.770*  --          Lab 05/14/23  1329   TOTAL PROTEIN 6.4   ALBUMIN 3.4*   GLOBULIN 3.0   ALT (SGPT) 9   AST (SGOT) 15   BILIRUBIN 0.2   ALK PHOS 105         Lab 05/14/23  1541 05/14/23  1329   PROBNP  --  1,309.0   HSTROP T 19* 21*   PROTIME  --  13.4   INR  --  1.01         Lab 05/15/23  0441   CHOLESTEROL 178   LDL CHOL 94   HDL CHOL 66*   TRIGLYCERIDES 100             Brief Urine Lab Results  (Last result in the past 365 days)      Color   Clarity   Blood   Leuk Est   Nitrite   Protein   CREAT   Urine HCG        12/05/22 2118 YELLOW   Clear   Negative   Negative  Comment: Culture Urine under CMS guidelines and criteria will auto reflex on a sole  criteria that is based on manual microscopic count of more than 10/hpf for  WBC. If Culture Urine is warranted aside from this criteria, place a  requisition or order within 24 hours of collection.   Negative                 Microbiology Results (last 10 days)     Procedure Component Value - Date/Time    Wound Culture - Swab,  "Leg, Left [[id number]] Collected: 05/14/23 1349    Lab Status: Preliminary result Specimen: Swab from Leg, Left Updated: 05/15/23 0937     Wound Culture Culture in progress     Gram Stain Many (4+) Gram negative bacilli      Rare (1+) WBCs seen    Blood Culture - Blood, Arm, Right [[id number]]  (Normal) Collected: 05/14/23 1345    Lab Status: Preliminary result Specimen: Blood from Arm, Right Updated: 05/15/23 1432     Blood Culture No growth at 24 hours    Blood Culture - Blood, Arm, Left [[id number]]  (Normal) Collected: 05/14/23 1345    Lab Status: Preliminary result Specimen: Blood from Arm, Left Updated: 05/15/23 1432     Blood Culture No growth at 24 hours    Narrative:      Less than seven (7) mL's of blood was collected.  Insufficient quantity may yield false negative results.          Hospital Course:  Patient is an 89-year-old female with a history of dementia, hypertension, hypothyroidism, previous GI bleed, previous PE.  She was just hospitalized and had an EGD and a colonoscopy done recently for shallow duodenal ulcer with recommendations for PPI and Carafate.  She is also had multiple syncopal-like events prior.  She presented to the ER on 5/14 for an event of \"slumping.  Apparently she was on her porch while her  was doing work.  He looked over and noticed her appearing limp.  She apparently slowly began to improve but was having slurred speech.  By time he got to the ER she was improved but still with some left-sided weakness.  CT of the head was nonacute.  She is admitted to the hospital for stroke rule out.  An MRI of the brain was done the morning of 5/15 which does show evidence for a small focus of acute infarct in the right posterior periventricular white matter.  She otherwise is doing well though.  She has no dysarthric speech.  She has no focal deficits.  She was seen by PT and OT who recommend home with assist by  and outpatient follow-up and outpatient therapy if needed.  " "Carotid ultrasound show less than 50% disease in the right ICA and between 50 to 69% disease of the left ICA.  2D echo with preserved EF and no PFO.  Overall doing well and her  wants to bring her home.  Seen by neurology have cleared her for discharge but do recommend resuming a baby aspirin along with a high-dose statin.  Again patient has a history of GI bleed and shallow ulcer but recommendation at this time is to resume and monitor for any signs of bleeding as it is felt the benefits outweigh the risks.  Continue PPI and Carafate.  Will discharge home with follow-up by PCP, neurology, GI as prior.    Physical Exam on Discharge:  /65 (BP Location: Left arm, Patient Position: Sitting)   Pulse 88   Temp 98 °F (36.7 °C) (Oral)   Resp 18   Ht 167.6 cm (66\")   Wt 67.1 kg (148 lb)   SpO2 99%   BMI 23.89 kg/m²   Physical Exam  GEN: Awake, alert, interactive, in NAD  HEENT: PERRLA, EOMI, Anicteric, Trachea midline  Lungs:  no wheezing/rales/rhonchi  Heart: RRR, +S1/s2, no rub  ABD: soft, nt/nd, +BS, no guarding/rebound  Extremities: atraumatic, no cyanosis, no edema  Skin: no rashes or lesions  Neuro: ALAMO, no obvious deficits, gait not testesd  Psych: normal mood & affect      Condition on Discharge: stable    Discharge Disposition:  Home or Self Care    Discharge Medications:     Discharge Medications      New Medications      Instructions Start Date   aspirin 81 MG EC tablet   81 mg, Oral, Daily      atorvastatin 80 MG tablet  Commonly known as: LIPITOR   80 mg, Oral, Nightly         Continue These Medications      Instructions Start Date   alendronate 70 MG tablet  Commonly known as: FOSAMAX   70 mg, Oral, Every 7 Days      levothyroxine 100 MCG tablet  Commonly known as: SYNTHROID, LEVOTHROID   100 mcg, Oral, Daily      memantine 10 MG tablet  Commonly known as: NAMENDA   10 mg, Oral, 2 Times Daily      metoprolol succinate XL 25 MG 24 hr tablet  Commonly known as: TOPROL-XL   12.5 mg, Oral, " Daily      mirtazapine 15 MG tablet  Commonly known as: REMERON   15 mg, Oral, Nightly      pantoprazole 40 MG EC tablet  Commonly known as: PROTONIX   40 mg, Oral, 2 Times Daily Before Meals      sucralfate 1 GM/10ML suspension  Commonly known as: CARAFATE   1 g, Oral, 4 Times Daily Before Meals & Nightly             Discharge Diet:    Dietary Orders (From admission, onward)     Start     Ordered    05/14/23 1805  Diet: Regular/House Diet; Texture: Regular Texture (IDDSI 7); Fluid Consistency: Thin (IDDSI 0)  Diet Effective Now        References:    Diet Order [facility]   Question Answer Comment   Diets: Regular/House Diet    Texture: Regular Texture (IDDSI 7)    Fluid Consistency: Thin (IDDSI 0)        05/14/23 1804                  Activity at Discharge:    Increase to baseline as tolerated    Follow-up Appointments:   No future appointments.    Test Results Pending at Discharge: none    Electronically signed by Jeff Landis DO, 05/15/23, 18:36 CDT.    Time: 32 minutes.

## 2023-05-15 NOTE — CASE MANAGEMENT/SOCIAL WORK
Discharge Planning Assessment  [facility]     Patient Name: Dina Michaels  MRN: [id number]  Today's Date: 5/15/2023    Admit Date: 5/14/2023        Discharge Needs Assessment     Row Name 05/15/23 1517       Living Environment    People in Home spouse    Name(s) of People in Home Nik    Current Living Arrangements home    Primary Care Provided by self;spouse/significant other    Provides Primary Care For no one    Family Caregiver if Needed spouse    Quality of Family Relationships involved;helpful    Able to Return to Prior Arrangements yes       Resource/Environmental Concerns    Resource/Environmental Concerns none    Transportation Concerns none       Food Insecurity    Within the past 12 months, you worried that your food would run out before you got the money to buy more. Never true    Within the past 12 months, the food you bought just didn't last and you didn't have money to get more. Never true       Transition Planning    Patient/Family Anticipates Transition to home with family;home with help/services    Patient/Family Anticipated Services at Transition none    Transportation Anticipated family or friend will provide       Discharge Needs Assessment    Readmission Within the Last 30 Days no previous admission in last 30 days    Equipment Currently Used at Home walker, rolling;bath bench;commode;wheelchair    Concerns to be Addressed adjustment to diagnosis/illness    Anticipated Changes Related to Illness none    Equipment Needed After Discharge none    Outpatient/Agency/Support Group Needs homecare agency;outpatient therapy    Discharge Facility/Level of Care Needs home with home health;outpatient therapy               Discharge Plan     Row Name 05/15/23 1515       Plan    Plan Comments Plan is for pt to return home with spouse. Pt has all DME needed in home and is at baseline. Pt has RX coverage and PCP.              Continued Care and Services - Admitted Since 5/14/2023    Coordination has not  been started for this encounter.          Demographic Summary    No documentation.                Functional Status    No documentation.                Psychosocial    No documentation.                Abuse/Neglect    No documentation.                Legal    No documentation.                Substance Abuse    No documentation.                Patient Forms    No documentation.                   ERASTO Leslie

## 2023-05-15 NOTE — THERAPY EVALUATION
Patient Name: Dina Michaels  : 1934    MRN: [id number]                              Today's Date: 5/15/2023       Admit Date: 2023    Visit Dx:     ICD-10-CM ICD-9-CM   1. TIA (transient ischemic attack)  G45.9 435.9   2. 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 Elevated troponin  R77.8 790.6   4. 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 Cognitive and behavioral changes  R41.89 799.59    R46.89 312.9   6. Impaired mobility  Z74.09 799.89     Patient Active Problem List   Diagnosis   • AMS (altered mental status)   • Vascular dementia without behavioral disturbance   • Near syncope   • Acute pulmonary embolism   • Hypothyroidism   • Lactic acidosis   • Gastrointestinal hemorrhage, unspecified gastrointestinal hemorrhage type: Possibly from duodenal versus diverticular bleed   • Normocytic anemia due to blood loss   • Essential hypertension   • TIA (transient ischemic attack)     Past Medical History:   Diagnosis Date   • Cancer      Past Surgical History:   Procedure Laterality Date   • COLONOSCOPY N/A 2023    Procedure: COLONOSCOPY WITH ANESTHESIA;  Surgeon: Bubba Asher MD;  Location: [facility] ENDOSCOPY;  Service: Gastroenterology;  Laterality: N/A;  pre gi bleed  post diverticulosis  Dr. Hebert   • ENDOSCOPY N/A 2023    Procedure: ESOPHAGOGASTRODUODENOSCOPY WITH ANESTHESIA;  Surgeon: Bubba Asher MD;  Location: [facility] ENDOSCOPY;  Service: Gastroenterology;  Laterality: N/A;  pre screen  post gastric bx  Dr Hebert      General Information     Row Name 05/15/23 1011          Physical Therapy Time and Intention    Document Type evaluation  CVA-R posterior paraventricular white matter, old L basal ganglia lacunar infarct, found on front porch slumped in chair, L side weakness and difficulty standing, confusion, history of dementia  -MS     Mode of Treatment physical therapy  -MS     Row Name 05/15/23 1011          General Information    Prior Level of Function independent:;all  household mobility;ADL's;dependent:;cooking;cleaning;driving  walks with walker, a little help with LE dressing  -MS     Existing Precautions/Restrictions fall  -MS     Barriers to Rehab previous functional deficit;cognitive status;hearing deficit  -MS     Row Name 05/15/23 1011          Living Environment    People in Home spouse  -MS     Row Name 05/15/23 1011          Home Main Entrance    Number of Stairs, Main Entrance five  walk in shower with seat  -MS     Stair Railings, Main Entrance railing on left side (ascending)  -MS     Row Name 05/15/23 1011          Stairs Within Home, Primary    Number of Stairs, Within Home, Primary none  -MS     Row Name 05/15/23 1011          Cognition    Orientation Status (Cognition) oriented to;person;place  -MS     Row Name 05/15/23 1011          Safety Issues, Functional Mobility    Impairments Affecting Function (Mobility) cognition;endurance/activity tolerance;strength  -MS     Cognitive Impairments, Mobility Safety/Performance problem-solving/reasoning;insight into deficits/self-awareness  -MS           User Key  (r) = Recorded By, (t) = Taken By, (c) = Cosigned By    Initials Name Provider Type    Iman Azevedo, PT, DPT, NCS Physical Therapist               Mobility     Row Name 05/15/23 1011          Bed Mobility    Comment, (Bed Mobility) pt sitting EOB upon entering the room  -MS     Row Name 05/15/23 1011          Sit-Stand Transfer    Sit-Stand [place] (Transfers) contact guard;verbal cues;nonverbal cues (demo/gesture)  -MS     Assistive Device (Sit-Stand Transfers) walker, front-wheeled  -MS     Row Name 05/15/23 1011          Gait/Stairs (Locomotion)    [place] Level (Gait) contact guard  -MS     Assistive Device (Gait) walker, front-wheeled  -MS     Distance in Feet (Gait) 75ft with decreased clearance of R foot  -MS           User Key  (r) = Recorded By, (t) = Taken By, (c) = Cosigned By    Initials Name Provider Type    Iman Azveedo, PT,  DPT, NCS Physical Therapist               Obj/Interventions     Row Name 05/15/23 1011          Range of Motion Comprehensive    General Range of Motion bilateral upper extremity ROM WFL;bilateral lower extremity ROM WFL  -MS     Row Name 05/15/23 1011          Strength Comprehensive (MMT)    Comment, General Manual Muscle Testing (MMT) Assessment R LE 4/5, L LE 4+/5, B UEs 4+/5  -MS     Row Name 05/15/23 1011          Motor Skills    Motor Skills coordination  -MS     Coordination WFL;bilateral;upper extremity;lower extremity  -MS     Row Name 05/15/23 1011          Balance    Balance Assessment sitting static balance;sitting dynamic balance;standing static balance;standing dynamic balance  -MS     Static Sitting Balance independent  -MS     Dynamic Sitting Balance independent  -MS     Position, Sitting Balance unsupported;sitting edge of bed  -MS     Static Standing Balance standby assist  -MS     Dynamic Standing Balance standby assist  -MS     Position/Device Used, Standing Balance walker, rolling  -MS     Row Name 05/15/23 1011          Sensory Assessment (Somatosensory)    Sensory Assessment (Somatosensory) sensation intact  -MS           User Key  (r) = Recorded By, (t) = Taken By, (c) = Cosigned By    Initials Name Provider Type    MS Feliciano Iman R, PT, DPT, NCS Physical Therapist               Goals/Plan     Row Name 05/15/23 1013          Bed Mobility Goal 1 (PT)    Activity/Assistive Device (Bed Mobility Goal 1, PT) bed mobility activities, all  -MS     [place] Level/Cues Needed (Bed Mobility Goal 1, PT) independent  -MS     Time Frame (Bed Mobility Goal 1, PT) long term goal (LTG);by discharge  -MS     Progress/Outcomes (Bed Mobility Goal 1, PT) new goal  -MS     Row Name 05/15/23 1013          Transfer Goal 1 (PT)    Activity/Assistive Device (Transfer Goal 1, PT) sit-to-stand/stand-to-sit;bed-to-chair/chair-to-bed;walker, rolling  -MS     [place] Level/Cues Needed (Transfer Goal 1, PT)  modified independence  -MS     Time Frame (Transfer Goal 1, PT) long term goal (LTG);by discharge  -MS     Progress/Outcome (Transfer Goal 1, PT) new goal  -MS     Row Name 05/15/23 1013          Gait Training Goal 1 (PT)    Activity/Assistive Device (Gait Training Goal 1, PT) gait (walking locomotion);assistive device use;decrease fall risk;diminish gait deviation;improve balance and speed;increase endurance/gait distance;walker, rolling  -MS     [place] Level (Gait Training Goal 1, PT) modified independence  -MS     Distance (Gait Training Goal 1, PT) 100ft  -MS     Time Frame (Gait Training Goal 1, PT) long term goal (LTG);by discharge  -MS     Progress/Outcome (Gait Training Goal 1, PT) new goal  -MS     Row Name 05/15/23 1013          Stairs Goal 1 (PT)    Activity/Assistive Device (Stairs Goal 1, PT) ascending stairs;descending stairs;using handrail, right;step-to-step  -MS     [place] Level/Cues Needed (Stairs Goal 1, PT) modified independence  -MS     Number of Stairs (Stairs Goal 1, PT) 5  -MS     Progress/Outcome (Stairs Goal 1, PT) new goal  -MS     Row Name 05/15/23 1013          Therapy Assessment/Plan (PT)    Planned Therapy Interventions (PT) balance training;bed mobility training;gait training;patient/family education;strengthening;stair training;transfer training  -MS           User Key  (r) = Recorded By, (t) = Taken By, (c) = Cosigned By    Initials Name Provider Type    Iman Azevedo, PT, DPT, NCS Physical Therapist               Clinical Impression     Row Name 05/15/23 1013          Pain    Pretreatment Pain Rating 0/10 - no pain  -MS     Posttreatment Pain Rating 0/10 - no pain  -MS     Row Name 05/15/23 1013          Plan of Care Review    Plan of Care Reviewed With patient;spouse  -MS     Progress improving  -MS     Outcome Evaluation The patient presents alert and oriented to person and place. 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 She  demonstrates no L sided weakness and no deficits in sensation or coordination. She is very hard of hearing and requires speaking louding directly to her. She lives at home with her spouse who assists her around the home. She seems to be at or near her baseline function of walking with a walker and walking short distances. PT will continue to work with her to encourage increased activity and to work on standing balance for safe standing activities. Recommend discharge home with assist.  -MS     Row Name 05/15/23 1013          Therapy Assessment/Plan (PT)    Patient/Family Therapy Goals Statement (PT) go home  -MS     Rehab Potential (PT) good, to achieve stated therapy goals  -MS     Criteria for Skilled Interventions Met (PT) yes;meets criteria;skilled treatment is necessary  -MS     Therapy Frequency (PT) 2 times/day  -MS     Predicted Duration of Therapy Intervention (PT) until discharge  -MS     Row Name 05/15/23 1013          Positioning and Restraints    Post Treatment Position chair  -MS     In Chair sitting;call light within reach;encouraged to call for assist;with family/caregiver  -MS           User Key  (r) = Recorded By, (t) = Taken By, (c) = Cosigned By    Initials Name Provider Type    MS Feliciano Iman R, PT, DPT, NCS Physical Therapist               Outcome Measures     Row Name 05/15/23 1013 05/15/23 0907       How much help from another person do you currently need...    Turning from your back to your side while in flat bed without using bedrails? 3  -MS 4  -BR    Moving from lying on back to sitting on the side of a flat bed without bedrails? 3  -MS 4  -BR    Moving to and from a bed to a chair (including a wheelchair)? 3  -MS 3  -BR    Standing up from a chair using your arms (e.g., wheelchair, bedside chair)? 3  -MS 3  -BR    Climbing 3-5 steps with a railing? 2  -MS 2  -BR    To walk in hospital room? 3  -MS 3  -BR    AM-PAC 6 Clicks Score (PT) 17  -MS 19  -BR    Highest level of mobility 5 -->  Static standing  -MS 6 --> Walked 10 steps or more  -BR    Row Name 05/15/23 1013          Functional Assessment    Outcome Measure Options AM-PAC 6 Clicks Basic Mobility (PT)  -MS           User Key  (r) = Recorded By, (t) = Taken By, (c) = Cosigned By    Initials Name Provider Type    MS Iman Wiggins STEPAN, PT, DPT, NCS Physical Therapist    Chantelle Durán RN Registered Nurse                             Physical Therapy Education     Title: PT OT SLP Therapies (In Progress)     Topic: Physical Therapy (In Progress)     Point: Mobility training (Done)     Learning Progress Summary           Patient Acceptance, E, VU by MS at 5/15/2023 1419    Comment: role of PT in her care   Significant Other Acceptance, E, VU by MS at 5/15/2023 1419    Comment: role of PT in her care                   Point: Home exercise program (Not Started)     Learner Progress:  Not documented in this visit.          Point: Body mechanics (Not Started)     Learner Progress:  Not documented in this visit.          Point: Precautions (Not Started)     Learner Progress:  Not documented in this visit.                      User Key     Initials Effective Dates Name Provider Type Discipline    MS 06/19/18 -  Iman Wiggins, PT, DPT, NCS Physical Therapist PT              PT Recommendation and Plan  Planned Therapy Interventions (PT): balance training, bed mobility training, gait training, patient/family education, strengthening, stair training, transfer training  Plan of Care Reviewed With: patient, spouse  Progress: improving  Outcome Evaluation: The patient presents alert and oriented to person and place. 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 She demonstrates no L sided weakness and no deficits in sensation or coordination. She is very hard of hearing and requires speaking louding directly to her. She lives at home with her spouse who assists her around the home. She seems to be at or near her  baseline function of walking with a walker and walking short distances. PT will continue to work with her to encourage increased activity and to work on standing balance for safe standing activities. Recommend discharge home with assist.     Time Calculation:    PT Charges     Row Name 05/15/23 1013             Time Calculation    Start Time 1002  -MS      Stop Time 1040  -MS      Time Calculation (min) 38 min  -MS      PT Received On 05/15/23  -MS      PT Goal Re-Cert Due Date 05/25/23  -MS         Untimed Charges    PT Eval/Re-eval Minutes 38  -MS         Total Minutes    Untimed Charges Total Minutes 38  -MS       Total Minutes 38  -MS            User Key  (r) = Recorded By, (t) = Taken By, (c) = Cosigned By    Initials Name Provider Type    Iman Azevedo, PT, DPT, NCS Physical Therapist              Therapy Charges for Today     Code Description Service Date Service Provider Modifiers Qty    [id number] HC PT EVAL LOW COMPLEXITY 3 5/15/2023 Iman Wiggins PT, DPT, NCS GP 1          PT G-Codes  Outcome Measure Options: AM-PAC 6 Clicks Basic Mobility (PT)  AM-PAC 6 Clicks Score (PT): 17  PT Discharge Summary  Anticipated Discharge Disposition (PT): home with assist    Iman Wiggins, PT, DPT, NCS  5/15/2023

## 2023-05-16 NOTE — OUTREACH NOTE
Prep Survey    Flowsheet Row Responses   [facility] facility patient discharged from? [place]   Is LACE score < 7 ? No   Eligibility Readm Mgmt   Discharge diagnosis Acute CVA    Does the patient have one of the following disease processes/diagnoses(primary or secondary)? Stroke   Does the patient have Home health ordered? No   Is there a DME ordered? No   Prep survey completed? Yes          Petra GUTIÉRREZ - Registered Nurse

## 2023-05-16 NOTE — THERAPY DISCHARGE NOTE
Acute Care - Physical Therapy Discharge Summary  [facility]       Patient Name: Dina Michaels  : 1934  MRN: [id number]    Today's Date: 2023  Onset of Illness/Injury or Date of Surgery: 23       Referring Physician: Dr. Landis      Admit Date: 2023      PT Recommendation and Plan    Visit Dx:    ICD-10-CM ICD-9-CM   1. TIA (transient ischemic attack)  G45.9 435.9   2. 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 Elevated troponin  R77.8 790.6   4. 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 Cognitive and behavioral changes  R41.89 799.59    R46.89 312.9   6. Impaired mobility  Z74.09 799.89        Outcome Measures       Row Name 05/15/23 1500             How much help from another is currently needed...    Putting on and taking off regular lower body clothing? 3  -AC      Bathing (including washing, rinsing, and drying) 3  -AC      Toileting (which includes using toilet bed pan or urinal) 3  -AC      Putting on and taking off regular upper body clothing 3  -AC      Taking care of personal grooming (such as brushing teeth) 3  -AC      Eating meals 4  -AC      AM-PAC 6 Clicks Score (OT) 19  -AC         Modified [place] Scale    Pre-Stroke Modified [place] Scale 0 - No Symptoms at all.  -AC      Modified [place] Scale 0 - No Symptoms at all.  -AC         Functional Assessment    Outcome Measure Options AM-PAC 6 Clicks Daily Activity (OT);Modified [place]  -AC                User Key  (r) = Recorded By, (t) = Taken By, (c) = Cosigned By      Initials Name Provider Type    Harley Garcia, OTR/L, CNT Occupational Therapist                         PT Rehab Goals       Row Name 23 1147             Bed Mobility Goal 1 (PT)    Activity/Assistive Device (Bed Mobility Goal 1, PT) bed mobility activities, all  -AH      [place] Level/Cues Needed (Bed Mobility Goal 1, PT) independent  -AH      Time Frame (Bed Mobility Goal 1, PT) long term goal (LTG);by discharge  -      Progress/Outcomes  (Bed Mobility Goal 1, PT) goal not met  -AH         Transfer Goal 1 (PT)    Activity/Assistive Device (Transfer Goal 1, PT) sit-to-stand/stand-to-sit;bed-to-chair/chair-to-bed;walker, rolling  -      [place] Level/Cues Needed (Transfer Goal 1, PT) modified independence  -AH      Time Frame (Transfer Goal 1, PT) long term goal (LTG);by discharge  -      Progress/Outcome (Transfer Goal 1, PT) goal not met  -AH         Gait Training Goal 1 (PT)    Activity/Assistive Device (Gait Training Goal 1, PT) gait (walking locomotion);assistive device use;decrease fall risk;diminish gait deviation;improve balance and speed;increase endurance/gait distance;walker, rolling  -      [place] Level (Gait Training Goal 1, PT) modified independence  -AH      Distance (Gait Training Goal 1, PT) 100ft  -AH      Time Frame (Gait Training Goal 1, PT) long term goal (LTG);by discharge  -      Progress/Outcome (Gait Training Goal 1, PT) goal not met  -         Stairs Goal 1 (PT)    Activity/Assistive Device (Stairs Goal 1, PT) ascending stairs;descending stairs;using handrail, right;step-to-step  -      [place] Level/Cues Needed (Stairs Goal 1, PT) modified independence  -      Number of Stairs (Stairs Goal 1, PT) 5  -AH      Progress/Outcome (Stairs Goal 1, PT) goal not met  -                User Key  (r) = Recorded By, (t) = Taken By, (c) = Cosigned By      Initials Name Provider Type [facility] Becca Welch PTA Physical Therapist Assistant PT                        PT Discharge Summary  Reason for Discharge: Discharge from facility  Outcomes Achieved: Refer to plan of care for updates on goals achieved  Discharge Destination: Home      Becca Welch PTA   5/16/2023

## 2023-05-16 NOTE — NURSING NOTE
Received report for shift change, PM medications administered, IV dc'd, discharge packet reviewed. RN assisted pt by wheelchair downstairs to spouse by personal vehicle. Safety maintained.

## 2023-05-17 LAB
BACTERIA SPEC AEROBE CULT: ABNORMAL
BACTERIA SPEC AEROBE CULT: ABNORMAL
GRAM STN SPEC: ABNORMAL
GRAM STN SPEC: ABNORMAL

## 2023-05-18 ENCOUNTER — READMISSION MANAGEMENT (OUTPATIENT)
Dept: CALL CENTER | Facility: HOSPITAL | Age: 88
End: 2023-05-18
Payer: MEDICARE

## 2023-05-18 NOTE — OUTREACH NOTE
Stroke Week 1 Survey    Flowsheet Row Responses   [facility] facility patient discharged from? [place]   Does the patient have one of the following disease processes/diagnoses(primary or secondary)? Stroke   Week 1 attempt successful? No   Unsuccessful attempts Attempt 1          JENA MO - Registered Nurse

## 2023-05-19 LAB
BACTERIA SPEC AEROBE CULT: NORMAL
BACTERIA SPEC AEROBE CULT: NORMAL

## 2023-05-23 ENCOUNTER — READMISSION MANAGEMENT (OUTPATIENT)
Dept: CALL CENTER | Facility: HOSPITAL | Age: 88
End: 2023-05-23
Payer: MEDICARE

## 2023-05-23 NOTE — OUTREACH NOTE
Stroke Week 1 Survey    Flowsheet Row Responses   [facility] facility patient discharged from? [place]   Does the patient have one of the following disease processes/diagnoses(primary or secondary)? Stroke   Week 1 attempt successful? No   Unsuccessful attempts Attempt 2          Sandie COMBS - Licensed Nurse

## 2023-06-02 ENCOUNTER — READMISSION MANAGEMENT (OUTPATIENT)
Dept: CALL CENTER | Facility: HOSPITAL | Age: 88
End: 2023-06-02

## 2023-06-02 NOTE — OUTREACH NOTE
Stroke Week 3 Survey    Flowsheet Row Responses   [facility] facility patient discharged from? [place]   Does the patient have one of the following disease processes/diagnoses(primary or secondary)? Stroke   Week 3 attempt successful? No   Unsuccessful attempts Attempt 1          Ada ROSE - Registered Nurse

## 2023-06-06 ENCOUNTER — READMISSION MANAGEMENT (OUTPATIENT)
Dept: CALL CENTER | Facility: HOSPITAL | Age: 88
End: 2023-06-06
Payer: MEDICARE

## 2023-06-06 NOTE — OUTREACH NOTE
Stroke Week 3 Survey      Flowsheet Row Responses   [facility] facility patient discharged from? [place]   Does the patient have one of the following disease processes/diagnoses(primary or secondary)? Stroke   Week 3 attempt successful? No   Unsuccessful attempts Attempt 2  [attempted home and cell number]            DEBO GUTIÉRREZ - Registered Nurse

## 2023-11-04 ENCOUNTER — APPOINTMENT (OUTPATIENT)
Dept: GENERAL RADIOLOGY | Facility: HOSPITAL | Age: 88
End: 2023-11-04
Payer: MEDICARE

## 2023-11-04 ENCOUNTER — HOSPITAL ENCOUNTER (INPATIENT)
Facility: HOSPITAL | Age: 88
LOS: 3 days | Discharge: NURSING, REHAB OR LONG-TERM CARE | End: 2023-11-07
Attending: EMERGENCY MEDICINE | Admitting: FAMILY MEDICINE
Payer: MEDICARE

## 2023-11-04 DIAGNOSIS — S72.012A CLOSED SUBCAPITAL FRACTURE OF FEMUR, LEFT, INITIAL ENCOUNTER: Primary | ICD-10-CM

## 2023-11-04 DIAGNOSIS — Z74.09 IMPAIRED MOBILITY: ICD-10-CM

## 2023-11-04 DIAGNOSIS — S72.002A CLOSED FRACTURE OF LEFT HIP, INITIAL ENCOUNTER: ICD-10-CM

## 2023-11-04 LAB
ALBUMIN SERPL-MCNC: 3.7 G/DL (ref 3.5–5.2)
ALBUMIN/GLOB SERPL: 1.3 G/DL
ALP SERPL-CCNC: 65 U/L (ref 39–117)
ALT SERPL W P-5'-P-CCNC: 13 U/L (ref 1–33)
ANION GAP SERPL CALCULATED.3IONS-SCNC: 8 MMOL/L (ref 5–15)
APTT PPP: 29.9 SECONDS (ref 24.5–36)
AST SERPL-CCNC: 17 U/L (ref 1–32)
BASOPHILS # BLD AUTO: 0.06 10*3/MM3 (ref 0–0.2)
BASOPHILS NFR BLD AUTO: 0.5 % (ref 0–1.5)
BILIRUB SERPL-MCNC: 0.4 MG/DL (ref 0–1.2)
BUN SERPL-MCNC: 17 MG/DL (ref 8–23)
BUN/CREAT SERPL: 16.2 (ref 7–25)
CALCIUM SPEC-SCNC: 9.3 MG/DL (ref 8.6–10.5)
CHLORIDE SERPL-SCNC: 102 MMOL/L (ref 98–107)
CO2 SERPL-SCNC: 25 MMOL/L (ref 22–29)
CREAT SERPL-MCNC: 1.05 MG/DL (ref 0.57–1)
DEPRECATED RDW RBC AUTO: 50.4 FL (ref 37–54)
EGFRCR SERPLBLD CKD-EPI 2021: 50.9 ML/MIN/1.73
EOSINOPHIL # BLD AUTO: 0.04 10*3/MM3 (ref 0–0.4)
EOSINOPHIL NFR BLD AUTO: 0.3 % (ref 0.3–6.2)
ERYTHROCYTE [DISTWIDTH] IN BLOOD BY AUTOMATED COUNT: 16.5 % (ref 12.3–15.4)
GLOBULIN UR ELPH-MCNC: 2.9 GM/DL
GLUCOSE SERPL-MCNC: 145 MG/DL (ref 65–99)
HCT VFR BLD AUTO: 38.1 % (ref 34–46.6)
HGB BLD-MCNC: 11.7 G/DL (ref 12–15.9)
IMM GRANULOCYTES # BLD AUTO: 0.09 10*3/MM3 (ref 0–0.05)
IMM GRANULOCYTES NFR BLD AUTO: 0.7 % (ref 0–0.5)
INR PPP: 0.96 (ref 0.91–1.09)
LYMPHOCYTES # BLD AUTO: 0.87 10*3/MM3 (ref 0.7–3.1)
LYMPHOCYTES NFR BLD AUTO: 7.2 % (ref 19.6–45.3)
MCH RBC QN AUTO: 25.8 PG (ref 26.6–33)
MCHC RBC AUTO-ENTMCNC: 30.7 G/DL (ref 31.5–35.7)
MCV RBC AUTO: 83.9 FL (ref 79–97)
MONOCYTES # BLD AUTO: 0.83 10*3/MM3 (ref 0.1–0.9)
MONOCYTES NFR BLD AUTO: 6.9 % (ref 5–12)
NEUTROPHILS NFR BLD AUTO: 10.2 10*3/MM3 (ref 1.7–7)
NEUTROPHILS NFR BLD AUTO: 84.4 % (ref 42.7–76)
NRBC BLD AUTO-RTO: 0 /100 WBC (ref 0–0.2)
PLATELET # BLD AUTO: 266 10*3/MM3 (ref 140–450)
PMV BLD AUTO: 10 FL (ref 6–12)
POTASSIUM SERPL-SCNC: 4.3 MMOL/L (ref 3.5–5.2)
PROT SERPL-MCNC: 6.6 G/DL (ref 6–8.5)
PROTHROMBIN TIME: 12.9 SECONDS (ref 11.8–14.8)
RBC # BLD AUTO: 4.54 10*6/MM3 (ref 3.77–5.28)
SODIUM SERPL-SCNC: 135 MMOL/L (ref 136–145)
WBC NRBC COR # BLD: 12.09 10*3/MM3 (ref 3.4–10.8)

## 2023-11-04 PROCEDURE — 73552 X-RAY EXAM OF FEMUR 2/>: CPT

## 2023-11-04 PROCEDURE — 85025 COMPLETE CBC W/AUTO DIFF WBC: CPT | Performed by: EMERGENCY MEDICINE

## 2023-11-04 PROCEDURE — 73502 X-RAY EXAM HIP UNI 2-3 VIEWS: CPT

## 2023-11-04 PROCEDURE — 25010000002 ONDANSETRON PER 1 MG: Performed by: EMERGENCY MEDICINE

## 2023-11-04 PROCEDURE — 71045 X-RAY EXAM CHEST 1 VIEW: CPT

## 2023-11-04 PROCEDURE — 85610 PROTHROMBIN TIME: CPT | Performed by: EMERGENCY MEDICINE

## 2023-11-04 PROCEDURE — 85730 THROMBOPLASTIN TIME PARTIAL: CPT | Performed by: EMERGENCY MEDICINE

## 2023-11-04 PROCEDURE — 99285 EMERGENCY DEPT VISIT HI MDM: CPT

## 2023-11-04 PROCEDURE — 80053 COMPREHEN METABOLIC PANEL: CPT | Performed by: EMERGENCY MEDICINE

## 2023-11-04 PROCEDURE — 25010000002 MORPHINE PER 10 MG: Performed by: EMERGENCY MEDICINE

## 2023-11-04 RX ORDER — ONDANSETRON 2 MG/ML
4 INJECTION INTRAMUSCULAR; INTRAVENOUS ONCE
Status: COMPLETED | OUTPATIENT
Start: 2023-11-04 | End: 2023-11-04

## 2023-11-04 RX ADMIN — ONDANSETRON 4 MG: 2 INJECTION INTRAMUSCULAR; INTRAVENOUS at 22:01

## 2023-11-04 RX ADMIN — MORPHINE SULFATE 4 MG: 4 INJECTION, SOLUTION INTRAMUSCULAR; INTRAVENOUS at 22:01

## 2023-11-05 ENCOUNTER — APPOINTMENT (OUTPATIENT)
Dept: GENERAL RADIOLOGY | Facility: HOSPITAL | Age: 88
End: 2023-11-05
Payer: MEDICARE

## 2023-11-05 ENCOUNTER — ANESTHESIA (OUTPATIENT)
Dept: PERIOP | Facility: HOSPITAL | Age: 88
End: 2023-11-05
Payer: MEDICARE

## 2023-11-05 ENCOUNTER — ANESTHESIA EVENT (OUTPATIENT)
Dept: PERIOP | Facility: HOSPITAL | Age: 88
End: 2023-11-05
Payer: MEDICARE

## 2023-11-05 PROBLEM — S72.012A CLOSED SUBCAPITAL FRACTURE OF FEMUR, LEFT, INITIAL ENCOUNTER: Status: ACTIVE | Noted: 2023-11-04
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
## 2023-11-05 PROBLEM — M25.552 ACUTE HIP PAIN, LEFT: Status: ACTIVE | Noted: 2023-11-05

## 2023-11-05 LAB
ANION GAP SERPL CALCULATED.3IONS-SCNC: 11 MMOL/L (ref 5–15)
BACTERIA UR QL AUTO: ABNORMAL /HPF
BILIRUB UR QL STRIP: NEGATIVE
BUN SERPL-MCNC: 14 MG/DL (ref 8–23)
BUN/CREAT SERPL: 15.7 (ref 7–25)
CALCIUM SPEC-SCNC: 8.8 MG/DL (ref 8.6–10.5)
CHLORIDE SERPL-SCNC: 101 MMOL/L (ref 98–107)
CLARITY UR: ABNORMAL
CO2 SERPL-SCNC: 21 MMOL/L (ref 22–29)
COD CRY URNS QL: ABNORMAL /HPF
COLOR UR: YELLOW
CREAT SERPL-MCNC: 0.89 MG/DL (ref 0.57–1)
EGFRCR SERPLBLD CKD-EPI 2021: 62.1 ML/MIN/1.73
GLUCOSE BLDC GLUCOMTR-MCNC: 151 MG/DL (ref 70–130)
GLUCOSE SERPL-MCNC: 105 MG/DL (ref 65–99)
GLUCOSE UR STRIP-MCNC: NEGATIVE MG/DL
HGB UR QL STRIP.AUTO: NEGATIVE
HYALINE CASTS UR QL AUTO: ABNORMAL /LPF
KETONES UR QL STRIP: ABNORMAL
LEUKOCYTE ESTERASE UR QL STRIP.AUTO: ABNORMAL
NITRITE UR QL STRIP: POSITIVE
PH UR STRIP.AUTO: 5.5 [PH] (ref 5–8)
POTASSIUM SERPL-SCNC: 4.7 MMOL/L (ref 3.5–5.2)
PROT UR QL STRIP: NEGATIVE
RBC # UR STRIP: ABNORMAL /HPF
REF LAB TEST METHOD: ABNORMAL
SODIUM SERPL-SCNC: 133 MMOL/L (ref 136–145)
SP GR UR STRIP: 1.02 (ref 1–1.03)
SQUAMOUS #/AREA URNS HPF: ABNORMAL /HPF
TSH SERPL DL<=0.05 MIU/L-ACNC: 1.68 UIU/ML (ref 0.27–4.2)
UROBILINOGEN UR QL STRIP: ABNORMAL
WBC # UR STRIP: ABNORMAL /HPF

## 2023-11-05 PROCEDURE — 25010000002 CEFAZOLIN PER 500 MG

## 2023-11-05 PROCEDURE — 87088 URINE BACTERIA CULTURE: CPT | Performed by: NURSE PRACTITIONER

## 2023-11-05 PROCEDURE — 82948 REAGENT STRIP/BLOOD GLUCOSE: CPT

## 2023-11-05 PROCEDURE — 0SRS0JZ REPLACEMENT OF LEFT HIP JOINT, FEMORAL SURFACE WITH SYNTHETIC SUBSTITUTE, OPEN APPROACH: ICD-10-PCS | Performed by: ORTHOPAEDIC SURGERY

## 2023-11-05 PROCEDURE — 25010000002 MORPHINE PER 10 MG: Performed by: INTERNAL MEDICINE

## 2023-11-05 PROCEDURE — 25010000002 ONDANSETRON PER 1 MG

## 2023-11-05 PROCEDURE — 25010000002 CEFAZOLIN PER 500 MG: Performed by: ORTHOPAEDIC SURGERY

## 2023-11-05 PROCEDURE — 73502 X-RAY EXAM HIP UNI 2-3 VIEWS: CPT

## 2023-11-05 PROCEDURE — L1830 KO IMMOB CANVAS LONG PRE OTS: HCPCS | Performed by: ORTHOPAEDIC SURGERY

## 2023-11-05 PROCEDURE — 25810000003 LACTATED RINGERS PER 1000 ML: Performed by: NURSE PRACTITIONER

## 2023-11-05 PROCEDURE — 25010000002 DEXAMETHASONE PER 1 MG

## 2023-11-05 PROCEDURE — 87186 SC STD MICRODIL/AGAR DIL: CPT | Performed by: NURSE PRACTITIONER

## 2023-11-05 PROCEDURE — 25010000002 NALOXONE PER 1 MG: Performed by: ORTHOPAEDIC SURGERY

## 2023-11-05 PROCEDURE — C1776 JOINT DEVICE (IMPLANTABLE): HCPCS | Performed by: ORTHOPAEDIC SURGERY

## 2023-11-05 PROCEDURE — 84443 ASSAY THYROID STIM HORMONE: CPT | Performed by: NURSE PRACTITIONER

## 2023-11-05 PROCEDURE — 25010000002 FENTANYL CITRATE (PF) 100 MCG/2ML SOLUTION

## 2023-11-05 PROCEDURE — 87086 URINE CULTURE/COLONY COUNT: CPT | Performed by: NURSE PRACTITIONER

## 2023-11-05 PROCEDURE — 25010000002 FENTANYL CITRATE (PF) 50 MCG/ML SOLUTION

## 2023-11-05 PROCEDURE — 80048 BASIC METABOLIC PNL TOTAL CA: CPT | Performed by: NURSE PRACTITIONER

## 2023-11-05 PROCEDURE — 25010000002 PROPOFOL 10 MG/ML EMULSION

## 2023-11-05 PROCEDURE — 25010000002 HYDROMORPHONE PER 4 MG

## 2023-11-05 PROCEDURE — 81001 URINALYSIS AUTO W/SCOPE: CPT | Performed by: NURSE PRACTITIONER

## 2023-11-05 PROCEDURE — 94799 UNLISTED PULMONARY SVC/PX: CPT

## 2023-11-05 PROCEDURE — 25810000003 LACTATED RINGERS PER 1000 ML: Performed by: ORTHOPAEDIC SURGERY

## 2023-11-05 DEVICE — SELF CENTERING BI-POLAR HEAD 28MM ID 45MM OD
Type: IMPLANTABLE DEVICE | Site: HIP | Status: FUNCTIONAL
Brand: SELF CENTERING

## 2023-11-05 DEVICE — CORAIL HIP SYSTEM CEMENTLESS FEMORAL STEM 12/14 AMT 135 DEGREES KA SIZE 13 HA COATED STANDARD COLLAR
Type: IMPLANTABLE DEVICE | Site: HIP | Status: FUNCTIONAL
Brand: CORAIL

## 2023-11-05 DEVICE — ARTICUL/EZE FEMORAL HEAD DIAMETER 28MM +1.5 12/14 TAPER
Type: IMPLANTABLE DEVICE | Site: HIP | Status: FUNCTIONAL
Brand: ARTICUL/EZE

## 2023-11-05 DEVICE — SUT FW #2 W/TPR NDL 1/2 CIR 38IN 97CM 26.5MM BLU: Type: IMPLANTABLE DEVICE | Site: HIP | Status: FUNCTIONAL

## 2023-11-05 DEVICE — CAP BIPOL HIP CORAIL ACTIS: Type: IMPLANTABLE DEVICE | Site: HIP | Status: FUNCTIONAL

## 2023-11-05 RX ORDER — BISACODYL 5 MG/1
5 TABLET, DELAYED RELEASE ORAL DAILY PRN
Status: DISCONTINUED | OUTPATIENT
Start: 2023-11-05 | End: 2023-11-07 | Stop reason: HOSPADM

## 2023-11-05 RX ORDER — MEMANTINE HYDROCHLORIDE 5 MG/1
10 TABLET ORAL 2 TIMES DAILY
Status: DISCONTINUED | OUTPATIENT
Start: 2023-11-05 | End: 2023-11-07 | Stop reason: HOSPADM

## 2023-11-05 RX ORDER — MAGNESIUM HYDROXIDE 1200 MG/15ML
LIQUID ORAL AS NEEDED
Status: DISCONTINUED | OUTPATIENT
Start: 2023-11-05 | End: 2023-11-05 | Stop reason: HOSPADM

## 2023-11-05 RX ORDER — LIDOCAINE HYDROCHLORIDE 20 MG/ML
INJECTION, SOLUTION EPIDURAL; INFILTRATION; INTRACAUDAL; PERINEURAL AS NEEDED
Status: DISCONTINUED | OUTPATIENT
Start: 2023-11-05 | End: 2023-11-05 | Stop reason: SURG

## 2023-11-05 RX ORDER — ONDANSETRON 2 MG/ML
4 INJECTION INTRAMUSCULAR; INTRAVENOUS
Status: DISCONTINUED | OUTPATIENT
Start: 2023-11-05 | End: 2023-11-05 | Stop reason: HOSPADM

## 2023-11-05 RX ORDER — METOPROLOL SUCCINATE 25 MG/1
12.5 TABLET, EXTENDED RELEASE ORAL DAILY
Status: DISCONTINUED | OUTPATIENT
Start: 2023-11-05 | End: 2023-11-07

## 2023-11-05 RX ORDER — MORPHINE SULFATE 2 MG/ML
2 INJECTION, SOLUTION INTRAMUSCULAR; INTRAVENOUS ONCE
Status: COMPLETED | OUTPATIENT
Start: 2023-11-05 | End: 2023-11-05

## 2023-11-05 RX ORDER — FENTANYL CITRATE 50 UG/ML
INJECTION, SOLUTION INTRAMUSCULAR; INTRAVENOUS AS NEEDED
Status: DISCONTINUED | OUTPATIENT
Start: 2023-11-05 | End: 2023-11-05 | Stop reason: SURG

## 2023-11-05 RX ORDER — ACETAMINOPHEN 650 MG/1
650 SUPPOSITORY RECTAL EVERY 4 HOURS PRN
Status: DISCONTINUED | OUTPATIENT
Start: 2023-11-05 | End: 2023-11-07 | Stop reason: HOSPADM

## 2023-11-05 RX ORDER — NEOSTIGMINE METHYLSULFATE 5 MG/5 ML
SYRINGE (ML) INTRAVENOUS AS NEEDED
Status: DISCONTINUED | OUTPATIENT
Start: 2023-11-05 | End: 2023-11-05 | Stop reason: SURG

## 2023-11-05 RX ORDER — PANTOPRAZOLE SODIUM 40 MG/1
40 TABLET, DELAYED RELEASE ORAL
Status: DISCONTINUED | OUTPATIENT
Start: 2023-11-05 | End: 2023-11-06

## 2023-11-05 RX ORDER — NALOXONE HCL 0.4 MG/ML
0.4 VIAL (ML) INJECTION
Status: DISCONTINUED | OUTPATIENT
Start: 2023-11-05 | End: 2023-11-07 | Stop reason: HOSPADM

## 2023-11-05 RX ORDER — SODIUM CHLORIDE 0.9 % (FLUSH) 0.9 %
10 SYRINGE (ML) INJECTION EVERY 12 HOURS SCHEDULED
Status: DISCONTINUED | OUTPATIENT
Start: 2023-11-05 | End: 2023-11-07 | Stop reason: HOSPADM

## 2023-11-05 RX ORDER — PROMETHAZINE HYDROCHLORIDE 25 MG/1
12.5 TABLET ORAL EVERY 6 HOURS PRN
Status: DISCONTINUED | OUTPATIENT
Start: 2023-11-05 | End: 2023-11-07 | Stop reason: HOSPADM

## 2023-11-05 RX ORDER — FENTANYL CITRATE 50 UG/ML
25 INJECTION, SOLUTION INTRAMUSCULAR; INTRAVENOUS
Status: DISCONTINUED | OUTPATIENT
Start: 2023-11-05 | End: 2023-11-05 | Stop reason: HOSPADM

## 2023-11-05 RX ORDER — BISACODYL 10 MG
10 SUPPOSITORY, RECTAL RECTAL DAILY PRN
Status: DISCONTINUED | OUTPATIENT
Start: 2023-11-05 | End: 2023-11-07 | Stop reason: HOSPADM

## 2023-11-05 RX ORDER — HYDROCODONE BITARTRATE AND ACETAMINOPHEN 5; 325 MG/1; MG/1
2 TABLET ORAL EVERY 4 HOURS PRN
Status: DISCONTINUED | OUTPATIENT
Start: 2023-11-05 | End: 2023-11-07

## 2023-11-05 RX ORDER — SODIUM CHLORIDE 0.9 % (FLUSH) 0.9 %
10 SYRINGE (ML) INJECTION AS NEEDED
Status: DISCONTINUED | OUTPATIENT
Start: 2023-11-05 | End: 2023-11-07 | Stop reason: HOSPADM

## 2023-11-05 RX ORDER — LABETALOL HYDROCHLORIDE 5 MG/ML
5 INJECTION, SOLUTION INTRAVENOUS
Status: DISCONTINUED | OUTPATIENT
Start: 2023-11-05 | End: 2023-11-05 | Stop reason: HOSPADM

## 2023-11-05 RX ORDER — ENOXAPARIN SODIUM 100 MG/ML
40 INJECTION SUBCUTANEOUS DAILY
Status: DISCONTINUED | OUTPATIENT
Start: 2023-11-06 | End: 2023-11-07

## 2023-11-05 RX ORDER — SODIUM CHLORIDE, SODIUM LACTATE, POTASSIUM CHLORIDE, CALCIUM CHLORIDE 600; 310; 30; 20 MG/100ML; MG/100ML; MG/100ML; MG/100ML
100 INJECTION, SOLUTION INTRAVENOUS CONTINUOUS
Status: DISCONTINUED | OUTPATIENT
Start: 2023-11-05 | End: 2023-11-07

## 2023-11-05 RX ORDER — AMOXICILLIN 250 MG
2 CAPSULE ORAL 2 TIMES DAILY
Status: DISCONTINUED | OUTPATIENT
Start: 2023-11-05 | End: 2023-11-07 | Stop reason: HOSPADM

## 2023-11-05 RX ORDER — MIRTAZAPINE 15 MG/1
15 TABLET, FILM COATED ORAL NIGHTLY
Status: DISCONTINUED | OUTPATIENT
Start: 2023-11-05 | End: 2023-11-06

## 2023-11-05 RX ORDER — HYDROCODONE BITARTRATE AND ACETAMINOPHEN 5; 325 MG/1; MG/1
1 TABLET ORAL EVERY 4 HOURS PRN
Status: DISCONTINUED | OUTPATIENT
Start: 2023-11-05 | End: 2023-11-05

## 2023-11-05 RX ORDER — ONDANSETRON 2 MG/ML
4 INJECTION INTRAMUSCULAR; INTRAVENOUS EVERY 6 HOURS PRN
Status: DISCONTINUED | OUTPATIENT
Start: 2023-11-05 | End: 2023-11-07 | Stop reason: HOSPADM

## 2023-11-05 RX ORDER — LEVOTHYROXINE SODIUM 0.1 MG/1
100 TABLET ORAL
Status: DISCONTINUED | OUTPATIENT
Start: 2023-11-05 | End: 2023-11-07 | Stop reason: HOSPADM

## 2023-11-05 RX ORDER — ONDANSETRON 2 MG/ML
INJECTION INTRAMUSCULAR; INTRAVENOUS AS NEEDED
Status: DISCONTINUED | OUTPATIENT
Start: 2023-11-05 | End: 2023-11-05 | Stop reason: SURG

## 2023-11-05 RX ORDER — SODIUM CHLORIDE 0.9 % (FLUSH) 0.9 %
1-10 SYRINGE (ML) INJECTION AS NEEDED
Status: DISCONTINUED | OUTPATIENT
Start: 2023-11-05 | End: 2023-11-07 | Stop reason: HOSPADM

## 2023-11-05 RX ORDER — IBUPROFEN 600 MG/1
600 TABLET ORAL ONCE AS NEEDED
Status: DISCONTINUED | OUTPATIENT
Start: 2023-11-05 | End: 2023-11-05 | Stop reason: HOSPADM

## 2023-11-05 RX ORDER — FLUMAZENIL 0.1 MG/ML
0.2 INJECTION INTRAVENOUS AS NEEDED
Status: DISCONTINUED | OUTPATIENT
Start: 2023-11-05 | End: 2023-11-05 | Stop reason: HOSPADM

## 2023-11-05 RX ORDER — SODIUM CHLORIDE, SODIUM LACTATE, POTASSIUM CHLORIDE, CALCIUM CHLORIDE 600; 310; 30; 20 MG/100ML; MG/100ML; MG/100ML; MG/100ML
50 INJECTION, SOLUTION INTRAVENOUS CONTINUOUS
Status: DISCONTINUED | OUTPATIENT
Start: 2023-11-05 | End: 2023-11-07

## 2023-11-05 RX ORDER — HYDROMORPHONE HYDROCHLORIDE 1 MG/ML
0.5 INJECTION, SOLUTION INTRAMUSCULAR; INTRAVENOUS; SUBCUTANEOUS
Status: DISCONTINUED | OUTPATIENT
Start: 2023-11-05 | End: 2023-11-05 | Stop reason: HOSPADM

## 2023-11-05 RX ORDER — HYDROMORPHONE HYDROCHLORIDE 1 MG/ML
0.5 INJECTION, SOLUTION INTRAMUSCULAR; INTRAVENOUS; SUBCUTANEOUS
Status: DISCONTINUED | OUTPATIENT
Start: 2023-11-05 | End: 2023-11-07

## 2023-11-05 RX ORDER — ACETAMINOPHEN 325 MG/1
650 TABLET ORAL EVERY 4 HOURS PRN
Status: DISCONTINUED | OUTPATIENT
Start: 2023-11-05 | End: 2023-11-07 | Stop reason: HOSPADM

## 2023-11-05 RX ORDER — OXYCODONE AND ACETAMINOPHEN 10; 325 MG/1; MG/1
1 TABLET ORAL ONCE AS NEEDED
Status: DISCONTINUED | OUTPATIENT
Start: 2023-11-05 | End: 2023-11-05 | Stop reason: HOSPADM

## 2023-11-05 RX ORDER — SODIUM CHLORIDE 9 MG/ML
40 INJECTION, SOLUTION INTRAVENOUS AS NEEDED
Status: DISCONTINUED | OUTPATIENT
Start: 2023-11-05 | End: 2023-11-07 | Stop reason: HOSPADM

## 2023-11-05 RX ORDER — NALOXONE HCL 0.4 MG/ML
0.04 VIAL (ML) INJECTION AS NEEDED
Status: DISCONTINUED | OUTPATIENT
Start: 2023-11-05 | End: 2023-11-05 | Stop reason: HOSPADM

## 2023-11-05 RX ORDER — CEFAZOLIN SODIUM 1 G/3ML
INJECTION, POWDER, FOR SOLUTION INTRAMUSCULAR; INTRAVENOUS AS NEEDED
Status: DISCONTINUED | OUTPATIENT
Start: 2023-11-05 | End: 2023-11-05 | Stop reason: SURG

## 2023-11-05 RX ORDER — DEXAMETHASONE SODIUM PHOSPHATE 4 MG/ML
INJECTION, SOLUTION INTRA-ARTICULAR; INTRALESIONAL; INTRAMUSCULAR; INTRAVENOUS; SOFT TISSUE AS NEEDED
Status: DISCONTINUED | OUTPATIENT
Start: 2023-11-05 | End: 2023-11-05 | Stop reason: SURG

## 2023-11-05 RX ORDER — ACETAMINOPHEN 325 MG/1
650 TABLET ORAL EVERY 6 HOURS PRN
Status: DISCONTINUED | OUTPATIENT
Start: 2023-11-05 | End: 2023-11-05

## 2023-11-05 RX ORDER — HYDROCODONE BITARTRATE AND ACETAMINOPHEN 5; 325 MG/1; MG/1
1 TABLET ORAL EVERY 4 HOURS PRN
Status: DISCONTINUED | OUTPATIENT
Start: 2023-11-05 | End: 2023-11-07 | Stop reason: HOSPADM

## 2023-11-05 RX ORDER — PROMETHAZINE HYDROCHLORIDE 12.5 MG/1
12.5 SUPPOSITORY RECTAL EVERY 6 HOURS PRN
Status: DISCONTINUED | OUTPATIENT
Start: 2023-11-05 | End: 2023-11-07 | Stop reason: HOSPADM

## 2023-11-05 RX ORDER — BUPIVACAINE HCL/0.9 % NACL/PF 0.125 %
PLASTIC BAG, INJECTION (ML) EPIDURAL AS NEEDED
Status: DISCONTINUED | OUTPATIENT
Start: 2023-11-05 | End: 2023-11-05 | Stop reason: SURG

## 2023-11-05 RX ORDER — POLYETHYLENE GLYCOL 3350 17 G/17G
17 POWDER, FOR SOLUTION ORAL DAILY PRN
Status: DISCONTINUED | OUTPATIENT
Start: 2023-11-05 | End: 2023-11-07 | Stop reason: HOSPADM

## 2023-11-05 RX ORDER — LABETALOL HYDROCHLORIDE 5 MG/ML
10 INJECTION, SOLUTION INTRAVENOUS EVERY 6 HOURS PRN
Status: DISCONTINUED | OUTPATIENT
Start: 2023-11-05 | End: 2023-11-07 | Stop reason: HOSPADM

## 2023-11-05 RX ORDER — DIAZEPAM 5 MG/1
5 TABLET ORAL EVERY 6 HOURS PRN
Status: DISCONTINUED | OUTPATIENT
Start: 2023-11-05 | End: 2023-11-07

## 2023-11-05 RX ORDER — ATORVASTATIN CALCIUM 40 MG/1
80 TABLET, FILM COATED ORAL NIGHTLY
Status: DISCONTINUED | OUTPATIENT
Start: 2023-11-05 | End: 2023-11-06

## 2023-11-05 RX ORDER — DROPERIDOL 2.5 MG/ML
0.62 INJECTION, SOLUTION INTRAMUSCULAR; INTRAVENOUS ONCE AS NEEDED
Status: DISCONTINUED | OUTPATIENT
Start: 2023-11-05 | End: 2023-11-05 | Stop reason: HOSPADM

## 2023-11-05 RX ORDER — PROPOFOL 10 MG/ML
VIAL (ML) INTRAVENOUS AS NEEDED
Status: DISCONTINUED | OUTPATIENT
Start: 2023-11-05 | End: 2023-11-05 | Stop reason: SURG

## 2023-11-05 RX ORDER — NALOXONE HCL 0.4 MG/ML
0.1 VIAL (ML) INJECTION
Status: DISCONTINUED | OUTPATIENT
Start: 2023-11-05 | End: 2023-11-07 | Stop reason: HOSPADM

## 2023-11-05 RX ORDER — DOCUSATE SODIUM 100 MG/1
100 CAPSULE, LIQUID FILLED ORAL 2 TIMES DAILY PRN
Status: DISCONTINUED | OUTPATIENT
Start: 2023-11-05 | End: 2023-11-07 | Stop reason: HOSPADM

## 2023-11-05 RX ORDER — ROCURONIUM BROMIDE 10 MG/ML
INJECTION, SOLUTION INTRAVENOUS AS NEEDED
Status: DISCONTINUED | OUTPATIENT
Start: 2023-11-05 | End: 2023-11-05 | Stop reason: SURG

## 2023-11-05 RX ADMIN — HYDROMORPHONE HYDROCHLORIDE 0.5 MG: 1 INJECTION, SOLUTION INTRAMUSCULAR; INTRAVENOUS; SUBCUTANEOUS at 11:18

## 2023-11-05 RX ADMIN — MORPHINE SULFATE 2 MG: 2 INJECTION, SOLUTION INTRAMUSCULAR; INTRAVENOUS at 05:38

## 2023-11-05 RX ADMIN — HYDROMORPHONE HYDROCHLORIDE 0.5 MG: 1 INJECTION, SOLUTION INTRAMUSCULAR; INTRAVENOUS; SUBCUTANEOUS at 11:08

## 2023-11-05 RX ADMIN — DOCUSATE SODIUM 50 MG AND SENNOSIDES 8.6 MG 2 TABLET: 8.6; 5 TABLET, FILM COATED ORAL at 20:26

## 2023-11-05 RX ADMIN — SODIUM CHLORIDE, POTASSIUM CHLORIDE, SODIUM LACTATE AND CALCIUM CHLORIDE 100 ML/HR: 600; 310; 30; 20 INJECTION, SOLUTION INTRAVENOUS at 18:38

## 2023-11-05 RX ADMIN — CEFAZOLIN 2 G: 2 INJECTION, POWDER, FOR SOLUTION INTRAMUSCULAR; INTRAVENOUS at 15:51

## 2023-11-05 RX ADMIN — DEXAMETHASONE SODIUM PHOSPHATE 4 MG: 4 INJECTION, SOLUTION INTRA-ARTICULAR; INTRALESIONAL; INTRAMUSCULAR; INTRAVENOUS; SOFT TISSUE at 09:52

## 2023-11-05 RX ADMIN — ONDANSETRON 4 MG: 2 INJECTION INTRAMUSCULAR; INTRAVENOUS at 10:27

## 2023-11-05 RX ADMIN — Medication 4 MG: at 10:29

## 2023-11-05 RX ADMIN — MEMANTINE HYDROCHLORIDE 10 MG: 5 TABLET, FILM COATED ORAL at 20:24

## 2023-11-05 RX ADMIN — LIDOCAINE HYDROCHLORIDE 60 MG: 20 INJECTION, SOLUTION EPIDURAL; INFILTRATION; INTRACAUDAL; PERINEURAL at 09:47

## 2023-11-05 RX ADMIN — ATORVASTATIN CALCIUM 80 MG: 40 TABLET, FILM COATED ORAL at 20:24

## 2023-11-05 RX ADMIN — GLYCOPYRROLATE 0.4 MG: 0.2 INJECTION INTRAMUSCULAR; INTRAVENOUS at 10:28

## 2023-11-05 RX ADMIN — PROPOFOL 100 MG: 10 INJECTION, EMULSION INTRAVENOUS at 09:47

## 2023-11-05 RX ADMIN — Medication 10 ML: at 20:25

## 2023-11-05 RX ADMIN — Medication 10 ML: at 08:46

## 2023-11-05 RX ADMIN — CEFAZOLIN 1 G: 1 INJECTION, POWDER, FOR SOLUTION INTRAMUSCULAR; INTRAVENOUS; PARENTERAL at 09:52

## 2023-11-05 RX ADMIN — HYDROMORPHONE HYDROCHLORIDE 0.5 MG: 1 INJECTION, SOLUTION INTRAMUSCULAR; INTRAVENOUS; SUBCUTANEOUS at 11:28

## 2023-11-05 RX ADMIN — SODIUM CHLORIDE, POTASSIUM CHLORIDE, SODIUM LACTATE AND CALCIUM CHLORIDE 50 ML/HR: 600; 310; 30; 20 INJECTION, SOLUTION INTRAVENOUS at 08:46

## 2023-11-05 RX ADMIN — FENTANYL CITRATE 100 MCG: 50 INJECTION, SOLUTION INTRAMUSCULAR; INTRAVENOUS at 09:45

## 2023-11-05 RX ADMIN — NALOXONE HYDROCHLORIDE 0.4 MG: 0.4 INJECTION, SOLUTION INTRAMUSCULAR; INTRAVENOUS; SUBCUTANEOUS at 14:56

## 2023-11-05 RX ADMIN — Medication 100 MCG: at 10:10

## 2023-11-05 RX ADMIN — MIRTAZAPINE 15 MG: 15 TABLET, FILM COATED ORAL at 20:24

## 2023-11-05 RX ADMIN — FENTANYL CITRATE 25 MCG: 50 INJECTION, SOLUTION INTRAMUSCULAR; INTRAVENOUS at 11:14

## 2023-11-05 RX ADMIN — Medication 10 ML: at 15:00

## 2023-11-05 RX ADMIN — ROCURONIUM BROMIDE 30 MG: 10 INJECTION, SOLUTION INTRAVENOUS at 09:47

## 2023-11-05 RX ADMIN — Medication 100 MCG: at 09:51

## 2023-11-05 RX ADMIN — Medication 10 ML: at 20:24

## 2023-11-05 NOTE — ED NOTES
"Nursing report ED to floor  Dina Michaels  89 y.o.  female    HPI:   Chief Complaint   Patient presents with    Fall    Hip Pain       Admitting doctor:   Jesus Leung MD    Consulting provider(s):  Consults       No orders found from 10/6/2023 to 11/5/2023.             Admitting diagnosis:   The primary encounter diagnosis was Closed subcapital fracture of femur, left, initial encounter. A diagnosis of Closed fracture of left hip, initial encounter was also pertinent to this visit.    Code status:   Current Code Status       Date Active Code Status Order ID Comments User Context       Prior            Allergies:   Amoxicillin, Clarithromycin, Nisoldipine er, and Sular [nisoldipine]    Intake and Output  No intake or output data in the 24 hours ending 11/04/23 2244    Weight:       11/04/23 2023   Weight: 68 kg (150 lb)       Most recent vitals:   Vitals:    11/04/23 2023 11/04/23 2208 11/04/23 2239   BP: 149/95  145/97   BP Location:   Left arm   Patient Position:   Sitting   Pulse: 83 74 82   Resp: 18  20   Temp: 98.4 °F (36.9 °C)  98.5 °F (36.9 °C)   TempSrc:   Oral   SpO2: 100% 100% 100%   Weight: 68 kg (150 lb)     Height: 162.6 cm (64\")       Oxygen Therapy: .    Active LDAs/IV Access:   Lines, Drains & Airways       Active LDAs       Name Placement date Placement time Site Days    Peripheral IV 11/04/23 2157 Left Antecubital 11/04/23 2157  Antecubital  less than 1                    Labs (abnormal labs have a star):   Labs Reviewed   COMPREHENSIVE METABOLIC PANEL - Abnormal; Notable for the following components:       Result Value    Glucose 145 (*)     Creatinine 1.05 (*)     Sodium 135 (*)     eGFR 50.9 (*)     All other components within normal limits    Narrative:     GFR Normal >60  Chronic Kidney Disease <60  Kidney Failure <15    The GFR formula is only valid for adults with stable renal function between ages 18 and 70.   CBC WITH AUTO DIFFERENTIAL - Abnormal; Notable for the following " components:    WBC 12.09 (*)     Hemoglobin 11.7 (*)     MCH 25.8 (*)     MCHC 30.7 (*)     RDW 16.5 (*)     Neutrophil % 84.4 (*)     Lymphocyte % 7.2 (*)     Immature Grans % 0.7 (*)     Neutrophils, Absolute 10.20 (*)     Immature Grans, Absolute 0.09 (*)     All other components within normal limits   PROTIME-INR - Normal   APTT - Normal   CBC AND DIFFERENTIAL    Narrative:     The following orders were created for panel order CBC & Differential.  Procedure                               Abnormality         Status                     ---------                               -----------         ------                     CBC Auto Differential[[id number]]        Abnormal            Final result                 Please view results for these tests on the individual orders.       Meds given in ED:   Medications   morphine injection 4 mg (4 mg Intravenous Given 11/4/23 2201)   ondansetron (ZOFRAN) injection 4 mg (4 mg Intravenous Given 11/4/23 2201)     No current facility-administered medications for this encounter.       NIH Stroke Scale:       Isolation/Infection(s):  No active isolations   MDR Pseudomonas     COVID Testing  Collected .  Resulted .    Nursing report ED to floor:  Mental status: .  Ambulatory status: .  Precautions: .    ED nurse phone extentsion- ..

## 2023-11-05 NOTE — BRIEF OP NOTE
HIP HEMIARTHROPLASTY  Progress Note    Dina Michaels  11/5/2023    Pre-op Diagnosis:   Closed subcapital fracture of femur, left, initial encounter [S72.012A]       Post-Op Diagnosis Codes:     * Closed subcapital fracture of femur, left, initial encounter [S72.012A]    Procedure/CPT® Codes:  PA OPTX FEM FX PROX END NCK INT FIXJ/PROSTC RPLCMT [99980]      Procedure(s):  HIP HEMIARTHROPLASTY              Surgeon(s):  Denzel De La Vega MD    Anesthesia: General    Staff:   Circulator: Laverne Dykes RN  Scrub Person: Miller Dow Charles J  Vendor Representative: Luis Miguel Gale         Estimated Blood Loss: <500ml    Urine Voided: 300 mL    Specimens:                None          Drains:   Urethral Catheter Double-lumen;Silicone 16 Fr. (Active)       [REMOVED] External Urinary Catheter (Removed)   Daily Indications Strict bedrest 11/05/23 0854   Site Assessment Clean;Skin intact 11/05/23 0854   Collection Container Wall suction 11/05/23 0854   Catheter care complete Yes 11/05/23 0854       Findings: see op note        Complications: none          Denzel De La Vega MD     Date: 11/5/2023  Time: 10:53 CST

## 2023-11-05 NOTE — ED PROVIDER NOTES
Subjective   History of Present Illness  Pt presents to the  with report of fall from stairs and L hip pain.  Hx obtained primarily from spouse d/t hx of dementia.  Pt reports pain in L hip.  Spouse states that they have five stairs leading to garage and she had fallen - was found at bottom of stairs sitting up.  No known LOC.  No vomiting/no complaints of neck/back pain.  No reported CP/SOB.  No other reported injuries or recent illnesses.         Review of Systems   Constitutional:  Negative for fever.   Respiratory:  Negative for cough.    Cardiovascular:  Negative for chest pain.   Gastrointestinal:  Negative for vomiting.   Musculoskeletal:  Negative for neck pain.        + L hip pain   Neurological:  Negative for syncope.   All other systems reviewed and are negative.      Past Medical History:   Diagnosis Date    Cancer        Allergies   Allergen Reactions    Amoxicillin Hallucinations    Clarithromycin Hallucinations    Nisoldipine Er Other (See Comments)     Feelings of passing out, tired    Sular [Nisoldipine] Unknown - Low Severity       Past Surgical History:   Procedure Laterality Date    COLONOSCOPY N/A 4/14/2023    Procedure: COLONOSCOPY WITH ANESTHESIA;  Surgeon: Bubba Asher MD;  Location: [facility] ENDOSCOPY;  Service: Gastroenterology;  Laterality: N/A;  pre gi bleed  post diverticulosis  Dr. Hebert    ENDOSCOPY N/A 4/14/2023    Procedure: ESOPHAGOGASTRODUODENOSCOPY WITH ANESTHESIA;  Surgeon: Bubba Asher MD;  Location: [facility] ENDOSCOPY;  Service: Gastroenterology;  Laterality: N/A;  pre screen  post gastric bx  Dr Hebert       No family history on file.    Social History     Socioeconomic History    Marital status:    Tobacco Use    Smoking status: Never    Smokeless tobacco: Never   Vaping Use    Vaping Use: Never used   Substance and Sexual Activity    Alcohol use: Yes    Drug use: Never           Objective   Physical Exam  Vitals and nursing note reviewed.   Constitutional:        General: She is not in acute distress.     Appearance: Normal appearance.   HENT:      Head: Normocephalic and atraumatic.      Nose: Nose normal.      Mouth/Throat:      Mouth: Mucous membranes are moist.   Eyes:      Extraocular Movements: Extraocular movements intact.      Conjunctiva/sclera: Conjunctivae normal.      Pupils: Pupils are equal, round, and reactive to light.   Cardiovascular:      Rate and Rhythm: Normal rate and regular rhythm.      Pulses: Normal pulses.      Heart sounds: Normal heart sounds.   Pulmonary:      Effort: Pulmonary effort is normal.      Breath sounds: Normal breath sounds.   Abdominal:      General: Abdomen is flat.      Palpations: Abdomen is soft.   Musculoskeletal:      Cervical back: Normal range of motion and neck supple.      Comments: L hip with + TTP diffusely - limited ROM.  Mild shortening.  No rotation.  NVT intact in LE    Skin:     General: Skin is warm and dry.      Capillary Refill: Capillary refill takes less than 2 seconds.   Neurological:      Mental Status: She is alert.         Procedures           ED Course      XR Chest 1 View   Final Result   1. Mild increasing prominence of the interstitial markings diffusely   raising the question of mild edema/volume overload.           This report was signed and finalized on 11/4/2023 9:44 PM CDT by Dr. Afia Renner MD.          XR Femur 2 View Left   Final Result   1. Acute impacted left subcapital femoral neck fracture.       This report was signed and finalized on 11/4/2023 9:45 PM CDT by Dr. Afia Renner MD.          XR Hip With or Without Pelvis 2 - 3 View Left    (Results Pending)                                            Medical Decision Making  Pt stable in EC - No hx s/o ICH/spinal injury.  + L femoral neck fx.  Labs/CXR obtained for further medical clearance.  D/w Dr. De La Vega - recommends admit to hospitalist for this.  D/w Dr. Leung -will admit for further mgmt.     Amount and/or Complexity of Data  Reviewed  Labs: ordered.  Radiology: ordered.    Risk  Prescription drug management.        Final diagnoses:   Closed fracture of left hip, initial encounter       ED Disposition  ED Disposition       ED Disposition   Decision to Admit    Condition   --    Comment   --               No follow-up provider specified.       Medication List      No changes were made to your prescriptions during this visit.            Artie Marquez, DO  11/04/23 2117       Artie Marquez, DO  11/04/23 2214

## 2023-11-05 NOTE — PLAN OF CARE
Goal Outcome Evaluation:     Patient had left hip short nailing today- ace wrap to LLE and immobilizer in place. Patient did require dose of narcan at bedside to awaken- where she was able to vocalize her name and follow commands. At this time no signs and symptoms of pain. Carlson in place. IVF going- spouse at bedside. NC with 3L-oxygen. Safety maintained.

## 2023-11-05 NOTE — ANESTHESIA POSTPROCEDURE EVALUATION
"Patient: Dina Michaels    Procedure Summary       Date: 11/05/23 Room / Location:  [facility] OR  /  [facility]    Anesthesia Start: 0945 Anesthesia Stop: 1055    Procedure: HIP HEMIARTHROPLASTY (Left: Hip) Diagnosis:       Closed subcapital fracture of femur, left, initial encounter      (Closed subcapital fracture of femur, left, initial encounter [S72.012A])    Surgeons: Denzel De La Vega MD Provider: Adriana Chowdhury CRNA    Anesthesia Type: general ASA Status: 3 - Emergent            Anesthesia Type: general    Vitals  Vitals Value Taken Time   /92 11/05/23 1215   Temp 97.1 °F (36.2 °C) 11/05/23 1215   Pulse 86 11/05/23 1215   Resp 16 11/05/23 1215   SpO2 94 % 11/05/23 1215           Post Anesthesia Care and Evaluation    Patient location during evaluation: PACU  Patient participation: complete - patient participated  Level of consciousness: awake and alert  Pain management: adequate    Airway patency: patent  Anesthetic complications: No anesthetic complications    Cardiovascular status: acceptable  Respiratory status: acceptable  Hydration status: acceptable    Comments: Blood pressure 170/92, pulse 86, temperature 97.1 °F (36.2 °C), temperature source Axillary, resp. rate 16, height 162.6 cm (64\"), weight 68 kg (150 lb), SpO2 94%.    Pt discharged from PACU based on josé miguel score >8    "

## 2023-11-05 NOTE — ANESTHESIA PROCEDURE NOTES
Airway  Date/Time: 11/5/2023 9:49 AM  Airway not difficult    General Information and Staff    Patient location during procedure: OR  CRNA/CAA: Adriana Chowdhury CRNA    Indications and Patient Condition  Indications for airway management: airway protection    Preoxygenated: yes  Mask difficulty assessment: 1 - vent by mask    Final Airway Details  Final airway type: endotracheal airway      Successful airway: ETT  Cuffed: yes   Successful intubation technique: video laryngoscopy  Facilitating devices/methods: intubating stylet  Endotracheal tube insertion site: oral  Blade: Knapp  Blade size: 3  ETT size (mm): 7.0  Cormack-Lehane Classification: grade I - full view of glottis  Placement verified by: chest auscultation and capnometry   Cuff volume (mL): 21  Measured from: teeth  ETT/EBT  to teeth (cm): 8  Number of attempts at approach: 1  Assessment: lips, teeth, and gum same as pre-op and atraumatic intubation

## 2023-11-05 NOTE — ANESTHESIA PREPROCEDURE EVALUATION
Anesthesia Evaluation     Patient summary reviewed and Nursing notes reviewed   no history of anesthetic complications:   NPO Solid Status: > 8 hours  NPO Liquid Status: > 6 hours           Airway   Mallampati: II  TM distance: >3 FB  Neck ROM: full  No difficulty expected  Dental    (+) partials    Pulmonary  Pulmonary embolism: 2020 .  Cardiovascular   Exercise tolerance: poor (<4 METS)    (+) hypertension  (-) pacemaker, past MI, cardiac stents, CABG      Neuro/Psych  (+) psychiatric history, dementia  (-) seizures, CVA  GI/Hepatic/Renal/Endo    (+) thyroid problem     Musculoskeletal     Abdominal    Substance History      OB/GYN          Other      history of cancer                  Anesthesia Plan    ASA 3 - emergent     general     (Severe dementia, history and consent obtained from  at bedside )  intravenous induction     Anesthetic plan, risks, benefits, and alternatives have been provided, discussed and informed consent has been obtained with: spouse/significant other.      CODE STATUS:    Level Of Support Discussed With: Health Care Surrogate  Code Status (Patient has no pulse and is not breathing): CPR (Attempt to Resuscitate)  Medical Interventions (Patient has pulse or is breathing): Full Support

## 2023-11-05 NOTE — H&P
[facility]  HISTORY AND PHYSICAL    Date of Admission: 11/4/2023  Primary Care Physician: Benedicto Hebert MD    Subjective   Primary Historian: Information obtained from  Nik present at bedside as patient not deemed a reliable historian.    Chief Complaint: Left hip pain    History of Present Illness  Ms. Michaels is an 89-year-old  female with a past medical history significant for vascular dementia without behavioral disturbance, hypothyroidism, primary hypertension and GERD.  Her primary care provider is Dr. Hebert.  Information obtained from  present at bedside.   reports they have 5 steps in their entryway and patient was walking down the steps to lock the door.  He found her sitting on the floor.  She had an unwitnessed fall but  believes she was close to the bottom step because she was sitting when he found her.  Patient was unable to stand or ambulate and reported immediate left hip pain.  There was no loss of consciousness noted per .  No other injuries noted or reported.  No complaints of chest pain, palpitations or shortness of breath.  X-ray left femur acute impacted left subcapital femoral neck fracture.  Chest x-ray increased prominence of interstitial markings diffusely raise questions of mild edema or volume overload.  Sodium 135, potassium 4.3, creatinine 1.05, WBC 12.09.  Emergency room physician contacted Dr. De La Vega orthopedics and plans for left hip hemoarthroplasty today.    Patient sitting up in bed examined in room 346 with  at bedside.  She does report right hip tenderness to palpation.  Otherwise no complaints.  Patient identifies her  and tells me her name.  She does not recall events of fall.  No oxygen use.    Review of Systems   Constitutional:  Positive for activity change (After fall). Negative for chills, fatigue and fever.   HENT:  Negative for congestion and trouble  swallowing.         Hard of hearing   Eyes:  Negative for photophobia and visual disturbance.   Respiratory:  Negative for cough, shortness of breath and wheezing.    Cardiovascular:  Negative for chest pain, palpitations and leg swelling.   Gastrointestinal:  Negative for constipation, diarrhea, nausea and vomiting.   Endocrine: Negative for cold intolerance, heat intolerance and polyuria.   Genitourinary:  Negative for dysuria and frequency.   Musculoskeletal:  Positive for gait problem (After fall).   Skin:  Negative for color change, pallor and wound.   Allergic/Immunologic: Negative for immunocompromised state.   Neurological:  Negative for weakness.   Hematological:  Negative for adenopathy. Does not bruise/bleed easily.   Psychiatric/Behavioral:  Positive for confusion (Baseline dementia). Negative for agitation and behavioral problems.       Information obtained from , Nik present at bedside as patient not reliable historian.  Otherwise complete ROS reviewed and negative except as mentioned in the HPI.    Past Medical History:   Past Medical History:   Diagnosis Date    Cancer     Dementia      Past Surgical History:  Past Surgical History:   Procedure Laterality Date    COLONOSCOPY N/A 4/14/2023    Procedure: COLONOSCOPY WITH ANESTHESIA;  Surgeon: Bubba Asher MD;  Location: [facility] ENDOSCOPY;  Service: Gastroenterology;  Laterality: N/A;  pre gi bleed  post diverticulosis  Dr. Hebert    ENDOSCOPY N/A 4/14/2023    Procedure: ESOPHAGOGASTRODUODENOSCOPY WITH ANESTHESIA;  Surgeon: Bubba Asher MD;  Location: [facility] ENDOSCOPY;  Service: Gastroenterology;  Laterality: N/A;  pre screen  post gastric bx  Dr Hebert     Social History:  reports that she has never smoked. She has never used smokeless tobacco. She reports current alcohol use. She reports that she does not use drugs.    Family History: family history is not on file.      Allergies:  Allergies   Allergen Reactions    Amoxicillin  "Hallucinations    Clarithromycin Hallucinations    Nisoldipine Er Other (See Comments)     Feelings of passing out, tired    Sular [Nisoldipine] Unknown - Low Severity       Medications:  Prior to Admission medications    Medication Sig Start Date End Date Taking? Authorizing Provider   alendronate (FOSAMAX) 70 MG tablet Take 1 tablet by mouth Every 7 (Seven) Days.    Jv Chaney MD   aspirin 81 MG EC tablet Take 1 tablet by mouth Daily. 5/15/23   Jeff Landis DO   atorvastatin (LIPITOR) 80 MG tablet Take 1 tablet by mouth Every Night. 5/15/23   Jeff Landis,    levothyroxine (SYNTHROID, LEVOTHROID) 100 MCG tablet Take 1 tablet by mouth Daily.    Jv Chaney MD   memantine (NAMENDA) 10 MG tablet Take 1 tablet by mouth 2 (Two) Times a Day.    Jv Chaney MD   metoprolol succinate XL (TOPROL-XL) 25 MG 24 hr tablet Take 12.5 mg by mouth Daily.    Jv Chaney MD   mirtazapine (REMERON) 15 MG tablet Take 1 tablet by mouth Every Night.    Jv Chaney MD   pantoprazole (PROTONIX) 40 MG EC tablet Take 1 tablet by mouth 2 (Two) Times a Day Before Meals. 4/15/23   Art Sanchez MD     I have utilized all available immediate resources to obtain, update, or review the patient's current medications (including all prescriptions, over-the-counter products, herbals, cannabis/cannabidiol products, and vitamin/mineral/dietary (nutritional) supplements).    Objective     Vital Signs: /58 (BP Location: Left arm, Patient Position: Lying)   Pulse 83   Temp 98.3 °F (36.8 °C) (Oral)   Resp 18   Ht 162.6 cm (64\")   Wt 68 kg (150 lb)   SpO2 99%   BMI 25.75 kg/m²   Physical Exam  Vitals and nursing note reviewed.   Constitutional:       Comments: Sitting up in bed.  No oxygen use.   in room.  Patient oriented to person and identifies .   HENT:      Head: Normocephalic and atraumatic.      Nose: No congestion.      Mouth/Throat:      Pharynx: " Oropharynx is clear. No oropharyngeal exudate or posterior oropharyngeal erythema.   Eyes:      Extraocular Movements: Extraocular movements intact.      Pupils: Pupils are equal, round, and reactive to light.   Cardiovascular:      Rate and Rhythm: Normal rate and regular rhythm.      Heart sounds: No murmur heard.  Pulmonary:      Breath sounds: No wheezing, rhonchi or rales.      Comments: No oxygen in use.  Abdominal:      Palpations: Abdomen is soft.      Tenderness: There is no abdominal tenderness.   Genitourinary:     Comments: WIC in place.  Musculoskeletal:         General: Tenderness (Left hip) present.      Cervical back: Normal range of motion and neck supple.   Skin:     General: Skin is warm and dry.   Neurological:      General: No focal deficit present.      Comments: Oriented to person.  Identifies .  Disoriented to situation and place.  At baseline per .        Results Reviewed:  Lab Results (last 24 hours)       Procedure Component Value Units Date/Time    Comprehensive Metabolic Panel [[id number]]  (Abnormal) Collected: 11/04/23 2159    Specimen: Blood Updated: 11/04/23 2225     Glucose 145 mg/dL      BUN 17 mg/dL      Creatinine 1.05 mg/dL      Sodium 135 mmol/L      Potassium 4.3 mmol/L      Comment: Slight hemolysis detected by analyzer. Results may be affected.        Chloride 102 mmol/L      CO2 25.0 mmol/L      Calcium 9.3 mg/dL      Total Protein 6.6 g/dL      Albumin 3.7 g/dL      ALT (SGPT) 13 U/L      AST (SGOT) 17 U/L      Alkaline Phosphatase 65 U/L      Total Bilirubin 0.4 mg/dL      Globulin 2.9 gm/dL      A/G Ratio 1.3 g/dL      BUN/Creatinine Ratio 16.2     Anion Gap 8.0 mmol/L      eGFR 50.9 mL/min/1.73     Narrative:      GFR Normal >60  Chronic Kidney Disease <60  Kidney Failure <15    The GFR formula is only valid for adults with stable renal function between ages 18 and 70.    aPTT [[id number]]  (Normal) Collected: 11/04/23 2159    Specimen: Blood Updated:  11/04/23 2223     PTT 29.9 seconds     Protime-INR [[id number]]  (Normal) Collected: 11/04/23 2159    Specimen: Blood Updated: 11/04/23 2222     Protime 12.9 Seconds      INR 0.96    CBC & Differential [[id number]]  (Abnormal) Collected: 11/04/23 2159    Specimen: Blood Updated: 11/04/23 2205    Narrative:      The following orders were created for panel order CBC & Differential.  Procedure                               Abnormality         Status                     ---------                               -----------         ------                     CBC Auto Differential[[id number]]        Abnormal            Final result                 Please view results for these tests on the individual orders.    CBC Auto Differential [[id number]]  (Abnormal) Collected: 11/04/23 2159    Specimen: Blood Updated: 11/04/23 2205     WBC 12.09 10*3/mm3      RBC 4.54 10*6/mm3      Hemoglobin 11.7 g/dL      Hematocrit 38.1 %      MCV 83.9 fL      MCH 25.8 pg      MCHC 30.7 g/dL      RDW 16.5 %      RDW-SD 50.4 fl      MPV 10.0 fL      Platelets 266 10*3/mm3      Neutrophil % 84.4 %      Lymphocyte % 7.2 %      Monocyte % 6.9 %      Eosinophil % 0.3 %      Basophil % 0.5 %      Immature Grans % 0.7 %      Neutrophils, Absolute 10.20 10*3/mm3      Lymphocytes, Absolute 0.87 10*3/mm3      Monocytes, Absolute 0.83 10*3/mm3      Eosinophils, Absolute 0.04 10*3/mm3      Basophils, Absolute 0.06 10*3/mm3      Immature Grans, Absolute 0.09 10*3/mm3      nRBC 0.0 /100 WBC           Imaging Results (Last 24 Hours)       Procedure Component Value Units Date/Time    XR Femur 2 View Left [[id number]] Collected: 11/04/23 2144     Updated: 11/04/23 2148    Narrative:      XR FEMUR 2 VW LEFT-     HISTORY: fall     COMPARISON: None     FINDINGS: Frontal and crosstable lateral views of the left femur were  obtained.     There is an impacted left subcapital femoral neck fracture. No  additional left femur fracture identified. Osteopenia.  Scattered  vascular calcification.       Impression:      1. Acute impacted left subcapital femoral neck fracture.     This report was signed and finalized on 11/4/2023 9:45 PM CDT by Dr. Aifa Renner MD.       XR Chest 1 View [[id number]] Collected: 11/04/23 2142     Updated: 11/04/23 2147    Narrative:      XR CHEST 1 VW-     HISTORY: fall     COMPARISON: 5/14/2023     FINDINGS: Frontal view the chest obtained.     Mild increasing prominence of the interstitial markings which may be  seen with mild edema/volume overload. The heart appears normal in size.  Calcification of the thoracic aortic arch. No pleural effusion or  pneumothorax. No acute regional bony pathology.       Impression:      1. Mild increasing prominence of the interstitial markings diffusely  raising the question of mild edema/volume overload.        This report was signed and finalized on 11/4/2023 9:44 PM CDT by Dr. Afia Renner MD.       XR Hip With or Without Pelvis 2 - 3 View Left [[id number]] Resulted: 11/04/23 2136     Updated: 11/04/23 2141              I have personally reviewed and interpreted the radiology studies and ECG obtained at time of admission.     Assessment / Plan   Assessment:   Active Hospital Problems    Diagnosis     **Closed subcapital fracture of femur, left, initial encounter     Fall (on) (from) other stairs and steps, initial encounter     Closed left hip fracture     Essential hypertension     Vascular dementia without behavioral disturbance     Hypothyroidism        Treatment Plan  The patient will be admitted to Dr. Sood service at [facility].  Ms. Michaels presented after a fall found to have left subcapital femur fracture.  Orthopedics consulted and Dr. De La Vega plans for surgical intervention today.    1.  Closed subcapital fracture left femur.  Patient walking down 5 steps and  found her sitting on the floor at the bottom of the steps.  No loss of consciousness noted per .  Patient  does not recall events of fall.  Dr. De La Vega plans for left hip hemiarthroplasty today.  N.p.o. for surgical intervention.  IV fluids at 50 mL/h.  SCDs for deep vein thrombosis prophylaxis.  Physical therapy postop per Dr. De La Vega.  CBC, CMP in AM.    2.  Fall from steps.  Physical therapy postop per Dr. De La Vega.  Will likely require SNF at discharge and discussed with .    3.  Vascular dementia without behavioral disturbance.  Patient able to tell me her name and 's name otherwise does not answer questions appropriately.  She is at baseline per .  Continue Namenda and Remeron.    4.  Primary hypertension.  Blood pressure 166/58.  Resume metoprolol XL.  Will add labetalol for systolic blood pressure greater than 160 or diastolic blood pressure greater than 100.    5.  Hypothyroidism.  Continue Synthroid.  Check TSH.    6.  Acute left hip pain.  Tylenol for mild pain, Norco for moderate pain.  Use narcotics sparingly with history of dementia.    7.  Social service consult for discharge planning.  Discussed with  and patient will likely need skilled facility at discharge.  Has been agreeable if recommended by physical therapy.    Medical Decision Making  Number and Complexity of problems: 6  Acute left subcapital femur fracture: Acute, high complexity poses threat to life and bodily function  Acute left hip pain: Acute, moderate complexity  Vascular dementia without behavioral disturbance: Chronic, moderate complexity, stable  Primary hypertension: Chronic, moderate complexity, not at baseline  Hypothyroidism: Chronic, low complexity  Fall: Acute, moderate complexity    Differential Diagnosis: None    Conditions and Status        Condition is unchanged.     [facility] Data  External documents reviewed: Dr. Hebert office visit 8/23/2023  Cardiac tracing (EKG, telemetry) interpretation: None  Radiology interpretation: Reviewed radiology interpretation left femur x-ray, chest x-ray  Labs reviewed:   CMP  11/4/2023.  Repeat BMP today.  CBC 11/4/2023.  Repeat CBC today.  Check urinalysis    Any tests that were considered but not ordered: None     Decision rules/scores evaluated (example [id number], Wells, etc): None     Discussed with: Dr. Sood, patient, and  Nik present at bedside     Care Planning  Shared decision making: Dr. Sood, patient, and  Nik.   and patient agreed to orthopedic consultation and surgical intervention by Dr. De La Vega.   agrees to physical therapy and SNF if recommended.  Code status and discussions: Full code  Patient surrogate decision maker is her , Nik    Disposition  Social Determinants of Health that impact treatment or disposition: None  Estimated length of stay is 3 nights.     I confirmed that the patient's advanced care plan is present, code status is documented, and a surrogate decision maker is listed in the patient's medical record.     The patient's surrogate decision maker is her  Nik.     The patient was seen and examined by me on 11/05/2023 at 0707.    Electronically signed by EDGARDO Uerna, 11/05/23, 07:07 CST.

## 2023-11-05 NOTE — OP NOTE
Patient Name: Rylie  : 1934  MRN: [id number]      DATE of SURGERY: 2023    SURGEON: Denzel De La Vega MD    ASSISTANT: NONE    Preoperative Diagnosis:  Acute traumatic displaced subcapital fracture of the neck of the Left femur, initial encounter for closed fracture     Postoperative Diagnosis: Acute traumatic displaced subcapital fracture of the neck of the Left femur, initial encounter for closed fracture     Procedure Performed: Left Hip Hemiarthroplasty    Surgical Approach: Posterior    Implants: DePuy Corail System with the following components:            44 mm bipolar head           13 mm press fit stem    Anesthesia Used: GETA    Operative Indications: 89 y.o. female with dementia status post fall at home yesterday with a displaced femoral neck fracture.  Surgical indications include fracture displacement, pain control, ability to mobilize the patient, and prevent sequelae of prolonged bedrest (DVT, pneumonia, skin ulceration).  Risks include, but are not limited to, bleeding, infection, pain, damage to neurovascular structures, leg length inequality, hip dislocation, blood clots, intraoperative death. Risks, benefits, and alternatives were discussed and the patient wished to proceed.    Estimated Blood Loss: 150 mL    Drains: None    Specimens: None    Complications: None    Procedure In Detail:  The patient was seen in the preoperative holding room, the informed consent was reviewed and signed, and the correct operative extremity marked with the patient’s agreement.  The patient was transported to the operating room, where a timeout was performed identifying the correct patient and operative site.  Perioperative antibiotics were administered prior to incision.  Once placed under general anesthesia, the patient was positioned in the lateral decubitus position and all bony prominences were padded.  An axillary roll was placed.  The extremity was prepped and draped in the usual sterile  fashion.    A posterior approach to the hip was utilized as a slightly curved incision was made centered from the posterior aspect of the greater trochanter.  Soft tissue was dissected in-line with the incision and the tensor fascia and gluteus jama muscle were split.  The trochanteric bursa was excised revealing the short external rotators of the hip, which were tagged and incised from the posterolateral aspect of the greater trochanter.  A T-shaped capsulotomy was performed and with progressive internal rotation of the hip the fracture was noted.  While protecting soft tissue structures, a standard femoral neck cut was made with an oscillating saw approximately one finger-breadth proximal to the lesser trochanter.  The native femoral head was removed with a corkscrew device and taken the back table and measured.    Preparation of the femoral canal was then performed, first with a box-cut chisel, followed by a canal finder, lateralizing device, and sequential broaches up to a stable fit.  Trial components were inserted, the hip was reduced showing excellent stability and approximately equal leg lengths.  The trial components were removed, final implants impacted without complication with findings as described.    The incision was thoroughly irrigated followed by closure of the capsule with 0-Vicryl, re-approximation of the short external rotators with #2 FiberWire suture, and closure of the wound in layers.  The skin was closed with adhesive glue.  A soft tissue dressing was placed.  The patient was placed supine on a hospital bed, legs lengths again checked showing them to be equal and a knee immobilizer was placed.    The patient was awakened by anesthesia transported to the PACU in stable condition.      POSTOPERATIVE PLAN: Admit inpatient for monitoring, PT/OT, 3 weeks of DVT prophylaxis, and IV antibiotics for 24 hrs.  Weight bear as tolerated with posterior hip precautions.    Electronically signed by  Denzel De La Vega MD on 11/5/2023 at 10:54 CST

## 2023-11-05 NOTE — CONSULTS
Inpatient Orthopedic Surgery Consult  Consult performed by: Denzel De La Vega MD  Consult ordered by: Denzel De La Vega MD  Assessment/Recommendations: Orthopaedic Inpatient Consultation    NAME:  Dina Michaels   : 1934  MRN: [id number]    2023  9:03 PM    Requesting Physician: Dr. Sood    CHIEF COMPLAINT:  left hip pain      HISTORY OF PRESENT ILLNESS:   The patient is a 89 y.o. female with dementia status post unwitnessed fall at home yesterday sustaining a left hip fracture.  History is obtained from her  who found her sitting and unable to ambulate.  Pain is located in the left hip, rated a 4/5, dull and constant, worse with movement, better with rest and medication.  There are no associated symptoms.      Past Medical History:    Past Medical History:  No date: Cancer  No date: Dementia    Past Surgical History:    Past Surgical History:  2023: COLONOSCOPY; N/A      Comment:  Procedure: COLONOSCOPY WITH ANESTHESIA;  Surgeon: Bubba Asher MD;  Location: [facility] ENDOSCOPY;  Service:                Gastroenterology;  Laterality: N/A;  pre gi bleedpost                diverticulosisDrRusty Hebert  2023: ENDOSCOPY; N/A      Comment:  Procedure: ESOPHAGOGASTRODUODENOSCOPY WITH ANESTHESIA;                 Surgeon: Bubba Asher MD;  Location: [facility] ENDOSCOPY;               Service: Gastroenterology;  Laterality: N/A;  pre                screenpost gastric bxDr Anup    Current Medications:   Prior to Admission medications :  Medication alendronate (FOSAMAX) 70 MG tablet, Sig Take 1 tablet by mouth Every 7 (Seven) Days., Start Date , End Date , Taking? , Authorizing Provider Provider, MD Jv    Medication aspirin 81 MG EC tablet, Sig Take 1 tablet by mouth Daily., Start Date 5/15/23, End Date , Taking? , Authorizing Provider Jeff Landis DO    Medication atorvastatin (LIPITOR) 80 MG tablet, Sig Take 1 tablet by mouth Every Night., Start Date  5/15/23, End Date , Taking? , Authorizing Provider Jeff Landis,     Medication levothyroxine (SYNTHROID, LEVOTHROID) 100 MCG tablet, Sig Take 1 tablet by mouth Daily., Start Date , End Date , Taking? , Authorizing Provider Jv Chaney MD    Medication memantine (NAMENDA) 10 MG tablet, Sig Take 1 tablet by mouth 2 (Two) Times a Day., Start Date , End Date , Taking? , Authorizing Provider Jv Chaney MD    Medication metoprolol succinate XL (TOPROL-XL) 25 MG 24 hr tablet, Sig Take 12.5 mg by mouth Daily., Start Date , End Date , Taking? , Authorizing Provider Jv Chaney MD    Medication mirtazapine (REMERON) 15 MG tablet, Sig Take 1 tablet by mouth Every Night., Start Date , End Date , Taking? , Authorizing Provider Jv Chaney MD    Medication pantoprazole (PROTONIX) 40 MG EC tablet, Sig Take 1 tablet by mouth 2 (Two) Times a Day Before Meals., Start Date 4/15/23, End Date , Taking? , Authorizing Provider Art Sanchez MD        Allergies:  Amoxicillin, Clarithromycin, Nisoldipine er, and Sular [nisoldipine]     Social History:   Social History    Socioeconomic History      Marital status:     Tobacco Use      Smoking status: Never      Smokeless tobacco: Never    Vaping Use      Vaping Use: Never used    Substance and Sexual Activity      Alcohol use: Yes      Drug use: Never      Family History:   History reviewed.  No pertinent family history.      REVIEW OF SYSTEMS:  14 point review of systems has been reviewed from the patient's emergency room visit, reviewed with the patient on today's date with no new changes.    PHYSICAL EXAM:      Physical Examination:  Vitals: --------------------------------------------------------------------------            11/04/23 11/04/23 11/04/23 11/05/23                    2208         9989             2302             0351           --------------------------------------------------------------------------    BP:                    145/97           175/88           166/58          BP Location:              Left arm         Left arm         Left arm        Patient Position:               Sitting           Lying            Lying         Pulse:      74           82               84               83            Resp:                    20               18               18            Temp:             98.5 °F (36.9 °C)98.3 °F (36.8 °C)98.3 °F (36.8 °C)    TempSrc:                Oral             Oral             Oral           SpO2:      100%         100%              99%              99%           Weight:                                                                  Height:                                                                 --------------------------------------------------------------------------  General:  Appears stated age, no distress.  Orientation:  Alert and oriented to time, place, and person.  Mood and Affect:  Cooperative and pleasant.  Gait:  Resting comfortably in bed.  Cardiovascular:  Symmetric 1-2 plus pulses in upper and lower extremities.  Lymph:  No cervical or inguinal lymphadenopathy noted.  Sensation:  Grossly intact to light touch.  DTR:  Normal, no pathologic reflexes.  Coordination/balance:  Normal    Musculoskeletal:  Right upper extremity exam:  There is no tenderness to palpation about the shoulder, elbow, wrist or hand.  Full motion.  Stability normal with provocative tests, 5/5 strength, and skin is normal.      Left upper extremity exam:  There is no tenderness to palpation about the shoulder, elbow, wrist or hand. Full motion.  Stability normal with provocative tests, 5/5 strength, and skin is normal.     Right lower extremity exam:  There is no tenderness to palpation about the hip, knee, ankle or foot.  Full motion  Stability normal with  provocative tests, 5/5 strength, and skin is normal.     Left lower extremity exam:  Tenderness left hip/groin, shortened/externally rotated, refuses motion/stability/strength due to pain, skin is normal.      DATA:    LAB RESULTS:  Lab             11/04/23 2159          WBC          12.09*        HEMOGLOBIN   11.7*         HEMATOCRIT   38.1          PLATELETS    266           NEUTROS ABS  10.20*        IMMATURE GR* 0.09*         LYMPHS ABS   0.87          MONOS ABS    0.83          EOS ABS      0.04          MCV          83.9          PROTIME      12.9          APTT         29.9          Lab             11/04/23 2159          SODIUM       135*          POTASSIUM    4.3           CHLORIDE     102           CO2          25.0          ANION GAP    8.0           BUN          17            CREATININE   1.05*         EGFR         50.9*         GLUCOSE      145*          CALCIUM      9.3           Lab             11/04/23 2159          TOTAL PROTE* 6.6           ALBUMIN      3.7           GLOBULIN     2.9           ALT (SGPT)   13            AST (SGOT)   17            BILIRUBIN    0.4           ALK PHOS     65            Lab             11/04/23 2159          PROTIME      12.9          INR          0.96                      Brief Urine Lab Results  (Last result in the past 365 days)      Color   Clarity   Blood   Leuk Est   Nitrite   Protein   CREAT   Urine   HCG        12/05/22 2118 YELLOW   Clear   Negative   Negative  Comment: Culture Urine under CMS guidelines and criteria will auto reflex   on a sole  criteria that is based on manual microscopic count of more than 10/hpf for  WBC. If Culture Urine is warranted aside from this criteria, place a  requisition or order within 24 hours of collection.   Negative               Microbiology Results (last 10 days)     ** No results found for the last 240 hours. **            ----------------------------------------------------------------------------------------------------------------------  I have reviewed the radiology images above and agree with the findings dictated below    Radiology: XR Femur 2 View Left    Result Date: 11/4/2023  XR FEMUR 2 VW LEFT-  HISTORY: fall  COMPARISON: None  FINDINGS: Frontal and crosstable lateral views of the left femur were obtained.  There is an impacted left subcapital femoral neck fracture. No additional left femur fracture identified. Osteopenia. Scattered vascular calcification.      1. Acute impacted left subcapital femoral neck fracture.  This report was signed and finalized on 11/4/2023 9:45 PM CDT by Dr. Afia Renner MD.      XR Chest 1 View    Result Date: 11/4/2023  XR CHEST 1 VW-  HISTORY: fall  COMPARISON: 5/14/2023  FINDINGS: Frontal view the chest obtained.  Mild increasing prominence of the interstitial markings which may be seen with mild edema/volume overload. The heart appears normal in size. Calcification of the thoracic aortic arch. No pleural effusion or pneumothorax. No acute regional bony pathology.      1. Mild increasing prominence of the interstitial markings diffusely raising the question of mild edema/volume overload.   This report was signed and finalized on 11/4/2023 9:44 PM CDT by Dr. Afia Renner MD.         ----------------------------------------------------------------------------------------------------------------------  Assessment:      Acute traumatic displaced subcapital fracture of the left hip, initial encounter for closed fracture    Vascular dementia without behavioral disturbance    Hypothyroidism    Essential hypertension    Fall (on) (from) other stairs and steps, initial encounter         Plan:  1) to OR for left hip hemiarthroplasty - we discussed the risks, benefits, and alternatives and the patient wishes to proceed  2) Admit postop for pain control, PT/OT  3) DVT prophylaxis x 3 weeks  postop    Electronically signed by Denzel De La Vega MD on 11/5/2023 at 08:15 CST

## 2023-11-06 PROBLEM — N30.00 ACUTE CYSTITIS WITHOUT HEMATURIA: Status: ACTIVE | Noted: 2023-11-06

## 2023-11-06 LAB
ALBUMIN SERPL-MCNC: 3.1 G/DL (ref 3.5–5.2)
ALBUMIN/GLOB SERPL: 1.2 G/DL
ALP SERPL-CCNC: 52 U/L (ref 39–117)
ALT SERPL W P-5'-P-CCNC: 7 U/L (ref 1–33)
ANION GAP SERPL CALCULATED.3IONS-SCNC: 10 MMOL/L (ref 5–15)
AST SERPL-CCNC: 14 U/L (ref 1–32)
BILIRUB SERPL-MCNC: 0.5 MG/DL (ref 0–1.2)
BUN SERPL-MCNC: 14 MG/DL (ref 8–23)
BUN/CREAT SERPL: 15.1 (ref 7–25)
CALCIUM SPEC-SCNC: 8.7 MG/DL (ref 8.6–10.5)
CHLORIDE SERPL-SCNC: 102 MMOL/L (ref 98–107)
CO2 SERPL-SCNC: 26 MMOL/L (ref 22–29)
CREAT SERPL-MCNC: 0.93 MG/DL (ref 0.57–1)
DEPRECATED RDW RBC AUTO: 51.4 FL (ref 37–54)
EGFRCR SERPLBLD CKD-EPI 2021: 58.9 ML/MIN/1.73
ERYTHROCYTE [DISTWIDTH] IN BLOOD BY AUTOMATED COUNT: 16.3 % (ref 12.3–15.4)
GLOBULIN UR ELPH-MCNC: 2.5 GM/DL
GLUCOSE SERPL-MCNC: 93 MG/DL (ref 65–99)
HCT VFR BLD AUTO: 34.4 % (ref 34–46.6)
HGB BLD-MCNC: 10.4 G/DL (ref 12–15.9)
MCH RBC QN AUTO: 25.7 PG (ref 26.6–33)
MCHC RBC AUTO-ENTMCNC: 30.2 G/DL (ref 31.5–35.7)
MCV RBC AUTO: 85.1 FL (ref 79–97)
PLATELET # BLD AUTO: 194 10*3/MM3 (ref 140–450)
PMV BLD AUTO: 10.7 FL (ref 6–12)
POTASSIUM SERPL-SCNC: 4.2 MMOL/L (ref 3.5–5.2)
PROT SERPL-MCNC: 5.6 G/DL (ref 6–8.5)
RBC # BLD AUTO: 4.04 10*6/MM3 (ref 3.77–5.28)
SODIUM SERPL-SCNC: 138 MMOL/L (ref 136–145)
WBC NRBC COR # BLD: 8.9 10*3/MM3 (ref 3.4–10.8)

## 2023-11-06 PROCEDURE — 25010000002 CEFTRIAXONE PER 250 MG: Performed by: NURSE PRACTITIONER

## 2023-11-06 PROCEDURE — 80053 COMPREHEN METABOLIC PANEL: CPT | Performed by: ORTHOPAEDIC SURGERY

## 2023-11-06 PROCEDURE — 97161 PT EVAL LOW COMPLEX 20 MIN: CPT

## 2023-11-06 PROCEDURE — 97530 THERAPEUTIC ACTIVITIES: CPT

## 2023-11-06 PROCEDURE — 25010000002 LABETALOL 5 MG/ML SOLUTION: Performed by: ORTHOPAEDIC SURGERY

## 2023-11-06 PROCEDURE — 85027 COMPLETE CBC AUTOMATED: CPT | Performed by: ORTHOPAEDIC SURGERY

## 2023-11-06 PROCEDURE — 97166 OT EVAL MOD COMPLEX 45 MIN: CPT

## 2023-11-06 PROCEDURE — 25010000002 CEFAZOLIN PER 500 MG: Performed by: ORTHOPAEDIC SURGERY

## 2023-11-06 PROCEDURE — 25010000002 ENOXAPARIN PER 10 MG: Performed by: ORTHOPAEDIC SURGERY

## 2023-11-06 RX ORDER — UBIDECARENONE 75 MG
50 CAPSULE ORAL DAILY
COMMUNITY

## 2023-11-06 RX ORDER — MELOXICAM 7.5 MG/1
7.5 TABLET ORAL DAILY
COMMUNITY
End: 2023-11-07 | Stop reason: HOSPADM

## 2023-11-06 RX ADMIN — MEMANTINE HYDROCHLORIDE 10 MG: 5 TABLET, FILM COATED ORAL at 20:34

## 2023-11-06 RX ADMIN — CEFAZOLIN 2 G: 2 INJECTION, POWDER, FOR SOLUTION INTRAMUSCULAR; INTRAVENOUS at 02:21

## 2023-11-06 RX ADMIN — LABETALOL HYDROCHLORIDE 10 MG: 5 INJECTION INTRAVENOUS at 23:12

## 2023-11-06 RX ADMIN — PANTOPRAZOLE SODIUM 40 MG: 40 TABLET, DELAYED RELEASE ORAL at 08:30

## 2023-11-06 RX ADMIN — DOCUSATE SODIUM 50 MG AND SENNOSIDES 8.6 MG 2 TABLET: 8.6; 5 TABLET, FILM COATED ORAL at 20:34

## 2023-11-06 RX ADMIN — MEMANTINE HYDROCHLORIDE 10 MG: 5 TABLET, FILM COATED ORAL at 09:00

## 2023-11-06 RX ADMIN — Medication 10 ML: at 09:01

## 2023-11-06 RX ADMIN — CEFTRIAXONE 1000 MG: 1 INJECTION, POWDER, FOR SOLUTION INTRAMUSCULAR; INTRAVENOUS at 11:16

## 2023-11-06 RX ADMIN — ENOXAPARIN SODIUM 40 MG: 100 INJECTION SUBCUTANEOUS at 09:00

## 2023-11-06 RX ADMIN — Medication 10 ML: at 20:34

## 2023-11-06 RX ADMIN — LEVOTHYROXINE SODIUM 100 MCG: 100 TABLET ORAL at 06:31

## 2023-11-06 RX ADMIN — METOPROLOL SUCCINATE 12.5 MG: 25 TABLET, EXTENDED RELEASE ORAL at 09:00

## 2023-11-06 RX ADMIN — DOCUSATE SODIUM 50 MG AND SENNOSIDES 8.6 MG 2 TABLET: 8.6; 5 TABLET, FILM COATED ORAL at 09:00

## 2023-11-06 NOTE — PLAN OF CARE
Goal Outcome Evaluation:      Pt. Alert to self, reorient to situation of hip, L hip surgical incision drsg CDI, family at bedside, IV antibx infusing, awaiting d/c plans, safety measures maintained, plan of care ongoing.

## 2023-11-06 NOTE — THERAPY EVALUATION
Patient Name: Dina Michaels  : 1934    MRN: [id number]                              Today's Date: 2023       Admit Date: 2023    Visit Dx:     ICD-10-CM ICD-9-CM   1. Closed subcapital fracture of femur, left, initial encounter  S72.012A 820.09   2. Closed fracture of left hip, initial encounter  S72.002A 820.8   3. Impaired mobility [Z74.09]  Z74.09 799.89     Patient Active Problem List   Diagnosis    AMS (altered mental status)    Vascular dementia without behavioral disturbance    Near syncope    Acute pulmonary embolism    Hypothyroidism    Lactic acidosis    Gastrointestinal hemorrhage, unspecified gastrointestinal hemorrhage type: Possibly from duodenal versus diverticular bleed    Chronic anemia    Essential hypertension    Acute CVA (cerebrovascular accident)    History of gastrointestinal bleeding    Closed subcapital fracture of femur, left, initial encounter    Fall (on) (from) other stairs and steps, initial encounter    Acute hip pain, left    Acute cystitis without hematuria     Past Medical History:   Diagnosis Date    Cancer     Dementia      Past Surgical History:   Procedure Laterality Date    COLONOSCOPY N/A 2023    Procedure: COLONOSCOPY WITH ANESTHESIA;  Surgeon: Bubba Asher MD;  Location: [facility] ENDOSCOPY;  Service: Gastroenterology;  Laterality: N/A;  pre gi bleed  post diverticulosis  Dr. Hebert    ENDOSCOPY N/A 2023    Procedure: ESOPHAGOGASTRODUODENOSCOPY WITH ANESTHESIA;  Surgeon: Bubba Asher MD;  Location: [facility] ENDOSCOPY;  Service: Gastroenterology;  Laterality: N/A;  pre screen  post gastric bx  Dr Hebert    HIP HEMIARTHROPLASTY Left 2023    Procedure: HIP HEMIARTHROPLASTY;  Surgeon: Denzel De La Vega MD;  Location: [facility] OR;  Service: Orthopedics;  Laterality: Left;      General Information       Row Name 23 0909          Physical Therapy Time and Intention    Document Type evaluation  s/p L hip hemiarthroplasty . **WBAT with  posterior hip precautions** Dx: closed L hip fx. CC: L hip pain, post fall on stairs. MHx: vascular dementia, HTN.  - (r) BE (t) LH (c)     Mode of Treatment physical therapy  - (r) BE (t)  (c)       Row Name 11/06/23 0909          General Information    Patient Profile Reviewed yes  -LH (r) BE (t) LH (c)     Prior Level of Function independent:;all household mobility;gait;transfer;bed mobility;ADL's  - (r) BE (t) LH (c)     Existing Precautions/Restrictions left;hip, posterior   WBAT LLE  - (r) BE (t) LH (c)     Barriers to Rehab medically complex;physical barrier;cognitive status;hearing deficit  baseline dementia  - (r) BE (t)  (c)       Row Name 11/06/23 0909          Living Environment    People in Home spouse  - (r) BE (t) LH (c)       Row Name 11/06/23 0909          Home Main Entrance    Number of Stairs, Main Entrance five  - (r) BE (t) LH (c)     Stair Railings, Main Entrance railing on left side (ascending)  - (r) BE (t) LH (c)       Row Name 11/06/23 0909          Stairs Within Home, Primary    Number of Stairs, Within Home, Primary none  - (r) BE (t) LH (c)       Row Name 11/06/23 0909          Cognition    Orientation Status (Cognition) oriented to;person  - (r) BE (t)  (c)       Row Name 11/06/23 0909          Safety Issues, Functional Mobility    Safety Issues Affecting Function (Mobility) ability to follow commands;at risk behavior observed;awareness of need for assistance;friction/shear risk;impulsivity;insight into deficits/self-awareness;judgment;positioning of assistive device;safety precaution awareness;safety precautions follow-through/compliance;sequencing abilities;problem-solving  - (r) BE (t) LH (c)     Impairments Affecting Function (Mobility) cognition;balance;endurance/activity tolerance;strength;pain;postural/trunk control  - (r) BE (t) LH (c)     Cognitive Impairments, Mobility Safety/Performance attention;awareness, need for assistance;impulsivity;insight  into deficits/self-awareness;judgment;problem-solving/reasoning;safety precaution awareness;safety precaution follow-through;sequencing abilities  -LH (r) BE (t) LH (c)               User Key  (r) = Recorded By, (t) = Taken By, (c) = Cosigned By      Initials Name Provider Type    Ashu Alexis, PT Physical Therapist    BE Tisha Beltran, PT Student PT Student                   Mobility       Row Name 11/06/23 0909          Bed Mobility    Bed Mobility supine-sit;sit-supine  -LH (r) BE (t) LH (c)     Supine-Sit [place] (Bed Mobility) moderate assist (50% patient effort);2 person assist;verbal cues  -LH (r) BE (t) LH (c)     Sit-Supine [place] (Bed Mobility) maximum assist (25% patient effort);dependent (less than 25% patient effort);2 person assist  -LH (r) BE (t) LH (c)     Assistive Device (Bed Mobility) head of bed elevated;draw sheet  -LH (r) BE (t) LH (c)       Row Name 11/06/23 0909          Transfers    Comment, (Transfers) attempted taking 1-2 very small steps forward and backward; pt demos trying to sit back on bed before being in a safe position to do so despite many verbal cues.  -LH (r) BE (t) LH (c)       Row Name 11/06/23 0909          Sit-Stand Transfer    Sit-Stand [place] (Transfers) moderate assist (50% patient effort);maximum assist (25% patient effort);2 person assist;verbal cues;nonverbal cues (demo/gesture)  -LH (r) BE (t) LH (c)     Assistive Device (Sit-Stand Transfers) walker, front-wheeled  -LH (r) BE (t) LH (c)     Comment, (Sit-Stand Transfer) x2  -LH (r) BE (t) LH (c)       Row Name 11/06/23 0909          Mobility    Extremity Weight-bearing Status left lower extremity   -LH (r) BE (t) LH (c)     Left Lower Extremity (Weight-bearing Status) weight-bearing as tolerated (WBAT)   w/ posterior hip precautions  -LH (r) BE (t) LH (c)               User Key  (r) = Recorded By, (t) = Taken By, (c) = Cosigned By      Initials Name Provider Type    Ashu Alexis D, PT Physical  Therapist    BE Tisha Beltran, PT Student PT Student                   Obj/Interventions       Row Name 11/06/23 0909          Range of Motion Comprehensive    Comment, General Range of Motion R LE ROM WFL; L ankle ROM WFL; L knee and hip ROM deferred due to knee immobilizer in place and posterior hip precautions  -LH (r) BE (t) LH (c)       Row Name 11/06/23 0909          Strength Comprehensive (MMT)    Comment, General Manual Muscle Testing (MMT) Assessment R LE hip strength 3+/5; R LE knee strength 4/5; R ankle DF strength 4/5  -LH (r) BE (t) LH (c)       Row Name 11/06/23 0909          Motor Skills    Motor Skills posture;functional endurance  -LH (r) BE (t) LH (c)     Functional Endurance pt became very visably fatigued with minimal upright OOB activity  -LH (r) BE (t) LH (c)       Row Name 11/06/23 0909          Balance    Balance Assessment sitting static balance;sitting dynamic balance;standing static balance;standing dynamic balance  -LH (r) BE (t) LH (c)     Static Sitting Balance standby assist  -LH (r) BE (t) LH (c)     Dynamic Sitting Balance contact guard  -LH (r) BE (t) LH (c)     Position, Sitting Balance unsupported;sitting edge of bed  -LH (r) BE (t) LH (c)     Static Standing Balance moderate assist;2-person assist  -LH (r) BE (t) LH (c)     Dynamic Standing Balance moderate assist;maximum assist;2-person assist  -LH (r) BE (t) LH (c)     Position/Device Used, Standing Balance supported;walker, front-wheeled  -LH (r) BE (t) LH (c)       Row Name 11/06/23 0909          Sensory Assessment (Somatosensory)    Sensory Assessment (Somatosensory) LE sensation intact  -LH (r) BE (t) LH (c)       Row Name 11/06/23 0909          Posture    Posture other (see comments)  forward flexed posture in sitting and standing despite consistent verbal and tactile cues to attempt correcting  -LH (r) BE (t) LH (c)               User Key  (r) = Recorded By, (t) = Taken By, (c) = Cosigned By      Initials Name Provider  Type     Ashu Hoskins, PT Physical Therapist    Tisha Schreiber, PT Student PT Student                   Goals/Plan       Row Name 11/06/23 0909          Bed Mobility Goal 1 (PT)    Activity/Assistive Device (Bed Mobility Goal 1, PT) sit to supine/supine to sit  -LH (r) BE (t) LH (c)     [place] Level/Cues Needed (Bed Mobility Goal 1, PT) minimum assist (75% or more patient effort)  -LH (r) BE (t) LH (c)     Time Frame (Bed Mobility Goal 1, PT) long term goal (LTG);10 days  -LH (r) BE (t) LH (c)     Progress/Outcomes (Bed Mobility Goal 1, PT) new goal  -LH (r) BE (t) LH (c)       Row Name 11/06/23 0909          Transfer Goal 1 (PT)    Activity/Assistive Device (Transfer Goal 1, PT) sit-to-stand/stand-to-sit;bed-to-chair/chair-to-bed;walker, rolling  -LH (r) BE (t) LH (c)     [place] Level/Cues Needed (Transfer Goal 1, PT) minimum assist (75% or more patient effort)  -LH (r) BE (t) LH (c)     Time Frame (Transfer Goal 1, PT) long term goal (LTG);10 days  -LH (r) BE (t) LH (c)     Progress/Outcome (Transfer Goal 1, PT) new goal  -LH (r) BE (t) LH (c)       Row Name 11/06/23 0909          Gait Training Goal 1 (PT)    Activity/Assistive Device (Gait Training Goal 1, PT) gait (walking locomotion);assistive device use;decrease asymmetrical patterns;decrease fall risk;diminish gait deviation;improve balance and speed;increase endurance/gait distance;increase energy conservation;maintain weight-bearing status;walker, rolling  -LH (r) BE (t) LH (c)     [place] Level (Gait Training Goal 1, PT) minimum assist (75% or more patient effort)  -LH (r) BE (t) LH (c)     Distance (Gait Training Goal 1, PT) 50 ft  -LH (r) BE (t) LH (c)     Time Frame (Gait Training Goal 1, PT) long term goal (LTG);10 days  -LH (r) BE (t) LH (c)     Progress/Outcome (Gait Training Goal 1, PT) new goal  -LH (r) BE (t) ELIZABET (c)       Row Name 11/06/23 0909          Therapy Assessment/Plan (PT)    Planned Therapy Interventions (PT)  "balance training;bed mobility training;gait training;home exercise program;patient/family education;postural re-education;orthotic fitting/training;ROM (range of motion);strengthening;stretching;transfer training  -LH (r) BE (t) LH (c)               User Key  (r) = Recorded By, (t) = Taken By, (c) = Cosigned By      Initials Name Provider Type     Ashu Hoskins, PT Physical Therapist    Tisha Schreiber, PT Student PT Student                   Clinical Impression       Row Name 11/06/23 0909          Pain    Pain Intervention(s) Repositioned;Ambulation/increased activity  -LH (r) BE (t) LH (c)     Additional Documentation Pain Scale: FACES Pre/Post-Treatment (Group)  -LH (r) BE (t) LH (c)       Row Name 11/06/23 0909          Pain Scale: FACES Pre/Post-Treatment    Pain: FACES Scale, Pretreatment 2-->hurts little bit  -LH (r) BE (t) LH (c)     Posttreatment Pain Rating 6-->hurts even more  -LH (r) BE (t) LH (c)     Pain Location - Side/Orientation Left  -LH (r) BE (t) LH (c)     Pain Location incisional  -LH (r) BE (t) LH (c)     Pain Location - hip  -LH (r) BE (t) LH (c)       Row Name 11/06/23 0909          Plan of Care Review    Plan of Care Reviewed With patient;spouse  -LH (r) BE (t) LH (c)     Progress no change  -LH (r) BE (t) LH (c)     Outcome Evaluation PT eval complete. Pt alert and oriented to person only. Pt is very [place] and requires PT to speak loudly and directly to her throughout evaluation. Pt's  at bedside to help provide PLOF. She currently lives at home with her spouse who reports that she was IND with all mobility and adls until her fall at home on the stairs. Her  also reports that pt has dementia and has \"good days and bad days.\" Pt and  were educated on posterior hip precautions, WBAT status, and knee immobilizer. Due to her dementia, pt will need continued education on these precautions. Pt completed supine<>sit EOB with mod A x2 and significant verbal cues. Pt " completed sit<>stand x2 with mod-max Ax2 and use of a rwx with significant verbal cues. Pt attempted taking 1-2 small steps at bedside but demos continuously trying to sit back down at EOB before in a safe place to do so despite consistent verbal cues. Pt then required mod-max A to maintain sitting EOB and returned to supine in bed with max-dep A x2 due to significant fatigue following activity. Pt would benefit from skilled PT intervention to address deficits in balance, strength, activity tolerance, fxl mob, and continued pt education on safety with mobility and hip precautions. Recommend d/c to SNF for continued PT/OT services.  -LH (r) BE (t) LH (c)       Row Name 11/06/23 0909          Therapy Assessment/Plan (PT)    Patient/Family Therapy Goals Statement (PT) improve function  -LH (r) BE (t) LH (c)     Rehab Potential (PT) good, to achieve stated therapy goals  -LH (r) BE (t) LH (c)     Criteria for Skilled Interventions Met (PT) yes;meets criteria;skilled treatment is necessary  -LH (r) BE (t) LH (c)     Therapy Frequency (PT) 2 times/day  -LH (r) BE (t) LH (c)     Predicted Duration of Therapy Intervention (PT) until d/c  -LH (r) BE (t) LH (c)       Row Name 11/06/23 0909          Vital Signs    Pre Patient Position Supine  -LH (r) BE (t) LH (c)     Intra Patient Position Standing  -LH (r) BE (t) LH (c)     Post Patient Position Supine  -LH (r) BE (t) LH (c)       Row Name 11/06/23 0909          Positioning and Restraints    Pre-Treatment Position in bed  -LH (r) BE (t) LH (c)     Post Treatment Position bed  -LH (r) BE (t) LH (c)     In Bed fowlers;call light within reach;encouraged to call for assist;exit alarm on;with family/caregiver;with other staff;side rails up x3;with brace;SCD pump applied  -LH (r) BE (t) LH (c)               User Key  (r) = Recorded By, (t) = Taken By, (c) = Cosigned By      Initials Name Provider Type     Ashu Hoskins, PT Physical Therapist    BE Tisha Beltran, PT Student PT  Student                   Outcome Measures       Row Name 11/06/23 0909 11/06/23 0800       How much help from another person do you currently need...    Turning from your back to your side while in flat bed without using bedrails? 2  -LH (r) BE (t) LH (c) 2  -KS    Moving from lying on back to sitting on the side of a flat bed without bedrails? 2  -LH (r) BE (t) LH (c) 1  -KS    Moving to and from a bed to a chair (including a wheelchair)? 2  -LH (r) BE (t) LH (c) 1  -KS    Standing up from a chair using your arms (e.g., wheelchair, bedside chair)? 2  -LH (r) BE (t) LH (c) 1  -KS    Climbing 3-5 steps with a railing? 1  -LH (r) BE (t) LH (c) 1  -KS    To walk in hospital room? 2  -LH (r) BE (t) LH (c) 1  -KS    AM-PAC 6 Clicks Score (PT) 11  -LH (r) BE (t) 7  -KS    Highest level of mobility 4 --> Transferred to chair/commode  -LH (r) BE (t) 2 --> Bed activities/dependent transfer  -KS      Row Name 11/06/23 0911 11/06/23 0909       Functional Assessment    Outcome Measure Options AM-PAC 6 Clicks Daily Activity (OT)  -CS (r) MB (t) CS (c) AM-PAC 6 Clicks Basic Mobility (PT)  -LH (r) BE (t) LH (c)              User Key  (r) = Recorded By, (t) = Taken By, (c) = Cosigned By      Initials Name Provider Type     Ashu Hoskins, PT Physical Therapist    Abiola Leone, OTR/L, CNT Occupational Therapist    Zuleyka Ramirez, RN Registered Nurse    Cait Pierre, OT Student OT Student    Tisha Schreiber, PT Student PT Student                                 Physical Therapy Education       Title: PT OT SLP Therapies (In Progress)       Topic: Physical Therapy (In Progress)       Point: Mobility training (In Progress)       Learning Progress Summary             Patient Acceptance, E, NR by BE at 11/6/2023 0909    Comment: educated on PT POC, posterior hip precautions, WBAT status, importance of increased mobility, d/c recommendations   Family Acceptance, E, NR by BE at 11/6/2023 0909    Comment: educated  "on PT POC, posterior hip precautions, WBAT status, importance of increased mobility, d/c recommendations                         Point: Home exercise program (Not Started)       Learner Progress:  Not documented in this visit.              Point: Body mechanics (Not Started)       Learner Progress:  Not documented in this visit.              Point: Precautions (In Progress)       Learning Progress Summary             Patient Acceptance, E, NR by BE at 11/6/2023 0909    Comment: educated on PT POC, posterior hip precautions, WBAT status, importance of increased mobility, d/c recommendations   Family Acceptance, E, NR by BE at 11/6/2023 0909    Comment: educated on PT POC, posterior hip precautions, WBAT status, importance of increased mobility, d/c recommendations                                         User Key       Initials Effective Dates Name Provider Type Discipline    BE 08/29/23 -  Tisha Beltran, PT Student PT Student PT                  PT Recommendation and Plan  Planned Therapy Interventions (PT): balance training, bed mobility training, gait training, home exercise program, patient/family education, postural re-education, orthotic fitting/training, ROM (range of motion), strengthening, stretching, transfer training  Plan of Care Reviewed With: patient, spouse  Progress: no change  Outcome Evaluation: PT eval complete. Pt alert and oriented to person only. Pt is very [place] and requires PT to speak loudly and directly to her throughout evaluation. Pt's  at bedside to help provide PLOF. She currently lives at home with her spouse who reports that she was IND with all mobility and adls until her fall at home on the stairs. Her  also reports that pt has dementia and has \"good days and bad days.\" Pt and  were educated on posterior hip precautions, WBAT status, and knee immobilizer. Due to her dementia, pt will need continued education on these precautions. Pt completed supine<>sit EOB with " mod A x2 and significant verbal cues. Pt completed sit<>stand x2 with mod-max Ax2 and use of a rwx with significant verbal cues. Pt attempted taking 1-2 small steps at bedside but demos continuously trying to sit back down at EOB before in a safe place to do so despite consistent verbal cues. Pt then required mod-max A to maintain sitting EOB and returned to supine in bed with max-dep A x2 due to significant fatigue following activity. Pt would benefit from skilled PT intervention to address deficits in balance, strength, activity tolerance, fxl mob, and continued pt education on safety with mobility and hip precautions. Recommend d/c to SNF for continued PT/OT services.     Time Calculation:         PT Charges       Row Name 11/06/23 1050             Time Calculation    Start Time 0909  -LH (r) BE (t) LH (c)      Stop Time 1012  -LH (r) BE (t) LH (c)      Time Calculation (min) 63 min  -LH (r) BE (t)      PT Received On 11/06/23  -LH (r) BE (t) LH (c)      PT Goal Re-Cert Due Date 11/16/23  -LH (r) BE (t) LH (c)         Untimed Charges    PT Eval/Re-eval Minutes 63  -LH         Total Minutes    Untimed Charges Total Minutes 63  -LH       Total Minutes 63  -LH                User Key  (r) = Recorded By, (t) = Taken By, (c) = Cosigned By      Initials Name Provider Type     Ashu Hoskins, PT Physical Therapist    Tisha Schreiber, PT Student PT Student                      PT G-Codes  Outcome Measure Options: AM-PAC 6 Clicks Daily Activity (OT)  AM-PAC 6 Clicks Score (PT): 11  AM-PAC 6 Clicks Score (OT): 15  PT Discharge Summary  Anticipated Discharge Disposition (PT): skilled nursing facility    Tisha Beltran, JEAN Student  11/6/2023

## 2023-11-06 NOTE — PLAN OF CARE
"Goal Outcome Evaluation:  Plan of Care Reviewed With: patient, spouse        Progress: no change  Outcome Evaluation: PT eval complete. Pt alert and oriented to person only. Pt is very [place] and requires PT to speak loudly and directly to her throughout evaluation. Pt's  at bedside to help provide PLOF. She currently lives at home with her spouse who reports that she was IND with all mobility and adls until her fall at home on the stairs. Her  also reports that pt has dementia and has \"good days and bad days.\" Pt and  were educated on posterior hip precautions, WBAT status, and knee immobilizer. Due to her dementia, pt will need continued education on these precautions. Pt completed supine<>sit EOB with mod A x2 and significant verbal cues. Pt completed sit<>stand x2 with mod-max Ax2 and use of a rwx with significant verbal cues. Pt attempted taking 1-2 small steps at bedside but demos continuously trying to sit back down at EOB before in a safe place to do so despite consistent verbal cues. Pt then required mod-max A to maintain sitting EOB and returned to supine in bed with max-dep A x2 due to significant fatigue following activity. Pt would benefit from skilled PT intervention to address deficits in balance, strength, activity tolerance, fxl mob, and continued pt education on safety with mobility and hip precautions. Recommend d/c to SNF for continued PT/OT services.      Anticipated Discharge Disposition (PT): skilled nursing facility  "

## 2023-11-06 NOTE — PLAN OF CARE
Goal Outcome Evaluation:      Pt alert and oriented x 2-4; dementia worse at times. VSS. no c/o pain, some facial grimacing. R leg weakness and in immobilizer /ace wrap. PPP. SCDS on L leg for VTE. . Tolerating regular diet. Incision drsxg in place and unable to visualize, no drainage on drsxg Voiding via FC.  Bed alarm on zone 2; x3 bed rails up. Call light within reach. Safety maintained.

## 2023-11-06 NOTE — THERAPY EVALUATION
Patient Name: Dina Michaels  : 1934    MRN: [id number]                              Today's Date: 2023       Admit Date: 2023    Visit Dx:     ICD-10-CM ICD-9-CM   1. Closed subcapital fracture of femur, left, initial encounter  S72.012A 820.09   2. Closed fracture of left hip, initial encounter  S72.002A 820.8     Patient Active Problem List   Diagnosis    AMS (altered mental status)    Vascular dementia without behavioral disturbance    Near syncope    Acute pulmonary embolism    Hypothyroidism    Lactic acidosis    Gastrointestinal hemorrhage, unspecified gastrointestinal hemorrhage type: Possibly from duodenal versus diverticular bleed    Chronic anemia    Essential hypertension    Acute CVA (cerebrovascular accident)    History of gastrointestinal bleeding    Closed subcapital fracture of femur, left, initial encounter    Fall (on) (from) other stairs and steps, initial encounter    Acute hip pain, left    Acute cystitis without hematuria     Past Medical History:   Diagnosis Date    Cancer     Dementia      Past Surgical History:   Procedure Laterality Date    COLONOSCOPY N/A 2023    Procedure: COLONOSCOPY WITH ANESTHESIA;  Surgeon: Bubba Asher MD;  Location: [facility] ENDOSCOPY;  Service: Gastroenterology;  Laterality: N/A;  pre gi bleed  post diverticulosis  Dr. Hebert    ENDOSCOPY N/A 2023    Procedure: ESOPHAGOGASTRODUODENOSCOPY WITH ANESTHESIA;  Surgeon: Bubba Asher MD;  Location: [facility] ENDOSCOPY;  Service: Gastroenterology;  Laterality: N/A;  pre screen  post gastric bx  Dr Hebert    HIP HEMIARTHROPLASTY Left 2023    Procedure: HIP HEMIARTHROPLASTY;  Surgeon: Denzel De La Vega MD;  Location: [facility] OR;  Service: Orthopedics;  Laterality: Left;      General Information       Row Name 23 0911          OT Time and Intention    Document Type evaluation  admitted s/p fall at home with c/o L hip pain, Dx:closed subcapital fx of L femur, Procedure: L hip  hemiarthroplasty  -CS (r) MB (t) CS (c)     Mode of Treatment occupational therapy;co-treatment  -CS (r) MB (t) CS (c)       Row Name 11/06/23 0911          General Information    Patient Profile Reviewed yes  -CS (r) MB (t) CS (c)     Prior Level of Function independent:;ADL's;all household mobility  -CS (r) MB (t) CS (c)     Existing Precautions/Restrictions fall;hip, posterior  WBAT LLE  -CS (r) MB (t) CS (c)     Barriers to Rehab cognitive status;physical barrier;visual deficit;hearing deficit  baseline dememtia, cataracts  -CS (r) MB (t) CS (c)       Row Name 11/06/23 0911          Occupational Profile    Environmental Supports and Barriers (Occupational Profile) FWW, walk-in shower, framed toilet seat, shower chair, rollator  -CS (r) MB (t) CS (c)       Row Name 11/06/23 0911          Living Environment    People in Home spouse  -CS (r) MB (t) CS (c)       Row Name 11/06/23 0911          Home Main Entrance    Number of Stairs, Main Entrance five  -CS (r) MB (t) CS (c)     Stair Railings, Main Entrance railing on left side (ascending)  -CS (r) MB (t) CS (c)       Row Name 11/06/23 0911          Stairs Within Home, Primary    Number of Stairs, Within Home, Primary none  -CS (r) MB (t) CS (c)       Row Name 11/06/23 0911          Cognition    Orientation Status (Cognition) oriented to;person  -CS (r) MB (t) CS (c)       Row Name 11/06/23 0911          Safety Issues, Functional Mobility    Safety Issues Affecting Function (Mobility) ability to follow commands;awareness of need for assistance;friction/shear risk;insight into deficits/self-awareness;judgment;positioning of assistive device;problem-solving;safety precaution awareness;sequencing abilities;safety precautions follow-through/compliance  -CS (r) MB (t) CS (c)     Impairments Affecting Function (Mobility) balance;cognition;endurance/activity tolerance;pain;postural/trunk control;strength  -CS (r) MB (t) CS (c)     Cognitive Impairments, Mobility  Safety/Performance attention;awareness, need for assistance;insight into deficits/self-awareness;judgment;problem-solving/reasoning;safety precaution awareness;safety precaution follow-through;sequencing abilities  -CS (r) MB (t) CS (c)               User Key  (r) = Recorded By, (t) = Taken By, (c) = Cosigned By      Initials Name Provider Type    CS Abiola Haynes S, OTR/L, CNT Occupational Therapist    Cait Pierre, OT Student OT Student                     Mobility/ADL's       Row Name 11/06/23 0911          Bed Mobility    Bed Mobility supine-sit;sit-supine  -CS (r) MB (t) CS (c)     Supine-Sit [place] (Bed Mobility) moderate assist (50% patient effort);maximum assist (25% patient effort);2 person assist;verbal cues  -CS (r) MB (t) CS (c)     Sit-Supine [place] (Bed Mobility) dependent (less than 25% patient effort);2 person assist;verbal cues  -CS (r) MB (t) CS (c)     Bed Mobility, Safety Issues cognitive deficits limit understanding;decreased use of legs for bridging/pushing  -CS (r) MB (t) CS (c)     Assistive Device (Bed Mobility) bed rails;draw sheet;head of bed elevated  -CS (r) MB (t) CS (c)       Row Name 11/06/23 0911          Transfers    Transfers sit-stand transfer  -CS (r) MB (t) CS (c)     Comment, (Transfers) --  -CS (r) MB (t) CS (c)       Row Name 11/06/23 0911          Sit-Stand Transfer    Sit-Stand [place] (Transfers) moderate assist (50% patient effort);maximum assist (25% patient effort)  -CS (r) MB (t) CS (c)     Assistive Device (Sit-Stand Transfers) walker, front-wheeled  -CS (r) MB (t) CS (c)     Comment, (Sit-Stand Transfer) x2  -CS (r) MB (t) CS (c)       Row Name 11/06/23 0911          Functional Mobility    Functional Mobility- Comment took a couple steps away from bed  -CS (r) MB (t) CS (c)       Row Name 11/06/23 0911          Activities of Daily Living    BADL Assessment/Intervention lower body dressing  -CS (r) MB (t) CS (c)       Row Name 11/06/23 0911           Mobility    Extremity Weight-bearing Status left lower extremity  -CS (r) MB (t) CS (c)     Left Lower Extremity (Weight-bearing Status) weight-bearing as tolerated (WBAT)  -CS (r) MB (t) CS (c)       Row Name 11/06/23 0911          Lower Body Dressing Assessment/Training    [place] Level (Lower Body Dressing) don;socks;dependent (less than 25% patient effort)  -CS (r) MB (t) CS (c)     Position (Lower Body Dressing) sitting up in bed  -CS (r) MB (t) CS (c)               User Key  (r) = Recorded By, (t) = Taken By, (c) = Cosigned By      Initials Name Provider Type    CS Abiola Haynes, OTR/L, CNT Occupational Therapist    Cait Pierre, OT Student OT Student                   Obj/Interventions       Row Name 11/06/23 0911          Sensory Assessment (Somatosensory)    Sensory Assessment (Somatosensory) UE sensation intact  -CS (r) MB (t) CS (c)       Row Name 11/06/23 0911          Range of Motion Comprehensive    Comment, General Range of Motion B shoulder flexion: 50% impaired, all other joints WFL  -CS (r) MB (t) CS (c)       Row Name 11/06/23 0911          Strength Comprehensive (MMT)    Comment, General Manual Muscle Testing (MMT) Assessment B shoulder flexion:2+/5, B elbow flex/ext:4/5  -CS (r) MB (t) CS (c)       Row Name 11/06/23 0911          Balance    Balance Assessment sitting static balance;standing static balance;sitting dynamic balance;standing dynamic balance  -CS (r) MB (t) CS (c)     Static Sitting Balance standby assist  -CS (r) MB (t) CS (c)     Dynamic Sitting Balance standby assist;contact guard  -CS (r) MB (t) CS (c)     Position, Sitting Balance unsupported;sitting edge of bed  -CS (r) MB (t) CS (c)     Static Standing Balance moderate assist;2-person assist;verbal cues  -CS (r) MB (t) CS (c)     Dynamic Standing Balance moderate assist;maximum assist;2-person assist;verbal cues  -CS (r) MB (t) CS (c)     Position/Device Used, Standing Balance supported;walker,  front-wheeled  -CS (r) MB (t) CS (c)               User Key  (r) = Recorded By, (t) = Taken By, (c) = Cosigned By      Initials Name Provider Type    Abiola Leone, OTR/L, CNT Occupational Therapist    aCit Pierre, OT Student OT Student                   Goals/Plan       Row Name 11/06/23 0911          Transfer Goal 1 (OT)    Activity/Assistive Device (Transfer Goal 1, OT) bed-to-chair/chair-to-bed;commode, 3-in-1  -CS (r) MB (t) CS (c)     [place] Level/Cues Needed (Transfer Goal 1, OT) moderate assist (50-74% patient effort)  -CS (r) MB (t) CS (c)     Time Frame (Transfer Goal 1, OT) long term goal (LTG)  -CS (r) MB (t) CS (c)     Progress/Outcome (Transfer Goal 1, OT) new goal  -CS (r) MB (t) CS (c)       Row Name 11/06/23 0911          Dressing Goal 1 (OT)    Activity/Device (Dressing Goal 1, OT) lower body dressing  -CS (r) MB (t) CS (c)     [place]/Cues Needed (Dressing Goal 1, OT) moderate assist (50-74% patient effort)  -CS (r) MB (t) CS (c)     Time Frame (Dressing Goal 1, OT) long term goal (LTG)  -CS (r) MB (t) CS (c)     Strategies/Barriers (Dressing Goal 1, OT) AE PRN  -CS (r) MB (t) CS (c)     Progress/Outcome (Dressing Goal 1, OT) new goal  -CS (r) MB (t) CS (c)       Row Name 11/06/23 0911          Grooming Goal 1 (OT)    Activity/Device (Grooming Goal 1, OT) grooming skills, all  -CS (r) MB (t) CS (c)     [place] (Grooming Goal 1, OT) moderate assist (50-74% patient effort)  -CS (r) MB (t) CS (c)     Time Frame (Grooming Goal 1, OT) long term goal (LTG)  -CS (r) MB (t) CS (c)     Strategies/Barriers (Grooming Goal 1, OT) standing bedside (x5 minutes)  -CS (r) MB (t) CS (c)     Progress/Outcome (Grooming Goal 1, OT) new goal  -CS (r) MB (t) CS (c)       Row Name 11/06/23 0911          Therapy Assessment/Plan (OT)    Planned Therapy Interventions (OT) activity tolerance training;BADL retraining;functional balance retraining;IADL retraining;occupation/activity based  "interventions;patient/caregiver education/training;strengthening exercise;transfer/mobility retraining;adaptive equipment training  -CS (r) MB (t) CS (c)               User Key  (r) = Recorded By, (t) = Taken By, (c) = Cosigned By      Initials Name Provider Type    CS Abiola Haynes S, OTR/L, CNT Occupational Therapist    Cait Pierre, OT Student OT Student                   Clinical Impression       Row Name 11/06/23 0911          Pain Assessment    Pain Intervention(s) Medication (See MAR);Repositioned;Ambulation/increased activity  -CS (r) MB (t) CS (c)     Additional Documentation Pain Scale: FACES Pre/Post-Treatment (Group)  -CS (r) MB (t) CS (c)       Row Name 11/06/23 0911          Pain Scale: FACES Pre/Post-Treatment    Pain: FACES Scale, Pretreatment 0-->no hurt  -CS (r) MB (t) CS (c)     Posttreatment Pain Rating 6-->hurts even more  -CS (r) MB (t) CS (c)     Pain Location - Side/Orientation Left  -CS (r) MB (t) CS (c)     Pain Location - hip  -CS (r) MB (t) CS (c)       Row Name 11/06/23 0911          Plan of Care Review    Plan of Care Reviewed With patient;spouse  -CS (r) MB (t) CS (c)     Progress no change  -CS (r) MB (t) CS (c)     Outcome Evaluation OT eval completed. Pt is alert and oriented to self only d/t baseline dementia. Pt's spouse reports pt has \"good days and bad days with her dementia.\"Spouse also reports pt was IND at home and \"just let her do whatever she wanted.\" [place] requiring therapist to speak loudly. Spouse present at bedside and is a good historian for pt. Pt in bed upon arrival with L knee immobilizer donned. LLE WBAT. Educated pt and spouse on hip precautions. Spouse verbalized understanding, however pt will require continuous education. Pt Mod-Max Ax2 for supine <>sit with HOB elevated. DEP for donning socks while seated upright in bed. Completed sit <> stand t/f x2 with Mod-Max A x2 and FWW to help maintain balance. First attempt pt stood up and sat right back down, " second attempt pt was able to take a couple steps away from the bed and then backwards towards the bed. Pt attempted to sit back down before being in a safe position to do so despite vc's. Pt limited d/t pain. Pt DEP for sit <>supine and increased fatigue noted. Pt would benefit from skilled OT to address fxl mobility, balance, stength, endurance with ADLs. Recommend d/c to SNF.  -CS (r) MB (t) CS (c)       Row Name 11/06/23 0911          Therapy Assessment/Plan (OT)    Rehab Potential (OT) good, to achieve stated therapy goals  -CS (r) MB (t) CS (c)     Criteria for Skilled Therapeutic Interventions Met (OT) meets criteria;skilled treatment is necessary  -CS (r) MB (t) CS (c)     Therapy Frequency (OT) 5 times/wk  -CS (r) MB (t) CS (c)     Predicted Duration of Therapy Intervention (OT) until hospital d/c  -CS (r) MB (t) CS (c)       Row Name 11/06/23 0911          Therapy Plan Review/Discharge Plan (OT)    Anticipated Discharge Disposition (OT) skilled nursing facility  -CS (r) MB (t) CS (c)       Row Name 11/06/23 0911          Positioning and Restraints    Pre-Treatment Position in bed  -CS (r) MB (t) CS (c)     Post Treatment Position bed  -CS (r) MB (t) CS (c)     In Bed fowlers;call light within reach;encouraged to call for assist;exit alarm on;with family/caregiver;side rails up x2;L knee immobilizer  -CS (r) MB (t) CS (c)               User Key  (r) = Recorded By, (t) = Taken By, (c) = Cosigned By      Initials Name Provider Type    CS Abiola Haynes S, OTR/L, CNT Occupational Therapist    Cait Pierre, OT Student OT Student                   Outcome Measures       Row Name 11/06/23 0911          How much help from another is currently needed...    Putting on and taking off regular lower body clothing? 1  -CS (r) MB (t) CS (c)     Bathing (including washing, rinsing, and drying) 2  -CS (r) MB (t) CS (c)     Toileting (which includes using toilet bed pan or urinal) 2  -CS (r) MB (t) CS (c)      Putting on and taking off regular upper body clothing 3  -CS (r) MB (t) CS (c)     Taking care of personal grooming (such as brushing teeth) 3  -CS (r) MB (t) CS (c)     Eating meals 4  -CS (r) MB (t) CS (c)     AM-PAC 6 Clicks Score (OT) 15  -CS (r) MB (t)       Row Name 11/06/23 0909 11/06/23 0800       How much help from another person do you currently need...    Turning from your back to your side while in flat bed without using bedrails? 2 (P)   -BE 2  -KS    Moving from lying on back to sitting on the side of a flat bed without bedrails? 2 (P)   -BE 1  -KS    Moving to and from a bed to a chair (including a wheelchair)? 2 (P)   -BE 1  -KS    Standing up from a chair using your arms (e.g., wheelchair, bedside chair)? 2 (P)   -BE 1  -KS    Climbing 3-5 steps with a railing? 1 (P)   -BE 1  -KS    To walk in hospital room? 2 (P)   -BE 1  -KS    AM-PAC 6 Clicks Score (PT) 11 (P)   -BE 7  -KS    Highest level of mobility 4 --> Transferred to chair/commode (P)   -BE 2 --> Bed activities/dependent transfer  -KS      Row Name 11/06/23 0911 11/06/23 0909       Functional Assessment    Outcome Measure Options AM-PAC 6 Clicks Daily Activity (OT)  -CS (r) MB (t) CS (c) AM-PAC 6 Clicks Basic Mobility (PT) (P)   -BE              User Key  (r) = Recorded By, (t) = Taken By, (c) = Cosigned By      Initials Name Provider Type    Abiola Leone, OTR/L, CNT Occupational Therapist    Zuleyka Ramirez, RN Registered Nurse    Cait Pierre, OT Student OT Student    Tisha Schreiber, PT Student PT Student                    Occupational Therapy Education       Title: PT OT SLP Therapies (In Progress)       Topic: Occupational Therapy (Done)       Point: ADL training (Done)       Description:   Instruct learner(s) on proper safety adaptation and remediation techniques during self care or transfers.   Instruct in proper use of assistive devices.                  Learning Progress Summary             Patient  "Acceptance, E, VU by MB at 11/6/2023 1047                         Point: Home exercise program (Done)       Description:   Instruct learner(s) on appropriate technique for monitoring, assisting and/or progressing therapeutic exercises/activities.                  Learning Progress Summary             Patient Acceptance, E, VU by MB at 11/6/2023 1047                         Point: Precautions (Done)       Description:   Instruct learner(s) on prescribed precautions during self-care and functional transfers.                  Learning Progress Summary             Patient Acceptance, E, VU by MB at 11/6/2023 1047                         Point: Body mechanics (Done)       Description:   Instruct learner(s) on proper positioning and spine alignment during self-care, functional mobility activities and/or exercises.                  Learning Progress Summary             Patient Acceptance, E, VU by MB at 11/6/2023 1047                                         User Key       Initials Effective Dates Name Provider Type Discipline    MB 08/04/23 -  Cait Andersen OT Student OT Student OT                  OT Recommendation and Plan  Planned Therapy Interventions (OT): activity tolerance training, BADL retraining, functional balance retraining, IADL retraining, occupation/activity based interventions, patient/caregiver education/training, strengthening exercise, transfer/mobility retraining, adaptive equipment training  Therapy Frequency (OT): 5 times/wk  Plan of Care Review  Plan of Care Reviewed With: patient, spouse  Progress: no change  Outcome Evaluation: OT eval completed. Pt is alert and oriented to self only d/t baseline dementia. Pt's spouse reports pt has \"good days and bad days with her dementia.\"Spouse also reports pt was IND at home and \"just let her do whatever she wanted.\" [place] requiring therapist to speak loudly. Spouse present at bedside and is a good historian for pt. Pt in bed upon arrival with L knee " immobilizer donned. LLE WBAT. Educated pt and spouse on hip precautions. Spouse verbalized understanding, however pt will require continuous education. Pt Mod-Max Ax2 for supine <>sit with HOB elevated. DEP for donning socks while seated upright in bed. Completed sit <> stand t/f x2 with Mod-Max A x2 and FWW to help maintain balance. First attempt pt stood up and sat right back down, second attempt pt was able to take a couple steps away from the bed and then backwards towards the bed. Pt attempted to sit back down before being in a safe position to do so despite vc's. Pt limited d/t pain. Pt DEP for sit <>supine and increased fatigue noted. Pt would benefit from skilled OT to address fxl mobility, balance, stength, endurance with ADLs. Recommend d/c to SNF.     Time Calculation:         Time Calculation- OT       Row Name 11/06/23 0911             Time Calculation- OT    OT Start Time 0925  +10 min chart review  -CS (r) MB (t) CS (c)      OT Stop Time 1012  -CS (r) MB (t) CS (c)      OT Time Calculation (min) 47 min  -CS (r) MB (t)      OT Received On 11/06/23  -CS (r) MB (t) CS (c)      OT Goal Re-Cert Due Date 11/16/23  -CS (r) MB (t) CS (c)         Untimed Charges    OT Eval/Re-eval Minutes 57  -CS (r) MB (t) CS (c)         Total Minutes    Untimed Charges Total Minutes 57  -CS (r) MB (t)       Total Minutes 57  -CS (r) MB (t)                User Key  (r) = Recorded By, (t) = Taken By, (c) = Cosigned By      Initials Name Provider Type    CS Abiola Haynes S, OTR/L, CNT Occupational Therapist    Cait Pierre, OT Student OT Student                           Cait Andersen, OT Student  11/6/2023

## 2023-11-06 NOTE — PAYOR COMM NOTE
"Behzad Michaels (89 y.o. Female)   [id number]   admit 11/4  [facility]     [place] phone    fax           Date of Birth   1934    Social Security Number       Address   [address] [facility] [address]    Home Phone   [phone]    MRN   [id number]       Congregation   [employer]    Marital Status                               Admission Date   11/4/23    Admission Type   Emergency    Admitting Provider   Kayden Clemente MD    Attending Provider   Kayden Clemente MD    Department, Room/Bed   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)   Code Status: CPR    Ht: 162.6 cm (64\")   Wt: 68 kg (150 lb)    Admission Cmt: None   Principal Problem: Closed subcapital fracture of femur, left, initial encounter [S72.012A]                   Active Insurance as of 11/4/2023       Primary Coverage       Payor Plan Insurance Group Employer/Plan Group    HUMANA MEDICARE REPLACEMENT HUMANA MEDICARE REPLACEMENT [id number]       Payor Plan Address Payor Plan Phone Number Payor Plan Fax Number Effective Dates    [address] [address] [phone]  1/1/2021 - None Entered    [facility] [address]         Subscriber Name Subscriber Birth Date Member ID       BEHZAD MICHAELS 1934 [id number]                     Emergency Contacts        (Rel.) Home Phone Work Phone Mobile Phone    OLLIE MICHAELS (Spouse) [phone] -- [phone]                 History & Physical        Steffany Stanford APRN at 11/05/23 0707       Attestation signed by Conner Sood MD at 11/05/23 1021    I have reviewed this documentation and agree.    This visit was performed by both a physician and an APC. I personally " evaluated and examined the patient. I performed all aspects of the MDM as documented.      Electronically signed by Conner Sood MD, 11/5/2023, 10:21 CST.                      [facility] Medicine Services  HISTORY AND PHYSICAL    Date of Admission: 11/4/2023  Primary Care Physician: Benedicto Hebert MD    Subjective   Primary Historian: Information obtained from  Nik present at bedside as patient not deemed a reliable historian.    Chief Complaint: Left hip pain    History of Present Illness  Ms. Michaels is an 89-year-old  female with a past medical history significant for vascular dementia without behavioral disturbance, hypothyroidism, primary hypertension and GERD.  Her primary care provider is Dr. Hebert.  Information obtained from  present at bedside.   reports they have 5 steps in their entryway and patient was walking down the steps to lock the door.  He found her sitting on the floor.  She had an unwitnessed fall but  believes she was close to the bottom step because she was sitting when he found her.  Patient was unable to stand or ambulate and reported immediate left hip pain.  There was no loss of consciousness noted per .  No other injuries noted or reported.  No complaints of chest pain, palpitations or shortness of breath.  X-ray left femur acute impacted left subcapital femoral neck fracture.  Chest x-ray increased prominence of interstitial markings diffusely raise questions of mild edema or volume overload.  Sodium 135, potassium 4.3, creatinine 1.05, WBC 12.09.  Emergency room physician contacted Dr. De La Vega orthopedics and plans for left hip hemoarthroplasty today.    Patient sitting up in bed examined in room 346 with  at bedside.  She does report right hip tenderness to palpation.  Otherwise no complaints.  Patient identifies her  and tells me her name.  She does not recall events of fall.   No oxygen use.    Review of Systems   Constitutional:  Positive for activity change (After fall). Negative for chills, fatigue and fever.   HENT:  Negative for congestion and trouble swallowing.         Hard of hearing   Eyes:  Negative for photophobia and visual disturbance.   Respiratory:  Negative for cough, shortness of breath and wheezing.    Cardiovascular:  Negative for chest pain, palpitations and leg swelling.   Gastrointestinal:  Negative for constipation, diarrhea, nausea and vomiting.   Endocrine: Negative for cold intolerance, heat intolerance and polyuria.   Genitourinary:  Negative for dysuria and frequency.   Musculoskeletal:  Positive for gait problem (After fall).   Skin:  Negative for color change, pallor and wound.   Allergic/Immunologic: Negative for immunocompromised state.   Neurological:  Negative for weakness.   Hematological:  Negative for adenopathy. Does not bruise/bleed easily.   Psychiatric/Behavioral:  Positive for confusion (Baseline dementia). Negative for agitation and behavioral problems.       Information obtained from , Nik present at bedside as patient not reliable historian.  Otherwise complete ROS reviewed and negative except as mentioned in the HPI.    Past Medical History:   Past Medical History:   Diagnosis Date    Cancer     Dementia      Past Surgical History:  Past Surgical History:   Procedure Laterality Date    COLONOSCOPY N/A 4/14/2023    Procedure: COLONOSCOPY WITH ANESTHESIA;  Surgeon: Bubba Asher MD;  Location: [facility] ENDOSCOPY;  Service: Gastroenterology;  Laterality: N/A;  pre gi bleed  post diverticulosis  Dr. Hebert    ENDOSCOPY N/A 4/14/2023    Procedure: ESOPHAGOGASTRODUODENOSCOPY WITH ANESTHESIA;  Surgeon: Bubba Asher MD;  Location: [facility] ENDOSCOPY;  Service: Gastroenterology;  Laterality: N/A;  pre screen  post gastric bx  Dr Hebert     Social History:  reports that she has never smoked. She has never used smokeless tobacco. She reports  "current alcohol use. She reports that she does not use drugs.    Family History: family history is not on file.      Allergies:  Allergies   Allergen Reactions    Amoxicillin Hallucinations    Clarithromycin Hallucinations    Nisoldipine Er Other (See Comments)     Feelings of passing out, tired    Sular [Nisoldipine] Unknown - Low Severity       Medications:  Prior to Admission medications    Medication Sig Start Date End Date Taking? Authorizing Provider   alendronate (FOSAMAX) 70 MG tablet Take 1 tablet by mouth Every 7 (Seven) Days.    ProviderJv MD   aspirin 81 MG EC tablet Take 1 tablet by mouth Daily. 5/15/23   Jeff Landis DO   atorvastatin (LIPITOR) 80 MG tablet Take 1 tablet by mouth Every Night. 5/15/23   Jeff Landis DO   levothyroxine (SYNTHROID, LEVOTHROID) 100 MCG tablet Take 1 tablet by mouth Daily.    ProviderJv MD   memantine (NAMENDA) 10 MG tablet Take 1 tablet by mouth 2 (Two) Times a Day.    ProviderJv MD   metoprolol succinate XL (TOPROL-XL) 25 MG 24 hr tablet Take 12.5 mg by mouth Daily.    ProviderJv MD   mirtazapine (REMERON) 15 MG tablet Take 1 tablet by mouth Every Night.    ProviderJv MD   pantoprazole (PROTONIX) 40 MG EC tablet Take 1 tablet by mouth 2 (Two) Times a Day Before Meals. 4/15/23   Art Sanchez MD     I have utilized all available immediate resources to obtain, update, or review the patient's current medications (including all prescriptions, over-the-counter products, herbals, cannabis/cannabidiol products, and vitamin/mineral/dietary (nutritional) supplements).    Objective     Vital Signs: /58 (BP Location: Left arm, Patient Position: Lying)   Pulse 83   Temp 98.3 °F (36.8 °C) (Oral)   Resp 18   Ht 162.6 cm (64\")   Wt 68 kg (150 lb)   SpO2 99%   BMI 25.75 kg/m²   Physical Exam  Vitals and nursing note reviewed.   Constitutional:       Comments: Sitting up in bed.  No oxygen use.  "  in room.  Patient oriented to person and identifies .   HENT:      Head: Normocephalic and atraumatic.      Nose: No congestion.      Mouth/Throat:      Pharynx: Oropharynx is clear. No oropharyngeal exudate or posterior oropharyngeal erythema.   Eyes:      Extraocular Movements: Extraocular movements intact.      Pupils: Pupils are equal, round, and reactive to light.   Cardiovascular:      Rate and Rhythm: Normal rate and regular rhythm.      Heart sounds: No murmur heard.  Pulmonary:      Breath sounds: No wheezing, rhonchi or rales.      Comments: No oxygen in use.  Abdominal:      Palpations: Abdomen is soft.      Tenderness: There is no abdominal tenderness.   Genitourinary:     Comments: WIC in place.  Musculoskeletal:         General: Tenderness (Left hip) present.      Cervical back: Normal range of motion and neck supple.   Skin:     General: Skin is warm and dry.   Neurological:      General: No focal deficit present.      Comments: Oriented to person.  Identifies .  Disoriented to situation and place.  At baseline per .        Results Reviewed:  Lab Results (last 24 hours)       Procedure Component Value Units Date/Time    Comprehensive Metabolic Panel [[id number]]  (Abnormal) Collected: 11/04/23 2159    Specimen: Blood Updated: 11/04/23 2225     Glucose 145 mg/dL      BUN 17 mg/dL      Creatinine 1.05 mg/dL      Sodium 135 mmol/L      Potassium 4.3 mmol/L      Comment: Slight hemolysis detected by analyzer. Results may be affected.        Chloride 102 mmol/L      CO2 25.0 mmol/L      Calcium 9.3 mg/dL      Total Protein 6.6 g/dL      Albumin 3.7 g/dL      ALT (SGPT) 13 U/L      AST (SGOT) 17 U/L      Alkaline Phosphatase 65 U/L      Total Bilirubin 0.4 mg/dL      Globulin 2.9 gm/dL      A/G Ratio 1.3 g/dL      BUN/Creatinine Ratio 16.2     Anion Gap 8.0 mmol/L      eGFR 50.9 mL/min/1.73     Narrative:      GFR Normal >60  Chronic Kidney Disease <60  Kidney Failure <15    The  GFR formula is only valid for adults with stable renal function between ages 18 and 70.    aPTT [[id number]]  (Normal) Collected: 11/04/23 2159    Specimen: Blood Updated: 11/04/23 2223     PTT 29.9 seconds     Protime-INR [[id number]]  (Normal) Collected: 11/04/23 2159    Specimen: Blood Updated: 11/04/23 2222     Protime 12.9 Seconds      INR 0.96    CBC & Differential [[id number]]  (Abnormal) Collected: 11/04/23 2159    Specimen: Blood Updated: 11/04/23 2205    Narrative:      The following orders were created for panel order CBC & Differential.  Procedure                               Abnormality         Status                     ---------                               -----------         ------                     CBC Auto Differential[[id number]]        Abnormal            Final result                 Please view results for these tests on the individual orders.    CBC Auto Differential [[id number]]  (Abnormal) Collected: 11/04/23 2159    Specimen: Blood Updated: 11/04/23 2205     WBC 12.09 10*3/mm3      RBC 4.54 10*6/mm3      Hemoglobin 11.7 g/dL      Hematocrit 38.1 %      MCV 83.9 fL      MCH 25.8 pg      MCHC 30.7 g/dL      RDW 16.5 %      RDW-SD 50.4 fl      MPV 10.0 fL      Platelets 266 10*3/mm3      Neutrophil % 84.4 %      Lymphocyte % 7.2 %      Monocyte % 6.9 %      Eosinophil % 0.3 %      Basophil % 0.5 %      Immature Grans % 0.7 %      Neutrophils, Absolute 10.20 10*3/mm3      Lymphocytes, Absolute 0.87 10*3/mm3      Monocytes, Absolute 0.83 10*3/mm3      Eosinophils, Absolute 0.04 10*3/mm3      Basophils, Absolute 0.06 10*3/mm3      Immature Grans, Absolute 0.09 10*3/mm3      nRBC 0.0 /100 WBC           Imaging Results (Last 24 Hours)       Procedure Component Value Units Date/Time    XR Femur 2 View Left [[id number]] Collected: 11/04/23 2144     Updated: 11/04/23 2148    Narrative:      XR FEMUR 2 VW LEFT-     HISTORY: fall     COMPARISON: None     FINDINGS: Frontal and crosstable lateral views of  the left femur were  obtained.     There is an impacted left subcapital femoral neck fracture. No  additional left femur fracture identified. Osteopenia. Scattered  vascular calcification.       Impression:      1. Acute impacted left subcapital femoral neck fracture.     This report was signed and finalized on 11/4/2023 9:45 PM CDT by Dr. Afia Renner MD.       XR Chest 1 View [[id number]] Collected: 11/04/23 2142     Updated: 11/04/23 2147    Narrative:      XR CHEST 1 VW-     HISTORY: fall     COMPARISON: 5/14/2023     FINDINGS: Frontal view the chest obtained.     Mild increasing prominence of the interstitial markings which may be  seen with mild edema/volume overload. The heart appears normal in size.  Calcification of the thoracic aortic arch. No pleural effusion or  pneumothorax. No acute regional bony pathology.       Impression:      1. Mild increasing prominence of the interstitial markings diffusely  raising the question of mild edema/volume overload.        This report was signed and finalized on 11/4/2023 9:44 PM CDT by Dr. Afia Renner MD.       XR Hip With or Without Pelvis 2 - 3 View Left [[id number]] Resulted: 11/04/23 2136     Updated: 11/04/23 2141              I have personally reviewed and interpreted the radiology studies and ECG obtained at time of admission.     Assessment / Plan   Assessment:   Active Hospital Problems    Diagnosis     **Closed subcapital fracture of femur, left, initial encounter     Fall (on) (from) other stairs and steps, initial encounter     Closed left hip fracture     Essential hypertension     Vascular dementia without behavioral disturbance     Hypothyroidism        Treatment Plan  The patient will be admitted to Dr. Sood service at [facility].  Ms. Michaels presented after a fall found to have left subcapital femur fracture.  Orthopedics consulted and Dr. De La Vega plans for surgical intervention today.    1.  Closed subcapital fracture left femur.   Patient walking down 5 steps and  found her sitting on the floor at the bottom of the steps.  No loss of consciousness noted per .  Patient does not recall events of fall.  Dr. De La Vega plans for left hip hemiarthroplasty today.  N.p.o. for surgical intervention.  IV fluids at 50 mL/h.  SCDs for deep vein thrombosis prophylaxis.  Physical therapy postop per Dr. De La Vega.  CBC, CMP in AM.    2.  Fall from steps.  Physical therapy postop per Dr. De La Vega.  Will likely require SNF at discharge and discussed with .    3.  Vascular dementia without behavioral disturbance.  Patient able to tell me her name and 's name otherwise does not answer questions appropriately.  She is at baseline per .  Continue Namenda and Remeron.    4.  Primary hypertension.  Blood pressure 166/58.  Resume metoprolol XL.  Will add labetalol for systolic blood pressure greater than 160 or diastolic blood pressure greater than 100.    5.  Hypothyroidism.  Continue Synthroid.  Check TSH.    6.  Acute left hip pain.  Tylenol for mild pain, Norco for moderate pain.  Use narcotics sparingly with history of dementia.    7.  Social service consult for discharge planning.  Discussed with  and patient will likely need skilled facility at discharge.  Has been agreeable if recommended by physical therapy.    Medical Decision Making  Number and Complexity of problems: 6  Acute left subcapital femur fracture: Acute, high complexity poses threat to life and bodily function  Acute left hip pain: Acute, moderate complexity  Vascular dementia without behavioral disturbance: Chronic, moderate complexity, stable  Primary hypertension: Chronic, moderate complexity, not at baseline  Hypothyroidism: Chronic, low complexity  Fall: Acute, moderate complexity    Differential Diagnosis: None    Conditions and Status        Condition is unchanged.     [facility] Data  External documents reviewed: Dr. Hebert office visit 8/23/2023  Cardiac  tracing (EKG, telemetry) interpretation: None  Radiology interpretation: Reviewed radiology interpretation left femur x-ray, chest x-ray  Labs reviewed:   CMP 11/4/2023.  Repeat BMP today.  CBC 11/4/2023.  Repeat CBC today.  Check urinalysis    Any tests that were considered but not ordered: None     Decision rules/scores evaluated (example [id number], Wells, etc): None     Discussed with: Dr. Sood, patient, and  Nik present at bedside     Care Planning  Shared decision making: Dr. Sood, patient, and  Nik.   and patient agreed to orthopedic consultation and surgical intervention by Dr. De La Vega.   agrees to physical therapy and SNF if recommended.  Code status and discussions: Full code  Patient surrogate decision maker is her , Nik    Disposition  Social Determinants of Health that impact treatment or disposition: None  Estimated length of stay is 3 nights.     I confirmed that the patient's advanced care plan is present, code status is documented, and a surrogate decision maker is listed in the patient's medical record.     The patient's surrogate decision maker is her  Nik.     The patient was seen and examined by me on 11/05/2023 at 0707.    Electronically signed by EDGARDO Urena, 11/05/23, 07:07 CST.               Electronically signed by Conner Sood MD at 11/05/23 1021          Emergency Department Notes        Grecia Jung, RN at 11/04/23 2244          Nursing report ED to floor  Dina Michaels  89 y.o.  female    HPI:   Chief Complaint   Patient presents with    Fall    Hip Pain       Admitting doctor:   Jesus Leung MD    Consulting provider(s):  Consults       No orders found from 10/6/2023 to 11/5/2023.             Admitting diagnosis:   The primary encounter diagnosis was Closed subcapital fracture of femur, left, initial encounter. A diagnosis of Closed fracture of left hip, initial encounter was also  "pertinent to this visit.    Code status:   Current Code Status       Date Active Code Status Order ID Comments User Context       Prior            Allergies:   Amoxicillin, Clarithromycin, Nisoldipine er, and Sular [nisoldipine]    Intake and Output  No intake or output data in the 24 hours ending 11/04/23 2244    Weight:       11/04/23 2023   Weight: 68 kg (150 lb)       Most recent vitals:   Vitals:    11/04/23 2023 11/04/23 2208 11/04/23 2239   BP: 149/95  145/97   BP Location:   Left arm   Patient Position:   Sitting   Pulse: 83 74 82   Resp: 18  20   Temp: 98.4 °F (36.9 °C)  98.5 °F (36.9 °C)   TempSrc:   Oral   SpO2: 100% 100% 100%   Weight: 68 kg (150 lb)     Height: 162.6 cm (64\")       Oxygen Therapy: .    Active LDAs/IV Access:   Lines, Drains & Airways       Active LDAs       Name Placement date Placement time Site Days    Peripheral IV 11/04/23 2157 Left Antecubital 11/04/23 2157  Antecubital  less than 1                    Labs (abnormal labs have a star):   Labs Reviewed   COMPREHENSIVE METABOLIC PANEL - Abnormal; Notable for the following components:       Result Value    Glucose 145 (*)     Creatinine 1.05 (*)     Sodium 135 (*)     eGFR 50.9 (*)     All other components within normal limits    Narrative:     GFR Normal >60  Chronic Kidney Disease <60  Kidney Failure <15    The GFR formula is only valid for adults with stable renal function between ages 18 and 70.   CBC WITH AUTO DIFFERENTIAL - Abnormal; Notable for the following components:    WBC 12.09 (*)     Hemoglobin 11.7 (*)     MCH 25.8 (*)     MCHC 30.7 (*)     RDW 16.5 (*)     Neutrophil % 84.4 (*)     Lymphocyte % 7.2 (*)     Immature Grans % 0.7 (*)     Neutrophils, Absolute 10.20 (*)     Immature Grans, Absolute 0.09 (*)     All other components within normal limits   PROTIME-INR - Normal   APTT - Normal   CBC AND DIFFERENTIAL    Narrative:     The following orders were created for panel order CBC & Differential.  Procedure             "                   Abnormality         Status                     ---------                               -----------         ------                     CBC Auto Differential[[id number]]        Abnormal            Final result                 Please view results for these tests on the individual orders.       Meds given in ED:   Medications   morphine injection 4 mg (4 mg Intravenous Given 11/4/23 2201)   ondansetron (ZOFRAN) injection 4 mg (4 mg Intravenous Given 11/4/23 2201)     No current facility-administered medications for this encounter.       NIH Stroke Scale:       Isolation/Infection(s):  No active isolations   MDR Pseudomonas     COVID Testing  Collected .  Resulted .    Nursing report ED to floor:  Mental status: .  Ambulatory status: .  Precautions: .    ED nurse phone extentsion- ..      Electronically signed by Grecia Jung RN at 11/04/23 2244       Grecia Jung RN at 11/04/23 2238          Attempted to call report at this time nurse unavailable     Electronically signed by Grecia Jung RN at 11/04/23 2238       Artie Marquez DO at 11/04/23 2114          Subjective   History of Present Illness  Pt presents to the EC with report of fall from stairs and L hip pain.  Hx obtained primarily from spouse d/t hx of dementia.  Pt reports pain in L hip.  Spouse states that they have five stairs leading to garage and she had fallen - was found at bottom of stairs sitting up.  No known LOC.  No vomiting/no complaints of neck/back pain.  No reported CP/SOB.  No other reported injuries or recent illnesses.         Review of Systems   Constitutional:  Negative for fever.   Respiratory:  Negative for cough.    Cardiovascular:  Negative for chest pain.   Gastrointestinal:  Negative for vomiting.   Musculoskeletal:  Negative for neck pain.        + L hip pain   Neurological:  Negative for syncope.   All other systems reviewed and are negative.      Past Medical History:   Diagnosis Date    Cancer         Allergies   Allergen Reactions    Amoxicillin Hallucinations    Clarithromycin Hallucinations    Nisoldipine Er Other (See Comments)     Feelings of passing out, tired    Sular [Nisoldipine] Unknown - Low Severity       Past Surgical History:   Procedure Laterality Date    COLONOSCOPY N/A 4/14/2023    Procedure: COLONOSCOPY WITH ANESTHESIA;  Surgeon: Bubba Asher MD;  Location: [facility] ENDOSCOPY;  Service: Gastroenterology;  Laterality: N/A;  pre gi bleed  post diverticulosis  Dr. Hebert    ENDOSCOPY N/A 4/14/2023    Procedure: ESOPHAGOGASTRODUODENOSCOPY WITH ANESTHESIA;  Surgeon: Bubba Asher MD;  Location: [facility] ENDOSCOPY;  Service: Gastroenterology;  Laterality: N/A;  pre screen  post gastric bx  Dr Hebert       No family history on file.    Social History     Socioeconomic History    Marital status:    Tobacco Use    Smoking status: Never    Smokeless tobacco: Never   Vaping Use    Vaping Use: Never used   Substance and Sexual Activity    Alcohol use: Yes    Drug use: Never           Objective   Physical Exam  Vitals and nursing note reviewed.   Constitutional:       General: She is not in acute distress.     Appearance: Normal appearance.   HENT:      Head: Normocephalic and atraumatic.      Nose: Nose normal.      Mouth/Throat:      Mouth: Mucous membranes are moist.   Eyes:      Extraocular Movements: Extraocular movements intact.      Conjunctiva/sclera: Conjunctivae normal.      Pupils: Pupils are equal, round, and reactive to light.   Cardiovascular:      Rate and Rhythm: Normal rate and regular rhythm.      Pulses: Normal pulses.      Heart sounds: Normal heart sounds.   Pulmonary:      Effort: Pulmonary effort is normal.      Breath sounds: Normal breath sounds.   Abdominal:      General: Abdomen is flat.      Palpations: Abdomen is soft.   Musculoskeletal:      Cervical back: Normal range of motion and neck supple.      Comments: L hip with + TTP diffusely - limited ROM.  Mild  shortening.  No rotation.  NVT intact in LE    Skin:     General: Skin is warm and dry.      Capillary Refill: Capillary refill takes less than 2 seconds.   Neurological:      Mental Status: She is alert.         Procedures          ED Course      XR Chest 1 View   Final Result   1. Mild increasing prominence of the interstitial markings diffusely   raising the question of mild edema/volume overload.           This report was signed and finalized on 11/4/2023 9:44 PM CDT by Dr. Afia Renner MD.          XR Femur 2 View Left   Final Result   1. Acute impacted left subcapital femoral neck fracture.       This report was signed and finalized on 11/4/2023 9:45 PM CDT by Dr. Afia Renner MD.          XR Hip With or Without Pelvis 2 - 3 View Left    (Results Pending)                                            Medical Decision Making  Pt stable in EC - No hx s/o ICH/spinal injury.  + L femoral neck fx.  Labs/CXR obtained for further medical clearance.  D/w Dr. De La Vega - recommends admit to hospitalist for this.  D/w Dr. Leung -will admit for further mgmt.     Amount and/or Complexity of Data Reviewed  Labs: ordered.  Radiology: ordered.    Risk  Prescription drug management.        Final diagnoses:   Closed fracture of left hip, initial encounter       ED Disposition  ED Disposition       ED Disposition   Decision to Admit    Condition   --    Comment   --               No follow-up provider specified.       Medication List      No changes were made to your prescriptions during this visit.            Artie Marquez DO  11/04/23 2117       Artie Marquez DO  11/04/23 2212      Electronically signed by Artie Marquez DO at 11/04/23 2212       Vital Signs (last 2 days)       Date/Time Temp Temp src Pulse Resp BP Patient Position SpO2    11/06/23 0816 98.3 (36.8) Axillary 103 16 -- Sitting 92    11/06/23 0313 99 (37.2) Temporal 96 16 150/54 Lying 95    11/05/23 2340 99 (37.2) Temporal 84 16  141/98 Lying 95    11/05/23 1952 98.3 (36.8) Temporal 100 16 156/80 Lying 96    11/05/23 1845 -- -- -- -- -- -- 98    11/05/23 1650 97.3 (36.3) Axillary 81 16 144/79 Lying 100    11/05/23 1647 -- -- 85 -- -- -- 100    11/05/23 1215 97.1 (36.2) Axillary 86 16 170/92 Lying 94    11/05/23 1200 97.3 (36.3) Temporal 82 11 187/88 -- 96    11/05/23 1145 -- -- 86 12 182/93 -- 96    11/05/23 1130 -- -- 83 17 164/86 -- 94    11/05/23 1115 -- -- 85 15 169/88 -- 96    11/05/23 1113 -- -- 85 14 169/88 -- 96    11/05/23 1108 -- -- 88 16 173/10 -- 87    11/05/23 1105 -- -- 85 16 190/115 -- 99    11/05/23 1100 -- -- 80 15 172/94 -- 100    11/05/23 1055 -- -- 79 14 178/85 -- 100    11/05/23 1052 98.1 (36.7) Temporal 78 18 182/104 -- 98    11/05/23 0830 98.6 (37) Oral 81 18 117/89 Lying 95    11/05/23 0354 98.3 (36.8) Oral 83 18 166/58 Lying 99    11/04/23 2302 98.3 (36.8) Oral 84 18 175/88 Lying 99    11/04/23 2239 98.5 (36.9) Oral 82 20 145/97 Sitting 100    11/04/23 2208 -- -- 74 -- -- -- 100    11/04/23 2023 98.4 (36.9) -- 83 18 149/95 -- 100          Current Facility-Administered Medications   Medication Dose Route Frequency Provider Last Rate Last Admin    acetaminophen (TYLENOL) tablet 650 mg  650 mg Oral Q4H PRN Denzel De La Vega MD        Or    acetaminophen (TYLENOL) suppository 650 mg  650 mg Rectal Q4H PRN Denzel De La Vega MD        atorvastatin (LIPITOR) tablet 80 mg  80 mg Oral Nightly Denzel De La Vega MD   80 mg at 11/05/23 2024    sennosides-docusate (PERICOLACE) 8.6-50 MG per tablet 2 tablet  2 tablet Oral BID Denzel De La Vega MD   2 tablet at 11/06/23 0900    And    polyethylene glycol (MIRALAX) packet 17 g  17 g Oral Daily PRN Denzel De La Vega MD        And    bisacodyl (DULCOLAX) EC tablet 5 mg  5 mg Oral Daily PRN Denzel De La Vega MD        And    bisacodyl (DULCOLAX) suppository 10 mg  10 mg Rectal Daily PRN Denzel De La Vega MD        diazePAM (VALIUM) tablet 5 mg  5 mg  Oral Q6H PRN Denzel De La Vega MD        docusate sodium (COLACE) capsule 100 mg  100 mg Oral BID PRN Denzel De La Vega MD        Enoxaparin Sodium (LOVENOX) syringe 40 mg  40 mg Subcutaneous Daily Denzel De La Vega MD   40 mg at 11/06/23 0900    HYDROcodone-acetaminophen (NORCO) 5-325 MG per tablet 1 tablet  1 tablet Oral Q4H PRN Denzel De La Vega MD        HYDROcodone-acetaminophen (NORCO) 5-325 MG per tablet 2 tablet  2 tablet Oral Q4H PRN Denzel De La Vega MD        HYDROmorphone (DILAUDID) injection 0.5 mg  0.5 mg Intravenous Q2H PRN Denzel De La Vega MD        And    naloxone (NARCAN) injection 0.1 mg  0.1 mg Intravenous Q5 Min PRN Denzel De La Vega MD        labetalol (NORMODYNE,TRANDATE) injection 10 mg  10 mg Intravenous Q6H PRN Denzel De La Vega MD        lactated ringers infusion  50 mL/hr Intravenous Continuous Denzel De La Vega MD 50 mL/hr at 11/05/23 0945 Restarted at 11/05/23 1029    lactated ringers infusion  100 mL/hr Intravenous Continuous Denzel De La Vega  mL/hr at 11/05/23 1838 100 mL/hr at 11/05/23 1838    levothyroxine (SYNTHROID, LEVOTHROID) tablet 100 mcg  100 mcg Oral Q AM Denzel De La Vega MD   100 mcg at 11/06/23 0631    magnesium hydroxide (MILK OF MAGNESIA) suspension 10 mL  10 mL Oral Daily PRN Denzel De La Vega MD        memantine (NAMENDA) tablet 10 mg  10 mg Oral BID Denzel De La Vega MD   10 mg at 11/06/23 0900    metoprolol succinate XL (TOPROL-XL) 24 hr tablet 12.5 mg  12.5 mg Oral Daily Denzel De La Vega MD   12.5 mg at 11/06/23 0900    mirtazapine (REMERON) tablet 15 mg  15 mg Oral Nightly Denzel De La Vega MD   15 mg at 11/05/23 2024    morphine injection 4 mg  4 mg Intravenous Q2H PRN Denzel De La Vega MD        And    naloxone (NARCAN) injection 0.4 mg  0.4 mg Intravenous Q5 Min PRN Denezl De La Vega MD   0.4 mg at 11/05/23 1456    ondansetron (ZOFRAN) injection 4 mg  4 mg Intravenous  Q6H PRN Denzel De La Vega MD        pantoprazole (PROTONIX) EC tablet 40 mg  40 mg Oral BID AC Denzel De La Vega MD   40 mg at 23 0830    phenol (CHLORASEPTIC) 1.4 % liquid 1 spray  1 spray Mouth/Throat Q2H PRN Denzel De La Vega MD        promethazine (PHENERGAN) tablet 12.5 mg  12.5 mg Oral Q6H PRN Denzel De La Vega MD        Or    promethazine (PHENERGAN) suppository 12.5 mg  12.5 mg Rectal Q6H PRN Denzel De La Vega MD        sodium chloride 0.9 % flush 1-10 mL  1-10 mL Intravenous PRN Denzel De La Vega MD        sodium chloride 0.9 % flush 10 mL  10 mL Intravenous Q12H Denzel De La Vega MD   10 mL at 23 0901    sodium chloride 0.9 % flush 10 mL  10 mL Intravenous PRN Denzel De La Vega MD        sodium chloride 0.9 % flush 10 mL  10 mL Intravenous Q12H Denzel De La Vega MD   10 mL at 23 0901    sodium chloride 0.9 % infusion 40 mL  40 mL Intravenous PRN Denzel De La Vega MD        sodium chloride 0.9 % infusion 40 mL  40 mL Intravenous PRN Denzel De La Vega MD            Operative/Procedure Notes (last 24 hours)        Denzel De La Vega MD at 23 1054          Patient Name: Rylie  : 1934  MRN: [id number]      DATE of SURGERY: 2023    SURGEON: Denzel De La Vega MD    ASSISTANT: NONE    Preoperative Diagnosis:  Acute traumatic displaced subcapital fracture of the neck of the Left femur, initial encounter for closed fracture     Postoperative Diagnosis: Acute traumatic displaced subcapital fracture of the neck of the Left femur, initial encounter for closed fracture     Procedure Performed: Left Hip Hemiarthroplasty    Surgical Approach: Posterior    Implants: DePuy Corail System with the following components:            44 mm bipolar head           13 mm press fit stem    Anesthesia Used: GETA    Operative Indications: 89 y.o. female with dementia status post fall at home yesterday with a displaced femoral neck  fracture.  Surgical indications include fracture displacement, pain control, ability to mobilize the patient, and prevent sequelae of prolonged bedrest (DVT, pneumonia, skin ulceration).  Risks include, but are not limited to, bleeding, infection, pain, damage to neurovascular structures, leg length inequality, hip dislocation, blood clots, intraoperative death. Risks, benefits, and alternatives were discussed and the patient wished to proceed.    Estimated Blood Loss: 150 mL    Drains: None    Specimens: None    Complications: None    Procedure In Detail:  The patient was seen in the preoperative holding room, the informed consent was reviewed and signed, and the correct operative extremity marked with the patient’s agreement.  The patient was transported to the operating room, where a timeout was performed identifying the correct patient and operative site.  Perioperative antibiotics were administered prior to incision.  Once placed under general anesthesia, the patient was positioned in the lateral decubitus position and all bony prominences were padded.  An axillary roll was placed.  The extremity was prepped and draped in the usual sterile fashion.    A posterior approach to the hip was utilized as a slightly curved incision was made centered from the posterior aspect of the greater trochanter.  Soft tissue was dissected in-line with the incision and the tensor fascia and gluteus jama muscle were split.  The trochanteric bursa was excised revealing the short external rotators of the hip, which were tagged and incised from the posterolateral aspect of the greater trochanter.  A T-shaped capsulotomy was performed and with progressive internal rotation of the hip the fracture was noted.  While protecting soft tissue structures, a standard femoral neck cut was made with an oscillating saw approximately one finger-breadth proximal to the lesser trochanter.  The native femoral head was removed with a corkscrew  device and taken the back table and measured.    Preparation of the femoral canal was then performed, first with a box-cut chisel, followed by a canal finder, lateralizing device, and sequential broaches up to a stable fit.  Trial components were inserted, the hip was reduced showing excellent stability and approximately equal leg lengths.  The trial components were removed, final implants impacted without complication with findings as described.    The incision was thoroughly irrigated followed by closure of the capsule with 0-Vicryl, re-approximation of the short external rotators with #2 FiberWire suture, and closure of the wound in layers.  The skin was closed with adhesive glue.  A soft tissue dressing was placed.  The patient was placed supine on a hospital bed, legs lengths again checked showing them to be equal and a knee immobilizer was placed.    The patient was awakened by anesthesia transported to the PACU in stable condition.      POSTOPERATIVE PLAN: Admit inpatient for monitoring, PT/OT, 3 weeks of DVT prophylaxis, and IV antibiotics for 24 hrs.  Weight bear as tolerated with posterior hip precautions.    Electronically signed by Denzel De La Vega MD on 11/5/2023 at 10:54 CST    Electronically signed by Denzel De La Vega MD at 11/05/23 1054       Denzel De La Vega MD at 11/05/23 1053          HIP HEMIARTHROPLASTY  Progress Note    Dina CALVERT Brando  11/5/2023    Pre-op Diagnosis:   Closed subcapital fracture of femur, left, initial encounter [S72.012A]       Post-Op Diagnosis Codes:     * Closed subcapital fracture of femur, left, initial encounter [S72.012A]    Procedure/CPT® Codes:  LA OPTX FEM FX PROX END NCK INT FIXJ/PROSTC RPLCMT [19705]      Procedure(s):  HIP HEMIARTHROPLASTY              Surgeon(s):  Dnezel De La Vega MD    Anesthesia: General    Staff:   Circulator: Laverne Dykes RN  Scrub Person: Miller Dow Charles J  Vendor Representative: Luis Miguel Gale          Estimated Blood Loss: <500ml    Urine Voided: 300 mL    Specimens:                None          Drains:   Urethral Catheter Double-lumen;Silicone 16 Fr. (Active)       [REMOVED] External Urinary Catheter (Removed)   Daily Indications Strict bedrest 23   Site Assessment Clean;Skin intact 23   Collection Container Wall suction 23   Catheter care complete Yes 23       Findings: see op note        Complications: none          Denzel De La Vega MD     Date: 2023  Time: 10:53 CST          Electronically signed by Denzel De La Vega MD at 23 1054          Physician Progress Notes (last 48 hours)        Denzel De La Vega MD at 23 2155            Full consult to follow    88 YO F with dementia s/p fall at home with L FNFx    1) NPO after midnight  2) Consent for left hip hemiarthroplasty  3) Hold DVT chemoprophylaxis    Electronically signed by Denzel De La Vega MD on 2023 at 21:56 CDT    Electronically signed by Denzel De La Vega MD at 23 2156          Consult Notes (last 72 hours)        Denzel De La Vega MD at 23 0815        Consult Orders    1. Inpatient Orthopedic Surgery Consult [[id number]] ordered by Denzel De La Vega MD                 Inpatient Orthopedic Surgery Consult  Consult performed by: Denzel De La Vega MD  Consult ordered by: Denzel De La Vega MD  Assessment/Recommendations: Orthopaedic Inpatient Consultation    NAME:  Dina Michaels   : 1934  MRN: [id number]    2023  9:03 PM    Requesting Physician: Dr. Sood    CHIEF COMPLAINT:  left hip pain      HISTORY OF PRESENT ILLNESS:   The patient is a 89 y.o. female with dementia status post unwitnessed fall at home yesterday sustaining a left hip fracture.  History is obtained from her  who found her sitting and unable to ambulate.  Pain is located in the left hip, rated a 4/5, dull and constant, worse with movement,  better with rest and medication.  There are no associated symptoms.      Past Medical History:    Past Medical History:  No date: Cancer  No date: Dementia    Past Surgical History:    Past Surgical History:  4/14/2023: COLONOSCOPY; N/A      Comment:  Procedure: COLONOSCOPY WITH ANESTHESIA;  Surgeon: Bubba Asher MD;  Location: [facility] END[facility];  Service:                Gastroenterology;  Laterality: N/A;  pre gi bleedpost                diverticulosisDr. Hebert  4/14/2023: ENDOSCOPY; N/A      Comment:  Procedure: ESOPHAGOGASTRODUODENOSCOPY WITH ANESTHESIA;                 Surgeon: Bubba Asher MD;  Location: [facility] ENDOSCOPY;               Service: Gastroenterology;  Laterality: N/A;  pre                screenpost gastric bxDr Hebert    Current Medications:   Prior to Admission medications :  Medication alendronate (FOSAMAX) 70 MG tablet, Sig Take 1 tablet by mouth Every 7 (Seven) Days., Start Date , End Date , Taking? , Authorizing Provider Jv Chaney MD    Medication aspirin 81 MG EC tablet, Sig Take 1 tablet by mouth Daily., Start Date 5/15/23, End Date , Taking? , Authorizing Provider Jeff Landis, DO    Medication atorvastatin (LIPITOR) 80 MG tablet, Sig Take 1 tablet by mouth Every Night., Start Date 5/15/23, End Date , Taking? , Authorizing Provider Jeff Landis, DO    Medication levothyroxine (SYNTHROID, LEVOTHROID) 100 MCG tablet, Sig Take 1 tablet by mouth Daily., Start Date , End Date , Taking? , Authorizing Provider Jv Chaney MD    Medication memantine (NAMENDA) 10 MG tablet, Sig Take 1 tablet by mouth 2 (Two) Times a Day., Start Date , End Date , Taking? , Authorizing Provider Jv Chaney MD    Medication metoprolol succinate XL (TOPROL-XL) 25 MG 24 hr tablet, Sig Take 12.5 mg by mouth Daily., Start Date , End Date , Taking? , Authorizing Provider Jv Chaney MD    Medication mirtazapine (REMERON) 15 MG tablet, Sig Take 1 tablet by  mouth Every Night., Start Date , End Date , Taking? , Authorizing Provider Provider, MD Jv    Medication pantoprazole (PROTONIX) 40 MG EC tablet, Sig Take 1 tablet by mouth 2 (Two) Times a Day Before Meals., Start Date 4/15/23, End Date , Taking? , Authorizing Provider Art Sanchez MD        Allergies:  Amoxicillin, Clarithromycin, Nisoldipine er, and Sular [nisoldipine]     Social History:   Social History    Socioeconomic History      Marital status:     Tobacco Use      Smoking status: Never      Smokeless tobacco: Never    Vaping Use      Vaping Use: Never used    Substance and Sexual Activity      Alcohol use: Yes      Drug use: Never      Family History:   History reviewed.  No pertinent family history.      REVIEW OF SYSTEMS:  14 point review of systems has been reviewed from the patient's emergency room visit, reviewed with the patient on today's date with no new changes.    PHYSICAL EXAM:      Physical Examination:  Vitals: --------------------------------------------------------------------------            11/04/23 11/04/23 11/04/23 11/05/23                    2208         2239             2302             0354          --------------------------------------------------------------------------    BP:                    145/97           175/88           166/58          BP Location:              Left arm         Left arm         Left arm        Patient Position:               Sitting           Lying            Lying         Pulse:      74           82               84               83            Resp:                    20               18               18            Temp:             98.5 °F (36.9 °C)98.3 °F (36.8 °C)98.3 °F (36.8 °C)    TempSrc:                Oral             Oral             Oral           SpO2:      100%         100%              99%              99%           Weight:                                                                   Height:                                                                 --------------------------------------------------------------------------  General:  Appears stated age, no distress.  Orientation:  Alert and oriented to time, place, and person.  Mood and Affect:  Cooperative and pleasant.  Gait:  Resting comfortably in bed.  Cardiovascular:  Symmetric 1-2 plus pulses in upper and lower extremities.  Lymph:  No cervical or inguinal lymphadenopathy noted.  Sensation:  Grossly intact to light touch.  DTR:  Normal, no pathologic reflexes.  Coordination/balance:  Normal    Musculoskeletal:  Right upper extremity exam:  There is no tenderness to palpation about the shoulder, elbow, wrist or hand.  Full motion.  Stability normal with provocative tests, 5/5 strength, and skin is normal.      Left upper extremity exam:  There is no tenderness to palpation about the shoulder, elbow, wrist or hand. Full motion.  Stability normal with provocative tests, 5/5 strength, and skin is normal.     Right lower extremity exam:  There is no tenderness to palpation about the hip, knee, ankle or foot.  Full motion  Stability normal with provocative tests, 5/5 strength, and skin is normal.     Left lower extremity exam:  Tenderness left hip/groin, shortened/externally rotated, refuses motion/stability/strength due to pain, skin is normal.      DATA:    LAB RESULTS:  Lab             11/04/23                       2159          WBC          12.09*        HEMOGLOBIN   11.7*         HEMATOCRIT   38.1          PLATELETS    266           NEUTROS ABS  10.20*        IMMATURE GR* 0.09*         LYMPHS ABS   0.87          MONOS ABS    0.83          EOS ABS      0.04          MCV          83.9          PROTIME      12.9          APTT         29.9          Lab             11/04/23                       2159          SODIUM       135*          POTASSIUM    4.3            CHLORIDE     102           CO2          25.0          ANION GAP    8.0           BUN          17            CREATININE   1.05*         EGFR         50.9*         GLUCOSE      145*          CALCIUM      9.3           Lab             11/04/23 2159          TOTAL PROTE* 6.6           ALBUMIN      3.7           GLOBULIN     2.9           ALT (SGPT)   13            AST (SGOT)   17            BILIRUBIN    0.4           ALK PHOS     65            Lab             11/04/23 2159          PROTIME      12.9          INR          0.96                      Brief Urine Lab Results  (Last result in the past 365 days)      Color   Clarity   Blood   Leuk Est   Nitrite   Protein   CREAT   Urine   HCG        12/05/22 2118 YELLOW   Clear   Negative   Negative  Comment: Culture Urine under CMS guidelines and criteria will auto reflex   on a sole  criteria that is based on manual microscopic count of more than 10/hpf for  WBC. If Culture Urine is warranted aside from this criteria, place a  requisition or order within 24 hours of collection.   Negative               Microbiology Results (last 10 days)     ** No results found for the last 240 hours. **           ----------------------------------------------------------------------------------------------------------------------  I have reviewed the radiology images above and agree with the findings dictated below    Radiology: XR Femur 2 View Left    Result Date: 11/4/2023  XR FEMUR 2 VW LEFT-  HISTORY: fall  COMPARISON: None  FINDINGS: Frontal and crosstable lateral views of the left femur were obtained.  There is an impacted left subcapital femoral neck fracture. No additional left femur fracture identified. Osteopenia. Scattered vascular calcification.      1. Acute impacted left subcapital femoral neck fracture.  This report was signed and finalized on 11/4/2023 9:45 PM CDT by Dr. Afia Renner MD.      XR Chest 1 View    Result Date:  11/4/2023  XR CHEST 1 VW-  HISTORY: fall  COMPARISON: 5/14/2023  FINDINGS: Frontal view the chest obtained.  Mild increasing prominence of the interstitial markings which may be seen with mild edema/volume overload. The heart appears normal in size. Calcification of the thoracic aortic arch. No pleural effusion or pneumothorax. No acute regional bony pathology.      1. Mild increasing prominence of the interstitial markings diffusely raising the question of mild edema/volume overload.   This report was signed and finalized on 11/4/2023 9:44 PM CDT by Dr. Afia Renner MD.         ----------------------------------------------------------------------------------------------------------------------  Assessment:      Acute traumatic displaced subcapital fracture of the left hip, initial encounter for closed fracture    Vascular dementia without behavioral disturbance    Hypothyroidism    Essential hypertension    Fall (on) (from) other stairs and steps, initial encounter         Plan:  1) to OR for left hip hemiarthroplasty - we discussed the risks, benefits, and alternatives and the patient wishes to proceed  2) Admit postop for pain control, PT/OT  3) DVT prophylaxis x 3 weeks postop    Electronically signed by Denzel De La Vega MD on 11/5/2023 at 08:15 CST              Electronically signed by Denzel De La Vega MD at 11/05/23 0818

## 2023-11-06 NOTE — PROGRESS NOTES
[facility] Medicine Services  INPATIENT PROGRESS NOTE    Patient Name: Dina Michaels  Date of Admission: 11/4/2023  Today's Date: 11/06/23  Length of Stay: 2  Primary Care Physician: Benedicto Hebert MD    Subjective   Chief Complaint: Left hip pain  HPI   Ms. Michaels is an 89-year-old  female with a past medical history significant for vascular dementia without behavioral disturbance, hypothyroidism, primary hypertension and GERD.  Her primary care provider is Dr. Hebert.  Information obtained from  present at bedside.   reports they have 5 steps in their entryway and patient was walking down the steps to lock the door.  He found her sitting on the floor.  She had an unwitnessed fall but  believes she was close to the bottom step because she was sitting when he found her.  Patient was unable to stand or ambulate and reported immediate left hip pain.  There was no loss of consciousness noted per .  No other injuries noted or reported.  No complaints of chest pain, palpitations or shortness of breath.  X-ray left femur acute impacted left subcapital femoral neck fracture.  Chest x-ray increased prominence of interstitial markings diffusely raise questions of mild edema or volume overload.  Sodium 135, potassium 4.3, creatinine 1.05, WBC 12.09.  Emergency room physician contacted Dr. De La Vega orthopedics and plans for left hip hemoarthroplasty today.     Today  Sitting up in bed.  No oxygen use.   in room.  Dr. DeL a Vega took her to surgery 11/6 for left hip hemiarthroplasty.  Patient allowed weightbearing as tolerated left lower extremity.  Discussed with  possibility of SNF pending progress with physical therapy.  Continue Lovenox for DVT prophylaxis with plans to transition to Eliquis prior to discharge.  No complaints of chest pain, palpitations or shortness of breath.  Patient denies nausea, vomiting or abdominal pain.  She tells  me her name and her 's name.  Otherwise she does not answer most questions appropriately.  She is at baseline mentation according to .    Review of Systems   Constitutional:  Positive for activity change (After fall). Negative for chills, fatigue and fever.   HENT:  Negative for congestion and trouble swallowing.         Hard of hearing   Eyes:  Negative for photophobia and visual disturbance.   Respiratory:  Negative for cough, shortness of breath and wheezing.    Cardiovascular:  Negative for chest pain, palpitations and leg swelling.   Gastrointestinal:  Negative for constipation, diarrhea, nausea and vomiting.   Endocrine: Negative for cold intolerance, heat intolerance and polyuria.   Genitourinary:  Negative for dysuria and frequency.   Musculoskeletal:  Positive for gait problem (After fall).   Skin:  Negative for color change, pallor and wound.   Allergic/Immunologic: Negative for immunocompromised state.   Neurological:  Negative for weakness.   Hematological:  Negative for adenopathy. Does not bruise/bleed easily.   Psychiatric/Behavioral:  Positive for confusion (Baseline dementia). Negative for agitation and behavioral problems.       Information obtained from Nik present at bedside as patient not reliable historian.    All pertinent negatives and positives are as above. All other systems have been reviewed and are negative unless otherwise stated.     Objective    Temp:  [97.1 °F (36.2 °C)-99 °F (37.2 °C)] 98.3 °F (36.8 °C)  Heart Rate:  [] 103  Resp:  [11-17] 16  BP: (141-190)/() 150/54  Physical Exam  Vitals and nursing note reviewed.   Constitutional:       Comments: Sitting up in bed.  No oxygen in use.   in room.   HENT:      Head: Normocephalic and atraumatic.      Nose: No congestion.      Mouth/Throat:      Pharynx: No oropharyngeal exudate or posterior oropharyngeal erythema.   Eyes:      Extraocular Movements: Extraocular movements intact.       "Pupils: Pupils are equal, round, and reactive to light.   Cardiovascular:      Rate and Rhythm: Normal rate and regular rhythm.      Heart sounds: No murmur heard.  Pulmonary:      Breath sounds: No wheezing, rhonchi or rales.      Comments: No oxygen use.  Abdominal:      Palpations: Abdomen is soft.      Tenderness: There is no abdominal tenderness.   Genitourinary:     Comments: Voiding  Musculoskeletal:         General: Tenderness (Left hip) present.      Cervical back: Normal range of motion and neck supple.      Comments: Left leg immobilizer in place.  SCD right lower extremity   Skin:     General: Skin is warm and dry.   Neurological:      General: No focal deficit present.      Comments: Oriented to person.  Identifies .  Follows commands.   Psychiatric:         Mood and Affect: Mood normal.         Behavior: Behavior normal.       Results Review:  I have reviewed the labs, radiology results, and diagnostic studies.    Laboratory Data:   Results from last 7 days   Lab Units 11/06/23  0418 11/04/23  2159   WBC 10*3/mm3 8.90 12.09*   HEMOGLOBIN g/dL 10.4* 11.7*   HEMATOCRIT % 34.4 38.1   PLATELETS 10*3/mm3 194 266     Results from last 7 days   Lab Units 11/06/23  0418 11/05/23  0906 11/04/23  2159   SODIUM mmol/L 138 133* 135*   POTASSIUM mmol/L 4.2 4.7 4.3   CHLORIDE mmol/L 102 101 102   CO2 mmol/L 26.0 21.0* 25.0   BUN mg/dL 14 14 17   CREATININE mg/dL 0.93 0.89 1.05*   CALCIUM mg/dL 8.7 8.8 9.3   BILIRUBIN mg/dL 0.5  --  0.4   ALK PHOS U/L 52  --  65   ALT (SGPT) U/L 7  --  13   AST (SGOT) U/L 14  --  17   GLUCOSE mg/dL 93 105* 145*       Culture Data:   No results found for: \"BLOODCX\", \"URINECX\", \"WOUNDCX\", \"MRSACX\", \"RESPCX\", \"STOOLCX\"    Radiology Data:   Imaging Results (Last 24 Hours)       Procedure Component Value Units Date/Time    XR Hip With or Without Pelvis 2 - 3 View Left [[id number]] Collected: 11/06/23 0734     Updated: 11/06/23 0739    Narrative:      EXAM: XR HIP W OR WO PELVIS 2-3 " VIEW LEFT-      DATE: 11/4/2023 9:36 PM CDT     HISTORY: fall       COMPARISON: No existing relevant imaging studies available.     TECHNIQUE: AP pelvis, AP and crosstable lateral views of the LEFT hip. 4  images.     FINDINGS:    LEFT femoral neck fracture with foreshortening and rotation. Limited  evaluation on crosstable lateral view. Diffuse bone demineralization.     Sacroiliac joints appear symmetric and patent. Upper sacral arcuate  lines limited in evaluation due to bone demineralization and overlying  bowel gas. Degenerative changes of the visualized spine. Pubic symphysis  anatomic.     There may be a mildly displaced RIGHT inferior pubic ramus fracture.  RIGHT hip appears grossly within normal limits on single view.       Impression:      1. LEFT femoral neck fracture with foreshortening and rotation.  2. There may be a mildly displaced RIGHT inferior pubic ramus fracture.     This report was signed and finalized on 11/6/2023 7:36 AM CST by Dr Lizbet Nichols MD.       XR Hip With or Without Pelvis 2 - 3 View Left [[id number]] Collected: 11/05/23 1147     Updated: 11/05/23 1151    Narrative:      EXAMINATION: XR HIP W OR WO PELVIS 2-3 VIEW LEFT-  11/5/2023 11:48 AM  CST     HISTORY: Postop     FINDINGS: AP radiograph of the pelvis as well as 2 view exam of the left  hip reveals the patient is undergone a total hip arthroplasty for a  intertrochanteric fracture of the left hip. There is good anatomic  alignment. No evidence of complication.       Impression:      . Good anatomic alignment status post left hip arthroplasty  for an intertrochanteric fracture of the left hip.     This report was signed and finalized on 11/5/2023 11:48 AM CST by Dr. Fish Moreno MD.             Scheduled medications:  atorvastatin, 80 mg, Oral, Nightly  cefTRIAXone, 1,000 mg, Intravenous, Q24H  enoxaparin, 40 mg, Subcutaneous, Daily  levothyroxine, 100 mcg, Oral, Q AM  memantine, 10 mg, Oral, BID  metoprolol succinate XL,  12.5 mg, Oral, Daily  mirtazapine, 15 mg, Oral, Nightly  pantoprazole, 40 mg, Oral, BID AC  senna-docusate sodium, 2 tablet, Oral, BID  sodium chloride, 10 mL, Intravenous, Q12H  sodium chloride, 10 mL, Intravenous, Q12H       I have reviewed the patient's current medications.     Assessment/Plan   Assessment  Active Hospital Problems    Diagnosis     **Closed subcapital fracture of femur, left, initial encounter     Acute cystitis without hematuria     Fall (on) (from) other stairs and steps, initial encounter     Acute hip pain, left     Essential hypertension     Vascular dementia without behavioral disturbance     Hypothyroidism      Treatment Plan  1.  Closed subcapital fracture left femur.  Patient walking down 5 steps and  found her sitting on the floor at the bottom of the steps.  No loss of consciousness noted per .  Patient does not recall events of fall.  Dr. De La Vega consulted and took her to surgery 11/5/2023 for Left hip hemiarthroplasty.  Postop weightbearing as tolerated with posterior hip precautions.  Physical therapy consulted.    [facility]VT [facility]    2.  Fall from steps.  Physical therapy postop per Dr. De La Vega.  May require SNF and discussed with .    3.  Vascular dementia without behavioral disturbance.  Patient able to tell me her name and 's name otherwise does not answer questions appropriately.  She is at baseline per .  Continue Namenda and Remeron.     4.  Primary hypertension.  Blood pressure 150/54.  Continue metoprolol XL.    5.  Acute cystitis without hematuria abnormal urinalysis.  11-12 WBC, 4+ bacteria, positive nitrates.  Await culture and sensitivity.  Start Rocephin.  Patient with baseline dementia and unable to report accurate symptoms.    6.  Hypothyroidism.  TSH 1.68.  Continue Synthroid.      7.  Acute left hip pain.  Tylenol for mild pain, Norco for moderate pain.  Use narcotics sparingly with  history of dementia.     8.  Social service consult for discharge planning.  Discussed with  and patient will likely need skilled facility at discharge.   agreeable if recommended by physical therapy.    Medical Decision Making  Number and Complexity of problems: 7  Acute left subcapital femur fracture: Acute, high complexity poses threat to life and bodily function, stable  Acute left hip pain: Acute, moderate complexity, stable  Acute cystitis without hematuria: Acute, moderate complexity, stable  Vascular dementia without behavioral disturbance: Chronic, moderate complexity, stable  Primary hypertension: Chronic, moderate complexity, not at baseline  Hypothyroidism: Chronic, low complexity  Fall: Acute, moderate complexity    Differential Diagnosis: None    Conditions and Status        Condition is improving.     [facility] Data  External documents reviewed: Dr. Hebert office visit 8/23/2023  Cardiac tracing (EKG, telemetry) interpretation: None  Radiology interpretation: Radiology interpretation left femur x-ray, chest x-ray  Labs reviewed:   CMP 11/6/2023.  Repeat CMP in a.m.  CBC 11/6/2023.  Repeat CBC in AM.  TSH  Urinalysis.  Await urine culture and sensitivity    Any tests that were considered but not ordered: None     Decision rules/scores evaluated (example [id number], Wells, etc): None     Discussed with: Dr. Clemente, patient, and , Nik.     Care Planning  Shared decision making: Dr. Clemente, patient, and  Nik.  Patient  agree to physical therapy, DVT prophylaxis, Rocephin for UTI, SNF if recommended.  Code status and discussions: Full code  The patient surrogate decision maker is her , Nik    Disposition  Social Determinants of Health that impact treatment or disposition: None  I expect the patient to be discharged to SNF in 2 days.     Electronically signed by EDGARDO Urena, 11/06/23, 11:04 CST.     The above documentation resulted from a  face-to-face encounter by rylan REYNOSO, ACNP-BC.

## 2023-11-06 NOTE — CASE MANAGEMENT/SOCIAL WORK
Discharge Planning Assessment   [facility]     Patient Name: Dina Michaels  MRN: [id number]  Today's Date: 11/6/2023    Admit Date: 11/4/2023        Discharge Needs Assessment       Row Name 11/06/23 1407       Living Environment    People in Home spouse    Current Living Arrangements home    Potentially Unsafe Housing Conditions none    In the past 12 months has the electric, gas, oil, or water company threatened to shut off services in your home? No    Primary Care Provided by spouse/significant other;self    Provides Primary Care For no one    Family Caregiver if Needed spouse    Quality of Family Relationships helpful;involved    Able to Return to Prior Arrangements other (see comments)    Living Arrangement Comments snf       Resource/Environmental Concerns    Resource/Environmental Concerns none    Transportation Concerns none       Food Insecurity    Within the past 12 months, you worried that your food would run out before you got the money to buy more. Never true    Within the past 12 months, the food you bought just didn't last and you didn't have money to get more. Never true       Transition Planning    Patient/Family Anticipates Transition to inpatient rehabilitation facility;long-term care facility    Patient/Family Anticipated Services at Transition skilled nursing    Transportation Anticipated health plan transportation       Discharge Needs Assessment    Readmission Within the Last 30 Days no previous admission in last 30 days    Concerns to be Addressed adjustment to diagnosis/illness;discharge planning    Anticipated Changes Related to Illness inability to care for self    Equipment Needed After Discharge other (see comments)  Snf/rehab will provide dme    Outpatient/Agency/Support Group Needs skilled nursing facility    Discharge Facility/Level of Care Needs nursing facility, skilled    Provided Post Acute Provider List? Yes    Post Acute Provider List Nursing Home    Provided Post Acute Provider  Quality & Resource List? Yes    Post Acute Provider Quality and Resource List Nursing Home    Delivered To Support Person    Method of Delivery In person    Discharge Coordination/Progress SW spoke to pt spouse about dc planning and recommendations for rehab. Provided Nik with Medicare compare list for Snf's in [facility] and [facility] is preferred. Referral sent to [facility]. If bed offered, precert will be started.                   Discharge Plan    No documentation.                 Continued Care and Services - Admitted Since 11/4/2023    Coordination has not been started for this encounter.          Demographic Summary    No documentation.                  Functional Status    No documentation.                  Psychosocial    No documentation.                  Abuse/Neglect    No documentation.                  Legal    No documentation.                  Substance Abuse    No documentation.                  Patient Forms    No documentation.                     ERASTO Leslie

## 2023-11-06 NOTE — CASE MANAGEMENT/SOCIAL WORK
Continued Stay Note   [place]     Patient Name: Dina Michaels  MRN: [id number]  Today's Date: 11/6/2023    Admit Date: 11/4/2023        Discharge Plan       Row Name 11/06/23 1630       Plan    Plan Comments [facility] did offer a bed and precert has been started. Await insurance decision.    Final Discharge Disposition Code 03 - skilled nursing facility (SNF)                   Discharge Codes    No documentation.                 Expected Discharge Date and Time       Expected Discharge Date Expected Discharge Time    Nov 8, 2023               ERASTO Leslie

## 2023-11-06 NOTE — DISCHARGE PLACEMENT REQUEST
"Behzad Michaels (89 y.o. Female)       Date of Birth   1934    Social Security Number       Address   [address] [place] [address]    Home Phone   [phone]    MRN   [id number]       [facility]    Marital Status                               Admission Date   11/4/23    Admission Type   Emergency    Admitting Provider   Kayden Clemente MD    Attending Provider   Kayden Clemente MD    Department, Room/Bed   [facility] 3A, 346/1       Discharge Date       Discharge Disposition       Discharge Destination                                 Attending Provider: Kayden Clemente MD    Allergies: Amoxicillin, Clarithromycin, Nisoldipine Er, Sular [Nisoldipine]    Isolation: None   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)   Code Status: CPR    Ht: 162.6 cm (64\")   Wt: 68 kg (150 lb)    Admission Cmt: None   Principal Problem: Closed subcapital fracture of femur, left, initial encounter [S72.012A]                   Active Insurance as of 11/4/2023       Primary Coverage       Payor Plan Insurance Group Employer/Plan Group    HUMANA MEDICARE REPLACEMENT HUMANA MEDICARE REPLACEMENT [id number]       Payor Plan Address Payor Plan Phone Number Payor Plan Fax Number Effective Dates    [address] [address] [phone]  1/1/2021 - None Entered    [facility] [id number]         Subscriber Name Subscriber Birth Date Member ID       BEHZAD MICHAELS 1934 [id number]                     Emergency Contacts        (Rel.) Home Phone Work Phone Mobile Phone    OLLIE MICHAELS (Spouse) [phone] -- [phone]              Insurance Information                  HUMANA MEDICARE REPLACEMENT/HUMANA MEDICARE REPLACEMENT Phone: [phone]    Subscriber: Behzad Michaels Subscriber#: [id number]    Group#: [id number] Precert#: --             History & Physical        Steffany Stanford, APRN at 11/05/23 0707       Attestation signed by Conner Sood MD at 11/05/23 1021    I have reviewed " this documentation and agree.    This visit was performed by both a physician and an APC. I personally evaluated and examined the patient. I performed all aspects of the MDM as documented.      Electronically signed by Conner Sood MD, 11/5/2023, 10:21 CST.                      [facility] Medicine Services  HISTORY AND PHYSICAL    Date of Admission: 11/4/2023  Primary Care Physician: Benedicto Hebert MD    Subjective   Primary Historian: Information obtained from  Nik present at bedside as patient not deemed a reliable historian.    Chief Complaint: Left hip pain    History of Present Illness  Ms. Michaels is an 89-year-old  female with a past medical history significant for vascular dementia without behavioral disturbance, hypothyroidism, primary hypertension and GERD.  Her primary care provider is Dr. Hebert.  Information obtained from  present at bedside.   reports they have 5 steps in their entryway and patient was walking down the steps to lock the door.  He found her sitting on the floor.  She had an unwitnessed fall but  believes she was close to the bottom step because she was sitting when he found her.  Patient was unable to stand or ambulate and reported immediate left hip pain.  There was no loss of consciousness noted per .  No other injuries noted or reported.  No complaints of chest pain, palpitations or shortness of breath.  X-ray left femur acute impacted left subcapital femoral neck fracture.  Chest x-ray increased prominence of interstitial markings diffusely raise questions of mild edema or volume overload.  Sodium 135, potassium 4.3, creatinine 1.05, WBC 12.09.  Emergency room physician contacted Dr. De La Vega orthopedics and plans for left hip hemoarthroplasty today.    Patient sitting up in bed examined in room 346 with  at bedside.  She does report right hip tenderness to palpation.  Otherwise no  complaints.  Patient identifies her  and tells me her name.  She does not recall events of fall.  No oxygen use.    Review of Systems   Constitutional:  Positive for activity change (After fall). Negative for chills, fatigue and fever.   HENT:  Negative for congestion and trouble swallowing.         Hard of hearing   Eyes:  Negative for photophobia and visual disturbance.   Respiratory:  Negative for cough, shortness of breath and wheezing.    Cardiovascular:  Negative for chest pain, palpitations and leg swelling.   Gastrointestinal:  Negative for constipation, diarrhea, nausea and vomiting.   Endocrine: Negative for cold intolerance, heat intolerance and polyuria.   Genitourinary:  Negative for dysuria and frequency.   Musculoskeletal:  Positive for gait problem (After fall).   Skin:  Negative for color change, pallor and wound.   Allergic/Immunologic: Negative for immunocompromised state.   Neurological:  Negative for weakness.   Hematological:  Negative for adenopathy. Does not bruise/bleed easily.   Psychiatric/Behavioral:  Positive for confusion (Baseline dementia). Negative for agitation and behavioral problems.       Information obtained from , Nik present at bedside as patient not reliable historian.  Otherwise complete ROS reviewed and negative except as mentioned in the HPI.    Past Medical History:   Past Medical History:   Diagnosis Date    Cancer     Dementia      Past Surgical History:  Past Surgical History:   Procedure Laterality Date    COLONOSCOPY N/A 4/14/2023    Procedure: COLONOSCOPY WITH ANESTHESIA;  Surgeon: Bubba Asher MD;  Location: [facility] ENDOSCOPY;  Service: Gastroenterology;  Laterality: N/A;  pre gi bleed  post diverticulosis  Dr. Hebert    ENDOSCOPY N/A 4/14/2023    Procedure: ESOPHAGOGASTRODUODENOSCOPY WITH ANESTHESIA;  Surgeon: Bubba Asher MD;  Location: [facility] ENDOSCOPY;  Service: Gastroenterology;  Laterality: N/A;  pre screen  post gastric bx  Dr Hebert  "    Social History:  reports that she has never smoked. She has never used smokeless tobacco. She reports current alcohol use. She reports that she does not use drugs.    Family History: family history is not on file.      Allergies:  Allergies   Allergen Reactions    Amoxicillin Hallucinations    Clarithromycin Hallucinations    Nisoldipine Er Other (See Comments)     Feelings of passing out, tired    Sular [Nisoldipine] Unknown - Low Severity       Medications:  Prior to Admission medications    Medication Sig Start Date End Date Taking? Authorizing Provider   alendronate (FOSAMAX) 70 MG tablet Take 1 tablet by mouth Every 7 (Seven) Days.    Jv Chaney MD   aspirin 81 MG EC tablet Take 1 tablet by mouth Daily. 5/15/23   Jeff Landis,    atorvastatin (LIPITOR) 80 MG tablet Take 1 tablet by mouth Every Night. 5/15/23   Jeff Landis,    levothyroxine (SYNTHROID, LEVOTHROID) 100 MCG tablet Take 1 tablet by mouth Daily.    Jv Chaney MD   memantine (NAMENDA) 10 MG tablet Take 1 tablet by mouth 2 (Two) Times a Day.    Jv Chaney MD   metoprolol succinate XL (TOPROL-XL) 25 MG 24 hr tablet Take 12.5 mg by mouth Daily.    Jv Chaney MD   mirtazapine (REMERON) 15 MG tablet Take 1 tablet by mouth Every Night.    Jv Chaney MD   pantoprazole (PROTONIX) 40 MG EC tablet Take 1 tablet by mouth 2 (Two) Times a Day Before Meals. 4/15/23   Art Sanchez MD     I have utilized all available immediate resources to obtain, update, or review the patient's current medications (including all prescriptions, over-the-counter products, herbals, cannabis/cannabidiol products, and vitamin/mineral/dietary (nutritional) supplements).    Objective     Vital Signs: /58 (BP Location: Left arm, Patient Position: Lying)   Pulse 83   Temp 98.3 °F (36.8 °C) (Oral)   Resp 18   Ht 162.6 cm (64\")   Wt 68 kg (150 lb)   SpO2 99%   BMI 25.75 kg/m²   Physical " Exam  Vitals and nursing note reviewed.   Constitutional:       Comments: Sitting up in bed.  No oxygen use.   in room.  Patient oriented to person and identifies .   HENT:      Head: Normocephalic and atraumatic.      Nose: No congestion.      Mouth/Throat:      Pharynx: Oropharynx is clear. No oropharyngeal exudate or posterior oropharyngeal erythema.   Eyes:      Extraocular Movements: Extraocular movements intact.      Pupils: Pupils are equal, round, and reactive to light.   Cardiovascular:      Rate and Rhythm: Normal rate and regular rhythm.      Heart sounds: No murmur heard.  Pulmonary:      Breath sounds: No wheezing, rhonchi or rales.      Comments: No oxygen in use.  Abdominal:      Palpations: Abdomen is soft.      Tenderness: There is no abdominal tenderness.   Genitourinary:     Comments: WIC in place.  Musculoskeletal:         General: Tenderness (Left hip) present.      Cervical back: Normal range of motion and neck supple.   Skin:     General: Skin is warm and dry.   Neurological:      General: No focal deficit present.      Comments: Oriented to person.  Identifies .  Disoriented to situation and place.  At baseline per .        Results Reviewed:  Lab Results (last 24 hours)       Procedure Component Value Units Date/Time    Comprehensive Metabolic Panel [[id number]]  (Abnormal) Collected: 11/04/23 2159    Specimen: Blood Updated: 11/04/23 2225     Glucose 145 mg/dL      BUN 17 mg/dL      Creatinine 1.05 mg/dL      Sodium 135 mmol/L      Potassium 4.3 mmol/L      Comment: Slight hemolysis detected by analyzer. Results may be affected.        Chloride 102 mmol/L      CO2 25.0 mmol/L      Calcium 9.3 mg/dL      Total Protein 6.6 g/dL      Albumin 3.7 g/dL      ALT (SGPT) 13 U/L      AST (SGOT) 17 U/L      Alkaline Phosphatase 65 U/L      Total Bilirubin 0.4 mg/dL      Globulin 2.9 gm/dL      A/G Ratio 1.3 g/dL      BUN/Creatinine Ratio 16.2     Anion Gap 8.0 mmol/L       eGFR 50.9 mL/min/1.73     Narrative:      GFR Normal >60  Chronic Kidney Disease <60  Kidney Failure <15    The GFR formula is only valid for adults with stable renal function between ages 18 and 70.    aPTT [[id number]]  (Normal) Collected: 11/04/23 2159    Specimen: Blood Updated: 11/04/23 2223     PTT 29.9 seconds     Protime-INR [[id number]]  (Normal) Collected: 11/04/23 2159    Specimen: Blood Updated: 11/04/23 2222     Protime 12.9 Seconds      INR 0.96    CBC & Differential [[id number]]  (Abnormal) Collected: 11/04/23 2159    Specimen: Blood Updated: 11/04/23 2205    Narrative:      The following orders were created for panel order CBC & Differential.  Procedure                               Abnormality         Status                     ---------                               -----------         ------                     CBC Auto Differential[[id number]]        Abnormal            Final result                 Please view results for these tests on the individual orders.    CBC Auto Differential [[id number]]  (Abnormal) Collected: 11/04/23 2159    Specimen: Blood Updated: 11/04/23 2205     WBC 12.09 10*3/mm3      RBC 4.54 10*6/mm3      Hemoglobin 11.7 g/dL      Hematocrit 38.1 %      MCV 83.9 fL      MCH 25.8 pg      MCHC 30.7 g/dL      RDW 16.5 %      RDW-SD 50.4 fl      MPV 10.0 fL      Platelets 266 10*3/mm3      Neutrophil % 84.4 %      Lymphocyte % 7.2 %      Monocyte % 6.9 %      Eosinophil % 0.3 %      Basophil % 0.5 %      Immature Grans % 0.7 %      Neutrophils, Absolute 10.20 10*3/mm3      Lymphocytes, Absolute 0.87 10*3/mm3      Monocytes, Absolute 0.83 10*3/mm3      Eosinophils, Absolute 0.04 10*3/mm3      Basophils, Absolute 0.06 10*3/mm3      Immature Grans, Absolute 0.09 10*3/mm3      nRBC 0.0 /100 WBC           Imaging Results (Last 24 Hours)       Procedure Component Value Units Date/Time    XR Femur 2 View Left [[id number]] Collected: 11/04/23 2144     Updated: 11/04/23 2148    Narrative:      XR  FEMUR 2 VW LEFT-     HISTORY: fall     COMPARISON: None     FINDINGS: Frontal and crosstable lateral views of the left femur were  obtained.     There is an impacted left subcapital femoral neck fracture. No  additional left femur fracture identified. Osteopenia. Scattered  vascular calcification.       Impression:      1. Acute impacted left subcapital femoral neck fracture.     This report was signed and finalized on 11/4/2023 9:45 PM CDT by Dr. Afia Renner MD.       XR Chest 1 View [[id number]] Collected: 11/04/23 2142     Updated: 11/04/23 2147    Narrative:      XR CHEST 1 VW-     HISTORY: fall     COMPARISON: 5/14/2023     FINDINGS: Frontal view the chest obtained.     Mild increasing prominence of the interstitial markings which may be  seen with mild edema/volume overload. The heart appears normal in size.  Calcification of the thoracic aortic arch. No pleural effusion or  pneumothorax. No acute regional bony pathology.       Impression:      1. Mild increasing prominence of the interstitial markings diffusely  raising the question of mild edema/volume overload.        This report was signed and finalized on 11/4/2023 9:44 PM CDT by Dr. Afia Renner MD.       XR Hip With or Without Pelvis 2 - 3 View Left [[id number]] Resulted: 11/04/23 2136     Updated: 11/04/23 2141              I have personally reviewed and interpreted the radiology studies and ECG obtained at time of admission.     Assessment / Plan   Assessment:   Active Hospital Problems    Diagnosis     **Closed subcapital fracture of femur, left, initial encounter     Fall (on) (from) other stairs and steps, initial encounter     Closed left hip fracture     Essential hypertension     Vascular dementia without behavioral disturbance     Hypothyroidism        Treatment Plan  The patient will be admitted to Dr. Sood service at [facility].  Ms. Michaels presented after a fall found to have left subcapital femur fracture.  Orthopedics  consulted and Dr. De La Vega plans for surgical intervention today.    1.  Closed subcapital fracture left femur.  Patient walking down 5 steps and  found her sitting on the floor at the bottom of the steps.  No loss of consciousness noted per .  Patient does not recall events of fall.  Dr. De La Vega plans for left hip hemiarthroplasty today.  N.p.o. for surgical intervention.  IV fluids at 50 mL/h.  SCDs for deep vein thrombosis prophylaxis.  Physical therapy postop per Dr. De La Vega.  CBC, CMP in AM.    2.  Fall from steps.  Physical therapy postop per Dr. De La Vega.  Will likely require SNF at discharge and discussed with .    3.  Vascular dementia without behavioral disturbance.  Patient able to tell me her name and 's name otherwise does not answer questions appropriately.  She is at baseline per .  Continue Namenda and Remeron.    4.  Primary hypertension.  Blood pressure 166/58.  Resume metoprolol XL.  Will add labetalol for systolic blood pressure greater than 160 or diastolic blood pressure greater than 100.    5.  Hypothyroidism.  Continue Synthroid.  Check TSH.    6.  Acute left hip pain.  Tylenol for mild pain, Norco for moderate pain.  Use narcotics sparingly with history of dementia.    7.  Social service consult for discharge planning.  Discussed with  and patient will likely need skilled facility at discharge.  Has been agreeable if recommended by physical therapy.    Medical Decision Making  Number and Complexity of problems: 6  Acute left subcapital femur fracture: Acute, high complexity poses threat to life and bodily function  Acute left hip pain: Acute, moderate complexity  Vascular dementia without behavioral disturbance: Chronic, moderate complexity, stable  Primary hypertension: Chronic, moderate complexity, not at baseline  Hypothyroidism: Chronic, low complexity  Fall: Acute, moderate complexity    Differential Diagnosis: None    Conditions and Status         Condition is unchanged.     [facility] Data  External documents reviewed: Dr. Hebert office visit 8/23/2023  Cardiac tracing (EKG, telemetry) interpretation: None  Radiology interpretation: Reviewed radiology interpretation left femur x-ray, chest x-ray  Labs reviewed:   CMP 11/4/2023.  Repeat BMP today.  CBC 11/4/2023.  Repeat CBC today.  Check urinalysis    Any tests that were considered but not ordered: None     Decision rules/scores evaluated (example [id number], [place], etc): None     Discussed with: Dr. Sood, patient, and  Nik present at bedside     Care Planning  Shared decision making: Dr. Sood, patient, and  Nik.   and patient agreed to orthopedic consultation and surgical intervention by Dr. De La Vega.   agrees to physical therapy and SNF if recommended.  Code status and discussions: Full code  Patient surrogate decision maker is her , Nik    Disposition  Social Determinants of Health that impact treatment or disposition: None  Estimated length of stay is 3 nights.     I confirmed that the patient's advanced care plan is present, code status is documented, and a surrogate decision maker is listed in the patient's medical record.     The patient's surrogate decision maker is her  Nik.     The patient was seen and examined by me on 11/05/2023 at 0707.    Electronically signed by EDGARDO Urena, 11/05/23, 07:07 CST.               Electronically signed by Conner Sood MD at 11/05/23 1021          Physician Progress Notes (last 24 hours)        Steffany Stanford APRN at 11/06/23 1048              [facility] Medicine Services  INPATIENT PROGRESS NOTE    Patient Name: Dina Michaels  Date of Admission: 11/4/2023  Today's Date: 11/06/23  Length of Stay: 2  Primary Care Physician: Benedicto Hebert MD    Subjective   Chief Complaint: Left hip pain  HPI   Ms. Michaels is an 89-year-old  female with  a past medical history significant for vascular dementia without behavioral disturbance, hypothyroidism, primary hypertension and GERD.  Her primary care provider is Dr. Hebert.  Information obtained from  present at bedside.   reports they have 5 steps in their entryway and patient was walking down the steps to lock the door.  He found her sitting on the floor.  She had an unwitnessed fall but  believes she was close to the bottom step because she was sitting when he found her.  Patient was unable to stand or ambulate and reported immediate left hip pain.  There was no loss of consciousness noted per .  No other injuries noted or reported.  No complaints of chest pain, palpitations or shortness of breath.  X-ray left femur acute impacted left subcapital femoral neck fracture.  Chest x-ray increased prominence of interstitial markings diffusely raise questions of mild edema or volume overload.  Sodium 135, potassium 4.3, creatinine 1.05, WBC 12.09.  Emergency room physician contacted Dr. De La Vega orthopedics and plans for left hip hemoarthroplasty today.     Today  Sitting up in bed.  No oxygen use.   in room.  Dr. De La Vega took her to surgery 11/6 for left hip hemiarthroplasty.  Patient allowed weightbearing as tolerated left lower extremity.  Discussed with  possibility of SNF pending progress with physical therapy.  Continue Lovenox for DVT prophylaxis with plans to transition to Eliquis prior to discharge.  No complaints of chest pain, palpitations or shortness of breath.  Patient denies nausea, vomiting or abdominal pain.  She tells me her name and her 's name.  Otherwise she does not answer most questions appropriately.  She is at baseline mentation according to .    Review of Systems   Constitutional:  Positive for activity change (After fall). Negative for chills, fatigue and fever.   HENT:  Negative for congestion and trouble swallowing.         Hard of  hearing   Eyes:  Negative for photophobia and visual disturbance.   Respiratory:  Negative for cough, shortness of breath and wheezing.    Cardiovascular:  Negative for chest pain, palpitations and leg swelling.   Gastrointestinal:  Negative for constipation, diarrhea, nausea and vomiting.   Endocrine: Negative for cold intolerance, heat intolerance and polyuria.   Genitourinary:  Negative for dysuria and frequency.   Musculoskeletal:  Positive for gait problem (After fall).   Skin:  Negative for color change, pallor and wound.   Allergic/Immunologic: Negative for immunocompromised state.   Neurological:  Negative for weakness.   Hematological:  Negative for adenopathy. Does not bruise/bleed easily.   Psychiatric/Behavioral:  Positive for confusion (Baseline dementia). Negative for agitation and behavioral problems.       Information obtained from , Nik present at bedside as patient not reliable historian.    All pertinent negatives and positives are as above. All other systems have been reviewed and are negative unless otherwise stated.     Objective    Temp:  [97.1 °F (36.2 °C)-99 °F (37.2 °C)] 98.3 °F (36.8 °C)  Heart Rate:  [] 103  Resp:  [11-17] 16  BP: (141-190)/() 150/54  Physical Exam  Vitals and nursing note reviewed.   Constitutional:       Comments: Sitting up in bed.  No oxygen in use.   in room.   HENT:      Head: Normocephalic and atraumatic.      Nose: No congestion.      Mouth/Throat:      Pharynx: No oropharyngeal exudate or posterior oropharyngeal erythema.   Eyes:      Extraocular Movements: Extraocular movements intact.      Pupils: Pupils are equal, round, and reactive to light.   Cardiovascular:      Rate and Rhythm: Normal rate and regular rhythm.      Heart sounds: No murmur heard.  Pulmonary:      Breath sounds: No wheezing, rhonchi or rales.      Comments: No oxygen use.  Abdominal:      Palpations: Abdomen is soft.      Tenderness: There is no abdominal  "tenderness.   Genitourinary:     Comments: Voiding  Musculoskeletal:         General: Tenderness (Left hip) present.      Cervical back: Normal range of motion and neck supple.      Comments: Left leg immobilizer in place.  SCD right lower extremity   Skin:     General: Skin is warm and dry.   Neurological:      General: No focal deficit present.      Comments: Oriented to person.  Identifies .  Follows commands.   Psychiatric:         Mood and Affect: Mood normal.         Behavior: Behavior normal.       Results Review:  I have reviewed the labs, radiology results, and diagnostic studies.    Laboratory Data:   Results from last 7 days   Lab Units 11/06/23 0418 11/04/23 2159   WBC 10*3/mm3 8.90 12.09*   HEMOGLOBIN g/dL 10.4* 11.7*   HEMATOCRIT % 34.4 38.1   PLATELETS 10*3/mm3 194 266     Results from last 7 days   Lab Units 11/06/23 0418 11/05/23 0906 11/04/23 2159   SODIUM mmol/L 138 133* 135*   POTASSIUM mmol/L 4.2 4.7 4.3   CHLORIDE mmol/L 102 101 102   CO2 mmol/L 26.0 21.0* 25.0   BUN mg/dL 14 14 17   CREATININE mg/dL 0.93 0.89 1.05*   CALCIUM mg/dL 8.7 8.8 9.3   BILIRUBIN mg/dL 0.5  --  0.4   ALK PHOS U/L 52  --  65   ALT (SGPT) U/L 7  --  13   AST (SGOT) U/L 14  --  17   GLUCOSE mg/dL 93 105* 145*       Culture Data:   No results found for: \"BLOODCX\", \"URINECX\", \"WOUNDCX\", \"MRSACX\", \"RESPCX\", \"STOOLCX\"    Radiology Data:   Imaging Results (Last 24 Hours)       Procedure Component Value Units Date/Time    XR Hip With or Without Pelvis 2 - 3 View Left [[id number]] Collected: 11/06/23 0734     Updated: 11/06/23 0739    Narrative:      EXAM: XR HIP W OR WO PELVIS 2-3 VIEW LEFT-      DATE: 11/4/2023 9:36 PM CDT     HISTORY: fall       COMPARISON: No existing relevant imaging studies available.     TECHNIQUE: AP pelvis, AP and crosstable lateral views of the LEFT hip. 4  images.     FINDINGS:    LEFT femoral neck fracture with foreshortening and rotation. Limited  evaluation on crosstable lateral view. " Diffuse bone demineralization.     Sacroiliac joints appear symmetric and patent. Upper sacral arcuate  lines limited in evaluation due to bone demineralization and overlying  bowel gas. Degenerative changes of the visualized spine. Pubic symphysis  anatomic.     There may be a mildly displaced RIGHT inferior pubic ramus fracture.  RIGHT hip appears grossly within normal limits on single view.       Impression:      1. LEFT femoral neck fracture with foreshortening and rotation.  2. There may be a mildly displaced RIGHT inferior pubic ramus fracture.     This report was signed and finalized on 11/6/2023 7:36 AM CST by Dr Lizbet Nichols MD.       XR Hip With or Without Pelvis 2 - 3 View Left [[id number]] Collected: 11/05/23 1147     Updated: 11/05/23 1151    Narrative:      EXAMINATION: XR HIP W OR WO PELVIS 2-3 VIEW LEFT-  11/5/2023 11:48 AM  CST     HISTORY: Postop     FINDINGS: AP radiograph of the pelvis as well as 2 view exam of the left  hip reveals the patient is undergone a total hip arthroplasty for a  intertrochanteric fracture of the left hip. There is good anatomic  alignment. No evidence of complication.       Impression:      . Good anatomic alignment status post left hip arthroplasty  for an intertrochanteric fracture of the left hip.     This report was signed and finalized on 11/5/2023 11:48 AM CST by Dr. Fish Moreno MD.             Scheduled medications:  atorvastatin, 80 mg, Oral, Nightly  cefTRIAXone, 1,000 mg, Intravenous, Q24H  enoxaparin, 40 mg, Subcutaneous, Daily  levothyroxine, 100 mcg, Oral, Q AM  memantine, 10 mg, Oral, BID  metoprolol succinate XL, 12.5 mg, Oral, Daily  mirtazapine, 15 mg, Oral, Nightly  pantoprazole, 40 mg, Oral, BID AC  senna-docusate sodium, 2 tablet, Oral, BID  sodium chloride, 10 mL, Intravenous, Q12H  sodium chloride, 10 mL, Intravenous, Q12H       I have reviewed the patient's current medications.     Assessment/Plan   Assessment  Active Hospital Problems     Diagnosis     **Closed subcapital fracture of femur, left, initial encounter     Acute cystitis without hematuria     Fall (on) (from) other stairs and steps, initial encounter     Acute hip pain, left     Essential hypertension     Vascular dementia without behavioral disturbance     Hypothyroidism      Treatment Plan  1.  Closed subcapital fracture left femur.  Patient walking down 5 steps and  found her sitting on the floor at the bottom of the steps.  No loss of consciousness noted per .  Patient does not recall events of fall.  Dr. De La Vega consulted and took her to surgery 11/5/2023 for Left hip hemiarthroplasty.  Postop weightbearing as tolerated with posterior hip precautions.  Physical therapy consulted.    Lovenox for DVT prophylaxis with plans to transition to Eliquis.    2.  Fall from steps.  Physical therapy postop per Dr. De La Vega.  May require SNF and discussed with .    3.  Vascular dementia without behavioral disturbance.  Patient able to tell me her name and 's name otherwise does not answer questions appropriately.  She is at baseline per .  Continue Namenda and Remeron.     4.  Primary hypertension.  Blood pressure 150/54.  Continue metoprolol XL.    5.  Acute cystitis without hematuria abnormal urinalysis.  11-12 WBC, 4+ bacteria, positive nitrates.  Await culture and sensitivity.  Start Rocephin.  Patient with baseline dementia and unable to report accurate symptoms.    6.  Hypothyroidism.  TSH 1.68.  Continue Synthroid.      7.  Acute left hip pain.  Tylenol for mild pain, Norco for moderate pain.  Use narcotics sparingly with history of dementia.     8.  Social service consult for discharge planning.  Discussed with  and patient will likely need skilled facility at discharge.   agreeable if recommended by physical therapy.    Medical Decision Making  Number and Complexity of problems: 7  Acute left subcapital femur fracture: Acute, high complexity  poses threat to life and bodily function, stable  Acute left hip pain: Acute, moderate complexity, stable  Acute cystitis without hematuria: Acute, moderate complexity, stable  Vascular dementia without behavioral disturbance: Chronic, moderate complexity, stable  Primary hypertension: Chronic, moderate complexity, not at baseline  Hypothyroidism: Chronic, low complexity  Fall: Acute, moderate complexity    Differential Diagnosis: None    Conditions and Status        Condition is improving.     [facility] Data  External documents reviewed: Dr. Hebert office visit 8/23/2023  Cardiac tracing (EKG, telemetry) interpretation: None  Radiology interpretation: Radiology interpretation left femur x-ray, chest x-ray  Labs reviewed:   CMP 11/6/2023.  Repeat CMP in a.m.  CBC 11/6/2023.  Repeat CBC in AM.  TSH  Urinalysis.  Await urine culture and sensitivity    Any tests that were considered but not ordered: None     Decision rules/scores evaluated (example [id number], Wells, etc): None     Discussed with: Dr. Clemente, patient, and , Nik.     Care Planning  Shared decision making: Dr. Clemente, patient, and  Nik.  Patient  agree to physical therapy, DVT prophylaxis, Rocephin for UTI, SNF if recommended.  Code status and discussions: Full code  The patient surrogate decision maker is her , Nik    Disposition  Social Determinants of Health that impact treatment or disposition: None  I expect the patient to be discharged to SNF in 2 days.     Electronically signed by EDGARDO Urena, 11/06/23, 11:04 CST.     The above documentation resulted from a face-to-face encounter by me Steffany REYNOSO, [facility].        Electronically signed by Steffany Stanford APRN at 11/06/23 1104          Physical Therapy Notes (last 24 hours)        Tisha Beltran, PT Student at 11/06/23 1333  Version 1 of 1      Attestation signed by Ashu Hoskins, PT at 11/06/23 1334    I reviewed the documentation and  "agree.                   Goal Outcome Evaluation:  Plan of Care Reviewed With: patient, spouse        Progress: no change  Outcome Evaluation: PT eval complete. Pt alert and oriented to person only. Pt is very [place] and requires PT to speak loudly and directly to her throughout evaluation. Pt's  at bedside to help provide PLOF. She currently lives at home with her spouse who reports that she was IND with all mobility and adls until her fall at home on the stairs. Her  also reports that pt has dementia and has \"good days and bad days.\" Pt and  were educated on posterior hip precautions, WBAT status, and knee immobilizer. Due to her dementia, pt will need continued education on these precautions. Pt completed supine<>sit EOB with mod A x2 and significant verbal cues. Pt completed sit<>stand x2 with mod-max Ax2 and use of a rwx with significant verbal cues. Pt attempted taking 1-2 small steps at bedside but demos continuously trying to sit back down at EOB before in a safe place to do so despite consistent verbal cues. Pt then required mod-max A to maintain sitting EOB and returned to supine in bed with max-dep A x2 due to significant fatigue following activity. Pt would benefit from skilled PT intervention to address deficits in balance, strength, activity tolerance, fxl mob, and continued pt education on safety with mobility and hip precautions. Recommend d/c to SNF for continued PT/OT services.      Anticipated Discharge Disposition (PT): skilled nursing facility    Electronically signed by Ashu Hoskins, PT at 23 1334       Tisha Beltran, PT Student at 23 1333  Version 1 of 1      Attestation signed by Ashu Hoskins, PT at 23 1334    I reviewed the documentation and agree.                   Patient Name: Dina Michaels  : 1934    MRN: [id number]                              Today's Date: 2023       Admit Date: 2023    Visit Dx:     ICD-10-CM ICD-9-CM   1. Closed " subcapital fracture of femur, left, initial encounter  S72.012A 820.09   2. Closed fracture of left hip, initial encounter  S72.002A 820.8   3. Impaired mobility [Z74.09]  Z74.09 799.89     Patient Active Problem List   Diagnosis    AMS (altered mental status)    Vascular dementia without behavioral disturbance    Near syncope    Acute pulmonary embolism    Hypothyroidism    Lactic acidosis    Gastrointestinal hemorrhage, unspecified gastrointestinal hemorrhage type: Possibly from duodenal versus diverticular bleed    Chronic anemia    Essential hypertension    Acute CVA (cerebrovascular accident)    History of gastrointestinal bleeding    Closed subcapital fracture of femur, left, initial encounter    Fall (on) (from) other stairs and steps, initial encounter    Acute hip pain, left    Acute cystitis without hematuria     Past Medical History:   Diagnosis Date    Cancer     Dementia      Past Surgical History:   Procedure Laterality Date    COLONOSCOPY N/A 4/14/2023    Procedure: COLONOSCOPY WITH ANESTHESIA;  Surgeon: Bubba Asher MD;  Location: [facility] ENDOSCOPY;  Service: Gastroenterology;  Laterality: N/A;  pre gi bleed  post diverticulosis  Dr. Hebert    ENDOSCOPY N/A 4/14/2023    Procedure: ESOPHAGOGASTRODUODENOSCOPY WITH ANESTHESIA;  Surgeon: Bubba Asher MD;  Location: [facility] ENDOSCOPY;  Service: Gastroenterology;  Laterality: N/A;  pre screen  post gastric bx  Dr Hebert    HIP HEMIARTHROPLASTY Left 11/5/2023    Procedure: HIP HEMIARTHROPLASTY;  Surgeon: Denzel De La Vega MD;  Location: [facility] OR;  Service: Orthopedics;  Laterality: Left;      General Information       Row Name 11/06/23 0909          Physical Therapy Time and Intention    Document Type evaluation  s/p L hip hemiarthroplasty 11/5. **WBAT with posterior hip precautions** Dx: closed L hip fx. CC: L hip pain, post fall on stairs. MHx: vascular dementia, HTN.  -LH (r) BE (t) LH (c)     Mode of Treatment physical therapy  -LH (r) BE (t)  LH (c)       Row Name 11/06/23 0909          General Information    Patient Profile Reviewed yes  -LH (r) BE (t) LH (c)     Prior Level of Function independent:;all household mobility;gait;transfer;bed mobility;ADL's  - (r) BE (t) LH (c)     Existing Precautions/Restrictions left;hip, posterior   WBAT LLE  - (r) BE (t) LH (c)     Barriers to Rehab medically complex;physical barrier;cognitive status;hearing deficit  baseline dementia  - (r) BE (t) LH (c)       Row Name 11/06/23 0909          Living Environment    People in Home spouse  - (r) BE (t) LH (c)       Row Name 11/06/23 0909          Home Main Entrance    Number of Stairs, Main Entrance five  - (r) BE (t) LH (c)     Stair Railings, Main Entrance railing on left side (ascending)  - (r) BE (t) LH (c)       Row Name 11/06/23 0909          Stairs Within Home, Primary    Number of Stairs, Within Home, Primary none  - (r) BE (t) LH (c)       Row Name 11/06/23 0909          Cognition    Orientation Status (Cognition) oriented to;person  - (r) BE (t) LH (c)       Row Name 11/06/23 0909          Safety Issues, Functional Mobility    Safety Issues Affecting Function (Mobility) ability to follow commands;at risk behavior observed;awareness of need for assistance;friction/shear risk;impulsivity;insight into deficits/self-awareness;judgment;positioning of assistive device;safety precaution awareness;safety precautions follow-through/compliance;sequencing abilities;problem-solving  - (r) BE (t) LH (c)     Impairments Affecting Function (Mobility) cognition;balance;endurance/activity tolerance;strength;pain;postural/trunk control  - (r) BE (t) LH (c)     Cognitive Impairments, Mobility Safety/Performance attention;awareness, need for assistance;impulsivity;insight into deficits/self-awareness;judgment;problem-solving/reasoning;safety precaution awareness;safety precaution follow-through;sequencing abilities  - (r) BE (t) LH (c)               User Key   (r) = Recorded By, (t) = Taken By, (c) = Cosigned By      Initials Name Provider Type     Ashu Hoskins, PT Physical Therapist    Tisha Schreiber, PT Student PT Student                   Mobility       Row Name 11/06/23 0909          Bed Mobility    Bed Mobility supine-sit;sit-supine  -LH (r) BE (t) LH (c)     Supine-Sit [place] (Bed Mobility) moderate assist (50% patient effort);2 person assist;verbal cues  -LH (r) BE (t) LH (c)     Sit-Supine [place] (Bed Mobility) maximum assist (25% patient effort);dependent (less than 25% patient effort);2 person assist  -LH (r) BE (t) LH (c)     Assistive Device (Bed Mobility) head of bed elevated;draw sheet  -LH (r) BE (t) LH (c)       Row Name 11/06/23 0909          Transfers    Comment, (Transfers) attempted taking 1-2 very small steps forward and backward; pt demos trying to sit back on bed before being in a safe position to do so despite many verbal cues.  -LH (r) BE (t) LH (c)       Row Name 11/06/23 0909          Sit-Stand Transfer    Sit-Stand [place] (Transfers) moderate assist (50% patient effort);maximum assist (25% patient effort);2 person assist;verbal cues;nonverbal cues (demo/gesture)  -LH (r) BE (t) LH (c)     Assistive Device (Sit-Stand Transfers) walker, front-wheeled  -LH (r) BE (t) LH (c)     Comment, (Sit-Stand Transfer) x2  -LH (r) BE (t) LH (c)       Row Name 11/06/23 0909          Mobility    Extremity Weight-bearing Status left lower extremity   -LH (r) BE (t) LH (c)     Left Lower Extremity (Weight-bearing Status) weight-bearing as tolerated (WBAT)   w/ posterior hip precautions  -LH (r) BE (t) LH (c)               User Key  (r) = Recorded By, (t) = Taken By, (c) = Cosigned By      Initials Name Provider Type     Ashu Hoskins, PT Physical Therapist    Tisha Schreiber, PT Student PT Student                   Obj/Interventions       Row Name 11/06/23 0909          Range of Motion Comprehensive    Comment, General Range of Motion R  LE ROM WFL; L ankle ROM WFL; L knee and hip ROM deferred due to knee immobilizer in place and posterior hip precautions  -LH (r) BE (t) LH (c)       Row Name 11/06/23 0909          Strength Comprehensive (MMT)    Comment, General Manual Muscle Testing (MMT) Assessment R LE hip strength 3+/5; R LE knee strength 4/5; R ankle DF strength 4/5  -LH (r) BE (t) LH (c)       Row Name 11/06/23 0909          Motor Skills    Motor Skills posture;functional endurance  -LH (r) BE (t) LH (c)     Functional Endurance pt became very visably fatigued with minimal upright OOB activity  -LH (r) BE (t) LH (c)       Row Name 11/06/23 0909          Balance    Balance Assessment sitting static balance;sitting dynamic balance;standing static balance;standing dynamic balance  -LH (r) BE (t) LH (c)     Static Sitting Balance standby assist  -LH (r) BE (t) LH (c)     Dynamic Sitting Balance contact guard  -LH (r) BE (t) LH (c)     Position, Sitting Balance unsupported;sitting edge of bed  -LH (r) BE (t) LH (c)     Static Standing Balance moderate assist;2-person assist  -LH (r) BE (t) LH (c)     Dynamic Standing Balance moderate assist;maximum assist;2-person assist  -LH (r) BE (t) LH (c)     Position/Device Used, Standing Balance supported;walker, front-wheeled  -LH (r) BE (t) LH (c)       Row Name 11/06/23 0909          Sensory Assessment (Somatosensory)    Sensory Assessment (Somatosensory) LE sensation intact  -LH (r) BE (t) LH (c)       Row Name 11/06/23 0909          Posture    Posture other (see comments)  forward flexed posture in sitting and standing despite consistent verbal and tactile cues to attempt correcting  -LH (r) BE (t) LH (c)               User Key  (r) = Recorded By, (t) = Taken By, (c) = Cosigned By      Initials Name Provider Type    Ashu Alexis, PT Physical Therapist    Tisha Schreiber, PT Student PT Student                   Goals/Plan       Row Name 11/06/23 0909          Bed Mobility Goal 1 (PT)     Activity/Assistive Device (Bed Mobility Goal 1, PT) sit to supine/supine to sit  -LH (r) BE (t) LH (c)     Bowman Level/Cues Needed (Bed Mobility Goal 1, PT) minimum assist (75% or more patient effort)  -LH (r) BE (t) LH (c)     Time Frame (Bed Mobility Goal 1, PT) long term goal (LTG);10 days  -LH (r) BE (t) LH (c)     Progress/Outcomes (Bed Mobility Goal 1, PT) new goal  -LH (r) BE (t) LH (c)       Row Name 11/06/23 0909          Transfer Goal 1 (PT)    Activity/Assistive Device (Transfer Goal 1, PT) sit-to-stand/stand-to-sit;bed-to-chair/chair-to-bed;walker, rolling  -LH (r) BE (t) LH (c)     Bowman Level/Cues Needed (Transfer Goal 1, PT) minimum assist (75% or more patient effort)  -LH (r) BE (t) LH (c)     Time Frame (Transfer Goal 1, PT) long term goal (LTG);10 days  -LH (r) BE (t) LH (c)     Progress/Outcome (Transfer Goal 1, PT) new goal  -LH (r) BE (t) LH (c)       Row Name 11/06/23 0909          Gait Training Goal 1 (PT)    Activity/Assistive Device (Gait Training Goal 1, PT) gait (walking locomotion);assistive device use;decrease asymmetrical patterns;decrease fall risk;diminish gait deviation;improve balance and speed;increase endurance/gait distance;increase energy conservation;maintain weight-bearing status;walker, rolling  -LH (r) BE (t) LH (c)     Bowman Level (Gait Training Goal 1, PT) minimum assist (75% or more patient effort)  -LH (r) BE (t) LH (c)     Distance (Gait Training Goal 1, PT) 50 ft  -LH (r) BE (t) LH (c)     Time Frame (Gait Training Goal 1, PT) long term goal (LTG);10 days  -LH (r) BE (t) LH (c)     Progress/Outcome (Gait Training Goal 1, PT) new goal  -LH (r) BE (t) LH (c)       Row Name 11/06/23 0909          Therapy Assessment/Plan (PT)    Planned Therapy Interventions (PT) balance training;bed mobility training;gait training;home exercise program;patient/family education;postural re-education;orthotic fitting/training;ROM (range of  "motion);strengthening;stretching;transfer training  -LH (r) BE (t) LH (c)               User Key  (r) = Recorded By, (t) = Taken By, (c) = Cosigned By      Initials Name Provider Type     Ashu Hoskins, PT Physical Therapist    Tisha Schreiber, PT Student PT Student                   Clinical Impression       Row Name 11/06/23 0909          Pain    Pain Intervention(s) Repositioned;Ambulation/increased activity  -LH (r) BE (t) LH (c)     Additional Documentation Pain Scale: FACES Pre/Post-Treatment (Group)  -LH (r) BE (t) LH (c)       Row Name 11/06/23 0909          Pain Scale: FACES Pre/Post-Treatment    Pain: FACES Scale, Pretreatment 2-->hurts little bit  -LH (r) BE (t) LH (c)     Posttreatment Pain Rating 6-->hurts even more  -LH (r) BE (t) LH (c)     Pain Location - Side/Orientation Left  -LH (r) BE (t) LH (c)     Pain Location incisional  -LH (r) BE (t) LH (c)     Pain Location - hip  -LH (r) BE (t) LH (c)       Row Name 11/06/23 0909          Plan of Care Review    Plan of Care Reviewed With patient;spouse  -LH (r) BE (t) LH (c)     Progress no change  -LH (r) BE (t) LH (c)     Outcome Evaluation PT eval complete. Pt alert and oriented to person only. Pt is very [place] and requires PT to speak loudly and directly to her throughout evaluation. Pt's  at bedside to help provide PLOF. She currently lives at home with her spouse who reports that she was IND with all mobility and adls until her fall at home on the stairs. Her  also reports that pt has dementia and has \"good days and bad days.\" Pt and  were educated on posterior hip precautions, WBAT status, and knee immobilizer. Due to her dementia, pt will need continued education on these precautions. Pt completed supine<>sit EOB with mod A x2 and significant verbal cues. Pt completed sit<>stand x2 with mod-max Ax2 and use of a rwx with significant verbal cues. Pt attempted taking 1-2 small steps at bedside but demos continuously trying to " sit back down at EOB before in a safe place to do so despite consistent verbal cues. Pt then required mod-max A to maintain sitting EOB and returned to supine in bed with max-dep A x2 due to significant fatigue following activity. Pt would benefit from skilled PT intervention to address deficits in balance, strength, activity tolerance, fxl mob, and continued pt education on safety with mobility and hip precautions. Recommend d/c to SNF for continued PT/OT services.  -LH (r) BE (t) LH (c)       Row Name 11/06/23 0909          Therapy Assessment/Plan (PT)    Patient/Family Therapy Goals Statement (PT) improve function  -LH (r) BE (t) LH (c)     Rehab Potential (PT) good, to achieve stated therapy goals  -LH (r) BE (t) LH (c)     Criteria for Skilled Interventions Met (PT) yes;meets criteria;skilled treatment is necessary  -LH (r) BE (t) LH (c)     Therapy Frequency (PT) 2 times/day  -LH (r) BE (t) LH (c)     Predicted Duration of Therapy Intervention (PT) until d/c  -LH (r) BE (t) LH (c)       Row Name 11/06/23 0909          Vital Signs    Pre Patient Position Supine  -LH (r) BE (t) LH (c)     Intra Patient Position Standing  -LH (r) BE (t) LH (c)     Post Patient Position Supine  -LH (r) BE (t) LH (c)       Row Name 11/06/23 0909          Positioning and Restraints    Pre-Treatment Position in bed  -LH (r) BE (t) LH (c)     Post Treatment Position bed  -LH (r) BE (t) LH (c)     In Bed fowlers;call light within reach;encouraged to call for assist;exit alarm on;with family/caregiver;with other staff;side rails up x3;with brace;SCD pump applied  -LH (r) BE (t) LH (c)               User Key  (r) = Recorded By, (t) = Taken By, (c) = Cosigned By      Initials Name Provider Type     Ashu Hoskins, PT Physical Therapist    BE Tisha Beltran, PT Student PT Student                   Outcome Measures       Row Name 11/06/23 0909 11/06/23 0800       How much help from another person do you currently need...    Turning from  your back to your side while in flat bed without using bedrails? 2  -LH (r) BE (t) LH (c) 2  -KS    Moving from lying on back to sitting on the side of a flat bed without bedrails? 2  -LH (r) BE (t) LH (c) 1  -KS    Moving to and from a bed to a chair (including a wheelchair)? 2  -LH (r) BE (t) LH (c) 1  -KS    Standing up from a chair using your arms (e.g., wheelchair, bedside chair)? 2  -LH (r) BE (t) LH (c) 1  -KS    Climbing 3-5 steps with a railing? 1  -LH (r) BE (t) LH (c) 1  -KS    To walk in hospital room? 2  -LH (r) BE (t) LH (c) 1  -KS    AM-PAC 6 Clicks Score (PT) 11  -LH (r) BE (t) 7  -KS    Highest level of mobility 4 --> Transferred to chair/commode  -LH (r) BE (t) 2 --> Bed activities/dependent transfer  -KS      Row Name 11/06/23 0911 11/06/23 0909       Functional Assessment    Outcome Measure Options AM-PAC 6 Clicks Daily Activity (OT)  -CS (r) MB (t) CS (c) AM-PAC 6 Clicks Basic Mobility (PT)  - (r) BE (t) LH (c)              User Key  (r) = Recorded By, (t) = Taken By, (c) = Cosigned By      Initials Name Provider Type     Ashu Hoskins, PT Physical Therapist    Abiola Leone, OTR/L, CNT Occupational Therapist    Zuleyka Ramirez, RN Registered Nurse    Cait Pierre, OT Student OT Student    Tisha Schreiber, PT Student PT Student                                 Physical Therapy Education       Title: PT OT SLP Therapies (In Progress)       Topic: Physical Therapy (In Progress)       Point: Mobility training (In Progress)       Learning Progress Summary             Patient Acceptance, E, NR by BE at 11/6/2023 0909    Comment: educated on PT POC, posterior hip precautions, WBAT status, importance of increased mobility, d/c recommendations   Family Acceptance, E, NR by BE at 11/6/2023 0909    Comment: educated on PT POC, posterior hip precautions, WBAT status, importance of increased mobility, d/c recommendations                         Point: Home exercise program (Not  "Started)       Learner Progress:  Not documented in this visit.              Point: Body mechanics (Not Started)       Learner Progress:  Not documented in this visit.              Point: Precautions (In Progress)       Learning Progress Summary             Patient Acceptance, E, NR by BE at 11/6/2023 0909    Comment: educated on PT POC, posterior hip precautions, WBAT status, importance of increased mobility, d/c recommendations   Family Acceptance, E, NR by BE at 11/6/2023 0909    Comment: educated on PT POC, posterior hip precautions, WBAT status, importance of increased mobility, d/c recommendations                                         User Key       Initials Effective Dates Name Provider Type Discipline    BE 08/29/23 -  Tisha Beltran, PT Student PT Student PT                  PT Recommendation and Plan  Planned Therapy Interventions (PT): balance training, bed mobility training, gait training, home exercise program, patient/family education, postural re-education, orthotic fitting/training, ROM (range of motion), strengthening, stretching, transfer training  Plan of Care Reviewed With: patient, spouse  Progress: no change  Outcome Evaluation: PT eval complete. Pt alert and oriented to person only. Pt is very [place] and requires PT to speak loudly and directly to her throughout evaluation. Pt's  at bedside to help provide PLOF. She currently lives at home with her spouse who reports that she was IND with all mobility and adls until her fall at home on the stairs. Her  also reports that pt has dementia and has \"good days and bad days.\" Pt and  were educated on posterior hip precautions, WBAT status, and knee immobilizer. Due to her dementia, pt will need continued education on these precautions. Pt completed supine<>sit EOB with mod A x2 and significant verbal cues. Pt completed sit<>stand x2 with mod-max Ax2 and use of a rwx with significant verbal cues. Pt attempted taking 1-2 small steps " at bedside but demos continuously trying to sit back down at EOB before in a safe place to do so despite consistent verbal cues. Pt then required mod-max A to maintain sitting EOB and returned to supine in bed with max-dep A x2 due to significant fatigue following activity. Pt would benefit from skilled PT intervention to address deficits in balance, strength, activity tolerance, fxl mob, and continued pt education on safety with mobility and hip precautions. Recommend d/c to SNF for continued PT/OT services.     Time Calculation:         PT Charges       Row Name 23 1050             Time Calculation    Start Time 09  -LH (r) BE (t) LH (c)      Stop Time 1012  -LH (r) BE (t) LH (c)      Time Calculation (min) 63 min  -LH (r) BE (t)      PT Received On 23  -LH (r) BE (t) LH (c)      PT Goal Re-Cert Due Date 23  -LH (r) BE (t) LH (c)         Untimed Charges    PT Eval/Re-eval Minutes 63  -LH         Total Minutes    Untimed Charges Total Minutes 63  -LH       Total Minutes 63  -LH                User Key  (r) = Recorded By, (t) = Taken By, (c) = Cosigned By      Initials Name Provider Type     Ashu Hoskins, PT Physical Therapist    Tisha Schreiber, PT Student PT Student                      PT G-Codes  Outcome Measure Options: AM-PAC 6 Clicks Daily Activity (OT)  AM-PAC 6 Clicks Score (PT): 11  AM-PAC 6 Clicks Score (OT): 15  PT Discharge Summary  Anticipated Discharge Disposition (PT): skilled nursing facility    Tisha Beltran, PT Student  2023      Electronically signed by Ashu Hoskins, PT at 23 1334       Maribeth Ojeda, PTA at 23 1400  Version 1 of 1         Acute Care - Physical Therapy Treatment Note  [facility]     Patient Name: Dina Michaels  : 1934  MRN: [id number]  Today's Date: 2023      Visit Dx:     ICD-10-CM ICD-9-CM   1. Closed subcapital fracture of femur, left, initial encounter  S72.012A 820.09   2. Closed fracture of left hip, initial encounter   S72.002A 820.8   3. Impaired mobility [Z74.09]  Z74.09 799.89     Patient Active Problem List   Diagnosis    AMS (altered mental status)    Vascular dementia without behavioral disturbance    Near syncope    Acute pulmonary embolism    Hypothyroidism    Lactic acidosis    Gastrointestinal hemorrhage, unspecified gastrointestinal hemorrhage type: Possibly from duodenal versus diverticular bleed    Chronic anemia    Essential hypertension    Acute CVA (cerebrovascular accident)    History of gastrointestinal bleeding    Closed subcapital fracture of femur, left, initial encounter    Fall (on) (from) other stairs and steps, initial encounter    Acute hip pain, left    Acute cystitis without hematuria     Past Medical History:   Diagnosis Date    Cancer     Dementia      Past Surgical History:   Procedure Laterality Date    COLONOSCOPY N/A 4/14/2023    Procedure: COLONOSCOPY WITH ANESTHESIA;  Surgeon: Bubba Asher MD;  Location: [facility] ENDOSCOPY;  Service: Gastroenterology;  Laterality: N/A;  pre gi bleed  post diverticulosis  Dr. Hebert    ENDOSCOPY N/A 4/14/2023    Procedure: ESOPHAGOGASTRODUODENOSCOPY WITH ANESTHESIA;  Surgeon: Bubba Asher MD;  Location: [facility] ENDOSCOPY;  Service: Gastroenterology;  Laterality: N/A;  pre screen  post gastric bx  Dr Hebert    HIP HEMIARTHROPLASTY Left 11/5/2023    Procedure: HIP HEMIARTHROPLASTY;  Surgeon: Denzel De La Vega MD;  Location: [facility] OR;  Service: Orthopedics;  Laterality: Left;     PT Assessment (last 12 hours)       PT Evaluation and Treatment       Row Name 11/06/23 1315          Physical Therapy Time and Intention    Subjective Information complains of;pain  -LY     Document Type therapy note (daily note)  -LY     Mode of Treatment physical therapy  -LY     Comment LLE WBAT with immobilizer  -LY       Row Name 11/06/23 1315          General Information    Existing Precautions/Restrictions fall;hip, posterior  WBAT LLE  -LY       Row Name 11/06/23 1316           Pain    Pain Intervention(s) Medication (See MAR);Ambulation/increased activity;Repositioned  -LY       Row Name 11/06/23 1315          Pain Scale: FACES Pre/Post-Treatment    Pain: FACES Scale, Pretreatment 0-->no hurt  -LY     Posttreatment Pain Rating 8-->hurts whole lot  -LY     Pain Location - Side/Orientation Left  -LY     Pain Location - hip  -LY       Row Name 11/06/23 1315          Mobility    Extremity Weight-bearing Status left lower extremity  -LY     Left Lower Extremity (Weight-bearing Status) weight-bearing as tolerated (WBAT)  -LY       Row Name 11/06/23 1315          Bed Mobility    Supine-Sit [place] (Bed Mobility) moderate assist (50% patient effort);maximum assist (25% patient effort);2 person assist  -LY     Sit-Supine [place] (Bed Mobility) dependent (less than 25% patient effort);2 person assist  -LY     Bed Mobility, Safety Issues cognitive deficits limit understanding;decreased use of legs for bridging/pushing  -LY     Assistive Device (Bed Mobility) bed rails;draw sheet;head of bed elevated  -LY       Row Name 11/06/23 1315          Transfers    Comment, (Transfers) stood 2 times, unable to take steps at this time  -LY       Row Name 11/06/23 1315          Sit-Stand Transfer    Sit-Stand [place] (Transfers) verbal cues;moderate assist (50% patient effort);2 person assist  -LY     Assistive Device (Sit-Stand Transfers) walker, front-wheeled  -LY     Comment, (Sit-Stand Transfer) weight shift posteriorly, unable to maintain upright posture.  -LY       Row Name             Wound 11/05/23 1010 Left anterior hip Incision    Wound - Properties Group Placement Date: 11/05/23  -LM Placement Time: 1010  -LM Present on Original Admission: N  -LM Side: Left  -LM Orientation: anterior  -LM Location: hip  -LM Primary Wound Type: Incision  -LM    Retired Wound - Properties Group Placement Date: 11/05/23  -LM Placement Time: 1010  -LM Present on Original Admission: N  -LM Side: Left   -LM Orientation: anterior  -LM Location: hip  -LM Primary Wound Type: Incision  -LM    Retired Wound - Properties Group Date first assessed: 11/05/23  -LM Time first assessed: 1010  -LM Present on Original Admission: N  -LM Side: Left  -LM Location: hip  -LM Primary Wound Type: Incision  -LM      Row Name 11/06/23 1315          Positioning and Restraints    Pre-Treatment Position in bed  -LY     Post Treatment Position bed  -LY     In Bed fowlers;call light within reach;encouraged to call for assist;with family/caregiver  -LY               User Key  (r) = Recorded By, (t) = Taken By, (c) = Cosigned By      Initials Name Provider Type    LY Maribeth Oejda PTA Physical Therapist Assistant    Laverne Sparks RN Registered Nurse                    Physical Therapy Education       Title: PT OT SLP Therapies (In Progress)       Topic: Physical Therapy (In Progress)       Point: Mobility training (In Progress)       Learning Progress Summary             Patient Acceptance, E, NR by BE at 11/6/2023 0909    Comment: educated on PT POC, posterior hip precautions, WBAT status, importance of increased mobility, d/c recommendations   Family Acceptance, E, NR by BE at 11/6/2023 0909    Comment: educated on PT POC, posterior hip precautions, WBAT status, importance of increased mobility, d/c recommendations                         Point: Home exercise program (Not Started)       Learner Progress:  Not documented in this visit.              Point: Body mechanics (Not Started)       Learner Progress:  Not documented in this visit.              Point: Precautions (In Progress)       Learning Progress Summary             Patient Acceptance, E, NR by BE at 11/6/2023 0909    Comment: educated on PT POC, posterior hip precautions, WBAT status, importance of increased mobility, d/c recommendations   Family Acceptance, E, NR by BE at 11/6/2023 0909    Comment: educated on PT POC, posterior hip precautions, WBAT status, importance of  increased mobility, d/c recommendations                                         User Key       Initials Effective Dates Name Provider Type Discipline    BE 08/29/23 -  Tisha Beltran, PT Student PT Student PT                  PT Recommendation and Plan             Time Calculation:    PT Charges       Row Name 11/06/23 1400 11/06/23 1050          Time Calculation    Start Time 1315  -LY 0909  -LH (r) BE (t) LH (c)     Stop Time 1340  -LY 1012  -LH (r) BE (t) LH (c)     Time Calculation (min) 25 min  -LY 63 min  -LH (r) BE (t)     PT Received On 11/06/23  -LY 11/06/23  -LH (r) BE (t) LH (c)     PT Goal Re-Cert Due Date -- 11/16/23  -LH (r) BE (t) LH (c)        Time Calculation- PT    Total Timed Code Minutes- PT 25 minute(s)  -LY --        Timed Charges    06915 - PT Therapeutic Activity Minutes 25  -LY --        Untimed Charges    PT Eval/Re-eval Minutes -- 63  -LH        Total Minutes    Timed Charges Total Minutes 25  -LY --     Untimed Charges Total Minutes -- 63  -LH      Total Minutes 25  -LY 63  -LH               User Key  (r) = Recorded By, (t) = Taken By, (c) = Cosigned By      Initials Name Provider Type    LH Ashu Hoskins, PT Physical Therapist    Maribeth Moura PTA Physical Therapist Assistant    BE Tisha Beltran, PT Student PT Student                  Therapy Charges for Today       Code Description Service Date Service Provider Modifiers Qty    [id number] HC PT THERAPEUTIC ACT EA 15 MIN 11/6/2023 Maribeth Ojeda PTA GP 2            PT G-Codes  Outcome Measure Options: AM-PAC 6 Clicks Daily Activity (OT)  AM-PAC 6 Clicks Score (PT): 11  AM-PAC 6 Clicks Score (OT): 15    Maribeth Ojeda PTA  11/6/2023      Electronically signed by Maribeth Ojeda PTA at 11/06/23 1400       Occupational Therapy Notes (last 24 hours)  Notes from 11/05/23 1626 through 11/06/23 1626   No notes exist for this encounter.     Cait Andersen, OT Student   OT Student  Occupational Therapy     Therapy Evaluation       Attested     Date of Service: 23  Creation Time: 23     Attested           Attestation signed by Abiola Haynes, OTR/L, GAVINO at 23     I reviewed the documentation and agree.                Expand All Collapse All[]Expand All by Default    Show:  []Written[x]Templated[]Copied    Added by:  [x]Cait Andersen, OT Student    []Dwaine for details  Patient Name: Dina Michaels                      : 1934                          MRN: [id number]                              Today's Date: 2023                                   Admit Date: 2023                        Visit Dx:   Visit Diagnosis       ICD-10-CM ICD-9-CM   1. Closed subcapital fracture of femur, left, initial encounter  S72.012A 820.09   2. Closed fracture of left hip, initial encounter  S72.002A 820.8         Problem List       Patient Active Problem List   Diagnosis    AMS (altered mental status)    Vascular dementia without behavioral disturbance    Near syncope    Acute pulmonary embolism    Hypothyroidism    Lactic acidosis    Gastrointestinal hemorrhage, unspecified gastrointestinal hemorrhage type: Possibly from duodenal versus diverticular bleed    Chronic anemia    Essential hypertension    Acute CVA (cerebrovascular accident)    History of gastrointestinal bleeding    Closed subcapital fracture of femur, left, initial encounter    Fall (on) (from) other stairs and steps, initial encounter    Acute hip pain, left    Acute cystitis without hematuria         Medical History        Past Medical History:   Diagnosis Date    Cancer      Dementia           Surgical History         Past Surgical History:   Procedure Laterality Date    COLONOSCOPY N/A 2023     Procedure: COLONOSCOPY WITH ANESTHESIA;  Surgeon: Bubba Asher MD;  Location: [facility] ENDOSCOPY;  Service: Gastroenterology;  Laterality: N/A;  pre gi bleed  post diverticulosis  Dr. Hebert    ENDOSCOPY N/A 2023     Procedure:  ESOPHAGOGASTRODUODENOSCOPY WITH ANESTHESIA;  Surgeon: Bubba Asher MD;  Location: [facility] ENDOSCOPY;  Service: Gastroenterology;  Laterality: N/A;  pre screen  post gastric bx  Dr Hebert    HIP HEMIARTHROPLASTY Left 11/5/2023     Procedure: HIP HEMIARTHROPLASTY;  Surgeon: Denzel De La Vega MD;  Location: [facility] OR;  Service: Orthopedics;  Laterality: Left;           General Information         Row Name 11/06/23 0911                 OT Time and Intention     Document Type evaluation  admitted s/p fall at home with c/o L hip pain, Dx:closed subcapital fx of L femur, Procedure: L hip hemiarthroplasty  -CS (r) MB (t) CS (c)       Mode of Treatment occupational therapy;co-treatment  -CS (r) MB (t) CS (c)          Row Name 11/06/23 0911                 General Information     Patient Profile Reviewed yes  -CS (r) MB (t) CS (c)       Prior Level of Function independent:;ADL's;all household mobility  -CS (r) MB (t) CS (c)       Existing Precautions/Restrictions fall;hip, posterior  WBAT LLE  -CS (r) MB (t) CS (c)       Barriers to Rehab cognitive status;physical barrier;visual deficit;hearing deficit  baseline dememtia, cataracts  -CS (r) MB (t) CS (c)          Row Name 11/06/23 0911                 Occupational Profile     Environmental Supports and Barriers (Occupational Profile) FWW, walk-in shower, framed toilet seat, shower chair, rollator  -CS (r) MB (t) CS (c)          Row Name 11/06/23 0911                 Living Environment     People in Home spouse  -CS (r) MB (t) CS (c)          Row Name 11/06/23 0911                 Home Main Entrance     Number of Stairs, Main Entrance five  -CS (r) MB (t) CS (c)       Stair Railings, Main Entrance railing on left side (ascending)  -CS (r) MB (t) CS (c)          Row Name 11/06/23 0911                 Stairs Within Home, Primary     Number of Stairs, Within Home, Primary none  -CS (r) MB (t) CS (c)          Row Name 11/06/23 0911                 Cognition     Orientation  Status (Cognition) oriented to;person  -CS (r) MB (t) CS (c)          Row Name 11/06/23 0911                 Safety Issues, Functional Mobility     Safety Issues Affecting Function (Mobility) ability to follow commands;awareness of need for assistance;friction/shear risk;insight into deficits/self-awareness;judgment;positioning of assistive device;problem-solving;safety precaution awareness;sequencing abilities;safety precautions follow-through/compliance  -CS (r) MB (t) CS (c)       Impairments Affecting Function (Mobility) balance;cognition;endurance/activity tolerance;pain;postural/trunk control;strength  -CS (r) MB (t) CS (c)       Cognitive Impairments, Mobility Safety/Performance attention;awareness, need for assistance;insight into deficits/self-awareness;judgment;problem-solving/reasoning;safety precaution awareness;safety precaution follow-through;sequencing abilities  -CS (r) MB (t) CS (c)                       User Key  (r) = Recorded By, (t) = Taken By, (c) = Cosigned By        Initials Name Provider Type     CS Abiola Haynes, OTR/L, CNT Occupational Therapist     Cait Pierre, OT Student OT Student                                     Mobility/ADL's         Row Name 11/06/23 0911                 Bed Mobility     Bed Mobility supine-sit;sit-supine  -CS (r) MB (t) CS (c)       Supine-Sit [place] (Bed Mobility) moderate assist (50% patient effort);maximum assist (25% patient effort);2 person assist;verbal cues  -CS (r) MB (t) CS (c)       Sit-Supine [place] (Bed Mobility) dependent (less than 25% patient effort);2 person assist;verbal cues  -CS (r) MB (t) CS (c)       Bed Mobility, Safety Issues cognitive deficits limit understanding;decreased use of legs for bridging/pushing  -CS (r) MB (t) CS (c)       Assistive Device (Bed Mobility) bed rails;draw sheet;head of bed elevated  -CS (r) MB (t) CS (c)          Row Name 11/06/23 0911                 Transfers     Transfers sit-stand  transfer  -CS (r) MB (t) CS (c)       Comment, (Transfers) --  -CS (r) MB (t) CS (c)          Row Name 11/06/23 0911                 Sit-Stand Transfer     Sit-Stand [place] (Transfers) moderate assist (50% patient effort);maximum assist (25% patient effort)  -CS (r) MB (t) CS (c)       Assistive Device (Sit-Stand Transfers) walker, front-wheeled  -CS (r) MB (t) CS (c)       Comment, (Sit-Stand Transfer) x2  -CS (r) MB (t) CS (c)          Row Name 11/06/23 0911                 Functional Mobility     Functional Mobility- Comment took a couple steps away from bed  -CS (r) MB (t) CS (c)          Row Name 11/06/23 0911                 Activities of Daily Living     BADL Assessment/Intervention lower body dressing  -CS (r) MB (t) CS (c)          Row Name 11/06/23 09                 Mobility     Extremity Weight-bearing Status left lower extremity  -CS (r) MB (t) CS (c)       Left Lower Extremity (Weight-bearing Status) weight-bearing as tolerated (WBAT)  -CS (r) MB (t) CS (c)          Row Name 11/06/23 0911                 Lower Body Dressing Assessment/Training     [place] Level (Lower Body Dressing) don;socks;dependent (less than 25% patient effort)  -CS (r) MB (t) CS (c)       Position (Lower Body Dressing) sitting up in bed  -CS (r) MB (t) CS (c)                       User Key  (r) = Recorded By, (t) = Taken By, (c) = Cosigned By        Initials Name Provider Type     CS Abiola Haynes S, OTR/L, CNT Occupational Therapist     Cait Pierre, OT Student OT Student                            Obj/Interventions         [facility] Name 11/06/23 0911                 Sensory Assessment (Somatosensory)     Sensory Assessment (Somatosensory) UE sensation intact  -CS (r) MB (t) CS (c)          Row Name 11/06/23 0911                 Range of Motion Comprehensive     Comment, General Range of Motion B shoulder flexion: 50% impaired, all other joints WFL  -CS (r) MB (t) CS (c)          Row Name 11/06/23 0911                  Strength Comprehensive (MMT)     Comment, General Manual Muscle Testing (MMT) Assessment B shoulder flexion:2+/5, B elbow flex/ext:4/5  -CS (r) MB (t) CS (c)          Row Name 11/06/23 0911                 Balance     Balance Assessment sitting static balance;standing static balance;sitting dynamic balance;standing dynamic balance  -CS (r) MB (t) CS (c)       Static Sitting Balance standby assist  -CS (r) MB (t) CS (c)       Dynamic Sitting Balance standby assist;contact guard  -CS (r) MB (t) CS (c)       Position, Sitting Balance unsupported;sitting edge of bed  -CS (r) MB (t) CS (c)       Static Standing Balance moderate assist;2-person assist;verbal cues  -CS (r) MB (t) CS (c)       Dynamic Standing Balance moderate assist;maximum assist;2-person assist;verbal cues  -CS (r) MB (t) CS (c)       Position/Device Used, Standing Balance supported;walker, front-wheeled  -CS (r) MB (t) CS (c)                       User Key  (r) = Recorded By, (t) = Taken By, (c) = Cosigned By        Initials Name Provider Type     CS Abiola Haynes S, OTR/L, CNT Occupational Therapist     Cait Pierre, OT Student OT Student                            Goals/Plan         Row Name 11/06/23 0911                 Transfer Goal 1 (OT)     Activity/Assistive Device (Transfer Goal 1, OT) bed-to-chair/chair-to-bed;commode, 3-in-1  -CS (r) MB (t) CS (c)       [place] Level/Cues Needed (Transfer Goal 1, OT) moderate assist (50-74% patient effort)  -CS (r) MB (t) CS (c)       Time Frame (Transfer Goal 1, OT) long term goal (LTG)  -CS (r) MB (t) CS (c)       Progress/Outcome (Transfer Goal 1, OT) new goal  -CS (r) MB (t) CS (c)          Row Name 11/06/23 0911                 Dressing Goal 1 (OT)     Activity/Device (Dressing Goal 1, OT) lower body dressing  -CS (r) MB (t) CS (c)       [place]/Cues Needed (Dressing Goal 1, OT) moderate assist (50-74% patient effort)  -CS (r) MB (t) CS (c)       Time Frame (Dressing Goal 1,  OT) long term goal (LTG)  -CS (r) MB (t) CS (c)       Strategies/Barriers (Dressing Goal 1, OT) AE PRN  -CS (r) MB (t) CS (c)       Progress/Outcome (Dressing Goal 1, OT) new goal  -CS (r) MB (t) CS (c)          Row Name 11/06/23 11                 Grooming Goal 1 (OT)     Activity/Device (Grooming Goal 1, OT) grooming skills, all  -CS (r) MB (t) CS (c)       [place] (Grooming Goal 1, OT) moderate assist (50-74% patient effort)  -CS (r) MB (t) CS (c)       Time Frame (Grooming Goal 1, OT) long term goal (LTG)  -CS (r) MB (t) CS (c)       Strategies/Barriers (Grooming Goal 1, OT) standing bedside (x5 minutes)  -CS (r) MB (t) CS (c)       Progress/Outcome (Grooming Goal 1, OT) new goal  -CS (r) MB (t) CS (c)          Row Name 11/06/23 0911                 Therapy Assessment/Plan (OT)     Planned Therapy Interventions (OT) activity tolerance training;BADL retraining;functional balance retraining;IADL retraining;occupation/activity based interventions;patient/caregiver education/training;strengthening exercise;transfer/mobility retraining;adaptive equipment training  -CS (r) MB (t) CS (c)                    User Key  (r) = Recorded By, (t) = Taken By, (c) = Cosigned By        Initials Name Provider Type     CS Abiola Haynes, OTR/L, CNT Occupational Therapist     Cait Pierre, OT Student OT Student                         Clinical Impression         Row Name 11/06/23 0911                 Pain Assessment     Pain Intervention(s) Medication (See MAR);Repositioned;Ambulation/increased activity  -CS (r) MB (t) CS (c)       Additional Documentation Pain Scale: FACES Pre/Post-Treatment (Group)  -CS (r) MB (t) CS (c)          Row Name 11/06/23 0911                 Pain Scale: FACES Pre/Post-Treatment     Pain: FACES Scale, Pretreatment 0-->no hurt  -CS (r) MB (t) CS (c)       Posttreatment Pain Rating 6-->hurts even more  -CS (r) MB (t) CS (c)       Pain Location - Side/Orientation Left  -CS (r) MB (t) CS  "(c)       Pain Location - hip  -CS (r) MB (t) CS (c)          Row Name 11/06/23 0911                 Plan of Care Review     Plan of Care Reviewed With patient;spouse  -CS (r) MB (t) CS (c)       Progress no change  -CS (r) MB (t) CS (c)       Outcome Evaluation OT eval completed. Pt is alert and oriented to self only d/t baseline dementia. Pt's spouse reports pt has \"good days and bad days with her dementia.\"Spouse also reports pt was IND at home and \"just let her do whatever she wanted.\" [place] requiring therapist to speak loudly. Spouse present at bedside and is a good historian for pt. Pt in bed upon arrival with L knee immobilizer donned. LLE WBAT. Educated pt and spouse on hip precautions. Spouse verbalized understanding, however pt will require continuous education. Pt Mod-Max Ax2 for supine <>sit with HOB elevated. DEP for donning socks while seated upright in bed. Completed sit <> stand t/f x2 with Mod-Max A x2 and FWW to help maintain balance. First attempt pt stood up and sat right back down, second attempt pt was able to take a couple steps away from the bed and then backwards towards the bed. Pt attempted to sit back down before being in a safe position to do so despite vc's. Pt limited d/t pain. Pt DEP for sit <>supine and increased fatigue noted. Pt would benefit from skilled OT to address fxl mobility, balance, stength, endurance with ADLs. Recommend d/c to SNF.  -CS (r) MB (t) CS (c)          Row Name 11/06/23 0911                 Therapy Assessment/Plan (OT)     Rehab Potential (OT) good, to achieve stated therapy goals  -CS (r) MB (t) CS (c)       Criteria for Skilled Therapeutic Interventions Met (OT) meets criteria;skilled treatment is necessary  -CS (r) MB (t) CS (c)       Therapy Frequency (OT) 5 times/wk  -CS (r) MB (t) CS (c)       Predicted Duration of Therapy Intervention (OT) until hospital d/c  -CS (r) MB (t) CS (c)          Row Name 11/06/23 0911                 Therapy Plan " Review/Discharge Plan (OT)     Anticipated Discharge Disposition (OT) skilled nursing facility  -CS (r) MB (t) CS (c)          Row Name 11/06/23 0911                 Positioning and Restraints     Pre-Treatment Position in bed  -CS (r) MB (t) CS (c)       Post Treatment Position bed  -CS (r) MB (t) CS (c)       In Bed fowlers;call light within reach;encouraged to call for assist;exit alarm on;with family/caregiver;side rails up x2;L knee immobilizer  -CS (r) MB (t) CS (c)                       User Key  (r) = Recorded By, (t) = Taken By, (c) = Cosigned By        Initials Name Provider Type     CS Abiola Haynes, OTR/L, CNT Occupational Therapist     Cait Pierre, OT Student OT Student                            Outcome Measures         Row Name 11/06/23 0911                 How much help from another is currently needed...     Putting on and taking off regular lower body clothing? 1  -CS (r) MB (t) CS (c)       Bathing (including washing, rinsing, and drying) 2  -CS (r) MB (t) CS (c)       Toileting (which includes using toilet bed pan or urinal) 2  -CS (r) MB (t) CS (c)       Putting on and taking off regular upper body clothing 3  -CS (r) MB (t) CS (c)       Taking care of personal grooming (such as brushing teeth) 3  -CS (r) MB (t) CS (c)       Eating meals 4  -CS (r) MB (t) CS (c)       AM-PAC 6 Clicks Score (OT) 15  -CS (r) MB (t)          Row Name 11/06/23 0909 11/06/23 0800            How much help from another person do you currently need...     Turning from your back to your side while in flat bed without using bedrails? 2 (P)   -BE 2  -KS     Moving from lying on back to sitting on the side of a flat bed without bedrails? 2 (P)   -BE 1  -KS     Moving to and from a bed to a chair (including a wheelchair)? 2 (P)   -BE 1  -KS     Standing up from a chair using your arms (e.g., wheelchair, bedside chair)? 2 (P)   -BE 1  -KS     Climbing 3-5 steps with a railing? 1 (P)   -BE 1  -KS     To walk in  hospital room? 2 (P)   -BE 1  -KS     AM-PAC 6 Clicks Score (PT) 11 (P)   -BE 7  -KS     Highest level of mobility 4 --> Transferred to chair/commode (P)   -BE 2 --> Bed activities/dependent transfer  -KS        Row Name 11/06/23 0911 11/06/23 0909            Functional Assessment     Outcome Measure Options AM-PAC 6 Clicks Daily Activity (OT)  -CS (r) MB (t) CS (c) AM-PAC 6 Clicks Basic Mobility (PT) (P)   -BE                     User Key  (r) = Recorded By, (t) = Taken By, (c) = Cosigned By        Initials Name Provider Type     CS Abiola Haynes, OTR/L, CNT Occupational Therapist     Zuleyka Ramirez, RN Registered Nurse     aCit Pierre, OT Student OT Student     BE Tisha Beltran, PT Student PT Student                             Occupational Therapy Education            Title: PT OT SLP Therapies (In Progress)         Topic: Occupational Therapy (Done)         Point: ADL training (Done)         Description:   Instruct learner(s) on proper safety adaptation and remediation techniques during self care or transfers.   Instruct in proper use of assistive devices.                       Learning Progress Summary               Patient Acceptance, E, VU by MB at 11/6/2023 1047                               Point: Home exercise program (Done)         Description:   Instruct learner(s) on appropriate technique for monitoring, assisting and/or progressing therapeutic exercises/activities.                       Learning Progress Summary               Patient Acceptance, E, VU by MB at 11/6/2023 1047                               Point: Precautions (Done)         Description:   Instruct learner(s) on prescribed precautions during self-care and functional transfers.                       Learning Progress Summary               Patient Acceptance, E, VU by MB at 11/6/2023 1047                               Point: Body mechanics (Done)         Description:   Instruct learner(s) on proper positioning and spine  "alignment during self-care, functional mobility activities and/or exercises.                       Learning Progress Summary               Patient Acceptance, E, VU by MB at 11/6/2023 1047                                                   User Key         Initials Effective Dates Name Provider Type Discipline     MB 08/04/23 -  Cait Andersen OT Student OT Student OT                          OT Recommendation and Plan  Planned Therapy Interventions (OT): activity tolerance training, BADL retraining, functional balance retraining, IADL retraining, occupation/activity based interventions, patient/caregiver education/training, strengthening exercise, transfer/mobility retraining, adaptive equipment training  Therapy Frequency (OT): 5 times/wk  Plan of Care Review  Plan of Care Reviewed With: patient, spouse  Progress: no change  Outcome Evaluation: OT eval completed. Pt is alert and oriented to self only d/t baseline dementia. Pt's spouse reports pt has \"good days and bad days with her dementia.\"Spouse also reports pt was IND at home and \"just let her do whatever she wanted.\" [place] requiring therapist to speak loudly. Spouse present at bedside and is a good historian for pt. Pt in bed upon arrival with L knee immobilizer donned. LLE WBAT. Educated pt and spouse on hip precautions. Spouse verbalized understanding, however pt will require continuous education. Pt Mod-Max Ax2 for supine <>sit with HOB elevated. DEP for donning socks while seated upright in bed. Completed sit <> stand t/f x2 with Mod-Max A x2 and FWW to help maintain balance. First attempt pt stood up and sat right back down, second attempt pt was able to take a couple steps away from the bed and then backwards towards the bed. Pt attempted to sit back down before being in a safe position to do so despite vc's. Pt limited d/t pain. Pt DEP for sit <>supine and increased fatigue noted. Pt would benefit from skilled OT to address fxl mobility, balance, " "stength, endurance with ADLs. Recommend d/c to SNF.      Time Calculation:        Time Calculation- OT         Row Name 11/06/23 0911                       Time Calculation- OT     OT Start Time 0925  +10 min chart review  -CS (r) MB (t) CS (c)         OT Stop Time 1012  -CS (r) MB (t) CS (c)         OT Time Calculation (min) 47 min  -CS (r) MB (t)         OT Received On 11/06/23  -CS (r) MB (t) CS (c)         OT Goal Re-Cert Due Date 11/16/23  -CS (r) MB (t) CS (c)                 Untimed Charges     OT Eval/Re-eval Minutes 57  -CS (r) MB (t) CS (c)                 Total Minutes     Untimed Charges Total Minutes 57  -CS (r) MB (t)          Total Minutes 57  -CS (r) MB (t)                      User Key  (r) = Recorded By, (t) = Taken By, (c) = Cosigned By        Initials Name Provider Type     CS Abiola Haynes OTSTEPAN/L, GAVINO Occupational Therapist     Cait Pierre, OT Student OT Student                                aCit Andersen, OT Student                   11/6/2023            Cosigned by: Abiola Haynes OTR/L, CNT at 11/06/23 1309            ED to Hosp-Admission (Current) on 11/4/2023            Revision History            Detailed Report            ROS Info          Note shared with patient  Additional Documentation    Vitals: /74 (BP Location: Left arm, Patient Position: Lying)     Pulse 98     Temp 99.6 °F (37.6 °C) (Oral)     Resp 16     Ht 162.6 cm (64\")     Wt 68 kg (150 lb)     SpO2 92%     BMI 25.75 kg/m²     BSA 1.73 m²          More Vitals   Flowsheets: Vital Signs,     HPI,     Acuity/Destination,     Pain,     Travel, Exposure, and Communicable Disease Screening,     Outbreak Screen,     ...(66 more)   Encounter Info: Billing Info,     History,     Allergies,     Detailed Report     Patient Summary Extracts    Lab Result Report 11/6/2023  4:26 PM   Payor Communication 11/6/2023 11:24 AM   Payor Communication 11/6/2023 11:24 AM     Encounter Information    Encounter " Information   Department Encounter #   11/4/2023  9:03 PM [facility] 3A [id number]     Care Timeline    11/04   Admitted from ED 2250   11/05   HIP HEMIARTHROPLASTY     Clinical Impressions      Closed fracture of left hip, initial encounter  Disposition      Decision to Admit   Orders Placed      Labs    Basic Metabolic Panel Once    CBC & Differential Once  ...(11 more)    Imaging    XR Chest 1 View 1 Time Imaging    XR Femur 2 View Left 1 Time Imaging    XR Hip With or Without Pelvis 2 - 3 View Left 1 Time Imaging    XR Hip With or Without Pelvis 2 - 3 View Left 1 Time Imaging    Other Orders  Advance Diet As Tolerated - Until Discontinued  Apply Ice To Affected Area As Needed  ...(45 more)  All Encounter Results  Care Timeline    2022   Arrived   2141   XR Hip With or Without Pelvis 2 - 3 View Left     XR Femur 2 View Left     XR Chest 1 View   2159   Comprehensive Metabolic Panel      CBC & Differential      Protime-INR     aPTT   2201   Morphine Sulfate 4 mg     Ondansetron HCl 4 mg   2212   Admitted (ED Boarder)   2250   Transferred to [facility] 3A     Medications Administered    All Medications Administered (20)  Visit Diagnoses      Closed subcapital fracture of femur, left, initial encounter    Closed fracture of left hip, initial encounter    Impaired mobility [Z74.09]  Problem List  Media  From this encounter  Electronic signature on 11/4/2023 2144 - E-signed     Committee Details    There is no Committee Review information to display for this encounter.     Committee Members    Hospitalist: Steffany Stanford APRN Occupational Therapy: Cait Andersen, OT Student  Abiola Haynes, OTR/L, CNT   Orthopedic Surgery: Denzel De La Vega MD Physical Therapy: Marie Griffith, PT  Tisha Beltran, PT Student  Becca Welch, PTA  Ashu Hoskins, PT  Lacey Pimentel, PTA  Jeff Roman, PTA Student  Maribeth Ojeda, PTA

## 2023-11-06 NOTE — PLAN OF CARE
"Goal Outcome Evaluation:  Plan of Care Reviewed With: (P) patient, spouse        Progress: (P) no change  Outcome Evaluation: (P) OT eval completed. Pt is alert and oriented to self only d/t baseline dementia. Pt's spouse reports pt has \"good days and bad days with her dementia.\"Spouse also reports pt was IND at home and \"just let her do whatever she wanted.\" [place] requiring therapist to speak loudly. Spouse present at bedside and is a good historian for pt. Pt in bed upon arrival with L knee immobilizer donned. LLE WBAT. Educated pt and spouse on hip precautions. Spouse verbalized understanding, however pt will require continuous education. Pt Mod-Max Ax2 for supine <>sit with HOB elevated. DEP for donning socks while seated upright in bed. Completed sit <> stand t/f x2 with Mod-Max A x2 and FWW to help maintain balance. First attempt pt stood up and sat right back down, second attempt pt was able to take a couple steps away from the bed and then backwards towards the bed. Pt attempted to sit back down before being in a safe position to do so despite vc's. Pt limited d/t pain. Pt DEP for sit <>supine and increased fatigue noted. Pt would benefit from skilled OT to address fxl mobility, balance, stength, endurance with ADLs. Recommend d/c to SNF.      Anticipated Discharge Disposition (OT): (P) skilled nursing facility  "

## 2023-11-06 NOTE — CASE MANAGEMENT/SOCIAL WORK
Post-Acute Authorization Submission      Post Acute Pre-Cert Documentation  Request Submitted by Facility - Type:: Hospital  Post-Acute Authorization Type Submitted:: SNF  Date Post Acute Pre-Cert Inititated per Facility: 11/06/23  Accepting Facility: [facility]  All Clinicals Submitted?: Yes  Had Accepting Facility at Time of Submission: Yes              ERASTO Leslie

## 2023-11-06 NOTE — PROGRESS NOTES
Orthopedic Surgery Progress Note    Dina Michaels  11/6/2023      Subjective:     Systemic or Specific Complaints:   Baseline neuro status/mentation   at bedside - states did well with PT, exploring d/c options    Objective:     Patient Vitals for the past 24 hrs:   BP Temp Temp src Pulse Resp SpO2   11/06/23 1614 162/74 99.6 °F (37.6 °C) Oral 98 16 92 %   11/06/23 1143 160/75 98.5 °F (36.9 °C) Oral 118 16 94 %   11/06/23 0816 176/76 98.3 °F (36.8 °C) Axillary 103 16 92 %   11/06/23 0313 150/54 99 °F (37.2 °C) Temporal 96 16 95 %   11/05/23 2340 141/98 99 °F (37.2 °C) Temporal 84 16 95 %   11/05/23 1952 156/80 98.3 °F (36.8 °C) Temporal 100 16 96 %   11/05/23 1845 -- -- -- -- -- 98 %       left lower  General: alert, appears stated age and cooperative   Wound: covered             Dressing: Clean, dry, intact   Extremity: Distal NVI, leg lengths approximately equal           DVT Exam: No evidence of DVT                   Data Review:  Lab Results (last 24 hours)       Procedure Component Value Units Date/Time    Urine Culture - Urine, Straight Cath [[id number]]  (Abnormal) Collected: 11/05/23 0740    Specimen: Urine from Straight Cath Updated: 11/06/23 1204     Urine Culture >100,000 CFU/mL Escherichia coli    Narrative:      Colonization of the urinary tract without infection is common. Treatment is discouraged unless the patient is symptomatic, pregnant, or undergoing an invasive urologic procedure.    Comprehensive Metabolic Panel [[id number]]  (Abnormal) Collected: 11/06/23 0418    Specimen: Blood Updated: 11/06/23 0605     Glucose 93 mg/dL      BUN 14 mg/dL      Creatinine 0.93 mg/dL      Sodium 138 mmol/L      Potassium 4.2 mmol/L      Chloride 102 mmol/L      CO2 26.0 mmol/L      Calcium 8.7 mg/dL      Total Protein 5.6 g/dL      Albumin 3.1 g/dL      ALT (SGPT) 7 U/L      AST (SGOT) 14 U/L      Alkaline Phosphatase 52 U/L      Total Bilirubin 0.5 mg/dL      Globulin 2.5 gm/dL      A/G Ratio 1.2 g/dL       BUN/Creatinine Ratio 15.1     Anion Gap 10.0 mmol/L      eGFR 58.9 mL/min/1.73     Narrative:      GFR Normal >60  Chronic Kidney Disease <60  Kidney Failure <15    The GFR formula is only valid for adults with stable renal function between ages 18 and 70.    CBC (No Diff) [[id number]]  (Abnormal) Collected: 11/06/23 0418    Specimen: Blood Updated: 11/06/23 0547     WBC 8.90 10*3/mm3      RBC 4.04 10*6/mm3      Hemoglobin 10.4 g/dL      Hematocrit 34.4 %      MCV 85.1 fL      MCH 25.7 pg      MCHC 30.2 g/dL      RDW 16.3 %      RDW-SD 51.4 fl      MPV 10.7 fL      Platelets 194 10*3/mm3           Imaging Results (Last 24 Hours)       Procedure Component Value Units Date/Time    XR Hip With or Without Pelvis 2 - 3 View Left [[id number]] Collected: 11/06/23 0734     Updated: 11/06/23 0739    Narrative:      EXAM: XR HIP W OR WO PELVIS 2-3 VIEW LEFT-      DATE: 11/4/2023 9:36 PM CDT     HISTORY: fall       COMPARISON: No existing relevant imaging studies available.     TECHNIQUE: AP pelvis, AP and crosstable lateral views of the LEFT hip. 4  images.     FINDINGS:    LEFT femoral neck fracture with foreshortening and rotation. Limited  evaluation on crosstable lateral view. Diffuse bone demineralization.     Sacroiliac joints appear symmetric and patent. Upper sacral arcuate  lines limited in evaluation due to bone demineralization and overlying  bowel gas. Degenerative changes of the visualized spine. Pubic symphysis  anatomic.     There may be a mildly displaced RIGHT inferior pubic ramus fracture.  RIGHT hip appears grossly within normal limits on single view.       Impression:      1. LEFT femoral neck fracture with foreshortening and rotation.  2. There may be a mildly displaced RIGHT inferior pubic ramus fracture.     This report was signed and finalized on 11/6/2023 7:36 AM CST by Dr Lizbet Nichols MD.               Assessment:   1 Day Post-Op  L hip desmond    Plan:      1:  DVT prophylaxis, ICE, elevate  2:   Pain control  3:  Physical therapy/Occupational therapy  4:  Anticipate discharge to SNF once medically stable, f/u 2-3 weeks  5: WBAT LLE with posterior hip precautions, knee immobilizer while in bed x 4 weeks      Denzel De La Vega MD

## 2023-11-06 NOTE — THERAPY TREATMENT NOTE
Acute Care - Physical Therapy Treatment Note  [facility]     Patient Name: Dina Michaels  : 1934  MRN: [id number]  Today's Date: 2023      Visit Dx:     ICD-10-CM ICD-9-CM   1. Closed subcapital fracture of femur, left, initial encounter  S72.012A 820.09   2. Closed fracture of left hip, initial encounter  S72.002A 820.8   3. Impaired mobility [Z74.09]  Z74.09 799.89     Patient Active Problem List   Diagnosis    AMS (altered mental status)    Vascular dementia without behavioral disturbance    Near syncope    Acute pulmonary embolism    Hypothyroidism    Lactic acidosis    Gastrointestinal hemorrhage, unspecified gastrointestinal hemorrhage type: Possibly from duodenal versus diverticular bleed    Chronic anemia    Essential hypertension    Acute CVA (cerebrovascular accident)    History of gastrointestinal bleeding    Closed subcapital fracture of femur, left, initial encounter    Fall (on) (from) other stairs and steps, initial encounter    Acute hip pain, left    Acute cystitis without hematuria     Past Medical History:   Diagnosis Date    Cancer     Dementia      Past Surgical History:   Procedure Laterality Date    COLONOSCOPY N/A 2023    Procedure: COLONOSCOPY WITH ANESTHESIA;  Surgeon: Bubba Asher MD;  Location: [facility] ENDOSCOPY;  Service: Gastroenterology;  Laterality: N/A;  pre gi bleed  post diverticulosis  Dr. Hebert    ENDOSCOPY N/A 2023    Procedure: ESOPHAGOGASTRODUODENOSCOPY WITH ANESTHESIA;  Surgeon: Bubba Asher MD;  Location: [facility] ENDOSCOPY;  Service: Gastroenterology;  Laterality: N/A;  pre screen  post gastric bx  Dr Hebert    HIP HEMIARTHROPLASTY Left 2023    Procedure: HIP HEMIARTHROPLASTY;  Surgeon: Denzel De La Vega MD;  Location: [facility] OR;  Service: Orthopedics;  Laterality: Left;     PT Assessment (last 12 hours)       PT Evaluation and Treatment       Row Name 23 3045          Physical Therapy Time and Intention    Subjective Information  complains of;pain  -LY     Document Type therapy note (daily note)  -LY     Mode of Treatment physical therapy  -LY     Comment LLE WBAT with immobilizer  -LY       Row Name 11/06/23 1315          General Information    Existing Precautions/Restrictions fall;hip, posterior  WBAT LLE  -LY       Row Name 11/06/23 1315          Pain    Pain Intervention(s) Medication (See MAR);Ambulation/increased activity;Repositioned  -LY       Row Name 11/06/23 1315          Pain Scale: FACES Pre/Post-Treatment    Pain: FACES Scale, Pretreatment 0-->no hurt  -LY     Posttreatment Pain Rating 8-->hurts whole lot  -LY     Pain Location - Side/Orientation Left  -LY     Pain Location - hip  -LY       Row Name 11/06/23 1315          Mobility    Extremity Weight-bearing Status left lower extremity  -LY     Left Lower Extremity (Weight-bearing Status) weight-bearing as tolerated (WBAT)  -LY       Row Name 11/06/23 1315          Bed Mobility    Supine-Sit Lynn (Bed Mobility) moderate assist (50% patient effort);maximum assist (25% patient effort);2 person assist  -LY     Sit-Supine [place] (Bed Mobility) dependent (less than 25% patient effort);2 person assist  -LY     Bed Mobility, Safety Issues cognitive deficits limit understanding;decreased use of legs for bridging/pushing  -LY     Assistive Device (Bed Mobility) bed rails;draw sheet;head of bed elevated  -LY       Row Name 11/06/23 1315          Transfers    Comment, (Transfers) stood 2 times, unable to take steps at this time  -LY       Row Name 11/06/23 1315          Sit-Stand Transfer    Sit-Stand Lynn (Transfers) verbal cues;moderate assist (50% patient effort);2 person assist  -LY     Assistive Device (Sit-Stand Transfers) walker, front-wheeled  -LY     Comment, (Sit-Stand Transfer) weight shift posteriorly, unable to maintain upright posture.  -LY       Row Name             Wound 11/05/23 1010 Left anterior hip Incision    Wound - Properties Group Placement  Date: 11/05/23  -LM Placement Time: 1010  -LM Present on Original Admission: N  -LM Side: Left  -LM Orientation: anterior  -LM Location: hip  -LM Primary Wound Type: Incision  -LM    Retired Wound - Properties Group Placement Date: 11/05/23  -LM Placement Time: 1010  -LM Present on Original Admission: N  -LM Side: Left  -LM Orientation: anterior  -LM Location: hip  -LM Primary Wound Type: Incision  -LM    Retired Wound - Properties Group Date first assessed: 11/05/23  -LM Time first assessed: 1010  -LM Present on Original Admission: N  -LM Side: Left  -LM Location: hip  -LM Primary Wound Type: Incision  -LM      Row Name 11/06/23 1315          Positioning and Restraints    Pre-Treatment Position in bed  -LY     Post Treatment Position bed  -LY     In Bed fowlers;call light within reach;encouraged to call for assist;with family/caregiver  -LY               User Key  (r) = Recorded By, (t) = Taken By, (c) = Cosigned By      Initials Name Provider Type    LY Maribeth Ojeda PTA Physical Therapist Assistant    Laverne Sparks, RN Registered Nurse                    Physical Therapy Education       Title: PT OT SLP Therapies (In Progress)       Topic: Physical Therapy (In Progress)       Point: Mobility training (In Progress)       Learning Progress Summary             Patient Acceptance, E, NR by BE at 11/6/2023 0909    Comment: educated on PT POC, posterior hip precautions, WBAT status, importance of increased mobility, d/c recommendations   Family Acceptance, E, NR by BE at 11/6/2023 0909    Comment: educated on PT POC, posterior hip precautions, WBAT status, importance of increased mobility, d/c recommendations                         Point: Home exercise program (Not Started)       Learner Progress:  Not documented in this visit.              Point: Body mechanics (Not Started)       Learner Progress:  Not documented in this visit.              Point: Precautions (In Progress)       Learning Progress Summary              Patient Acceptance, E, NR by BE at 11/6/2023 0909    Comment: educated on PT POC, posterior hip precautions, WBAT status, importance of increased mobility, d/c recommendations   Family Acceptance, E, NR by BE at 11/6/2023 0909    Comment: educated on PT POC, posterior hip precautions, WBAT status, importance of increased mobility, d/c recommendations                                         User Key       Initials Effective Dates Name Provider Type Discipline    BE 08/29/23 -  Tisha Beltran, PT Student PT Student PT                  PT Recommendation and Plan             Time Calculation:    PT Charges       Row Name 11/06/23 1400 11/06/23 1050          Time Calculation    Start Time 1315  -LY 0909  -LH (r) BE (t) LH (c)     Stop Time 1340  -LY 1012  -LH (r) BE (t) LH (c)     Time Calculation (min) 25 min  -LY 63 min  -LH (r) BE (t)     PT Received On 11/06/23  -LY 11/06/23  -LH (r) BE (t) LH (c)     PT Goal Re-Cert Due Date -- 11/16/23  -LH (r) BE (t) LH (c)        Time Calculation- PT    Total Timed Code Minutes- PT 25 minute(s)  -LY --        Timed Charges    60659 - PT Therapeutic Activity Minutes 25  -LY --        Untimed Charges    PT Eval/Re-eval Minutes -- 63  -LH        Total Minutes    Timed Charges Total Minutes 25  -LY --     Untimed Charges Total Minutes -- 63  -LH      Total Minutes 25  -LY 63  -LH               User Key  (r) = Recorded By, (t) = Taken By, (c) = Cosigned By      Initials Name Provider Type     Ashu Hoskins, PT Physical Therapist    Maribeth Moura, PTA Physical Therapist Assistant    BE Tisha Beltran, PT Student PT Student                  Therapy Charges for Today       Code Description Service Date Service Provider Modifiers Qty    [id number] HC PT THERAPEUTIC ACT EA 15 MIN 11/6/2023 Maribeth Ojeda, PTA GP 2            PT G-Codes  Outcome Measure Options: AM-PAC 6 Clicks Daily Activity (OT)  AM-PAC 6 Clicks Score (PT): 11  AM-PAC 6 Clicks Score (OT): 15    Maribeth Ojeda  PTA  11/6/2023

## 2023-11-07 VITALS
TEMPERATURE: 98.2 F | HEIGHT: 64 IN | HEART RATE: 85 BPM | WEIGHT: 150 LBS | DIASTOLIC BLOOD PRESSURE: 67 MMHG | SYSTOLIC BLOOD PRESSURE: 126 MMHG | RESPIRATION RATE: 18 BRPM | BODY MASS INDEX: 25.61 KG/M2 | OXYGEN SATURATION: 95 %

## 2023-11-07 PROBLEM — B96.20 E. COLI UTI (URINARY TRACT INFECTION): Status: ACTIVE | Noted: 2023-11-07

## 2023-11-07 PROBLEM — D62 POSTOPERATIVE ANEMIA DUE TO ACUTE BLOOD LOSS: Status: ACTIVE | Noted: 2023-11-07

## 2023-11-07 PROBLEM — N39.0 E. COLI UTI (URINARY TRACT INFECTION): Status: ACTIVE | Noted: 2023-11-07

## 2023-11-07 LAB
ALBUMIN SERPL-MCNC: 3 G/DL (ref 3.5–5.2)
ALBUMIN/GLOB SERPL: 1.1 G/DL
ALP SERPL-CCNC: 61 U/L (ref 39–117)
ALT SERPL W P-5'-P-CCNC: 6 U/L (ref 1–33)
ANION GAP SERPL CALCULATED.3IONS-SCNC: 9 MMOL/L (ref 5–15)
AST SERPL-CCNC: 20 U/L (ref 1–32)
BACTERIA SPEC AEROBE CULT: ABNORMAL
BILIRUB SERPL-MCNC: 0.6 MG/DL (ref 0–1.2)
BUN SERPL-MCNC: 16 MG/DL (ref 8–23)
BUN/CREAT SERPL: 15.1 (ref 7–25)
CALCIUM SPEC-SCNC: 8.5 MG/DL (ref 8.6–10.5)
CHLORIDE SERPL-SCNC: 100 MMOL/L (ref 98–107)
CO2 SERPL-SCNC: 27 MMOL/L (ref 22–29)
CREAT SERPL-MCNC: 1.06 MG/DL (ref 0.57–1)
DEPRECATED RDW RBC AUTO: 49.7 FL (ref 37–54)
EGFRCR SERPLBLD CKD-EPI 2021: 50.3 ML/MIN/1.73
ERYTHROCYTE [DISTWIDTH] IN BLOOD BY AUTOMATED COUNT: 16.2 % (ref 12.3–15.4)
GLOBULIN UR ELPH-MCNC: 2.7 GM/DL
GLUCOSE SERPL-MCNC: 106 MG/DL (ref 65–99)
HCT VFR BLD AUTO: 31.7 % (ref 34–46.6)
HGB BLD-MCNC: 9.7 G/DL (ref 12–15.9)
MCH RBC QN AUTO: 25.5 PG (ref 26.6–33)
MCHC RBC AUTO-ENTMCNC: 30.6 G/DL (ref 31.5–35.7)
MCV RBC AUTO: 83.4 FL (ref 79–97)
PLATELET # BLD AUTO: 170 10*3/MM3 (ref 140–450)
PMV BLD AUTO: 10.3 FL (ref 6–12)
POTASSIUM SERPL-SCNC: 3.7 MMOL/L (ref 3.5–5.2)
PROT SERPL-MCNC: 5.7 G/DL (ref 6–8.5)
RBC # BLD AUTO: 3.8 10*6/MM3 (ref 3.77–5.28)
SODIUM SERPL-SCNC: 136 MMOL/L (ref 136–145)
WBC NRBC COR # BLD: 10.74 10*3/MM3 (ref 3.4–10.8)

## 2023-11-07 PROCEDURE — 80053 COMPREHEN METABOLIC PANEL: CPT | Performed by: ORTHOPAEDIC SURGERY

## 2023-11-07 PROCEDURE — 85027 COMPLETE CBC AUTOMATED: CPT | Performed by: NURSE PRACTITIONER

## 2023-11-07 RX ORDER — CEFDINIR 300 MG/1
300 CAPSULE ORAL EVERY 12 HOURS SCHEDULED
Status: DISCONTINUED | OUTPATIENT
Start: 2023-11-08 | End: 2023-11-07 | Stop reason: HOSPADM

## 2023-11-07 RX ORDER — UBIDECARENONE 75 MG
50 CAPSULE ORAL DAILY
Status: DISCONTINUED | OUTPATIENT
Start: 2023-11-07 | End: 2023-11-07 | Stop reason: HOSPADM

## 2023-11-07 RX ORDER — ACETAMINOPHEN 325 MG/1
650 TABLET ORAL EVERY 4 HOURS PRN
Start: 2023-11-07

## 2023-11-07 RX ORDER — METOPROLOL SUCCINATE 25 MG/1
25 TABLET, EXTENDED RELEASE ORAL DAILY
Status: DISCONTINUED | OUTPATIENT
Start: 2023-11-07 | End: 2023-11-07 | Stop reason: HOSPADM

## 2023-11-07 RX ORDER — METOPROLOL SUCCINATE 25 MG/1
25 TABLET, EXTENDED RELEASE ORAL DAILY
Start: 2023-11-08

## 2023-11-07 RX ORDER — PSEUDOEPHEDRINE HCL 30 MG
100 TABLET ORAL 2 TIMES DAILY PRN
Start: 2023-11-07

## 2023-11-07 RX ORDER — CEFDINIR 300 MG/1
300 CAPSULE ORAL 2 TIMES DAILY
Start: 2023-11-08 | End: 2023-11-10

## 2023-11-07 RX ADMIN — DOCUSATE SODIUM 100 MG: 100 CAPSULE, LIQUID FILLED ORAL at 09:39

## 2023-11-07 RX ADMIN — MAGNESIUM HYDROXIDE 10 ML: 2400 SUSPENSION ORAL at 09:39

## 2023-11-07 RX ADMIN — METOPROLOL SUCCINATE 25 MG: 25 TABLET, EXTENDED RELEASE ORAL at 09:36

## 2023-11-07 RX ADMIN — LEVOTHYROXINE SODIUM 100 MCG: 100 TABLET ORAL at 05:55

## 2023-11-07 RX ADMIN — ACETAMINOPHEN 650 MG: 325 TABLET, FILM COATED ORAL at 05:58

## 2023-11-07 RX ADMIN — DOCUSATE SODIUM 50 MG AND SENNOSIDES 8.6 MG 2 TABLET: 8.6; 5 TABLET, FILM COATED ORAL at 09:36

## 2023-11-07 RX ADMIN — APIXABAN 2.5 MG: 2.5 TABLET, FILM COATED ORAL at 09:36

## 2023-11-07 RX ADMIN — VITAM B12 50 MCG: 100 TAB at 09:36

## 2023-11-07 RX ADMIN — MEMANTINE HYDROCHLORIDE 10 MG: 5 TABLET, FILM COATED ORAL at 09:36

## 2023-11-07 NOTE — PAYOR COMM NOTE
"REF:   [id number]     [facility]  FAX  [phone]     Behzad Michaels (89 y.o. Female)       Date of Birth   1934    Social Security Number       Address   [address] [facility] [address]    Home Phone   [phone]    MRN   [id number]       [facility]    Marital Status                               Admission Date   11/4/23    Admission Type   Emergency    Admitting Provider   Kayden Clemente MD    Attending Provider       Department, Room/Bed   [facility] 3A, 346/1       Discharge Date   11/7/2023    Discharge Disposition   Skilled Nursing Facility (DC - External)    Discharge Destination                                 Attending Provider: (none)   Allergies: Amoxicillin, Clarithromycin, Nisoldipine Er, Sular [Nisoldipine]    Isolation: None   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)   Code Status: CPR    Ht: 162.6 cm (64\")   Wt: 68 kg (150 lb)    Admission Cmt: None   Principal Problem: Closed subcapital fracture of femur, left, initial encounter [S72.012A]                   Active Insurance as of 11/4/2023       Primary Coverage       Payor Plan Insurance Group Employer/Plan Group    HUMANA MEDICARE REPLACEMENT HUMANA MEDICARE REPLACEMENT [id number]       Payor Plan Address Payor Plan Phone Number Payor Plan Fax Number Effective Dates    [address] [address] [phone]  1/1/2021 - None Entered    [facility] [id number]         Subscriber Name Subscriber Birth Date Member ID       BEHZAD MICHAELS 1934 [id number]                     Emergency Contacts        (Rel.) Home Phone Work Phone Mobile Phone    OLLIE MICHAELS (Spouse) [phone] -- [phone]                 Discharge Summary        Steffany Stanford, APRN at 11/07/23 0928                [facility] Medicine Services  DISCHARGE SUMMARY     Date of Admission: 11/4/2023  Date of Discharge:  11/7/2023  Primary Care Physician: Benedicto Hebert MD    Presenting " Problem/History of Present Illness:  Left hip pain    Final Discharge Diagnoses:  Active Hospital Problems    Diagnosis     **Closed subcapital fracture of femur, left, initial encounter     E. coli UTI (urinary tract infection), present on admission     Postoperative anemia due to acute blood loss     Acute cystitis without hematuria     Fall (on) (from) other stairs and steps, initial encounter     Acute hip pain, left     Essential hypertension     Vascular dementia without behavioral disturbance     Hypothyroidism        Consults: Dr. De La Vega, orthopedics    Procedures Performed: Left hip hemiarthroplasty 11/5/2023 Dr. De La Vega    Pertinent Test Results:   Results for orders placed during the hospital encounter of 05/14/23    Adult Transthoracic Echo Complete W/ Cont if Necessary Per Protocol    Interpretation Summary    Left ventricular systolic function is hyperdynamic (EF > 70%). Left ventricular ejection fraction appears to be greater than 70%.    Left ventricular diastolic dysfunction is noted.    No evidence of a patent foramen ovale. Saline test results are negative for right to left atrial level shunt.    Moderate mitral annular calcification is present. There is moderate calcification of the mitral valve posterior leaflet(s).      Imaging Results (All)       Procedure Component Value Units Date/Time    XR Hip With or Without Pelvis 2 - 3 View Left [[id number]] Collected: 11/06/23 0734     Updated: 11/06/23 0739    Narrative:      EXAM: XR HIP W OR WO PELVIS 2-3 VIEW LEFT-      DATE: 11/4/2023 9:36 PM CDT     HISTORY: fall       COMPARISON: No existing relevant imaging studies available.     TECHNIQUE: AP pelvis, AP and crosstable lateral views of the LEFT hip. 4  images.     FINDINGS:    LEFT femoral neck fracture with foreshortening and rotation. Limited  evaluation on crosstable lateral view. Diffuse bone demineralization.     Sacroiliac joints appear symmetric and patent. Upper sacral arcuate  lines  limited in evaluation due to bone demineralization and overlying  bowel gas. Degenerative changes of the visualized spine. Pubic symphysis  anatomic.     There may be a mildly displaced RIGHT inferior pubic ramus fracture.  RIGHT hip appears grossly within normal limits on single view.       Impression:      1. LEFT femoral neck fracture with foreshortening and rotation.  2. There may be a mildly displaced RIGHT inferior pubic ramus fracture.     This report was signed and finalized on 11/6/2023 7:36 AM CST by Dr Lizbet Nichols MD.       XR Hip With or Without Pelvis 2 - 3 View Left [[id number]] Collected: 11/05/23 1147     Updated: 11/05/23 1151    Narrative:      EXAMINATION: XR HIP W OR WO PELVIS 2-3 VIEW LEFT-  11/5/2023 11:48 AM  CST     HISTORY: Postop     FINDINGS: AP radiograph of the pelvis as well as 2 view exam of the left  hip reveals the patient is undergone a total hip arthroplasty for a  intertrochanteric fracture of the left hip. There is good anatomic  alignment. No evidence of complication.       Impression:      . Good anatomic alignment status post left hip arthroplasty  for an intertrochanteric fracture of the left hip.     This report was signed and finalized on 11/5/2023 11:48 AM CST by Dr. Fish Moreno MD.       XR Femur 2 View Left [[id number]] Collected: 11/04/23 2144     Updated: 11/04/23 2148    Narrative:      XR FEMUR 2 VW LEFT-     HISTORY: fall     COMPARISON: None     FINDINGS: Frontal and crosstable lateral views of the left femur were  obtained.     There is an impacted left subcapital femoral neck fracture. No  additional left femur fracture identified. Osteopenia. Scattered  vascular calcification.       Impression:      1. Acute impacted left subcapital femoral neck fracture.     This report was signed and finalized on 11/4/2023 9:45 PM CDT by Dr. Afia Renner MD.       XR Chest 1 View [[id number]] Collected: 11/04/23 2142     Updated: 11/04/23 2147    Narrative:      XR  CHEST 1 VW-     HISTORY: fall     COMPARISON: 5/14/2023     FINDINGS: Frontal view the chest obtained.     Mild increasing prominence of the interstitial markings which may be  seen with mild edema/volume overload. The heart appears normal in size.  Calcification of the thoracic aortic arch. No pleural effusion or  pneumothorax. No acute regional bony pathology.       Impression:      1. Mild increasing prominence of the interstitial markings diffusely  raising the question of mild edema/volume overload.        This report was signed and finalized on 11/4/2023 9:44 PM CDT by Dr. Afia Renner MD.             LAB RESULTS:      Lab 11/07/23  0509 11/06/23 0418 11/04/23 2159   WBC 10.74 8.90 12.09*   HEMOGLOBIN 9.7* 10.4* 11.7*   HEMATOCRIT 31.7* 34.4 38.1   PLATELETS 170 194 266   NEUTROS ABS  --   --  10.20*   IMMATURE GRANS (ABS)  --   --  0.09*   LYMPHS ABS  --   --  0.87   MONOS ABS  --   --  0.83   EOS ABS  --   --  0.04   MCV 83.4 85.1 83.9   PROTIME  --   --  12.9   APTT  --   --  29.9         Lab 11/07/23  0509 11/06/23 0418 11/05/23  0906 11/04/23 2159   SODIUM 136 138 133* 135*   POTASSIUM 3.7 4.2 4.7 4.3   CHLORIDE 100 102 101 102   CO2 27.0 26.0 21.0* 25.0   ANION GAP 9.0 10.0 11.0 8.0   BUN 16 14 14 17   CREATININE 1.06* 0.93 0.89 1.05*   EGFR 50.3* 58.9* 62.1 50.9*   GLUCOSE 106* 93 105* 145*   CALCIUM 8.5* 8.7 8.8 9.3   TSH  --   --  1.680  --          Lab 11/07/23  0509 11/06/23 0418 11/04/23 2159   TOTAL PROTEIN 5.7* 5.6* 6.6   ALBUMIN 3.0* 3.1* 3.7   GLOBULIN 2.7 2.5 2.9   ALT (SGPT) 6 7 13   AST (SGOT) 20 14 17   BILIRUBIN 0.6 0.5 0.4   ALK PHOS 61 52 65         Lab 11/04/23  2159   PROTIME 12.9   INR 0.96                 Brief Urine Lab Results  (Last result in the past 365 days)        Color   Clarity   Blood   Leuk Est   Nitrite   Protein   CREAT   Urine HCG        11/05/23 0740 Yellow   Cloudy   Negative   Small (1+)   Positive   Negative                 Microbiology Results (last 10  days)       Procedure Component Value - Date/Time    Urine Culture - Urine, Straight Cath [[id number]]  (Abnormal)  (Susceptibility) Collected: 11/05/23 0740    Lab Status: Final result Specimen: Urine from Straight Cath Updated: 11/07/23 0202     Urine Culture >100,000 CFU/mL Escherichia coli    Narrative:      Colonization of the urinary tract without infection is common. Treatment is discouraged unless the patient is symptomatic, pregnant, or undergoing an invasive urologic procedure.    Susceptibility        Escherichia coli      LUCI      Ampicillin Susceptible      Ampicillin + Sulbactam Susceptible      Cefazolin Susceptible      Cefepime Susceptible      Ceftazidime Susceptible      Ceftriaxone Susceptible      Gentamicin Susceptible      Levofloxacin Susceptible      Nitrofurantoin Susceptible      Piperacillin + Tazobactam Susceptible      Trimethoprim + Sulfamethoxazole Susceptible                                 Hospital Course: Ms. Michaels is an 89-year-old  female with a past medical history significant for vascular dementia without behavioral disturbance, hypothyroidism, primary hypertension and GERD.  Her primary care provider is Dr. Hebert.  Information obtained from  present at bedside.   reports they have 5 steps in their entryway and patient was walking down the steps to lock the door.  He found her sitting on the floor.  She had an unwitnessed fall but  believes she was close to the bottom step because she was sitting when he found her.  Patient was unable to stand or ambulate and reported immediate left hip pain.  There was no loss of consciousness noted per .  No other injuries noted or reported.  No complaints of chest pain, palpitations or shortness of breath.  X-ray left femur acute impacted left subcapital femoral neck fracture.  Chest x-ray increased prominence of interstitial markings diffusely raise questions of mild edema or volume overload.  Sodium 135,  potassium 4.3, creatinine 1.05, WBC 12.09.  Emergency room physician contacted Dr. De La Vega orthopedics and plans for left hip hemoarthroplasty on 11/5.    She was admitted to the orthopedic floor with closed subcapital left femur fracture.  Patient had unwitnessed fall without loss of consciousness.  Orthopedics consulted and Dr. De La Vega took her to surgery 11/5/2023 for left hip hemiarthroplasty.  Postoperatively, patient allowed weightbearing as tolerated with posterior hip precautions.  Patient to remain with left leg knee immobilizer in place for 30 days while in bed.  Physical therapy and Occupational Therapy consulted postoperatively.  Skilled nursing facility recommended.  Lovenox ordered for DVT prophylaxis postoperatively and transition to Eliquis 2.5 mg twice daily for 30 days on 11/7/2023.  Discontinue Eliquis after last dose on 12/6/2023.    Patient has history of vascular dementia without behavioral disturbance.  She was able to tell me her name and her 's name.  Otherwise, she does not answer questions appropriately.  Patient is at baseline per .  Namenda and Remeron continued.    Metoprolol XL continued for primary hypertension.  Blood pressure 172/87 and metoprolol increased to 25 mg orally daily.  Follow-up blood pressure 126/67.    Acute cystitis without hematuria.  Urinalysis 11-12 WBC, 4+ bacteria with positive nitrates.  Urine culture positive for E. coli.  Patient started on Rocephin and will transition to oral Omnicef for 2 days beginning 11/8/2023.    Synthroid continued for hypothyroidism.  TSH 1.68.    Postop blood loss anemia.  Hemoglobin 11.7 on admission and 9.7 at discharge.  No increased bruising, bleeding noted at operative site.  No need for transfusion.    Tylenol ordered for postoperative pain.  Narcotics use sparingly secondary to dementia.    Social service consulted for discharge planning.   agreed to skilled nursing facility.  Referral made to Mis in bed  "offered and insurance approved on 11/7.    On 11/7/2023, she will be discharged to [facility] for ongoing physical therapy and Occupational Therapy prior to consideration for discharge home.  Patient to remain weightbearing as tolerated left lower extremity with posterior hip precautions.  Left knee leg immobilizer to remain in place when in bed for the next 30 days.  Eliquis 2.5 mg twice daily for 30 days postop.  Discontinue after last dose on 12/6/2023.  Omnicef for 2 days beginning 11/8/2023.  Follow-up with Dr. De La Vega in 2 weeks and follow-up with Dr. Hebert after discharge from [facility] nursing facility.    Physical Exam on Discharge:  /67 (BP Location: Left arm, Patient Position: Lying)   Pulse 85   Temp 98.2 °F (36.8 °C) (Oral)   Resp 18   Ht 162.6 cm (64\")   Wt 68 kg (150 lb)   SpO2 95%   BMI 25.75 kg/m²   Physical Exam  Vitals and nursing note reviewed.   Constitutional:       Comments: Sitting up in bed.  No oxygen use.   in room.   HENT:      Head: Normocephalic and atraumatic.      Nose: No congestion.      Mouth/Throat:      Pharynx: Oropharynx is clear. No oropharyngeal exudate or posterior oropharyngeal erythema.   Eyes:      Extraocular Movements: Extraocular movements intact.      Pupils: Pupils are equal, round, and reactive to light.   Cardiovascular:      Rate and Rhythm: Normal rate and regular rhythm.      Heart sounds: No murmur heard.  Pulmonary:      Breath sounds: No wheezing, rhonchi or rales.      Comments: No oxygen in use.  Abdominal:      Palpations: Abdomen is soft.      Tenderness: There is no abdominal tenderness.   Genitourinary:     Comments: [facility]  Musculoskeletal:         General: Tenderness (Left hip) present.      Cervical back: Normal range of motion and neck supple.      Comments: Left leg immobilizer in place   Skin:     General: Skin is warm and dry.   Neurological:      General: No focal deficit present.      Comments: Oriented to person.  Identifies " herself and her .  At baseline per .   Psychiatric:         Mood and Affect: Mood normal.         Behavior: Behavior normal.       Condition on Discharge: Stable for discharge to [facility] for physical therapy and Occupational Therapy    Discharge Disposition:  Skilled Nursing Facility (DC - External)    Discharge Medications:     Discharge Medications        New Medications        Instructions Start Date   acetaminophen 325 MG tablet  Commonly known as: TYLENOL   650 mg, Oral, Every 4 Hours PRN      apixaban 2.5 MG tablet tablet  Commonly known as: ELIQUIS   2.5 mg, Oral, Every 12 Hours Scheduled for 30 days.  Start date 11/7/2023.  Discontinue after last dose 12/6/2023.      cefdinir 300 MG capsule  Commonly known as: OMNICEF   300 mg, Oral, 2 Times Daily, Begin 11/8/2023 for 2 days.   Start Date: November 8, 2023     docusate sodium 100 MG capsule   100 mg, Oral, 2 Times Daily PRN             Changes to Medications        Instructions Start Date   metoprolol succinate XL 25 MG 24 hr tablet  Commonly known as: TOPROL-XL  What changed: how much to take   25 mg, Oral, Daily   Start Date: November 8, 2023            Continue These Medications        Instructions Start Date   alendronate 70 MG tablet  Commonly known as: FOSAMAX   70 mg, Oral, Every 7 Days      aspirin 81 MG EC tablet   81 mg, Oral, Daily      levothyroxine 100 MCG tablet  Commonly known as: SYNTHROID, LEVOTHROID   100 mcg, Oral, Daily      memantine 10 MG tablet  Commonly known as: NAMENDA   10 mg, Oral, 2 Times Daily      vitamin B-12 100 MCG tablet  Commonly known as: CYANOCOBALAMIN   50 mcg, Oral, Daily             Stop These Medications      meloxicam 7.5 MG tablet  Commonly known as: MOBIC            Discharge Diet:   Diet Instructions       Diet: Regular/House Diet; Regular Texture (IDDSI 7); Thin (IDDSI 0)      Discharge Diet: Regular/House Diet    Texture: Regular Texture (IDDSI 7)    Fluid Consistency: Thin (IDDSI 0)           Activity at Discharge:   Activity Instructions       Other Activity Instructions      Activity Instructions: Weightbearing as tolerated left lower extremity.  Left knee immobilizer to remain in place when in bed for the next 30 days per Dr. Chavez          Discharge instructions:  1.  For recurrent fall seek medical attention.  2.  Weightbearing as tolerated left lower extremity with posterior hip precautions.  Left knee immobilizer in place while in bed for 4 weeks per Dr. Chavez  3.  Eliquis twice daily for 30 days for DVT prophylaxis.  Start date 11/7/2023.  Discontinue after last dose on 12/6/23  4.  Omnicef 300 mg twice daily for 2 days begin 11/8/2023 for E. coli UTI  5.  Increase metoprolol to 25 mg orally daily  6.  Follow-up with Dr. Chavez 2 weeks  7.  Follow-up with Dr. Hebert after discharge from [facility]    Follow-up Appointments:   Dr. Hebert after discharge from [facility]  Dr. Chavez 2 weeks    Test Results Pending at Discharge: None    Electronically signed by EDGARDO Urena, 11/07/23, 09:58 CST.    Time: 35 minutes.  Discussed with Dr. Clemente, patient, and  Nik present in room.    The above documentation resulted from a face-to-face encounter by me Steffany REYNOSO, [facility].           Electronically signed by Steffany Stanford APRN at 11/07/23 1000       Discharge Order (From admission, onward)       Start     Ordered    11/07/23 0938  Discharge patient  Once        Expected Discharge Date: 11/07/23   Discharge Disposition: Skilled Nursing Facility (DC - External)   Physician of Record for Attribution - Please select from Treatment Team: SHU CHAVEZ [[id number]]   Review needed by CMO to determine Physician of Record: No      Question Answer Comment   Physician of Record for Attribution - Please select from Treatment Team SHU CHAVEZ    Review needed by CMO to determine Physician of Record No        11/07/23 4332

## 2023-11-07 NOTE — DISCHARGE SUMMARY
[facility] Medicine Services  DISCHARGE SUMMARY     Date of Admission: 11/4/2023  Date of Discharge:  11/7/2023  Primary Care Physician: Benedicto Hebert MD    Presenting Problem/History of Present Illness:  Left hip pain    Final Discharge Diagnoses:  Active Hospital Problems    Diagnosis     **Closed subcapital fracture of femur, left, initial encounter     E. coli UTI (urinary tract infection), present on admission     Postoperative anemia due to acute blood loss     Acute cystitis without hematuria     Fall (on) (from) other stairs and steps, initial encounter     Acute hip pain, left     Essential hypertension     Vascular dementia without behavioral disturbance     Hypothyroidism        Consults: Dr. De La Vega, orthopedics    Procedures Performed: Left hip hemiarthroplasty 11/5/2023 Dr. De La Vega    Pertinent Test Results:   Results for orders placed during the hospital encounter of 05/14/23    Adult Transthoracic Echo Complete W/ Cont if Necessary Per Protocol    Interpretation Summary    Left ventricular systolic function is hyperdynamic (EF > 70%). Left ventricular ejection fraction appears to be greater than 70%.    Left ventricular diastolic dysfunction is noted.    No evidence of a patent foramen ovale. Saline test results are negative for right to left atrial level shunt.    Moderate mitral annular calcification is present. There is moderate calcification of the mitral valve posterior leaflet(s).      Imaging Results (All)       Procedure Component Value Units Date/Time    XR Hip With or Without Pelvis 2 - 3 View Left [[id number]] Collected: 11/06/23 0734     Updated: 11/06/23 0739    Narrative:      EXAM: XR HIP W OR WO PELVIS 2-3 VIEW LEFT-      DATE: 11/4/2023 9:36 PM CDT     HISTORY: fall       COMPARISON: No existing relevant imaging studies available.     TECHNIQUE: AP pelvis, AP and crosstable lateral views of the LEFT hip. 4  images.     FINDINGS:    LEFT  femoral neck fracture with foreshortening and rotation. Limited  evaluation on crosstable lateral view. Diffuse bone demineralization.     Sacroiliac joints appear symmetric and patent. Upper sacral arcuate  lines limited in evaluation due to bone demineralization and overlying  bowel gas. Degenerative changes of the visualized spine. Pubic symphysis  anatomic.     There may be a mildly displaced RIGHT inferior pubic ramus fracture.  RIGHT hip appears grossly within normal limits on single view.       Impression:      1. LEFT femoral neck fracture with foreshortening and rotation.  2. There may be a mildly displaced RIGHT inferior pubic ramus fracture.     This report was signed and finalized on 11/6/2023 7:36 AM CST by Dr Lizbet Nichols MD.       XR Hip With or Without Pelvis 2 - 3 View Left [[id number]] Collected: 11/05/23 1147     Updated: 11/05/23 1151    Narrative:      EXAMINATION: XR HIP W OR WO PELVIS 2-3 VIEW LEFT-  11/5/2023 11:48 AM  CST     HISTORY: Postop     FINDINGS: AP radiograph of the pelvis as well as 2 view exam of the left  hip reveals the patient is undergone a total hip arthroplasty for a  intertrochanteric fracture of the left hip. There is good anatomic  alignment. No evidence of complication.       Impression:      . Good anatomic alignment status post left hip arthroplasty  for an intertrochanteric fracture of the left hip.     This report was signed and finalized on 11/5/2023 11:48 AM CST by Dr. Fish Moreno MD.       XR Femur 2 View Left [[id number]] Collected: 11/04/23 2144     Updated: 11/04/23 2148    Narrative:      XR FEMUR 2 VW LEFT-     HISTORY: fall     COMPARISON: None     FINDINGS: Frontal and crosstable lateral views of the left femur were  obtained.     There is an impacted left subcapital femoral neck fracture. No  additional left femur fracture identified. Osteopenia. Scattered  vascular calcification.       Impression:      1. Acute impacted left subcapital femoral  neck fracture.     This report was signed and finalized on 11/4/2023 9:45 PM CDT by Dr. Afia Renner MD.       XR Chest 1 View [[id number]] Collected: 11/04/23 2142     Updated: 11/04/23 2147    Narrative:      XR CHEST 1 VW-     HISTORY: fall     COMPARISON: 5/14/2023     FINDINGS: Frontal view the chest obtained.     Mild increasing prominence of the interstitial markings which may be  seen with mild edema/volume overload. The heart appears normal in size.  Calcification of the thoracic aortic arch. No pleural effusion or  pneumothorax. No acute regional bony pathology.       Impression:      1. Mild increasing prominence of the interstitial markings diffusely  raising the question of mild edema/volume overload.        This report was signed and finalized on 11/4/2023 9:44 PM CDT by Dr. Afia Renner MD.             LAB RESULTS:      Lab 11/07/23  0509 11/06/23 0418 11/04/23 2159   WBC 10.74 8.90 12.09*   HEMOGLOBIN 9.7* 10.4* 11.7*   HEMATOCRIT 31.7* 34.4 38.1   PLATELETS 170 194 266   NEUTROS ABS  --   --  10.20*   IMMATURE GRANS (ABS)  --   --  0.09*   LYMPHS ABS  --   --  0.87   MONOS ABS  --   --  0.83   EOS ABS  --   --  0.04   MCV 83.4 85.1 83.9   PROTIME  --   --  12.9   APTT  --   --  29.9         Lab 11/07/23  0509 11/06/23 0418 11/05/23  0906 11/04/23 2159   SODIUM 136 138 133* 135*   POTASSIUM 3.7 4.2 4.7 4.3   CHLORIDE 100 102 101 102   CO2 27.0 26.0 21.0* 25.0   ANION GAP 9.0 10.0 11.0 8.0   BUN 16 14 14 17   CREATININE 1.06* 0.93 0.89 1.05*   EGFR 50.3* 58.9* 62.1 50.9*   GLUCOSE 106* 93 105* 145*   CALCIUM 8.5* 8.7 8.8 9.3   TSH  --   --  1.680  --          Lab 11/07/23  0509 11/06/23 0418 11/04/23 2159   TOTAL PROTEIN 5.7* 5.6* 6.6   ALBUMIN 3.0* 3.1* 3.7   GLOBULIN 2.7 2.5 2.9   ALT (SGPT) 6 7 13   AST (SGOT) 20 14 17   BILIRUBIN 0.6 0.5 0.4   ALK PHOS 61 52 65         Lab 11/04/23 2159   PROTIME 12.9   INR 0.96                 Brief Urine Lab Results  (Last result in the past 365  days)        Color   Clarity   Blood   Leuk Est   Nitrite   Protein   CREAT   Urine HCG        11/05/23 0740 Yellow   Cloudy   Negative   Small (1+)   Positive   Negative                 Microbiology Results (last 10 days)       Procedure Component Value - Date/Time    Urine Culture - Urine, Straight Cath [[id number]]  (Abnormal)  (Susceptibility) Collected: 11/05/23 0740    Lab Status: Final result Specimen: Urine from Straight Cath Updated: 11/07/23 0202     Urine Culture >100,000 CFU/mL Escherichia coli    Narrative:      Colonization of the urinary tract without infection is common. Treatment is discouraged unless the patient is symptomatic, pregnant, or undergoing an invasive urologic procedure.    Susceptibility        Escherichia coli      LUCI      Ampicillin Susceptible      Ampicillin + Sulbactam Susceptible      Cefazolin Susceptible      Cefepime Susceptible      Ceftazidime Susceptible      Ceftriaxone Susceptible      Gentamicin Susceptible      Levofloxacin Susceptible      Nitrofurantoin Susceptible      Piperacillin + Tazobactam Susceptible      Trimethoprim + Sulfamethoxazole Susceptible                                 Hospital Course: Ms. Michaels is an 89-year-old  female with a past medical history significant for vascular dementia without behavioral disturbance, hypothyroidism, primary hypertension and GERD.  Her primary care provider is Dr. Hebert.  Information obtained from  present at bedside.   reports they have 5 steps in their entryway and patient was walking down the steps to lock the door.  He found her sitting on the floor.  She had an unwitnessed fall but  believes she was close to the bottom step because she was sitting when he found her.  Patient was unable to stand or ambulate and reported immediate left hip pain.  There was no loss of consciousness noted per .  No other injuries noted or reported.  No complaints of chest pain, palpitations or  shortness of breath.  X-ray left femur acute impacted left subcapital femoral neck fracture.  Chest x-ray increased prominence of interstitial markings diffusely raise questions of mild edema or volume overload.  Sodium 135, potassium 4.3, creatinine 1.05, WBC 12.09.  Emergency room physician contacted Dr. De La Vega orthopedics and plans for left hip hemoarthroplasty on 11/5.    She was admitted to the orthopedic floor with closed subcapital left femur fracture.  Patient had unwitnessed fall without loss of consciousness.  Orthopedics consulted and Dr. De La Vega took her to surgery 11/5/2023 for left hip hemiarthroplasty.  Postoperatively, patient allowed weightbearing as tolerated with posterior hip precautions.  Patient to remain with left leg knee immobilizer in place for 30 days while in bed.  Physical therapy and Occupational Therapy consulted postoperatively.  Skilled nursing facility recommended.  Lovenox ordered for DVT prophylaxis postoperatively and transition to Eliquis 2.5 mg twice daily for 30 days on 11/7/2023.  Discontinue Eliquis after last dose on 12/6/2023.    Patient has history of vascular dementia without behavioral disturbance.  She was able to tell me her name and her 's name.  Otherwise, she does not answer questions appropriately.  Patient is at baseline per .  Namenda and Remeron continued.    Metoprolol XL continued for primary hypertension.  Blood pressure 172/87 and metoprolol increased to 25 mg orally daily.  Follow-up blood pressure 126/67.    Acute cystitis without hematuria.  Urinalysis 11-12 WBC, 4+ bacteria with positive nitrates.  Urine culture positive for E. coli.  Patient started on Rocephin and will transition to oral Omnicef for 2 days beginning 11/8/2023.    Synthroid continued for hypothyroidism.  TSH 1.68.    Postop blood loss anemia.  Hemoglobin 11.7 on admission and 9.7 at discharge.  No increased bruising, bleeding noted at operative site.  No need for  "transfusion.    Tylenol ordered for postoperative pain.  Narcotics use sparingly secondary to dementia.    Social service consulted for discharge planning.   agreed to skilled nursing facility.  Referral made to [facility] in bed offered and insurance approved on 11/7.    On 11/7/2023, she will be discharged to [facility] for ongoing physical therapy and Occupational Therapy prior to consideration for discharge home.  Patient to remain weightbearing as tolerated left lower extremity with posterior hip precautions.  Left knee leg immobilizer to remain in place when in bed for the next 30 days.  Eliquis 2.5 mg twice daily for 30 days postop.  Discontinue after last dose on 12/6/2023.  Omnicef for 2 days beginning 11/8/2023.  Follow-up with Dr. De La Vega in 2 weeks and follow-up with Dr. Hebert after discharge from skilled nursing facility.    Physical Exam on Discharge:  /67 (BP Location: Left arm, Patient Position: Lying)   Pulse 85   Temp 98.2 °F (36.8 °C) (Oral)   Resp 18   Ht 162.6 cm (64\")   Wt 68 kg (150 lb)   SpO2 95%   BMI 25.75 kg/m²   Physical Exam  Vitals and nursing note reviewed.   Constitutional:       Comments: Sitting up in bed.  No oxygen use.   in room.   HENT:      Head: Normocephalic and atraumatic.      Nose: No congestion.      Mouth/Throat:      Pharynx: Oropharynx is clear. No oropharyngeal exudate or posterior oropharyngeal erythema.   Eyes:      Extraocular Movements: Extraocular movements intact.      Pupils: Pupils are equal, round, and reactive to light.   Cardiovascular:      Rate and Rhythm: Normal rate and regular rhythm.      Heart sounds: No murmur heard.  Pulmonary:      Breath sounds: No wheezing, rhonchi or rales.      Comments: No oxygen in use.  Abdominal:      Palpations: Abdomen is soft.      Tenderness: There is no abdominal tenderness.   Genitourinary:     Comments: [facility]  Musculoskeletal:         General: Tenderness (Left hip) present.      Cervical back: " Normal range of motion and neck supple.      Comments: Left leg immobilizer in place   Skin:     General: Skin is warm and dry.   Neurological:      General: No focal deficit present.      Comments: Oriented to person.  Identifies herself and her .  At baseline per .   Psychiatric:         Mood and Affect: Mood normal.         Behavior: Behavior normal.       Condition on Discharge: Stable for discharge to [facility] for physical therapy and Occupational Therapy    Discharge Disposition:  Skilled Nursing Facility (PR - External)    Discharge Medications:     Discharge Medications        New Medications        Instructions Start Date   acetaminophen 325 MG tablet  Commonly known as: TYLENOL   650 mg, Oral, Every 4 Hours PRN      apixaban 2.5 MG tablet tablet  Commonly known as: ELIQUIS   2.5 mg, Oral, Every 12 Hours Scheduled for 30 days.  Start date 11/7/2023.  Discontinue after last dose 12/6/2023.      cefdinir 300 MG capsule  Commonly known as: OMNICEF   300 mg, Oral, 2 Times Daily, Begin 11/8/2023 for 2 days.   Start Date: November 8, 2023     docusate sodium 100 MG capsule   100 mg, Oral, 2 Times Daily PRN             Changes to Medications        Instructions Start Date   metoprolol succinate XL 25 MG 24 hr tablet  Commonly known as: TOPROL-XL  What changed: how much to take   25 mg, Oral, Daily   Start Date: November 8, 2023            Continue These Medications        Instructions Start Date   alendronate 70 MG tablet  Commonly known as: FOSAMAX   70 mg, Oral, Every 7 Days      aspirin 81 MG EC tablet   81 mg, Oral, Daily      levothyroxine 100 MCG tablet  Commonly known as: SYNTHROID, LEVOTHROID   100 mcg, Oral, Daily      memantine 10 MG tablet  Commonly known as: NAMENDA   10 mg, Oral, 2 Times Daily      vitamin B-12 100 MCG tablet  Commonly known as: CYANOCOBALAMIN   50 mcg, Oral, Daily             Stop These Medications      meloxicam 7.5 MG tablet  Commonly known as: MOBIC             Discharge Diet:   Diet Instructions       Diet: Regular/House Diet; Regular Texture (IDDSI 7); Thin (IDDSI 0)      Discharge Diet: Regular/House Diet    Texture: Regular Texture (IDDSI 7)    Fluid Consistency: Thin (IDDSI 0)          Activity at Discharge:   Activity Instructions       Other Activity Instructions      Activity Instructions: Weightbearing as tolerated left lower extremity.  Left knee immobilizer to remain in place when in bed for the next 30 days per Dr. De La Vega          Discharge instructions:  1.  For recurrent fall seek medical attention.  2.  Weightbearing as tolerated left lower extremity with posterior hip precautions.  Left knee immobilizer in place while in bed for 4 weeks per Dr. De La Vega  3.  Eliquis twice daily for 30 days for DVT prophylaxis.  Start date 11/7/2023.  Discontinue after last dose on 12/6/23  4.  Omnicef 300 mg twice daily for 2 days begin 11/8/2023 for E. coli UTI  5.  Increase metoprolol to 25 mg orally daily  6.  Follow-up with Dr. De La Vega 2 weeks  7.  Follow-up with Dr. Hebert after discharge from [facility]    Follow-up Appointments:   Dr. Hebert after discharge from [facility]  Dr. De La Vega 2 weeks    Test Results Pending at Discharge: None    Electronically signed by EDGARDO Urena, 11/07/23, 09:58 CST.    Time: 35 minutes.  Discussed with Dr. Clemente, patient, and  Nik present in room.    The above documentation resulted from a face-to-face encounter by me Steffany REYNOSO, [facility].

## 2023-11-07 NOTE — CASE MANAGEMENT/SOCIAL WORK
Continued Stay Note   [place]     Patient Name: Dina Michaels  MRN: [id number]  Today's Date: 11/7/2023    Admit Date: 11/4/2023        Discharge Plan       Row Name 11/07/23 0925       Plan    Final Discharge Disposition Code 03 - skilled nursing facility (SNF)    Final Note PT has insurance approval to dc to [facility]. PT may dc when medically ready. SNF pharmacy has been added in [place] ([facility]                   Discharge Codes    No documentation.                 Expected Discharge Date and Time       Expected Discharge Date Expected Discharge Time    Nov 8, 2023               CRYSTAL Victor

## 2023-11-07 NOTE — PLAN OF CARE
Goal Outcome Evaluation:  Plan of Care Reviewed With: patient        Progress: no change  Outcome Evaluation: Pt alert to self. BP elevated. Prn given. Aicha c/d/lucero. Caitlyn w/ ladonna. SCDs for VTE. Bed alarm set. Safety maintained.

## 2023-11-07 NOTE — THERAPY DISCHARGE NOTE
Acute Care - Occupational Therapy Discharge Summary  [facility]     Patient Name: Dina Michaels  : 1934  MRN: [id number]    Today's Date: 2023                 Admit Date: 2023        OT Recommendation and Plan    Visit Dx:    ICD-10-CM ICD-9-CM   1. Closed subcapital fracture of femur, left, initial encounter  S72.012A 820.09   2. Closed fracture of left hip, initial encounter  S72.002A 820.8   3. Impaired mobility [Z74.09]  Z74.09 799.89                OT Rehab Goals       Row Name 23 1500             Transfer Goal 1 (OT)    Activity/Assistive Device (Transfer Goal 1, OT) bed-to-chair/chair-to-bed;commode, 3-in-1  -JJ      [place] Level/Cues Needed (Transfer Goal 1, OT) moderate assist (50-74% patient effort)  -JJ      Time Frame (Transfer Goal 1, OT) long term goal (LTG)  -JJ      Progress/Outcome (Transfer Goal 1, OT) goal not met;discharged from facility  -JJ         Dressing Goal 1 (OT)    Activity/Device (Dressing Goal 1, OT) lower body dressing  -JJ      [place]/Cues Needed (Dressing Goal 1, OT) moderate assist (50-74% patient effort)  -JJ      Time Frame (Dressing Goal 1, OT) long term goal (LTG)  -JJ      Strategies/Barriers (Dressing Goal 1, OT) AE PRN  -JJ      Progress/Outcome (Dressing Goal 1, OT) goal not met;discharged from facility  -JJ         Grooming Goal 1 (OT)    Activity/Device (Grooming Goal 1, OT) grooming skills, all  -JJ      [place] (Grooming Goal 1, OT) moderate assist (50-74% patient effort)  -JJ      Time Frame (Grooming Goal 1, OT) long term goal (LTG)  -JJ      Strategies/Barriers (Grooming Goal 1, OT) standing bedside (x5 minutes)  -JJ      Progress/Outcome (Grooming Goal 1, OT) goal not met;discharged from facility  -JJ                User Key  (r) = Recorded By, (t) = Taken By, (c) = Cosigned By      Initials Name Provider Type Discipline    Afia Anne, OTR/L, CSRS Occupational Therapist OT                                OT  Discharge Summary  Anticipated Discharge Disposition (OT): skilled nursing facility  Reason for Discharge: Discharge from facility  Outcomes Achieved: Refer to plan of care for updates on goals achieved  Discharge Destination: SNF      Afia Thorpe OTR/L, CSRS  11/7/2023

## 2023-11-07 NOTE — CASE MANAGEMENT/SOCIAL WORK
Post-Acute Authorization Submission      Post Acute Pre-Cert Documentation  Request Submitted by Facility - Type:: Hospital  Post-Acute Authorization Type Submitted:: SNF  Date Post Acute Pre-Cert Inititated per Facility: 11/06/23  Verification from Payer: Yes  Date Post Acute Pre-Cert Completed: 11/07/23  Accepting Facility: [facility] Discharge Date Requested: 11/07/23  All Clinicals Submitted?: Yes  Had Accepting Facility at Time of Submission: Yes  Response Received from Insurance?: Approval  Response Communicated to:: , Accepting Facility Liaison  Authorization Number:: [id number]  Post Acute Pre-Cert Initiated Comment: Precert is approved for [facility]              Charity Silver RN

## 2023-11-07 NOTE — THERAPY DISCHARGE NOTE
Acute Care - Physical Therapy Discharge Summary  [facility]       Patient Name: Dina Michaels  : 1934  MRN: [id number]    Today's Date: 2023                 Admit Date: 2023      PT Recommendation and Plan    Visit Dx:    ICD-10-CM ICD-9-CM   1. Closed subcapital fracture of femur, left, initial encounter  S72.012A 820.09   2. Closed fracture of left hip, initial encounter  S72.002A 820.8   3. Impaired mobility [Z74.09]  Z74.09 799.89                PT Rehab Goals       Row Name 23 1333             Bed Mobility Goal 1 (PT)    Activity/Assistive Device (Bed Mobility Goal 1, PT) sit to supine/supine to sit  -LY      [place] Level/Cues Needed (Bed Mobility Goal 1, PT) minimum assist (75% or more patient effort)  -LY      Time Frame (Bed Mobility Goal 1, PT) long term goal (LTG);10 days  -LY      Progress/Outcomes (Bed Mobility Goal 1, PT) goal not met  -LY         Transfer Goal 1 (PT)    Activity/Assistive Device (Transfer Goal 1, PT) sit-to-stand/stand-to-sit;bed-to-chair/chair-to-bed;walker, rolling  -LY      [place] Level/Cues Needed (Transfer Goal 1, PT) minimum assist (75% or more patient effort)  -LY      Time Frame (Transfer Goal 1, PT) long term goal (LTG);10 days  -LY      Progress/Outcome (Transfer Goal 1, PT) goal not met  -LY         Gait Training Goal 1 (PT)    Activity/Assistive Device (Gait Training Goal 1, PT) gait (walking locomotion);assistive device use;decrease asymmetrical patterns;decrease fall risk;diminish gait deviation;improve balance and speed;increase endurance/gait distance;increase energy conservation;maintain weight-bearing status;walker, rolling  -LY      [place] Level (Gait Training Goal 1, PT) minimum assist (75% or more patient effort)  -LY      Distance (Gait Training Goal 1, PT) 50 ft  -LY      Time Frame (Gait Training Goal 1, PT) long term goal (LTG);10 days  -LY      Progress/Outcome (Gait Training Goal 1, PT) goal not met  -LY                 User Key  (r) = Recorded By, (t) = Taken By, (c) = Cosigned By      Initials Name Provider Type Discipline    LY Maribeth Ojeda, PTA Physical Therapist Assistant PT                    Therapy Charges for Today       Code Description Service Date Service Provider Modifiers Qty    [id number]  PT THERAPEUTIC ACT EA 15 MIN 11/6/2023 Maribeth Ojeda PTA GP 2            PT Discharge Summary  Anticipated Discharge Disposition (PT): skilled nursing facility  Reason for Discharge: Discharge from facility  Outcomes Achieved: Refer to plan of care for updates on goals achieved  Discharge Destination: SNF      Maribeth Ojeda PTA   11/7/2023

## 2023-11-13 PROCEDURE — 87086 URINE CULTURE/COLONY COUNT: CPT | Performed by: NURSE PRACTITIONER

## 2023-11-13 PROCEDURE — 81001 URINALYSIS AUTO W/SCOPE: CPT | Performed by: NURSE PRACTITIONER

## 2023-11-14 ENCOUNTER — LAB REQUISITION (OUTPATIENT)
Dept: LAB | Facility: HOSPITAL | Age: 88
End: 2023-11-14
Payer: MEDICARE

## 2023-11-14 DIAGNOSIS — R41.82 ALTERED MENTAL STATUS, UNSPECIFIED: ICD-10-CM

## 2023-11-14 LAB
ANION GAP SERPL CALCULATED.3IONS-SCNC: 8 MMOL/L (ref 5–15)
BACTERIA UR QL AUTO: ABNORMAL /HPF
BASOPHILS # BLD AUTO: 0.05 10*3/MM3 (ref 0–0.2)
BASOPHILS NFR BLD AUTO: 0.5 % (ref 0–1.5)
BILIRUB UR QL STRIP: NEGATIVE
BUN SERPL-MCNC: 24 MG/DL (ref 8–23)
BUN/CREAT SERPL: 33.3 (ref 7–25)
CALCIUM SPEC-SCNC: 8.4 MG/DL (ref 8.6–10.5)
CHLORIDE SERPL-SCNC: 100 MMOL/L (ref 98–107)
CLARITY UR: CLEAR
CO2 SERPL-SCNC: 28 MMOL/L (ref 22–29)
COLOR UR: YELLOW
CREAT SERPL-MCNC: 0.72 MG/DL (ref 0.57–1)
DEPRECATED RDW RBC AUTO: 47.3 FL (ref 37–54)
EGFRCR SERPLBLD CKD-EPI 2021: 80 ML/MIN/1.73
EOSINOPHIL # BLD AUTO: 0.22 10*3/MM3 (ref 0–0.4)
EOSINOPHIL NFR BLD AUTO: 2.1 % (ref 0.3–6.2)
ERYTHROCYTE [DISTWIDTH] IN BLOOD BY AUTOMATED COUNT: 15.3 % (ref 12.3–15.4)
GLUCOSE SERPL-MCNC: 128 MG/DL (ref 65–99)
GLUCOSE UR STRIP-MCNC: NEGATIVE MG/DL
HCT VFR BLD AUTO: 30.9 % (ref 34–46.6)
HGB BLD-MCNC: 9.4 G/DL (ref 12–15.9)
HGB UR QL STRIP.AUTO: NEGATIVE
HYALINE CASTS UR QL AUTO: ABNORMAL /LPF
IMM GRANULOCYTES # BLD AUTO: 0.12 10*3/MM3 (ref 0–0.05)
IMM GRANULOCYTES NFR BLD AUTO: 1.2 % (ref 0–0.5)
KETONES UR QL STRIP: NEGATIVE
LEUKOCYTE ESTERASE UR QL STRIP.AUTO: ABNORMAL
LYMPHOCYTES # BLD AUTO: 1.03 10*3/MM3 (ref 0.7–3.1)
LYMPHOCYTES NFR BLD AUTO: 9.9 % (ref 19.6–45.3)
MCH RBC QN AUTO: 25.7 PG (ref 26.6–33)
MCHC RBC AUTO-ENTMCNC: 30.4 G/DL (ref 31.5–35.7)
MCV RBC AUTO: 84.4 FL (ref 79–97)
MONOCYTES # BLD AUTO: 0.88 10*3/MM3 (ref 0.1–0.9)
MONOCYTES NFR BLD AUTO: 8.5 % (ref 5–12)
NEUTROPHILS NFR BLD AUTO: 77.8 % (ref 42.7–76)
NEUTROPHILS NFR BLD AUTO: 8.09 10*3/MM3 (ref 1.7–7)
NITRITE UR QL STRIP: NEGATIVE
NRBC BLD AUTO-RTO: 0 /100 WBC (ref 0–0.2)
PH UR STRIP.AUTO: 5.5 [PH] (ref 5–8)
PLATELET # BLD AUTO: 385 10*3/MM3 (ref 140–450)
PMV BLD AUTO: 10.1 FL (ref 6–12)
POTASSIUM SERPL-SCNC: 4.5 MMOL/L (ref 3.5–5.2)
PROT UR QL STRIP: NEGATIVE
RBC # BLD AUTO: 3.66 10*6/MM3 (ref 3.77–5.28)
RBC # UR STRIP: ABNORMAL /HPF
REF LAB TEST METHOD: ABNORMAL
SODIUM SERPL-SCNC: 136 MMOL/L (ref 136–145)
SP GR UR STRIP: 1.02 (ref 1–1.03)
SQUAMOUS #/AREA URNS HPF: ABNORMAL /HPF
UROBILINOGEN UR QL STRIP: ABNORMAL
WBC # UR STRIP: ABNORMAL /HPF
WBC NRBC COR # BLD: 10.39 10*3/MM3 (ref 3.4–10.8)

## 2023-11-14 PROCEDURE — 36415 COLL VENOUS BLD VENIPUNCTURE: CPT | Performed by: NURSE PRACTITIONER

## 2023-11-14 PROCEDURE — 80048 BASIC METABOLIC PNL TOTAL CA: CPT | Performed by: NURSE PRACTITIONER

## 2023-11-14 PROCEDURE — 85025 COMPLETE CBC W/AUTO DIFF WBC: CPT | Performed by: NURSE PRACTITIONER

## 2023-11-15 LAB — BACTERIA SPEC AEROBE CULT: NO GROWTH

## 2023-12-06 ENCOUNTER — HOSPITAL ENCOUNTER (OUTPATIENT)
Dept: GENERAL RADIOLOGY | Facility: HOSPITAL | Age: 88
Discharge: HOME | End: 2023-12-06
Admitting: INTERNAL MEDICINE
Payer: MEDICARE

## 2023-12-06 ENCOUNTER — TRANSCRIBE ORDERS (OUTPATIENT)
Dept: ADMINISTRATIVE | Facility: HOSPITAL | Age: 88
End: 2023-12-06
Payer: MEDICARE

## 2023-12-06 DIAGNOSIS — M25.562 LEFT KNEE PAIN, UNSPECIFIED CHRONICITY: Primary | ICD-10-CM

## 2023-12-06 DIAGNOSIS — M25.562 LEFT KNEE PAIN, UNSPECIFIED CHRONICITY: ICD-10-CM

## 2023-12-06 PROCEDURE — 73502 X-RAY EXAM HIP UNI 2-3 VIEWS: CPT

## 2023-12-06 PROCEDURE — 73562 X-RAY EXAM OF KNEE 3: CPT

## 2023-12-11 ENCOUNTER — NURSE TRIAGE (OUTPATIENT)
Dept: CALL CENTER | Facility: HOSPITAL | Age: 88
End: 2023-12-11
Payer: MEDICARE

## 2023-12-11 NOTE — TELEPHONE ENCOUNTER
"Rosalina with [facility] called to notify Dr. Hebert that patient refused nurse visit this week.  will call patient to schedule for next week. [facility] can be reached at [phone] with any questions.    Reason for Disposition   [1] Caller requesting NON-URGENT health information AND [2] PCP's office is the best resource    Additional Information   Negative: [1] Caller is not with the adult (patient) AND [2] reporting urgent symptoms   Negative: Lab result questions   Negative: Medication questions   Negative: Caller can't be reached by phone   Negative: Caller has already spoken to PCP or another triager   Negative: RN needs further essential information from caller in order to complete triage   Negative: Requesting regular office appointment    Answer Assessment - Initial Assessment Questions  1. REASON FOR CALL or QUESTION: \"What is your reason for calling today?\" or \"How can I best help you?\" or \"What question do you have that I can help answer?\"      Rosalina with [facility] called to notify Dr. Hebert that patient refused nurse visit this week.  will call patient to schedule for next week. [facility] can be reached at [phone] with any questions.    Protocols used: Information Only Call-ADULT-    "

## 2024-03-14 NOTE — PROGRESS NOTES
Full consult to follow    88 YO F with dementia s/p fall at home with L FNFx    1) NPO after midnight  2) Consent for left hip hemiarthroplasty  3) Hold DVT chemoprophylaxis    Electronically signed by Denzel De La Vega MD on 11/4/2023 at 21:56 CDT   Pre injection questionnaire was reviewed.  1. Have you had increased asthma symptoms (chest tightness, increased cough, wheezing or felt short of breath) in the past week? no  2. Have you had a marked increase in allergy symptoms(itchy eyes or nose, sneezing, runny nose, post nasal drip or throat clearing) in the past week?  no  3. Do you have a cold or respiratory tract infection, or flu-like symptoms? no  4. Did you have any problems such as increased allergy or asthma symptoms, hives or generalized itching within 12 hours of receiving your allergy injection or swelling that persisted into the next day? no  5.Are you on any new medications? Any new eye drops? Any new blood pressure or migraine medications? no  If yes,please specify____________________________________   6. Are you taking your allergy medicine as prescribed? yes      Patient requested to sign an AMA form to leave right after her injection instead of waiting 30 minutes after injection to watch for reaction as advised by MD/RN. AMA sent to scanning.

## 2024-07-28 ENCOUNTER — APPOINTMENT (OUTPATIENT)
Dept: CT IMAGING | Facility: HOSPITAL | Age: 89
End: 2024-07-28
Payer: MEDICARE

## 2024-07-28 ENCOUNTER — HOSPITAL ENCOUNTER (EMERGENCY)
Facility: HOSPITAL | Age: 89
Discharge: HOME | End: 2024-07-28
Admitting: EMERGENCY MEDICINE
Payer: MEDICARE

## 2024-07-28 VITALS
HEIGHT: 64 IN | HEART RATE: 68 BPM | OXYGEN SATURATION: 100 % | WEIGHT: 154.32 LBS | RESPIRATION RATE: 16 BRPM | BODY MASS INDEX: 26.35 KG/M2 | SYSTOLIC BLOOD PRESSURE: 148 MMHG | TEMPERATURE: 97.8 F | DIASTOLIC BLOOD PRESSURE: 96 MMHG

## 2024-07-28 DIAGNOSIS — K64.4 EXTERNAL HEMORRHOID: ICD-10-CM

## 2024-07-28 DIAGNOSIS — R93.5 ABNORMAL CT OF THE ABDOMEN: ICD-10-CM

## 2024-07-28 DIAGNOSIS — K64.8 INTERNAL HEMORRHOID: ICD-10-CM

## 2024-07-28 DIAGNOSIS — R79.89 ELEVATED SERUM CREATININE: ICD-10-CM

## 2024-07-28 DIAGNOSIS — R94.4 DECREASED GFR: ICD-10-CM

## 2024-07-28 DIAGNOSIS — K92.1 BLACK STOOLS: Primary | ICD-10-CM

## 2024-07-28 LAB
ALBUMIN SERPL-MCNC: 3.6 G/DL (ref 3.5–5.2)
ALBUMIN/GLOB SERPL: 1.3 G/DL
ALP SERPL-CCNC: 52 U/L (ref 39–117)
ALT SERPL W P-5'-P-CCNC: 6 U/L (ref 1–33)
ANION GAP SERPL CALCULATED.3IONS-SCNC: 10 MMOL/L (ref 5–15)
AST SERPL-CCNC: 13 U/L (ref 1–32)
BASOPHILS # BLD AUTO: 0.04 10*3/MM3 (ref 0–0.2)
BASOPHILS NFR BLD AUTO: 0.6 % (ref 0–1.5)
BILIRUB SERPL-MCNC: 0.4 MG/DL (ref 0–1.2)
BUN SERPL-MCNC: 22 MG/DL (ref 8–23)
BUN/CREAT SERPL: 17.9 (ref 7–25)
CALCIUM SPEC-SCNC: 9.3 MG/DL (ref 8.2–9.6)
CHLORIDE SERPL-SCNC: 101 MMOL/L (ref 98–107)
CO2 SERPL-SCNC: 27 MMOL/L (ref 22–29)
CREAT SERPL-MCNC: 1.23 MG/DL (ref 0.57–1)
DEPRECATED RDW RBC AUTO: 48.2 FL (ref 37–54)
DEVELOPER EXPIRATION DATE: ABNORMAL
DEVELOPER LOT NUMBER: 271
EGFRCR SERPLBLD CKD-EPI 2021: 41.8 ML/MIN/1.73
EOSINOPHIL # BLD AUTO: 0.1 10*3/MM3 (ref 0–0.4)
EOSINOPHIL NFR BLD AUTO: 1.4 % (ref 0.3–6.2)
ERYTHROCYTE [DISTWIDTH] IN BLOOD BY AUTOMATED COUNT: 15.6 % (ref 12.3–15.4)
EXPIRATION DATE: ABNORMAL
FECAL OCCULT BLOOD SCREEN, POC: POSITIVE
GLOBULIN UR ELPH-MCNC: 2.7 GM/DL
GLUCOSE SERPL-MCNC: 113 MG/DL (ref 65–99)
HCT VFR BLD AUTO: 33.5 % (ref 34–46.6)
HGB BLD-MCNC: 10.4 G/DL (ref 12–15.9)
IMM GRANULOCYTES # BLD AUTO: 0.04 10*3/MM3 (ref 0–0.05)
IMM GRANULOCYTES NFR BLD AUTO: 0.6 % (ref 0–0.5)
LYMPHOCYTES # BLD AUTO: 1 10*3/MM3 (ref 0.7–3.1)
LYMPHOCYTES NFR BLD AUTO: 14 % (ref 19.6–45.3)
Lab: 271
MCH RBC QN AUTO: 26.6 PG (ref 26.6–33)
MCHC RBC AUTO-ENTMCNC: 31 G/DL (ref 31.5–35.7)
MCV RBC AUTO: 85.7 FL (ref 79–97)
MONOCYTES # BLD AUTO: 0.6 10*3/MM3 (ref 0.1–0.9)
MONOCYTES NFR BLD AUTO: 8.4 % (ref 5–12)
NEGATIVE CONTROL: NEGATIVE
NEUTROPHILS NFR BLD AUTO: 5.35 10*3/MM3 (ref 1.7–7)
NEUTROPHILS NFR BLD AUTO: 75 % (ref 42.7–76)
NRBC BLD AUTO-RTO: 0 /100 WBC (ref 0–0.2)
PLATELET # BLD AUTO: 206 10*3/MM3 (ref 140–450)
PMV BLD AUTO: 10.4 FL (ref 6–12)
POSITIVE CONTROL: POSITIVE
POTASSIUM SERPL-SCNC: 4.5 MMOL/L (ref 3.5–5.2)
PROT SERPL-MCNC: 6.3 G/DL (ref 6–8.5)
RBC # BLD AUTO: 3.91 10*6/MM3 (ref 3.77–5.28)
SODIUM SERPL-SCNC: 138 MMOL/L (ref 136–145)
WBC NRBC COR # BLD AUTO: 7.13 10*3/MM3 (ref 3.4–10.8)

## 2024-07-28 PROCEDURE — 85025 COMPLETE CBC W/AUTO DIFF WBC: CPT

## 2024-07-28 PROCEDURE — 96374 THER/PROPH/DIAG INJ IV PUSH: CPT

## 2024-07-28 PROCEDURE — 25510000001 IOPAMIDOL PER 1 ML

## 2024-07-28 PROCEDURE — 80053 COMPREHEN METABOLIC PANEL: CPT

## 2024-07-28 PROCEDURE — 74174 CTA ABD&PLVS W/CONTRAST: CPT

## 2024-07-28 PROCEDURE — 82270 OCCULT BLOOD FECES: CPT

## 2024-07-28 PROCEDURE — 99285 EMERGENCY DEPT VISIT HI MDM: CPT

## 2024-07-28 PROCEDURE — 25810000003 SODIUM CHLORIDE 0.9 % SOLUTION

## 2024-07-28 RX ORDER — PRAMOXINE HYDROCHLORIDE 10 MG/G
AEROSOL, FOAM TOPICAL
Qty: 15 G | Refills: 0 | Status: SHIPPED | OUTPATIENT
Start: 2024-07-28

## 2024-07-28 RX ORDER — PANTOPRAZOLE SODIUM 20 MG/1
20 TABLET, DELAYED RELEASE ORAL DAILY
Qty: 30 TABLET | Refills: 0 | Status: SHIPPED | OUTPATIENT
Start: 2024-07-28

## 2024-07-28 RX ORDER — HYDROCORTISONE ACETATE 25 MG/1
25 SUPPOSITORY RECTAL 2 TIMES DAILY
Qty: 12 EACH | Refills: 0 | Status: SHIPPED | OUTPATIENT
Start: 2024-07-28

## 2024-07-28 RX ORDER — PANTOPRAZOLE SODIUM 40 MG/10ML
80 INJECTION, POWDER, LYOPHILIZED, FOR SOLUTION INTRAVENOUS ONCE
Status: COMPLETED | OUTPATIENT
Start: 2024-07-28 | End: 2024-07-28

## 2024-07-28 RX ORDER — SODIUM CHLORIDE 0.9 % (FLUSH) 0.9 %
10 SYRINGE (ML) INJECTION AS NEEDED
Status: DISCONTINUED | OUTPATIENT
Start: 2024-07-28 | End: 2024-07-28 | Stop reason: HOSPADM

## 2024-07-28 RX ADMIN — SODIUM CHLORIDE 1000 ML: 9 INJECTION, SOLUTION INTRAVENOUS at 16:55

## 2024-07-28 RX ADMIN — PANTOPRAZOLE SODIUM 80 MG: 40 INJECTION, POWDER, FOR SOLUTION INTRAVENOUS at 16:55

## 2024-07-28 RX ADMIN — IOPAMIDOL 100 ML: 755 INJECTION, SOLUTION INTRAVENOUS at 14:39

## 2024-07-28 NOTE — DISCHARGE INSTRUCTIONS
Today your wife was seen in the ED for her symptoms.  We have discussed the results of her labs and imaging at the bedside which reveals a decline in her kidney function as well as probable bleeding in her colon.  I did recommend admission given this, however you would prefer to take her home and follow-up closely outpatient with her PCP which is fairly reasonable given you do not wish to put her through a colonoscopy.  We have given her a dose of Protonix here in the ED through her IV, I am sending this in for her to take at home as well.  Please make sure she is drinking plenty of fluids.  I would recommend her seeing her PCP sometime this week.  Please return her to the ED with any new, worsening, or persistent symptoms.  Of note, we did discuss that she has some sort of rectal mass noted on her CT abdomen pelvis which she will want to make her PCP aware of.  They will likely recommend following up with GI as well.

## 2024-07-28 NOTE — ED PROVIDER NOTES
Subjective   History of Present Illness  Patient is a 89-year-old female who presents to the ED today with her  complaining of rectal bleeding, sudden onset this morning.  He states she does have history of this, history of hemorrhoids.  He states they have been camping and he went to check on her this morning and noticed a large amount of blood in the bed as well as on her bottom.  He states it was very dark, almost black and very sticky/tarry and smelly.  He denies any other symptoms on part of the patient.  Patient does have significant difficulty hearing which is making the history limited, most of the history is coming from the patient's .  She is hypertensive at 165/106, all other vital signs are reassuring.  She is not taking any blood thinners, does take a daily baby aspirin.  She denies any overt abdominal tenderness, is nontender on palpation.  PMH is significant for cancer and dementia.  Differential diagnoses: GI bleed, hemorrhoids, acute blood loss anemia, electrolyte imbalance, and other.    History provided by:  Patient and spouse  History limited by:  Dementia   used: No        Review of Systems   Constitutional: Negative.    HENT: Negative.     Eyes: Negative.    Respiratory: Negative.     Cardiovascular: Negative.    Gastrointestinal:  Positive for blood in stool. Negative for abdominal distention, abdominal pain, constipation, diarrhea, nausea and vomiting.   Genitourinary: Negative.    Musculoskeletal: Negative.    Skin: Negative.    Neurological: Negative.    Psychiatric/Behavioral:          Dementia; confused at baseline       Past Medical History:   Diagnosis Date    Cancer     Dementia        Allergies   Allergen Reactions    Amoxicillin Hallucinations    Clarithromycin Hallucinations    Nisoldipine Er Other (See Comments)     Feelings of passing out, tired    Sular [Nisoldipine] Unknown - Low Severity       Past Surgical History:   Procedure Laterality Date     COLONOSCOPY N/A 4/14/2023    Procedure: COLONOSCOPY WITH ANESTHESIA;  Surgeon: Bubba Asher MD;  Location: [facility] ENDOSCOPY;  Service: Gastroenterology;  Laterality: N/A;  pre gi bleed  post diverticulosis  Dr. Hebert    ENDOSCOPY N/A 4/14/2023    Procedure: ESOPHAGOGASTRODUODENOSCOPY WITH ANESTHESIA;  Surgeon: Bubba Asher MD;  Location: [facility] ENDOSCOPY;  Service: Gastroenterology;  Laterality: N/A;  pre screen  post gastric bx  Dr Hebert    HIP HEMIARTHROPLASTY Left 11/5/2023    Procedure: HIP HEMIARTHROPLASTY;  Surgeon: Denzel De La Vega MD;  Location: [facility] OR;  Service: Orthopedics;  Laterality: Left;       History reviewed. No pertinent family history.    Social History     Socioeconomic History    Marital status:    Tobacco Use    Smoking status: Never    Smokeless tobacco: Never   Vaping Use    Vaping status: Never Used   Substance and Sexual Activity    Alcohol use: Yes    Drug use: Never       Objective   Physical Exam  Vitals and nursing note reviewed. Exam conducted with a chaperone present.   Constitutional:       General: She is not in acute distress.     Appearance: Normal appearance. She is not ill-appearing, toxic-appearing or diaphoretic.   HENT:      Head: Normocephalic and atraumatic.      Right Ear: External ear normal.      Left Ear: External ear normal.      Nose: Nose normal.      Mouth/Throat:      Mouth: Mucous membranes are moist.   Eyes:      Extraocular Movements: Extraocular movements intact.      Conjunctiva/sclera: Conjunctivae normal.      Pupils: Pupils are equal, round, and reactive to light.   Cardiovascular:      Rate and Rhythm: Normal rate and regular rhythm.      Pulses: Normal pulses.      Heart sounds: Normal heart sounds.   Pulmonary:      Effort: Pulmonary effort is normal.      Breath sounds: Normal breath sounds.   Abdominal:      General: Bowel sounds are normal. There is no distension.      Palpations: Abdomen is soft.      Tenderness: There is  no abdominal tenderness. There is no right CVA tenderness, left CVA tenderness, guarding or rebound.   Genitourinary:     Rectum: Guaiac result positive. Mass, external hemorrhoid and internal hemorrhoid present.      Comments: Stool appears to be melanotic, nursing staff have reported an episode of bright red bleeding on bowel movement in the bathroom  Skin:     General: Skin is warm and dry.      Capillary Refill: Capillary refill takes less than 2 seconds.   Neurological:      Mental Status: She is alert. Mental status is at baseline. She is confused.      GCS: GCS eye subscore is 4. GCS verbal subscore is 4. GCS motor subscore is 6.      Comments: Dementia, confused at baseline       Labs Reviewed   COMPREHENSIVE METABOLIC PANEL - Abnormal; Notable for the following components:       Result Value    Glucose 113 (*)     Creatinine 1.23 (*)     eGFR 41.8 (*)     All other components within normal limits    Narrative:     GFR Normal >60  Chronic Kidney Disease <60  Kidney Failure <15    The GFR formula is only valid for adults with stable renal function between ages 18 and 70.   CBC WITH AUTO DIFFERENTIAL - Abnormal; Notable for the following components:    Hemoglobin 10.4 (*)     Hematocrit 33.5 (*)     MCHC 31.0 (*)     RDW 15.6 (*)     Lymphocyte % 14.0 (*)     Immature Grans % 0.6 (*)     All other components within normal limits   POCT OCCULT BLOOD STOOL - Abnormal; Notable for the following components:    Fecal Occult Blood Positive (*)     All other components within normal limits   CBC AND DIFFERENTIAL    Narrative:     The following orders were created for panel order CBC & Differential.  Procedure                               Abnormality         Status                     ---------                               -----------         ------                     CBC Auto Differential[[id number]]        Abnormal            Final result                 Please view results for these tests on the individual  orders.     CT Angiogram Abdomen Pelvis   Final Result   1. There is no contrast extravasation within bowel loops on the arterial   phase sequence to suggest an active bleeding site. There is   diverticulosis of the colon. There is under distention of portions of   the colon and the distal stomach. Small bowel loops are nondilated. No   definite CT findings of diverticulitis.   2. Questionable rectal mass versus artifact of under distention.   3. No focal solid organ abnormality is seen.   4. Prior hysterectomy.   5. Degenerative changes of the spine and right hip. Prior left hip   arthroplasty.       This report was signed and finalized on 7/28/2024 3:24 PM by Dr. Bill Dudley MD.            Medications   iopamidol (ISOVUE-370) 76 % injection 100 mL (100 mL Intravenous Given 7/28/24 1439)   sodium chloride 0.9 % bolus 1,000 mL (0 mL Intravenous Stopped 7/28/24 1718)   pantoprazole (PROTONIX) injection 80 mg (80 mg Intravenous Given 7/28/24 1655)     Procedures           ED Course  ED Course as of 07/30/24 1000   Sun Jul 28, 2024   1619 Results discussed at bedside with the patient's .  She does appear to have some renal function decline, I am ordering IV fluids.  No history of CKD that he is aware of.  Hemoccult is positive, she is having mixed episodes of bright red bleeding and melena.  No acute blood loss anemia noted on CBC.  CTA abdomen pelvis reveals no brisk bleeding, however they did question a rectal mass.  On digital rectal exam I do appreciate findings consistent with this versus an internal hemorrhoid, there is an external hemorrhoid visualized as well.  I did ultimately recommend admission to the patient's  given renal decline and melanotic stool.  However, he really does not wish to keep her in the hospital as he is not willing to put her through another colonoscopy.  He states she has had several over the last few years and they all come back normal.  He would rather take her home  and have her follow-up outpatient with her PCP.  I did provide strict return precautions, he voices understanding.  We are going to give fluids and Protonix while she is still here in the ED and I will likely send her home on Protonix until she is able to follow-up with her PCP for further. [HE]      ED Course User Index  [HE] Fang Noriega APRN                                             Medical Decision Making  Patient is a 89-year-old female who presents to the ED today with her  complaining of rectal bleeding, sudden onset this morning.  He states she does have history of this, history of hemorrhoids.  He states they have been camping and he went to check on her this morning and noticed a large amount of blood in the bed as well as on her bottom.  He states it was very dark, almost black and very sticky/tarry and smelly.  He denies any other symptoms on part of the patient.  Patient does have significant difficulty hearing which is making the history limited, most of the history is coming from the patient's .  She is hypertensive at 165/106, all other vital signs are reassuring.  She is not taking any blood thinners, does take a daily baby aspirin.  She denies any overt abdominal tenderness, is nontender on palpation.  PMH is significant for cancer and dementia.  Differential diagnoses: GI bleed, hemorrhoids, acute blood loss anemia, electrolyte imbalance, and other.    POC occult stool test is positive.  On exam I was able to visualize a single external hemorrhoid that does not appear thrombosed or actively bleeding.  I did also appreciate an internal hemorrhoid as well as some sort of mass, no active bleeding at this time.  Stool does appear to be melanotic on exam.  Nursing staff did report an episode of bright red bleeding on bowel movement in the bathroom as well.    Patient was given IV fluids and Protonix in the ED.    CBC reveals normal white count and platelets, H&H at baseline.  No  signs of acute blood loss anemia.  CMP reveals elevated creatinine at 1.23 with a low GFR 41.8.  This does appear new over the last several months, however did have altered renal function in November 2023.  Was not this severe at that time.  Electrolytes and liver function are normal.  She was given IV fluids.    CTA abdomen pelvis reveals there is no contrast extravasation within bowel loops on the arterial  phase sequence to suggest an active bleeding site. There is  diverticulosis of the colon. There is under distention of portions of  the colon and the distal stomach. Small bowel loops are nondilated. No  definite CT findings of diverticulitis.  2. Questionable rectal mass versus artifact of under distention.  3. No focal solid organ abnormality is seen.  4. Prior hysterectomy.  5. Degenerative changes of the spine and right hip. Prior left hip  arthroplasty.    Results discussed at bedside with the patient's . Case discussed briefly with Dr. Nunez. She does appear to have some renal function decline, she was given IV fluids.  No history of CKD that the patient's  is aware of, although I am able to see on chart review that she has had some history of elevated creatinine and low GFR as low as 50.3 back in November 2023.  Hemoccult is positive, she is having mixed episodes of bright red bleeding and melena.  No acute blood loss anemia noted on CBC.  CTA abdomen pelvis reveals no brisk bleeding, however they did question a rectal mass.  On digital rectal exam I do appreciate findings consistent with this versus an internal hemorrhoid, there is an external hemorrhoid visualized as well.  I did ultimately recommend admission to the patient's  given renal decline and melanotic stool.  However, he really does not wish to keep her in the hospital as he is not willing to put her through another colonoscopy.  He states she has had several over the last few years and they all come back normal.  He  would rather take her home and have her follow-up outpatient with her PCP.  I did provide strict return precautions, he voices understanding.  We are going to give fluids and Protonix while she is still here in the ED and I will also send her home on Protonix until she is able to follow-up with her PCP for further.  I have sent Proctofoam and Anusol suppositories for hemorrhoids.  Vital signs remain reassuring, no hypotension noted.     Problems Addressed:  Abnormal CT of the abdomen: complicated acute illness or injury  Black stools: complicated acute illness or injury  Decreased GFR: complicated acute illness or injury  Elevated serum creatinine: complicated acute illness or injury  External hemorrhoid: complicated acute illness or injury  Internal hemorrhoid: complicated acute illness or injury    Amount and/or Complexity of Data Reviewed  Labs: ordered.  Radiology: ordered.    Risk  OTC drugs.  Prescription drug management.        Final diagnoses:   Black stools   Elevated serum creatinine   Decreased GFR   External hemorrhoid   Internal hemorrhoid   Abnormal CT of the abdomen       ED Disposition  ED Disposition       ED Disposition   Discharge    Condition   Stable    Comment   --               Benedicto Hebert MD  [address]  [facility] [address] [phone]               Medication List        New Prescriptions      hydrocortisone 25 MG suppository  Commonly known as: ANUSOL-HC  Insert 1 suppository into the rectum 2 (Two) Times a Day.     pantoprazole 20 MG EC tablet  Commonly known as: PROTONIX  Take 1 tablet by mouth Daily.     Pramoxine HCl (Perianal) 1 % foam  Commonly known as: Proctofoam  Insert  into the rectum Every 2 (Two) Hours As Needed for Hemorrhoids.               Where to Get Your Medications        These medications were sent to [facility]/pharmacy #[id number] - [facility] - [address] - [phone]  - [phone] FX  [address] [facility] [address]      Phone:  [phone]   hydrocortisone 25 MG suppository  pantoprazole 20 MG EC tablet  Pramoxine HCl (Perianal) 1 % foam            Fang Noriega, APRN  07/30/24 1000

## 2024-12-19 ENCOUNTER — APPOINTMENT (OUTPATIENT)
Dept: CT IMAGING | Facility: HOSPITAL | Age: 89
End: 2024-12-19
Payer: MEDICARE

## 2024-12-19 ENCOUNTER — HOSPITAL ENCOUNTER (EMERGENCY)
Facility: HOSPITAL | Age: 89
Discharge: HOME | End: 2024-12-19
Attending: EMERGENCY MEDICINE
Payer: MEDICARE

## 2024-12-19 VITALS
HEIGHT: 64 IN | DIASTOLIC BLOOD PRESSURE: 86 MMHG | WEIGHT: 130 LBS | TEMPERATURE: 98.2 F | RESPIRATION RATE: 18 BRPM | HEART RATE: 74 BPM | OXYGEN SATURATION: 100 % | SYSTOLIC BLOOD PRESSURE: 172 MMHG | BODY MASS INDEX: 22.2 KG/M2

## 2024-12-19 DIAGNOSIS — N39.0 ACUTE UTI (URINARY TRACT INFECTION): Primary | ICD-10-CM

## 2024-12-19 DIAGNOSIS — S32.10XA CLOSED FRACTURE OF SACRUM, UNSPECIFIED PORTION OF SACRUM, INITIAL ENCOUNTER: ICD-10-CM

## 2024-12-19 LAB
ANION GAP SERPL CALCULATED.3IONS-SCNC: 13 MMOL/L (ref 5–15)
BACTERIA UR QL AUTO: ABNORMAL /HPF
BASOPHILS # BLD AUTO: 0.04 10*3/MM3 (ref 0–0.2)
BASOPHILS NFR BLD AUTO: 0.4 % (ref 0–1.5)
BILIRUB UR QL STRIP: ABNORMAL
BUN SERPL-MCNC: 17 MG/DL (ref 8–23)
BUN/CREAT SERPL: 18.9 (ref 7–25)
CALCIUM SPEC-SCNC: 9 MG/DL (ref 8.2–9.6)
CHLORIDE SERPL-SCNC: 102 MMOL/L (ref 98–107)
CLARITY UR: ABNORMAL
CO2 SERPL-SCNC: 26 MMOL/L (ref 22–29)
COLOR UR: ABNORMAL
CREAT SERPL-MCNC: 0.9 MG/DL (ref 0.57–1)
DEPRECATED RDW RBC AUTO: 46 FL (ref 37–54)
EGFRCR SERPLBLD CKD-EPI 2021: 60.9 ML/MIN/1.73
EOSINOPHIL # BLD AUTO: 0.18 10*3/MM3 (ref 0–0.4)
EOSINOPHIL NFR BLD AUTO: 1.8 % (ref 0.3–6.2)
ERYTHROCYTE [DISTWIDTH] IN BLOOD BY AUTOMATED COUNT: 15.6 % (ref 12.3–15.4)
GLUCOSE SERPL-MCNC: 108 MG/DL (ref 65–99)
GLUCOSE UR STRIP-MCNC: NEGATIVE MG/DL
HCT VFR BLD AUTO: 32.2 % (ref 34–46.6)
HGB BLD-MCNC: 10 G/DL (ref 12–15.9)
HGB UR QL STRIP.AUTO: NEGATIVE
HYALINE CASTS UR QL AUTO: ABNORMAL /LPF
IMM GRANULOCYTES # BLD AUTO: 0.19 10*3/MM3 (ref 0–0.05)
IMM GRANULOCYTES NFR BLD AUTO: 1.9 % (ref 0–0.5)
KETONES UR QL STRIP: NEGATIVE
LEUKOCYTE ESTERASE UR QL STRIP.AUTO: NEGATIVE
LYMPHOCYTES # BLD AUTO: 1.38 10*3/MM3 (ref 0.7–3.1)
LYMPHOCYTES NFR BLD AUTO: 13.9 % (ref 19.6–45.3)
MCH RBC QN AUTO: 25.5 PG (ref 26.6–33)
MCHC RBC AUTO-ENTMCNC: 31.1 G/DL (ref 31.5–35.7)
MCV RBC AUTO: 82.1 FL (ref 79–97)
MONOCYTES # BLD AUTO: 0.91 10*3/MM3 (ref 0.1–0.9)
MONOCYTES NFR BLD AUTO: 9.2 % (ref 5–12)
NEUTROPHILS NFR BLD AUTO: 7.23 10*3/MM3 (ref 1.7–7)
NEUTROPHILS NFR BLD AUTO: 72.8 % (ref 42.7–76)
NITRITE UR QL STRIP: POSITIVE
NRBC BLD AUTO-RTO: 0 /100 WBC (ref 0–0.2)
PH UR STRIP.AUTO: <=5 [PH] (ref 5–8)
PLATELET # BLD AUTO: 315 10*3/MM3 (ref 140–450)
PMV BLD AUTO: 10.2 FL (ref 6–12)
POTASSIUM SERPL-SCNC: 3.9 MMOL/L (ref 3.5–5.2)
PROT UR QL STRIP: ABNORMAL
RBC # BLD AUTO: 3.92 10*6/MM3 (ref 3.77–5.28)
RBC # UR STRIP: ABNORMAL /HPF
REF LAB TEST METHOD: ABNORMAL
SODIUM SERPL-SCNC: 141 MMOL/L (ref 136–145)
SP GR UR STRIP: >1.03 (ref 1–1.03)
SQUAMOUS #/AREA URNS HPF: ABNORMAL /HPF
UROBILINOGEN UR QL STRIP: ABNORMAL
WBC # UR STRIP: ABNORMAL /HPF
WBC NRBC COR # BLD AUTO: 9.93 10*3/MM3 (ref 3.4–10.8)

## 2024-12-19 PROCEDURE — 81001 URINALYSIS AUTO W/SCOPE: CPT | Performed by: EMERGENCY MEDICINE

## 2024-12-19 PROCEDURE — 80048 BASIC METABOLIC PNL TOTAL CA: CPT | Performed by: EMERGENCY MEDICINE

## 2024-12-19 PROCEDURE — 25510000001 IOPAMIDOL 61 % SOLUTION: Performed by: EMERGENCY MEDICINE

## 2024-12-19 PROCEDURE — 25010000002 ONDANSETRON PER 1 MG: Performed by: EMERGENCY MEDICINE

## 2024-12-19 PROCEDURE — 25810000003 SODIUM CHLORIDE 0.9 % SOLUTION: Performed by: EMERGENCY MEDICINE

## 2024-12-19 PROCEDURE — 25010000002 CEFTRIAXONE PER 250 MG: Performed by: EMERGENCY MEDICINE

## 2024-12-19 PROCEDURE — 85025 COMPLETE CBC W/AUTO DIFF WBC: CPT | Performed by: EMERGENCY MEDICINE

## 2024-12-19 PROCEDURE — 25010000002 MORPHINE PER 10 MG: Performed by: EMERGENCY MEDICINE

## 2024-12-19 PROCEDURE — 96375 TX/PRO/DX INJ NEW DRUG ADDON: CPT

## 2024-12-19 PROCEDURE — 74177 CT ABD & PELVIS W/CONTRAST: CPT

## 2024-12-19 PROCEDURE — 96365 THER/PROPH/DIAG IV INF INIT: CPT

## 2024-12-19 PROCEDURE — 99285 EMERGENCY DEPT VISIT HI MDM: CPT

## 2024-12-19 RX ORDER — CEFDINIR 300 MG/1
300 CAPSULE ORAL 2 TIMES DAILY
Qty: 10 CAPSULE | Refills: 0 | Status: SHIPPED | OUTPATIENT
Start: 2024-12-19 | End: 2024-12-24

## 2024-12-19 RX ORDER — SODIUM CHLORIDE 0.9 % (FLUSH) 0.9 %
10 SYRINGE (ML) INJECTION AS NEEDED
Status: DISCONTINUED | OUTPATIENT
Start: 2024-12-19 | End: 2024-12-19 | Stop reason: HOSPADM

## 2024-12-19 RX ORDER — IOPAMIDOL 612 MG/ML
100 INJECTION, SOLUTION INTRAVASCULAR
Status: COMPLETED | OUTPATIENT
Start: 2024-12-19 | End: 2024-12-19

## 2024-12-19 RX ORDER — HYDROCODONE BITARTRATE AND ACETAMINOPHEN 5; 325 MG/1; MG/1
1 TABLET ORAL EVERY 6 HOURS PRN
Qty: 10 TABLET | Refills: 0 | Status: SHIPPED | OUTPATIENT
Start: 2024-12-19

## 2024-12-19 RX ORDER — ONDANSETRON 2 MG/ML
4 INJECTION INTRAMUSCULAR; INTRAVENOUS ONCE
Status: COMPLETED | OUTPATIENT
Start: 2024-12-19 | End: 2024-12-19

## 2024-12-19 RX ORDER — METHOCARBAMOL 750 MG/1
750 TABLET, FILM COATED ORAL 3 TIMES DAILY PRN
Qty: 10 TABLET | Refills: 0 | Status: SHIPPED | OUTPATIENT
Start: 2024-12-19

## 2024-12-19 RX ADMIN — SODIUM CHLORIDE 1000 MG: 900 INJECTION INTRAVENOUS at 04:22

## 2024-12-19 RX ADMIN — IOPAMIDOL 100 ML: 612 INJECTION, SOLUTION INTRAVENOUS at 03:28

## 2024-12-19 RX ADMIN — ONDANSETRON 4 MG: 2 INJECTION INTRAMUSCULAR; INTRAVENOUS at 02:37

## 2024-12-19 RX ADMIN — SODIUM CHLORIDE 500 ML: 9 INJECTION, SOLUTION INTRAVENOUS at 02:37

## 2024-12-19 RX ADMIN — MORPHINE SULFATE 4 MG: 4 INJECTION, SOLUTION INTRAMUSCULAR; INTRAVENOUS at 02:37

## 2024-12-19 NOTE — ED PROVIDER NOTES
Subjective   History of Present Illness  Pt presents to the  with report of low back pain - onset on Saturday.  Comes and goes per spouse. No known injuries.  No fevers/vomiting.  No problems with urination.  Spouse provides all hx this visit        Review of Systems   Constitutional:  Negative for fever.   Respiratory:  Negative for cough.    Gastrointestinal:  Negative for diarrhea and vomiting.   Genitourinary:  Negative for difficulty urinating.   Musculoskeletal:  Positive for back pain.   All other systems reviewed and are negative.      Past Medical History:   Diagnosis Date    Cancer     Dementia        Allergies   Allergen Reactions    Amoxicillin Hallucinations    Clarithromycin Hallucinations    Nisoldipine Er Other (See Comments)     Feelings of passing out, tired    Sular [Nisoldipine] Unknown - Low Severity       Past Surgical History:   Procedure Laterality Date    COLONOSCOPY N/A 4/14/2023    Procedure: COLONOSCOPY WITH ANESTHESIA;  Surgeon: Bubba Asher MD;  Location: [facility] ENDOSCOPY;  Service: Gastroenterology;  Laterality: N/A;  pre gi bleed  post diverticulosis  Dr. Hebert    ENDOSCOPY N/A 4/14/2023    Procedure: ESOPHAGOGASTRODUODENOSCOPY WITH ANESTHESIA;  Surgeon: Bubba Asher MD;  Location: [facility] ENDOSCOPY;  Service: Gastroenterology;  Laterality: N/A;  pre screen  post gastric bx  Dr Hebert    HIP HEMIARTHROPLASTY Left 11/5/2023    Procedure: HIP HEMIARTHROPLASTY;  Surgeon: Denzel De La Vega MD;  Location: [facility] OR;  Service: Orthopedics;  Laterality: Left;       No family history on file.    Social History     Socioeconomic History    Marital status:    Tobacco Use    Smoking status: Never    Smokeless tobacco: Never   Vaping Use    Vaping status: Never Used   Substance and Sexual Activity    Alcohol use: Yes    Drug use: Never           Objective   Physical Exam  Vitals and nursing note reviewed.   HENT:      Nose: Nose normal.      Mouth/Throat:      Mouth: Mucous  membranes are moist.   Cardiovascular:      Rate and Rhythm: Normal rate and regular rhythm.      Pulses: Normal pulses.      Heart sounds: Normal heart sounds.   Pulmonary:      Effort: Pulmonary effort is normal.      Breath sounds: Normal breath sounds.   Abdominal:      General: Abdomen is flat. Bowel sounds are normal.      Palpations: Abdomen is soft.   Musculoskeletal:      Comments: No stepoff/def/mass noted to lumbar region.  Pt reports pain when she moves and pain to buttock regions   Skin:     General: Skin is warm and dry.      Capillary Refill: Capillary refill takes less than 2 seconds.   Neurological:      Mental Status: She is alert.      Comments: Pt moves bilat LE.  Limited eval d/t cooperation         Procedures           ED Course      CT Abdomen Pelvis With Contrast   Final Result   1. Multilevel stress fractures of the sacrum as detailed above. No   callus formation to suggest healing process. The age is indeterminate.   2. No acute abnormality of the abdominal and pelvic solid organs.   3. Significant large volume stool in the colon without evidence of   obstruction. Diverticulosis of the colon. No evidence for   diverticulitis.   4. Persistent asymmetrical thickening of the wall of the rectum   extending into the anorectal junction. This may partly be due to   incomplete distention. This may be clinically correlated.   5. Other nonacute findings similar to the previous study.   6. A 9 mm subpleural nodule in the left lower lobe which was not seen in   the previous study. A follow-up CT scan of the chest in 6 months is   recommended to assure stability or resolution.                                                   This report was signed and finalized on 12/19/2024 5:59 AM by Dr. Tito Amato MD.            Labs Reviewed   BASIC METABOLIC PANEL - Abnormal; Notable for the following components:       Result Value    Glucose 108 (*)     All other components within normal limits     Narrative:     GFR Categories in Chronic Kidney Disease (CKD)      GFR Category          GFR (mL/min/1.73)    Interpretation  G1                     90 or greater         Normal or high (1)  G2                      60-89                Mild decrease (1)  G3a                   45-59                Mild to moderate decrease  G3b                   30-44                Moderate to severe decrease  G4                    15-29                Severe decrease  G5                    14 or less           Kidney failure          (1)In the absence of evidence of kidney disease, neither GFR category G1 or G2 fulfill the criteria for CKD.    eGFR calculation 2021 CKD-EPI creatinine equation, which does not include race as a factor   URINALYSIS W/ MICROSCOPIC IF INDICATED (NO CULTURE) - Abnormal; Notable for the following components:    Color, UA Dark Yellow (*)     Appearance, UA Cloudy (*)     Specific Gravity, UA >1.030 (*)     Bilirubin, UA Small (1+) (*)     Protein, UA Trace (*)     Nitrite, UA Positive (*)     All other components within normal limits   CBC WITH AUTO DIFFERENTIAL - Abnormal; Notable for the following components:    Hemoglobin 10.0 (*)     Hematocrit 32.2 (*)     MCH 25.5 (*)     MCHC 31.1 (*)     RDW 15.6 (*)     Lymphocyte % 13.9 (*)     Immature Grans % 1.9 (*)     Neutrophils, Absolute 7.23 (*)     Monocytes, Absolute 0.91 (*)     Immature Grans, Absolute 0.19 (*)     All other components within normal limits   URINALYSIS, MICROSCOPIC ONLY - Abnormal; Notable for the following components:    Bacteria, UA 4+ (*)     All other components within normal limits   CBC AND DIFFERENTIAL    Narrative:     The following orders were created for panel order CBC & Differential.  Procedure                               Abnormality         Status                     ---------                               -----------         ------                     CBC Auto Differential[[id number]]        Abnormal            Final  result                 Please view results for these tests on the individual orders.                                                        Medical Decision Making  Pt stable in EC att.  NAD.  Pt with + UTI - given rocephin in EC and will d/c with omnicef.  She was given morphine for her pain and had some sedation with this.  She is noted to have some sacral compression fractures on CT which are likely etiology of her pain.  Will d/c with norco/robaxin to improve pain.      Amount and/or Complexity of Data Reviewed  Labs: ordered.  Radiology: ordered.    Risk  Prescription drug management.        Final diagnoses:   Acute UTI (urinary tract infection)   Closed fracture of sacrum, unspecified portion of sacrum, initial encounter       ED Disposition  ED Disposition       ED Disposition   Discharge    Condition   Stable    Comment   --               Benedicto Hebert MD  [address] [facility] [address]  [facility] [id number] [phone]               Medication List        New Prescriptions      cefdinir 300 MG capsule  Commonly known as: OMNICEF  Take 1 capsule by mouth 2 (Two) Times a Day for 5 days.     HYDROcodone-acetaminophen 5-325 MG per tablet  Commonly known as: NORCO  Take 1 tablet by mouth Every 6 (Six) Hours As Needed for Severe Pain.     methocarbamol 750 MG tablet  Commonly known as: ROBAXIN  Take 1 tablet by mouth 3 (Three) Times a Day As Needed for Muscle Spasms.               Where to Get Your Medications        These medications were sent to [facility]/pharmacy #[id number] - [place] - [address] - [phone]  - [phone] FX  [address] [facility] [address]      Phone: [phone]   cefdinir 300 MG capsule  HYDROcodone-acetaminophen 5-325 MG per tablet  methocarbamol 750 MG tablet            Artie Marquez, DO  12/19/24 0227       Artie Marquez, DO  12/19/24 0620       Artie Marquez, DO  12/19/24 0622

## 2025-01-27 ENCOUNTER — HOSPITAL ENCOUNTER (EMERGENCY)
Facility: HOSPITAL | Age: OVER 89
Discharge: HOME | End: 2025-01-28
Admitting: EMERGENCY MEDICINE
Payer: MEDICARE

## 2025-01-27 DIAGNOSIS — T14.8XXA CHRONIC WOUND: Primary | ICD-10-CM

## 2025-01-27 LAB
ALBUMIN SERPL-MCNC: 3.2 G/DL (ref 3.5–5.2)
ALBUMIN/GLOB SERPL: 0.9 G/DL
ALP SERPL-CCNC: 146 U/L (ref 39–117)
ALT SERPL W P-5'-P-CCNC: 14 U/L (ref 1–33)
ANION GAP SERPL CALCULATED.3IONS-SCNC: 13 MMOL/L (ref 5–15)
AST SERPL-CCNC: 23 U/L (ref 1–32)
BASOPHILS # BLD AUTO: 0.05 10*3/MM3 (ref 0–0.2)
BASOPHILS NFR BLD AUTO: 0.7 % (ref 0–1.5)
BILIRUB SERPL-MCNC: 0.3 MG/DL (ref 0–1.2)
BUN SERPL-MCNC: 23 MG/DL (ref 8–23)
BUN/CREAT SERPL: 24 (ref 7–25)
CALCIUM SPEC-SCNC: 8.8 MG/DL (ref 8.2–9.6)
CHLORIDE SERPL-SCNC: 102 MMOL/L (ref 98–107)
CO2 SERPL-SCNC: 23 MMOL/L (ref 22–29)
CREAT SERPL-MCNC: 0.96 MG/DL (ref 0.57–1)
DEPRECATED RDW RBC AUTO: 51.5 FL (ref 37–54)
EGFRCR SERPLBLD CKD-EPI 2021: 56.3 ML/MIN/1.73
EOSINOPHIL # BLD AUTO: 0.04 10*3/MM3 (ref 0–0.4)
EOSINOPHIL NFR BLD AUTO: 0.6 % (ref 0.3–6.2)
ERYTHROCYTE [DISTWIDTH] IN BLOOD BY AUTOMATED COUNT: 17.1 % (ref 12.3–15.4)
GLOBULIN UR ELPH-MCNC: 3.4 GM/DL
GLUCOSE SERPL-MCNC: 123 MG/DL (ref 65–99)
HCT VFR BLD AUTO: 27 % (ref 34–46.6)
HGB BLD-MCNC: 8.1 G/DL (ref 12–15.9)
HOLD SPECIMEN: NORMAL
IMM GRANULOCYTES # BLD AUTO: 0.04 10*3/MM3 (ref 0–0.05)
IMM GRANULOCYTES NFR BLD AUTO: 0.6 % (ref 0–0.5)
LYMPHOCYTES # BLD AUTO: 0.72 10*3/MM3 (ref 0.7–3.1)
LYMPHOCYTES NFR BLD AUTO: 10.6 % (ref 19.6–45.3)
MCH RBC QN AUTO: 24.9 PG (ref 26.6–33)
MCHC RBC AUTO-ENTMCNC: 30 G/DL (ref 31.5–35.7)
MCV RBC AUTO: 83.1 FL (ref 79–97)
MONOCYTES # BLD AUTO: 0.7 10*3/MM3 (ref 0.1–0.9)
MONOCYTES NFR BLD AUTO: 10.3 % (ref 5–12)
NEUTROPHILS NFR BLD AUTO: 5.23 10*3/MM3 (ref 1.7–7)
NEUTROPHILS NFR BLD AUTO: 77.2 % (ref 42.7–76)
NRBC BLD AUTO-RTO: 0 /100 WBC (ref 0–0.2)
PLATELET # BLD AUTO: 290 10*3/MM3 (ref 140–450)
PMV BLD AUTO: 9.6 FL (ref 6–12)
POTASSIUM SERPL-SCNC: 4.4 MMOL/L (ref 3.5–5.2)
PROCALCITONIN SERPL-MCNC: 0.21 NG/ML (ref 0–0.25)
PROT SERPL-MCNC: 6.6 G/DL (ref 6–8.5)
RBC # BLD AUTO: 3.25 10*6/MM3 (ref 3.77–5.28)
SODIUM SERPL-SCNC: 138 MMOL/L (ref 136–145)
WBC NRBC COR # BLD AUTO: 6.78 10*3/MM3 (ref 3.4–10.8)
WHOLE BLOOD HOLD COAG: NORMAL
WHOLE BLOOD HOLD SPECIMEN: NORMAL

## 2025-01-27 PROCEDURE — 84145 PROCALCITONIN (PCT): CPT

## 2025-01-27 PROCEDURE — 81003 URINALYSIS AUTO W/O SCOPE: CPT

## 2025-01-27 PROCEDURE — P9612 CATHETERIZE FOR URINE SPEC: HCPCS

## 2025-01-27 PROCEDURE — 85025 COMPLETE CBC W/AUTO DIFF WBC: CPT

## 2025-01-27 PROCEDURE — 99283 EMERGENCY DEPT VISIT LOW MDM: CPT

## 2025-01-27 PROCEDURE — 80053 COMPREHEN METABOLIC PANEL: CPT

## 2025-01-27 RX ORDER — SODIUM CHLORIDE 0.9 % (FLUSH) 0.9 %
10 SYRINGE (ML) INJECTION AS NEEDED
Status: DISCONTINUED | OUTPATIENT
Start: 2025-01-27 | End: 2025-01-28 | Stop reason: HOSPADM

## 2025-01-28 VITALS
SYSTOLIC BLOOD PRESSURE: 111 MMHG | OXYGEN SATURATION: 100 % | DIASTOLIC BLOOD PRESSURE: 88 MMHG | HEIGHT: 63 IN | WEIGHT: 141 LBS | TEMPERATURE: 98 F | BODY MASS INDEX: 24.98 KG/M2 | HEART RATE: 96 BPM | RESPIRATION RATE: 18 BRPM

## 2025-01-28 LAB
BILIRUB UR QL STRIP: NEGATIVE
CLARITY UR: CLEAR
COLOR UR: ABNORMAL
GLUCOSE UR STRIP-MCNC: NEGATIVE MG/DL
HGB UR QL STRIP.AUTO: NEGATIVE
KETONES UR QL STRIP: ABNORMAL
LEUKOCYTE ESTERASE UR QL STRIP.AUTO: NEGATIVE
NITRITE UR QL STRIP: NEGATIVE
PH UR STRIP.AUTO: <=5 [PH] (ref 5–8)
PROT UR QL STRIP: NEGATIVE
SP GR UR STRIP: 1.02 (ref 1–1.03)
UROBILINOGEN UR QL STRIP: ABNORMAL

## 2025-01-28 RX ORDER — CEPHALEXIN 500 MG/1
500 CAPSULE ORAL 2 TIMES DAILY
Qty: 10 CAPSULE | Refills: 0 | Status: ON HOLD | OUTPATIENT
Start: 2025-01-28 | End: 2025-02-02

## 2025-01-28 RX ORDER — HYDROCODONE BITARTRATE AND ACETAMINOPHEN 5; 325 MG/1; MG/1
1 TABLET ORAL ONCE
Status: COMPLETED | OUTPATIENT
Start: 2025-01-28 | End: 2025-01-28

## 2025-01-28 RX ADMIN — HYDROCODONE BITARTRATE AND ACETAMINOPHEN 1 TABLET: 5; 325 TABLET ORAL at 00:42

## 2025-01-28 NOTE — ED NOTES
The following fall interventions were initiated:    [x] Patient and/or family given education   [x] Call light within reach and educated on how to use   [] Bed rails up per protocol    [x] Bed locked and in the lowest position   [] Bed alarm set and on loudest setting   [] Fall wrist band applied   [] Non skid footwear applied   [x] Room free of clutter   [x] Patient items within reach   [x] Adequate lighting provided  [] Falls sign present    [] Patient moved closer to nursing station   [] Restraints applied

## 2025-01-28 NOTE — ED PROVIDER NOTES
Subjective   History of Present Illness  Patient is a 89-year-old female who presents to the ER for lower extremity wounds.  Patient with history of dementia.   at the bedside providing history.  Reports that patient has had bilateral lower extremity wounds for the past 3 weeks.  Patient has history of bilateral lower extremity wounds in the past and has followed with wound care.  Reports that the wounds look similar to previous wounds.   reports that she picked the scabs causing it to bleed tonight which prompted him to come to the ER for further evaluation.   reports he has been doing wound care at home and using therahoney.  He also reports that patient has been hallucinating at times.  Reports history of UTIs.  States that she had one 1 month ago and is unsure if she has another.   does report patient hasn't been sleeping at home and is supposed to discuss this with PCP soon.    History provided by:  Spouse      Review of Systems   Unable to perform ROS: Dementia       Past Medical History:   Diagnosis Date    Cancer     Dementia        Allergies   Allergen Reactions    Amoxicillin Hallucinations    Clarithromycin Hallucinations    Nisoldipine Er Other (See Comments)     Feelings of passing out, tired    Sular [Nisoldipine] Unknown - Low Severity       Past Surgical History:   Procedure Laterality Date    COLONOSCOPY N/A 4/14/2023    Procedure: COLONOSCOPY WITH ANESTHESIA;  Surgeon: Bubba Asher MD;  Location: [facility] ENDOSCOPY;  Service: Gastroenterology;  Laterality: N/A;  pre gi bleed  post diverticulosis  Dr. Hebert    ENDOSCOPY N/A 4/14/2023    Procedure: ESOPHAGOGASTRODUODENOSCOPY WITH ANESTHESIA;  Surgeon: Bubba Asher MD;  Location: [facility] ENDOSCOPY;  Service: Gastroenterology;  Laterality: N/A;  pre screen  post gastric bx  Dr Hebert    HIP HEMIARTHROPLASTY Left 11/5/2023    Procedure: HIP HEMIARTHROPLASTY;  Surgeon: Denzel De La Vega MD;  Location: [facility] OR;   Service: Orthopedics;  Laterality: Left;       No family history on file.    Social History     Socioeconomic History    Marital status:    Tobacco Use    Smoking status: Never    Smokeless tobacco: Never   Vaping Use    Vaping status: Never Used   Substance and Sexual Activity    Alcohol use: Yes    Drug use: Never           Objective   Physical Exam  Vitals and nursing note reviewed.   Constitutional:       General: She is not in acute distress.     Appearance: Normal appearance. She is normal weight. She is not toxic-appearing.      Comments: Hx dementia   HENT:      Head: Normocephalic.   Cardiovascular:      Rate and Rhythm: Normal rate.      Pulses:           Dorsalis pedis pulses are detected w/ Doppler on the right side and detected w/ Doppler on the left side.   Pulmonary:      Effort: Pulmonary effort is normal.   Abdominal:      General: Abdomen is flat.   Musculoskeletal:         General: Normal range of motion.      Cervical back: Normal range of motion and neck supple.   Skin:     General: Skin is warm and dry.      Findings: Wound present.      Comments: Chronic appearing wounds present to BLE   Neurological:      General: No focal deficit present.      Mental Status: She is alert. Mental status is at baseline.      Comments: Hx dementia          Procedures       Labs Reviewed   COMPREHENSIVE METABOLIC PANEL - Abnormal; Notable for the following components:       Result Value    Glucose 123 (*)     Albumin 3.2 (*)     Alkaline Phosphatase 146 (*)     eGFR 56.3 (*)     All other components within normal limits    Narrative:     GFR Categories in Chronic Kidney Disease (CKD)      GFR Category          GFR (mL/min/1.73)    Interpretation  G1                     90 or greater         Normal or high (1)  G2                      60-89                Mild decrease (1)  G3a                   45-59                Mild to moderate decrease  G3b                   30-44                Moderate to severe  "decrease  G4                    15-29                Severe decrease  G5                    14 or less           Kidney failure          (1)In the absence of evidence of kidney disease, neither GFR category G1 or G2 fulfill the criteria for CKD.    eGFR calculation 2021 CKD-EPI creatinine equation, which does not include race as a factor   URINALYSIS W/ CULTURE IF INDICATED - Abnormal; Notable for the following components:    Color, UA Dark Yellow (*)     Ketones, UA 15 mg/dL (1+) (*)     All other components within normal limits    Narrative:     In absence of clinical symptoms, the presence of pyuria, bacteria, and/or nitrites on the urinalysis result does not correlate with infection.  Urine microscopic not indicated.   CBC WITH AUTO DIFFERENTIAL - Abnormal; Notable for the following components:    RBC 3.25 (*)     Hemoglobin 8.1 (*)     Hematocrit 27.0 (*)     MCH 24.9 (*)     MCHC 30.0 (*)     RDW 17.1 (*)     Neutrophil % 77.2 (*)     Lymphocyte % 10.6 (*)     Immature Grans % 0.6 (*)     All other components within normal limits   PROCALCITONIN - Normal    Narrative:     As a Marker for Sepsis (Non-Neonates):    1. <0.5 ng/mL represents a low risk of severe sepsis and/or septic shock.  2. >2 ng/mL represents a high risk of severe sepsis and/or septic shock.    As a Marker for Lower Respiratory Tract Infections that require antibiotic therapy:    PCT on Admission    Antibiotic Therapy       6-12 Hrs later    >0.5                Strongly Recommended  >0.25 - <0.5        Recommended   0.1 - 0.25          Discouraged              Remeasure/reassess PCT  <0.1                Strongly Discouraged     Remeasure/reassess PCT    As 28 day mortality risk marker: \"Change in Procalcitonin Result\" (>80% or <=80%) if Day 0 (or Day 1) and Day 4 values are available. Refer to http://www."[employer]s-pct-calculator.com    Change in PCT <=80%  A decrease of PCT levels below or equal to 80% defines a positive change in PCT test result " representing a higher risk for 28-day all-cause mortality of patients diagnosed with severe sepsis for septic shock.    Change in PCT >80%  A decrease of PCT levels of more than 80% defines a negative change in PCT result representing a lower risk for 28-day all-cause mortality of patients diagnosed with severe sepsis or septic shock.      RAINBOW DRAW    Narrative:     The following orders were created for panel order [place] Draw.  Procedure                               Abnormality         Status                     ---------                               -----------         ------                     Green Top (Gel)[[id number]]                                  Final result               Lavender Top[[id number]]                                     Final result               Red Top[[id number]]                                          Final result               Gray Top[[id number]]                                         Final result               Light Blue Top[[id number]]                                   Final result                 Please view results for these tests on the individual orders.   GREEN TOP   LAVENDER TOP   RED TOP   GRAY TOP   LIGHT BLUE TOP   CBC AND DIFFERENTIAL    Narrative:     The following orders were created for panel order CBC & Differential.  Procedure                               Abnormality         Status                     ---------                               -----------         ------                     CBC Auto Differential[[id number]]        Abnormal            Final result                 Please view results for these tests on the individual orders.           ED Course  ED Course as of 01/28/25 1427   Mon Jan 27, 2025   2256 Dorsalis pedis pulses are able to be dopplered bilaterally. [KR]      ED Course User Index  [KR] Radha Anaya APRN                                                       Medical Decision Making  Patient is a 89-year-old female who presents to the ER for lower  extremity wounds.  Patient with history of dementia.   at the bedside providing history.  Reports that patient has had bilateral lower extremity wounds for the past 3 weeks.  Patient has history of bilateral lower extremity wounds in the past and has followed with wound care.  Reports that the wounds look similar to previous wounds.   reports that she picked the scabs causing it to bleed tonight which prompted him to come to the ER for further evaluation.   reports he has been doing wound care at home and using therahoney.  He also reports that patient has been hallucinating at times.  Reports history of UTIs.  States that she had one 1 month ago and is unsure if she has another.   does report patient hasn't been sleeping at home and is supposed to discuss this with PCP soon.    Vital signs stable on arrival.    Patient's presentation raises suspicion for differentials including, but not limited to, chronic wounds, cellulitis, wound infection.     External (non-ED) record review: None    Given this, laboratory studies and imaging studies were ordered including CBC, CMP, procalcitonin, urinalysis.     Patient was given home dose Norco for symptomatic relief.    Labs were reviewed and interpreted. Please refer to above section for results.  CBC with no leukocytosis.  Normal procalcitonin.    On re-evaluation, patient remained hemodynamically stable.  Wounds appeared chronic.   reports that they have been there for the past 3 weeks.  Per , patient has had wounds that were similar in the past and has followed with wound care.  Wounds were cleaned in the ER.  Dressings were applied.  Dorsalis pedis pulses were able to be dopplered bilaterally on exam.  Have informed  to follow-up with wound care and PCP on an outpatient basis.  Oral antibiotics have been prescribed prophylactically.    I discussed all of the lab and imaging results with the patient's  during this  visit in the emergency department. I answered all the questions regarding the emergency department evaluation, diagnosis, and treatment plan. We talked about how crucial it is for the patient to follow up by calling their primary care provider and wound care as soon as possible to schedule an appointment for within the next few days or as soon as possible so that the symptoms can be reassessed to see if they have improved or to answer any additional questions. I also provided the patient's  with advice on returning safely and urged them to visit the emergency department right away if any worsening or new symptoms appeared. The patient's  verbalized understanding of the discharge instructions and agreed with them. Dina was discharged in stable condition.    Signed by:   EDGARDO Newton 1/28/2025 14:24 CST     Dragon disclaimer:  Part of this note may be an electronic transcription/translation of spoken language to printed text using the Dragon Dictation System.    Problems Addressed:  Chronic wound: acute illness or injury    Amount and/or Complexity of Data Reviewed  Labs: ordered.    Risk  Prescription drug management.        Final diagnoses:   Chronic wound       ED Disposition  ED Disposition       ED Disposition   Discharge    Condition   Stable    Comment   --               Benedicto Hebert MD  [address] [id number] [phone]    Schedule an appointment as soon as possible for a visit in 1 day      [facility] EMERGENCY DEPARTMENT  [address] [facility] [id number] [phone]  Go to   If symptoms worsen         Medication List        New Prescriptions      cephalexin 500 MG capsule  Commonly known as: KEFLEX  Take 1 capsule by mouth 2 (Two) Times a Day for 5 days.               Where to Get Your Medications        These medications were sent to [facility]/pharmacy #[id number] - [place], [place] - [address] - [phone]  - [phone] FX   [address] [facility] [address]      Phone: [phone]   cephalexin 500 MG capsule            Radha Anaya, APRN  01/28/25 0852

## 2025-02-03 ENCOUNTER — HOSPITAL ENCOUNTER (INPATIENT)
Facility: HOSPITAL | Age: OVER 89
LOS: 2 days | Discharge: HOSPICE | End: 2025-02-05
Attending: FAMILY MEDICINE | Admitting: INTERNAL MEDICINE
Payer: MEDICARE

## 2025-02-03 ENCOUNTER — APPOINTMENT (OUTPATIENT)
Dept: GENERAL RADIOLOGY | Facility: HOSPITAL | Age: OVER 89
End: 2025-02-03
Payer: MEDICARE

## 2025-02-03 ENCOUNTER — APPOINTMENT (OUTPATIENT)
Dept: CT IMAGING | Facility: HOSPITAL | Age: OVER 89
End: 2025-02-03
Payer: MEDICARE

## 2025-02-03 DIAGNOSIS — I95.9 HYPOTENSION, UNSPECIFIED HYPOTENSION TYPE: ICD-10-CM

## 2025-02-03 DIAGNOSIS — T68.XXXA HYPOTHERMIA, INITIAL ENCOUNTER: Primary | ICD-10-CM

## 2025-02-03 DIAGNOSIS — F03.90 DEMENTIA, UNSPECIFIED DEMENTIA SEVERITY, UNSPECIFIED DEMENTIA TYPE, UNSPECIFIED WHETHER BEHAVIORAL, PSYCHOTIC, OR MOOD DISTURBANCE OR ANXIETY: ICD-10-CM

## 2025-02-03 DIAGNOSIS — D64.9 ANEMIA, UNSPECIFIED TYPE: ICD-10-CM

## 2025-02-03 LAB
ABO GROUP BLD: NORMAL
ALBUMIN SERPL-MCNC: 3.1 G/DL (ref 3.5–5.2)
ALBUMIN/GLOB SERPL: 1 G/DL
ALP SERPL-CCNC: 126 U/L (ref 39–117)
ALT SERPL W P-5'-P-CCNC: 14 U/L (ref 1–33)
ANION GAP SERPL CALCULATED.3IONS-SCNC: 10 MMOL/L (ref 5–15)
AST SERPL-CCNC: 18 U/L (ref 1–32)
BASOPHILS # BLD AUTO: 0.01 10*3/MM3 (ref 0–0.2)
BASOPHILS NFR BLD AUTO: 0.2 % (ref 0–1.5)
BILIRUB SERPL-MCNC: 0.2 MG/DL (ref 0–1.2)
BILIRUB UR QL STRIP: ABNORMAL
BLD GP AB SCN SERPL QL: NEGATIVE
BUN SERPL-MCNC: 20 MG/DL (ref 8–23)
BUN/CREAT SERPL: 23.3 (ref 7–25)
CALCIUM SPEC-SCNC: 9.4 MG/DL (ref 8.2–9.6)
CHLORIDE SERPL-SCNC: 109 MMOL/L (ref 98–107)
CLARITY UR: ABNORMAL
CO2 SERPL-SCNC: 25 MMOL/L (ref 22–29)
COLOR UR: ABNORMAL
CREAT SERPL-MCNC: 0.86 MG/DL (ref 0.57–1)
DEPRECATED RDW RBC AUTO: 55 FL (ref 37–54)
EGFRCR SERPLBLD CKD-EPI 2021: 64.3 ML/MIN/1.73
EOSINOPHIL # BLD AUTO: 0.01 10*3/MM3 (ref 0–0.4)
EOSINOPHIL NFR BLD AUTO: 0.2 % (ref 0.3–6.2)
ERYTHROCYTE [DISTWIDTH] IN BLOOD BY AUTOMATED COUNT: 18.4 % (ref 12.3–15.4)
GEN 5 1HR TROPONIN T REFLEX: 28 NG/L
GLOBULIN UR ELPH-MCNC: 3 GM/DL
GLUCOSE SERPL-MCNC: 99 MG/DL (ref 65–99)
GLUCOSE UR STRIP-MCNC: NEGATIVE MG/DL
HCT VFR BLD AUTO: 21.8 % (ref 34–46.6)
HGB BLD-MCNC: 6.4 G/DL (ref 12–15.9)
HGB UR QL STRIP.AUTO: NEGATIVE
IMM GRANULOCYTES # BLD AUTO: 0.01 10*3/MM3 (ref 0–0.05)
IMM GRANULOCYTES NFR BLD AUTO: 0.2 % (ref 0–0.5)
KETONES UR QL STRIP: ABNORMAL
LEUKOCYTE ESTERASE UR QL STRIP.AUTO: NEGATIVE
LYMPHOCYTES # BLD AUTO: 0.3 10*3/MM3 (ref 0.7–3.1)
LYMPHOCYTES NFR BLD AUTO: 5 % (ref 19.6–45.3)
MAGNESIUM SERPL-MCNC: 2.5 MG/DL (ref 1.6–2.4)
MCH RBC QN AUTO: 24.8 PG (ref 26.6–33)
MCHC RBC AUTO-ENTMCNC: 29.4 G/DL (ref 31.5–35.7)
MCV RBC AUTO: 84.5 FL (ref 79–97)
MONOCYTES # BLD AUTO: 0.38 10*3/MM3 (ref 0.1–0.9)
MONOCYTES NFR BLD AUTO: 6.3 % (ref 5–12)
NEUTROPHILS NFR BLD AUTO: 5.35 10*3/MM3 (ref 1.7–7)
NEUTROPHILS NFR BLD AUTO: 88.1 % (ref 42.7–76)
NITRITE UR QL STRIP: NEGATIVE
NRBC BLD AUTO-RTO: 0 /100 WBC (ref 0–0.2)
PH UR STRIP.AUTO: <=5 [PH] (ref 5–8)
PLATELET # BLD AUTO: 211 10*3/MM3 (ref 140–450)
PMV BLD AUTO: 10.4 FL (ref 6–12)
POTASSIUM SERPL-SCNC: 3.9 MMOL/L (ref 3.5–5.2)
PROT SERPL-MCNC: 6.1 G/DL (ref 6–8.5)
PROT UR QL STRIP: ABNORMAL
RBC # BLD AUTO: 2.58 10*6/MM3 (ref 3.77–5.28)
RH BLD: POSITIVE
SODIUM SERPL-SCNC: 144 MMOL/L (ref 136–145)
SP GR UR STRIP: 1.02 (ref 1–1.03)
T&S EXPIRATION DATE: NORMAL
TROPONIN T % DELTA: -3
TROPONIN T NUMERIC DELTA: -1 NG/L
TROPONIN T SERPL HS-MCNC: 29 NG/L
UROBILINOGEN UR QL STRIP: ABNORMAL
WBC NRBC COR # BLD AUTO: 6.06 10*3/MM3 (ref 3.4–10.8)

## 2025-02-03 PROCEDURE — 86923 COMPATIBILITY TEST ELECTRIC: CPT

## 2025-02-03 PROCEDURE — 81003 URINALYSIS AUTO W/O SCOPE: CPT | Performed by: FAMILY MEDICINE

## 2025-02-03 PROCEDURE — 86901 BLOOD TYPING SEROLOGIC RH(D): CPT | Performed by: FAMILY MEDICINE

## 2025-02-03 PROCEDURE — 36430 TRANSFUSION BLD/BLD COMPNT: CPT

## 2025-02-03 PROCEDURE — P9612 CATHETERIZE FOR URINE SPEC: HCPCS

## 2025-02-03 PROCEDURE — 86900 BLOOD TYPING SEROLOGIC ABO: CPT | Performed by: FAMILY MEDICINE

## 2025-02-03 PROCEDURE — 71045 X-RAY EXAM CHEST 1 VIEW: CPT

## 2025-02-03 PROCEDURE — 80053 COMPREHEN METABOLIC PANEL: CPT | Performed by: FAMILY MEDICINE

## 2025-02-03 PROCEDURE — 70450 CT HEAD/BRAIN W/O DYE: CPT

## 2025-02-03 PROCEDURE — 84484 ASSAY OF TROPONIN QUANT: CPT | Performed by: FAMILY MEDICINE

## 2025-02-03 PROCEDURE — 85025 COMPLETE CBC W/AUTO DIFF WBC: CPT | Performed by: FAMILY MEDICINE

## 2025-02-03 PROCEDURE — 25810000003 SODIUM CHLORIDE 0.9 % SOLUTION: Performed by: FAMILY MEDICINE

## 2025-02-03 PROCEDURE — 83735 ASSAY OF MAGNESIUM: CPT | Performed by: FAMILY MEDICINE

## 2025-02-03 PROCEDURE — 86900 BLOOD TYPING SEROLOGIC ABO: CPT

## 2025-02-03 PROCEDURE — 36415 COLL VENOUS BLD VENIPUNCTURE: CPT

## 2025-02-03 PROCEDURE — 25010000002 LORAZEPAM PER 2 MG

## 2025-02-03 PROCEDURE — 99285 EMERGENCY DEPT VISIT HI MDM: CPT

## 2025-02-03 PROCEDURE — P9016 RBC LEUKOCYTES REDUCED: HCPCS

## 2025-02-03 PROCEDURE — 86850 RBC ANTIBODY SCREEN: CPT | Performed by: FAMILY MEDICINE

## 2025-02-03 RX ORDER — MORPHINE SULFATE 2 MG/ML
1 INJECTION, SOLUTION INTRAMUSCULAR; INTRAVENOUS
Status: DISCONTINUED | OUTPATIENT
Start: 2025-02-03 | End: 2025-02-05

## 2025-02-03 RX ORDER — LORAZEPAM 2 MG/ML
1 CONCENTRATE ORAL
Status: DISCONTINUED | OUTPATIENT
Start: 2025-02-03 | End: 2025-02-05

## 2025-02-03 RX ORDER — LORAZEPAM 2 MG/ML
INJECTION INTRAMUSCULAR
Status: DISPENSED
Start: 2025-02-03 | End: 2025-02-04

## 2025-02-03 RX ORDER — LORAZEPAM 2 MG/ML
1 INJECTION INTRAMUSCULAR
Status: DISCONTINUED | OUTPATIENT
Start: 2025-02-03 | End: 2025-02-04

## 2025-02-03 RX ORDER — ACETAMINOPHEN 325 MG/1
650 TABLET ORAL EVERY 4 HOURS PRN
Status: DISCONTINUED | OUTPATIENT
Start: 2025-02-03 | End: 2025-02-05 | Stop reason: HOSPADM

## 2025-02-03 RX ORDER — SCOPOLAMINE 1 MG/3D
1 PATCH, EXTENDED RELEASE TRANSDERMAL
Status: DISCONTINUED | OUTPATIENT
Start: 2025-02-03 | End: 2025-02-05 | Stop reason: HOSPADM

## 2025-02-03 RX ORDER — DIPHENOXYLATE HYDROCHLORIDE AND ATROPINE SULFATE 2.5; .025 MG/1; MG/1
1 TABLET ORAL
Status: DISCONTINUED | OUTPATIENT
Start: 2025-02-03 | End: 2025-02-05 | Stop reason: HOSPADM

## 2025-02-03 RX ORDER — ONDANSETRON 4 MG/1
4 TABLET, ORALLY DISINTEGRATING ORAL EVERY 6 HOURS PRN
Status: DISCONTINUED | OUTPATIENT
Start: 2025-02-03 | End: 2025-02-05 | Stop reason: HOSPADM

## 2025-02-03 RX ORDER — ONDANSETRON 2 MG/ML
4 INJECTION INTRAMUSCULAR; INTRAVENOUS EVERY 6 HOURS PRN
Status: DISCONTINUED | OUTPATIENT
Start: 2025-02-03 | End: 2025-02-05 | Stop reason: HOSPADM

## 2025-02-03 RX ORDER — LORAZEPAM 2 MG/ML
1 CONCENTRATE ORAL
Status: DISCONTINUED | OUTPATIENT
Start: 2025-02-03 | End: 2025-02-05 | Stop reason: HOSPADM

## 2025-02-03 RX ORDER — LORAZEPAM 2 MG/ML
2 INJECTION INTRAMUSCULAR
Status: DISCONTINUED | OUTPATIENT
Start: 2025-02-03 | End: 2025-02-05 | Stop reason: HOSPADM

## 2025-02-03 RX ORDER — LORAZEPAM 2 MG/ML
2 INJECTION INTRAMUSCULAR
Status: DISCONTINUED | OUTPATIENT
Start: 2025-02-03 | End: 2025-02-04

## 2025-02-03 RX ORDER — LORAZEPAM 2 MG/ML
0.5 INJECTION INTRAMUSCULAR
Status: DISCONTINUED | OUTPATIENT
Start: 2025-02-03 | End: 2025-02-05 | Stop reason: HOSPADM

## 2025-02-03 RX ORDER — LORAZEPAM 2 MG/ML
0.5 CONCENTRATE ORAL
Status: DISCONTINUED | OUTPATIENT
Start: 2025-02-03 | End: 2025-02-05

## 2025-02-03 RX ORDER — LORAZEPAM 2 MG/ML
0.5 CONCENTRATE ORAL
Status: DISCONTINUED | OUTPATIENT
Start: 2025-02-03 | End: 2025-02-05 | Stop reason: HOSPADM

## 2025-02-03 RX ORDER — LORAZEPAM 2 MG/ML
0.5 INJECTION INTRAMUSCULAR
Status: DISCONTINUED | OUTPATIENT
Start: 2025-02-03 | End: 2025-02-04

## 2025-02-03 RX ORDER — LORAZEPAM 2 MG/ML
1 INJECTION INTRAMUSCULAR
Status: DISCONTINUED | OUTPATIENT
Start: 2025-02-03 | End: 2025-02-05 | Stop reason: HOSPADM

## 2025-02-03 RX ORDER — ACETAMINOPHEN 160 MG/5ML
650 SOLUTION ORAL EVERY 4 HOURS PRN
Status: DISCONTINUED | OUTPATIENT
Start: 2025-02-03 | End: 2025-02-05 | Stop reason: HOSPADM

## 2025-02-03 RX ORDER — LORAZEPAM 1 MG/1
1 TABLET ORAL
Status: DISCONTINUED | OUTPATIENT
Start: 2025-02-03 | End: 2025-02-04

## 2025-02-03 RX ORDER — LORAZEPAM 0.5 MG/1
0.5 TABLET ORAL
Status: DISCONTINUED | OUTPATIENT
Start: 2025-02-03 | End: 2025-02-04

## 2025-02-03 RX ORDER — ACETAMINOPHEN 650 MG/1
650 SUPPOSITORY RECTAL EVERY 4 HOURS PRN
Status: DISCONTINUED | OUTPATIENT
Start: 2025-02-03 | End: 2025-02-05 | Stop reason: HOSPADM

## 2025-02-03 RX ORDER — LORAZEPAM 1 MG/1
2 TABLET ORAL
Status: DISCONTINUED | OUTPATIENT
Start: 2025-02-03 | End: 2025-02-04

## 2025-02-03 RX ORDER — SODIUM CHLORIDE 0.9 % (FLUSH) 0.9 %
10 SYRINGE (ML) INJECTION AS NEEDED
Status: DISCONTINUED | OUTPATIENT
Start: 2025-02-03 | End: 2025-02-05 | Stop reason: HOSPADM

## 2025-02-03 RX ORDER — NOREPINEPHRINE BITARTRATE 0.03 MG/ML
.02-.3 INJECTION, SOLUTION INTRAVENOUS
Status: DISCONTINUED | OUTPATIENT
Start: 2025-02-03 | End: 2025-02-04

## 2025-02-03 RX ORDER — LORAZEPAM 2 MG/ML
2 CONCENTRATE ORAL
Status: DISCONTINUED | OUTPATIENT
Start: 2025-02-03 | End: 2025-02-05 | Stop reason: HOSPADM

## 2025-02-03 RX ORDER — LORAZEPAM 2 MG/ML
2 CONCENTRATE ORAL
Status: DISCONTINUED | OUTPATIENT
Start: 2025-02-03 | End: 2025-02-05

## 2025-02-03 RX ADMIN — LORAZEPAM 1 MG: 2 INJECTION INTRAMUSCULAR; INTRAVENOUS at 23:33

## 2025-02-03 RX ADMIN — SODIUM CHLORIDE 1000 ML: 9 INJECTION, SOLUTION INTRAVENOUS at 17:27

## 2025-02-03 RX ADMIN — SODIUM CHLORIDE 1000 ML: 9 INJECTION, SOLUTION INTRAVENOUS at 18:18

## 2025-02-03 RX ADMIN — SODIUM CHLORIDE 1000 ML: 9 INJECTION, SOLUTION INTRAVENOUS at 16:02

## 2025-02-03 RX ADMIN — SODIUM CHLORIDE 1000 ML: 9 INJECTION, SOLUTION INTRAVENOUS at 13:11

## 2025-02-03 RX ADMIN — NOREPINEPHRINE BITARTRATE 0.02 MCG/KG/MIN: 0.03 INJECTION, SOLUTION INTRAVENOUS at 18:19

## 2025-02-03 NOTE — ED PROVIDER NOTES
Subjective   History of Present Illness  Patient was brought in from the home.  She lives at home with son.  Son states that she has dementia.  Started on Thursday last week.  She has not been eating or drinking.  She has not been speaking.  She is only been laying in the fetal position on the left side since the other day.  Patient does have a history of dementia.  Unable to get any history from the patient.          Review of Systems   Unable to perform ROS: Mental status change       Past Medical History:   Diagnosis Date    Cancer     Dementia        Allergies   Allergen Reactions    Amoxicillin Hallucinations    Clarithromycin Hallucinations    Nisoldipine Er Other (See Comments)     Feelings of passing out, tired    Sular [Nisoldipine] Unknown - Low Severity       Past Surgical History:   Procedure Laterality Date    COLONOSCOPY N/A 4/14/2023    Procedure: COLONOSCOPY WITH ANESTHESIA;  Surgeon: Bubba Asher MD;  Location: [facility] ENDOSCOPY;  Service: Gastroenterology;  Laterality: N/A;  pre gi bleed  post diverticulosis  Dr. Hebert    ENDOSCOPY N/A 4/14/2023    Procedure: ESOPHAGOGASTRODUODENOSCOPY WITH ANESTHESIA;  Surgeon: Bubba Asher MD;  Location: [facility] ENDOSCOPY;  Service: Gastroenterology;  Laterality: N/A;  pre screen  post gastric bx  Dr Hebert    HIP HEMIARTHROPLASTY Left 11/5/2023    Procedure: HIP HEMIARTHROPLASTY;  Surgeon: Denzel De La Vega MD;  Location: [facility] OR;  Service: Orthopedics;  Laterality: Left;       History reviewed. No pertinent family history.    Social History     Socioeconomic History    Marital status:    Tobacco Use    Smoking status: Never    Smokeless tobacco: Never   Vaping Use    Vaping status: Never Used   Substance and Sexual Activity    Alcohol use: Yes    Drug use: Never           Objective   Physical Exam  Vitals and nursing note reviewed.   Constitutional:       Appearance: Normal appearance.   HENT:      Head: Normocephalic and atraumatic.       Mouth/Throat:      Mouth: Mucous membranes are moist.   Eyes:      Extraocular Movements: Extraocular movements intact.      Pupils: Pupils are equal, round, and reactive to light.   Cardiovascular:      Rate and Rhythm: Normal rate and regular rhythm.      Heart sounds: Normal heart sounds.   Pulmonary:      Effort: Pulmonary effort is normal.      Breath sounds: Normal breath sounds.   Abdominal:      General: Bowel sounds are normal.      Palpations: Abdomen is soft.      Tenderness: There is no abdominal tenderness.   Skin:     General: Skin is warm and dry.   Neurological:      Mental Status: She is alert. She is disoriented.   Psychiatric:         Mood and Affect: Mood normal.         Behavior: Behavior normal.         Procedures           ED Course                                                   \\    Lab Results (last 24 hours)       Procedure Component Value Units Date/Time    CBC & Differential [[id number]]  (Abnormal) Collected: 02/03/25 1310    Specimen: Blood Updated: 02/03/25 1330    Narrative:      The following orders were created for panel order CBC & Differential.  Procedure                               Abnormality         Status                     ---------                               -----------         ------                     CBC Auto Differential[[id number]]        Abnormal            Final result                 Please view results for these tests on the individual orders.    Comprehensive Metabolic Panel [[id number]]  (Abnormal) Collected: 02/03/25 1310    Specimen: Blood Updated: 02/03/25 1347     Glucose 99 mg/dL      BUN 20 mg/dL      Creatinine 0.86 mg/dL      Sodium 144 mmol/L      Potassium 3.9 mmol/L      Chloride 109 mmol/L      CO2 25.0 mmol/L      Calcium 9.4 mg/dL      Total Protein 6.1 g/dL      Albumin 3.1 g/dL      ALT (SGPT) 14 U/L      AST (SGOT) 18 U/L      Alkaline Phosphatase 126 U/L      Total Bilirubin 0.2 mg/dL      Globulin 3.0 gm/dL      A/G Ratio 1.0 g/dL       BUN/Creatinine Ratio 23.3     Anion Gap 10.0 mmol/L      eGFR 64.3 mL/min/1.73     Narrative:      GFR Categories in Chronic Kidney Disease (CKD)      GFR Category          GFR (mL/min/1.73)    Interpretation  G1                     90 or greater         Normal or high (1)  G2                      60-89                Mild decrease (1)  G3a                   45-59                Mild to moderate decrease  G3b                   30-44                Moderate to severe decrease  G4                    15-29                Severe decrease  G5                    14 or less           Kidney failure          (1)In the absence of evidence of kidney disease, neither GFR category G1 or G2 fulfill the criteria for CKD.    eGFR calculation 2021 CKD-EPI creatinine equation, which does not include race as a factor    High Sensitivity Troponin T [[id number]]  (Abnormal) Collected: 02/03/25 1310    Specimen: Blood Updated: 02/03/25 1344     HS Troponin T 29 ng/L     Narrative:      High Sensitive Troponin T Reference Range:  <14.0 ng/L- Negative Female for AMI  <22.0 ng/L- Negative Male for AMI  >=14 - Abnormal Female indicating possible myocardial injury.  >=22 - Abnormal Male indicating possible myocardial injury.   Clinicians would have to utilize clinical acumen, EKG, Troponin, and serial changes to determine if it is an Acute Myocardial Infarction or myocardial injury due to an underlying chronic condition.         Magnesium [[id number]]  (Abnormal) Collected: 02/03/25 1310    Specimen: Blood Updated: 02/03/25 1347     Magnesium 2.5 mg/dL     CBC Auto Differential [[id number]]  (Abnormal) Collected: 02/03/25 1310    Specimen: Blood Updated: 02/03/25 1330     WBC 6.06 10*3/mm3      RBC 2.58 10*6/mm3      Hemoglobin 6.4 g/dL      Hematocrit 21.8 %      MCV 84.5 fL      MCH 24.8 pg      MCHC 29.4 g/dL      RDW 18.4 %      RDW-SD 55.0 fl      MPV 10.4 fL      Platelets 211 10*3/mm3      Neutrophil % 88.1 %      Lymphocyte % 5.0 %       Monocyte % 6.3 %      Eosinophil % 0.2 %      Basophil % 0.2 %      Immature Grans % 0.2 %      Neutrophils, Absolute 5.35 10*3/mm3      Lymphocytes, Absolute 0.30 10*3/mm3      Monocytes, Absolute 0.38 10*3/mm3      Eosinophils, Absolute 0.01 10*3/mm3      Basophils, Absolute 0.01 10*3/mm3      Immature Grans, Absolute 0.01 10*3/mm3      nRBC 0.0 /100 WBC     High Sensitivity Troponin T 1Hr [[id number]]  (Abnormal) Collected: 02/03/25 1434    Specimen: Blood Updated: 02/03/25 1510     HS Troponin T 28 ng/L      Troponin T Numeric Delta -1 ng/L      Troponin T % Delta -3    Narrative:      High Sensitive Troponin T Reference Range:  <14.0 ng/L- Negative Female for AMI  <22.0 ng/L- Negative Male for AMI  >=14 - Abnormal Female indicating possible myocardial injury.  >=22 - Abnormal Male indicating possible myocardial injury.   Clinicians would have to utilize clinical acumen, EKG, Troponin, and serial changes to determine if it is an Acute Myocardial Infarction or myocardial injury due to an underlying chronic condition.         Urinalysis With Culture If Indicated - Straight Cath [[id number]]  (Abnormal) Collected: 02/03/25 1442    Specimen: Urine from Straight Cath Updated: 02/03/25 1456     Color, UA Dark Yellow     Appearance, UA Cloudy     pH, UA <=5.0     Specific Gravity, UA 1.025     Glucose, UA Negative     Ketones, UA Trace     Bilirubin, UA Small (1+)     Blood, UA Negative     Protein, UA Trace     Leuk Esterase, UA Negative     Nitrite, UA Negative     Urobilinogen, UA 1.0 E.U./dL    Narrative:      In absence of clinical symptoms, the presence of pyuria, bacteria, and/or nitrites on the urinalysis result does not correlate with infection.  Urine microscopic not indicated.          CT Head Without Contrast   Final Result   Impression:         No acute intracranial abnormality.       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       This report was  signed and finalized on 2/3/2025 2:32 PM by Arturo Quiroz.          XR Chest 1 View   Final Result       No definite acute cardiopulmonary abnormality.               This report was signed and finalized on 2/3/2025 1:47 PM by Arturo Quiroz.            Medications   sodium chloride 0.9 % flush 10 mL (has no administration in time range)   norepinephrine (LEVOPHED) 8 mg in 250 mL NS infusion (premix) (0.08 mcg/kg/min × 64 kg Intravenous Rate/Dose Change 2/3/25 1852)   sodium chloride 0.9 % bolus 1,000 mL (0 mL Intravenous Stopped 2/3/25 1432)   sodium chloride 0.9 % bolus 1,000 mL (0 mL Intravenous Stopped 2/3/25 1650)   sodium chloride 0.9 % bolus 1,000 mL (0 mL Intravenous Stopped 2/3/25 1816)   sodium chloride 0.9 % bolus 1,000 mL (1,000 mL Intravenous New Bag 2/3/25 1818)         Medical Decision Making  9-year-old female was brought in because of failure to thrive.  She has not been eating drinking or communicating over the last few days.  Patient does have a history of dementia.  Patient was found to be anemic.  Blood was ordered for the patient.  Patient was hypotensive.  And hypothermic.  Patient was given warmer.  Patient's temperature was improving.  Patient's blood pressure continued remain hypotensive after IV fluids.  Patient had bladder scan performed after 4 L and only 300 cc.  Levophed peripherally was began.  Patient blood pressure did improve.  Case was discussed with [facility] with the intensivist group.  He has accepted patient under their services.    Problems Addressed:  Anemia, unspecified type: complicated acute illness or injury  Dementia, unspecified dementia severity, unspecified dementia type, unspecified whether behavioral, psychotic, or mood disturbance or anxiety: complicated acute illness or injury  Hypotension, unspecified hypotension type: complicated acute illness or injury  Hypothermia, initial encounter: complicated acute illness or injury    Amount and/or Complexity of  Data Reviewed  Labs: ordered. Decision-making details documented in ED Course.  Radiology: ordered. Decision-making details documented in ED Course.  ECG/medicine tests: ordered. Decision-making details documented in ED Course.    Risk  Prescription drug management.  Decision regarding hospitalization.        Final diagnoses:   Hypothermia, initial encounter   Anemia, unspecified type   Hypotension, unspecified hypotension type   Dementia, unspecified dementia severity, unspecified dementia type, unspecified whether behavioral, psychotic, or mood disturbance or anxiety       ED Disposition  ED Disposition       ED Disposition   Decision to Admit    Condition   --    Comment   Level of Care: Critical Care [6]   Diagnosis: Hypothermia [194046]   Admitting Physician: NIKOLAY DAWKINS [[id number]]   Attending Physician: NIKOLAY DAWKINS [[id number]]   Certification: I Certify That Inpatient Hospital Services Are Medically Necessary For Greater Than 2 Midnights                 No follow-up provider specified.       Medication List        ASK your doctor about these medications      cephalexin 500 MG capsule  Commonly known as: KEFLEX  Take 1 capsule by mouth 2 (Two) Times a Day for 5 days.  Ask about: Should I take this medication?                 Alvaro Nunez MD  02/03/25 1324       Alvaro Nunez MD  02/03/25 192     Handoff

## 2025-02-04 LAB
BH BB BLOOD EXPIRATION DATE: NORMAL
BH BB BLOOD TYPE BARCODE: 6200
BH BB DISPENSE STATUS: NORMAL
BH BB PRODUCT CODE: NORMAL
BH BB UNIT NUMBER: NORMAL
CROSSMATCH INTERPRETATION: NORMAL
UNIT  ABO: NORMAL
UNIT  RH: NORMAL

## 2025-02-04 PROCEDURE — 99222 1ST HOSP IP/OBS MODERATE 55: CPT

## 2025-02-04 PROCEDURE — 25010000002 LORAZEPAM PER 2 MG

## 2025-02-04 RX ORDER — CHLORHEXIDINE GLUCONATE 500 MG/1
1 CLOTH TOPICAL DAILY
Status: DISCONTINUED | OUTPATIENT
Start: 2025-02-04 | End: 2025-02-04

## 2025-02-04 RX ADMIN — LORAZEPAM 1 MG: 2 INJECTION INTRAMUSCULAR; INTRAVENOUS at 04:49

## 2025-02-04 NOTE — CONSULTS
[facility] Palliative Care Services  Initial Consult    Attending Physician: Zina Cope MD  Referring Provider:  Zina Cope MD    Patient Name: Dina Michaels  Date of Admission: 2/3/2025  Today's Date: 02/04/25     Reason for Referral: Hospice Referral/Discussion    Code Status and Medical Interventions: No CPR (Do Not Attempt to Resuscitate); Comfort Measures   Ordered at: 02/03/25 2306     Level Of Support Discussed With:    Health Care Surrogate     Code Status (Patient has no pulse and is not breathing):    No CPR (Do Not Attempt to Resuscitate)     Medical Interventions (Patient has pulse or is breathing):    Comfort Measures        Subjective     HPI: 89 y.o. female with past medical history of Alzheimer's dementia.  Cording to chart review she has had multiple ED visits in the last 2 months.  Presented on 12/19/2024 with low back pain.  She was diagnosed with urinary tract infection and also found to have sacral compression fractures.  She was given antibiotics and pain medication and discharged home.  Most recently evaluated in ED on 1/27/2025 due to lower extremity wounds.  She was given oral antibiotics prophylactically and discharged home with plans to follow-up with wound care and PCP outpatient per chart review.  Patient presented to [facility] on 2/3/2025 related to mental status change.  According to ED note she has not been eating, drinking, or speaking and has been lying in fetal position.  Chart review notes she was essentially unresponsive however would withdraw from painful stimuli.  Workup in ED revealed multiple abnormalities in labs including alkaline phosphatase 126, albumin 3.1, hemoglobin 6.4, hematocrit 21.8.  Chest x-ray negative for definite acute cardiopulmonary abnormality.  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   "She was noted to be hypothermic and hypotensive in ED.  Temperature documented as low as 87.5 °F.  According to chart review Genevievefernando garcía placed and was given IV fluids.  Continue to be hypotensive and have little urine output and required initiation of Levophed.  Received 1 unit PRBCs in ED.  She was admitted to the critical care unit for further workup and treatment.  Noted goals of care discussion held and patient's spouse ultimately elected to transition to comfort measures.  Has been off Levophed since around 8 PM.  According to MAR received 2 doses of as needed Ativan in the last 24 hours.  Palliative care has been consulted to discuss hospice.  She is lying in bed, somnolent and in no apparent distress.  Spouse, Nik, is at bedside and provided additional history.  Discussed with nursing.     Advance Care Planning   Advanced Directives: No advance directive on file.     Advance Care Planning Discussion: Mrs. Michaels is unable to demonstrate ability to make complex medical decisions at time of exam.  Spoke with her spouse, Nik, at bedside.  He provided additional history regarding events leading to hospitalization.  Shared he has noticed a progressive decline over the last few months and more recently in the last few weeks.  Shared she has been having difficulty sleeping and hallucinating more.  He reflected on significant decline that occurred which led to current hospitalization.  Reports she has essentially been nonverbal and was unable to eat or drink and felt she may have suffered a stroke.  Nik shared after further discussion with providers overnight he elected to transition to focus on comfort only instead of undergoing further workup or interventions to prolong state.  He was tearful as he stated \"I wish she would open her eyes one more time.\"  Support provided.  Discussed comfort measures further including medications/interventions available to promote comfort.  Encouraged him/family to notify " staff if she does not appear comfortable.  Discussed hospice services as well.  Nik reports he was under the impression she would pass while hospitalized.  Discussed option of transitioning home with hospice if appears comfortable and death does not appear imminent.  Requested time to think further before making decision.  He was appreciative of visit.  Encouraged questions/concerns if arise.  Support provided.     The patient receives support from her spouse and children. Patient's spouse is her next of kin.    Due to the palliative care topics discussed including treatment options and hospice services we will establish an advance care plan.      Review of Systems   Unable to perform ROS: Patient unresponsive     Pain Assessment  CPOT Facial Expression: 0-->relaxed, neutral  CPOT Body Movements: 0-->absence of movements  CPOT Muscle Tension: 0-->relaxed  Ventilator Compliance/Vocalization: 0-->talking in normal tone or no sound  CPOT Score: 0  Past Medical History:   Diagnosis Date    Cancer     Dementia       Past Surgical History:   Procedure Laterality Date    COLONOSCOPY N/A 4/14/2023    Procedure: COLONOSCOPY WITH ANESTHESIA;  Surgeon: Bubba Asher MD;  Location: [facility] ENDOSCOPY;  Service: Gastroenterology;  Laterality: N/A;  pre gi bleed  post diverticulosis  Dr. Hebert    ENDOSCOPY N/A 4/14/2023    Procedure: ESOPHAGOGASTRODUODENOSCOPY WITH ANESTHESIA;  Surgeon: Bubba Asher MD;  Location: [facility] ENDOSCOPY;  Service: Gastroenterology;  Laterality: N/A;  pre screen  post gastric bx  Dr Hebert    HIP HEMIARTHROPLASTY Left 11/5/2023    Procedure: HIP HEMIARTHROPLASTY;  Surgeon: Denzel De La Vega MD;  Location: [facility] OR;  Service: Orthopedics;  Laterality: Left;      Social History     Socioeconomic History    Marital status:    Tobacco Use    Smoking status: Never    Smokeless tobacco: Never   Vaping Use    Vaping status: Never Used   Substance and Sexual Activity    Alcohol use: Yes     Drug use: Never     History reviewed. No pertinent family history.   Allergies   Allergen Reactions    Amoxicillin Hallucinations    Clarithromycin Hallucinations    Nisoldipine Er Other (See Comments)     Feelings of passing out, tired    Sular [Nisoldipine] Unknown - Low Severity       Objective   Diagnostics: Reviewed    Intake/Output Summary (Last 24 hours) at 2/4/2025 0757  Last data filed at 2/4/2025 0500  Gross per 24 hour   Intake --   Output 250 ml   Net -250 ml       Current medications patient is presently taking including all prescriptions, over-the-counter, herbals and vitamin/mineral/dietary (nutritional) supplements with reviewed including route, type, dose and frequency and are current per MAR at time of dictation.  Current Facility-Administered Medications   Medication Dose Route Frequency Provider Last Rate Last Admin    acetaminophen (TYLENOL) tablet 650 mg  650 mg Oral Q4H PRN Darrell Hall APRN        Or    acetaminophen (TYLENOL) 160 MG/5ML oral solution 650 mg  650 mg Oral Q4H PRN Darrell Hall APRN        Or    acetaminophen (TYLENOL) suppository 650 mg  650 mg Rectal Q4H PRN Darrell Hall APRN        diphenoxylate-atropine (LOMOTIL) 2.5-0.025 MG per tablet 1 tablet  1 tablet Oral Q2H PRN Darrell Hall APRN        HYDROmorphone (DILAUDID) injection 1 mg  1 mg Intravenous Q2H PRN Darrell Hall APRN        Or    HYDROmorphone (DILAUDID) injection 2 mg  2 mg Subcutaneous Q2H PRN Darrell Hall APRN        LORazepam (ATIVAN) tablet 0.5 mg  0.5 mg Oral Q1H PRN Darrell Hall APRN        Or    LORazepam (ATIVAN) injection 0.5 mg  0.5 mg Intravenous Q1H PRN Darrell Hall APRN        Or    LORazepam (ATIVAN) injection 0.5 mg  0.5 mg Intramuscular Q1H PRN Darrell Hall APRN        Or    LORazepam (ATIVAN) injection 0.5 mg  0.5 mg Subcutaneous Q1H PRDarrell Barry APRN        Or    LORazepam (ATIVAN) 2 MG/ML concentrated solution 0.5 mg  0.5 mg Oral Q1H PRN Darrell Hall  EDGARDO        Or    LORazepam (ATIVAN) 2 MG/ML concentrated solution 0.5 mg  0.5 mg Sublingual Q1H PRN Darrell Hall APRN        LORazepam (ATIVAN) tablet 1 mg  1 mg Oral Q1H PRN Darrell Hall APRN        Or    LORazepam (ATIVAN) injection 1 mg  1 mg Intravenous Q1H PRN Darrell Hall APRN   1 mg at 02/04/25 0449    Or    LORazepam (ATIVAN) injection 1 mg  1 mg Intramuscular Q1H PRN Darrell Hall APRN        Or    LORazepam (ATIVAN) injection 1 mg  1 mg Subcutaneous Q1H PRN Darrell Hall APRN        Or    LORazepam (ATIVAN) 2 MG/ML concentrated solution 1 mg  1 mg Oral Q1H PRN Darrell Hall APRN        Or    LORazepam (ATIVAN) 2 MG/ML concentrated solution 1 mg  1 mg Sublingual Q1H PRN Darrell Hall APRN        LORazepam (ATIVAN) tablet 2 mg  2 mg Oral Q1H PRN Darrell Hall APRN        Or    LORazepam (ATIVAN) injection 2 mg  2 mg Intravenous Q1H PRN Darrell Hall APRN        Or    LORazepam (ATIVAN) injection 2 mg  2 mg Intramuscular Q1H PRN Darrell Hall APRN        Or    LORazepam (ATIVAN) injection 2 mg  2 mg Subcutaneous Q1H PRN Darrell Hall APRN        Or    LORazepam (ATIVAN) 2 MG/ML concentrated solution 2 mg  2 mg Oral Q1H PRN Darrell Hall APRN        Or    LORazepam (ATIVAN) 2 MG/ML concentrated solution 2 mg  2 mg Sublingual Q1H PRN Darrell Hall APRN        LORazepam (ATIVAN) 2 MG/ML injection  - ADS Override Pull             morphine injection 4 mg  4 mg Intravenous Q1H PRN Darrell Hall APRN        Morphine sulfate (PF) injection 1 mg  1 mg Intravenous Q1H PRN Darrell Hall APRN        norepinephrine (LEVOPHED) 8 mg in 250 mL NS infusion (premix)  0.02-0.3 mcg/kg/min Intravenous Titrated Alvaro Nunez MD   Stopped at 02/03/25 2003    ondansetron ODT (ZOFRAN-ODT) disintegrating tablet 4 mg  4 mg Oral Q6H PRN Darrell Hall APRN        Or    ondansetron (ZOFRAN) injection 4 mg  4 mg Intravenous Q6H PRN Darrell Hall, APRN        Polyvinyl Alcohol-Povidone  "PF (ARTIFICIAL TEARS) 1.4-0.6 % ophthalmic solution 1 drop  1 drop Both Eyes Q30 Min PRN Darrell Hall APRN        scopolamine patch 1 mg/72 hr  1 patch Transdermal Q72H PRN Darrell Hall APRN        sodium chloride 0.9 % flush 10 mL  10 mL Intravenous PRN Alvaro Nunez MD        norepinephrine, 0.02-0.3 mcg/kg/min, Last Rate: Stopped (02/03/25 2003)       acetaminophen **OR** acetaminophen **OR** acetaminophen    diphenoxylate-atropine    HYDROmorphone **OR** HYDROmorphone    LORazepam **OR** LORazepam **OR** LORazepam **OR** LORazepam **OR** LORazepam **OR** LORazepam    LORazepam **OR** LORazepam **OR** LORazepam **OR** LORazepam **OR** LORazepam **OR** LORazepam    LORazepam **OR** LORazepam **OR** LORazepam **OR** LORazepam **OR** LORazepam **OR** LORazepam    LORazepam    Morphine    Morphine    ondansetron ODT **OR** ondansetron    Polyvinyl Alcohol-Povidone PF    Scopolamine    [COMPLETED] Insert Peripheral IV **AND** sodium chloride  Assessment   BP 96/49   Pulse 79   Temp 96.2 °F (35.7 °C)   Resp 16   Ht 160 cm (63\")   Wt 64 kg (141 lb 1.5 oz)   SpO2 100%   BMI 24.99 kg/m²     Physical Exam  Vitals and nursing note reviewed.   Constitutional:       General: She is not in acute distress.     Appearance: She is ill-appearing.   HENT:      Head: Normocephalic.   Eyes:      General: Lids are normal.   Neck:      Vascular: No JVD.      Trachea: Trachea normal.   Cardiovascular:      Rate and Rhythm: Normal rate.   Pulmonary:      Effort: Pulmonary effort is normal.   Genitourinary:     Comments: Carlson catheter present.  Musculoskeletal:      Cervical back: Neck supple.   Skin:     General: Skin is warm and dry.      Findings: Wound (bilateral lower extremities) present.   Neurological:      Mental Status: She is unresponsive.     Functional status: Palliative Performance Scale Score: Performance 10% based on the following measures: Ambulation: Totally bed bound, Activity and Evidence of Disease: " Unable to do any work, extensive evidence of disease, Self-Care: Total care required,  Intake: Mouth care only, LOC: Drowsy or comatose.  Nutritional status: Albumin 3.1. Body mass index is 24.99 kg/m².  Patient status: Disease state: No further treatment being pursued.    Active Hospital Problems    Diagnosis     **Hypothermia      Impression/Problem List:  Alzheimer's dementia   Hypothermia  Hypotension  Anemia      Hypoalbuminemia   Advanced age      Plan / Recommendations     Palliative Care Encounter   Mrs. Michaels is unable to demonstrate ability to make complex medical decisions at time of exam.    Spoke with her spouse, Nik, at bedside.  Details of discussion above.   Reflected on Mrs. Michaels's overall decline and events leading to hospitalization.   Transitioned to comfort measures last night (2/3/2025).   Discussed comfort measures further including medications/interventions available to promote comfort.    Encouraged him/family to notify staff if she does not appear comfortable.   He was appreciative of visit.       Discussed hospice services.  Nik reports he was under the impression she would pass while hospitalized.  Discussed option of transitioning home with hospice if appears comfortable and death does not appear imminent.  Requested time to think further before making decision.   Updated nursing.     Encouraged questions/concerns if arise.  Support provided.     Thank you for allowing us to participate in patient's plan of care. Palliative Care Team will continue to follow patient.       Electronically signed by, EDGARDO Appiah, 02/04/25.

## 2025-02-04 NOTE — CASE MANAGEMENT/SOCIAL WORK
Discharge Planning Assessment  [facility]     Patient Name: Dina Michaels  MRN: [id number]  Today's Date: 2/4/2025    Admit Date: 2/3/2025        Discharge Needs Assessment       Row Name 02/04/25 1006       Discharge Needs Assessment    Discharge Coordination/Progress Patient has been living at home with her  but has significantly declined lately.  She was transitioned to Comfort Care yesterday and Palliative Care is closely following and providing support.      Row Name 02/04/25 1004       Living Environment    People in Home spouse    Current Living Arrangements home    Potentially Unsafe Housing Conditions none    In the past 12 months has the electric, gas, oil, or water company threatened to shut off services in your home? No    Primary Care Provided by spouse/significant other    Provides Primary Care For no one;no one, unable/limited ability to care for self    Family Caregiver if Needed spouse    Quality of Family Relationships helpful;involved;supportive    Able to Return to Prior Arrangements no       Resource/Environmental Concerns    Resource/Environmental Concerns none       Transportation Needs    In the past 12 months, has lack of transportation kept you from medical appointments or from getting medications? no    In the past 12 months, has lack of transportation kept you from meetings, work, or from getting things needed for daily living? No       Food Insecurity    Within the past 12 months, you worried that your food would run out before you got the money to buy more. Never true    Within the past 12 months, the food you bought just didn't last and you didn't have money to get more. Never true       Transition Planning    Patient/Family Anticipates Transition to home with help/services    Patient/Family Anticipated Services at Transition none       Discharge Needs Assessment    Do you want help finding or keeping work or a job? I do not need or want help    Do you want help with school or  training? For example, starting or completing job training or getting a high school diploma, GED or equivalent No    Anticipated Changes Related to Illness none                   Discharge Plan    No documentation.                 Continued Care and Services - Admitted Since 2/3/2025    No active coordination exists for this encounter.          Demographic Summary    No documentation.                  Functional Status    No documentation.                  Psychosocial    No documentation.                  Abuse/Neglect    No documentation.                  Legal    No documentation.                  Substance Abuse    No documentation.                  Patient Forms    No documentation.                     Linda Gardner RN

## 2025-02-04 NOTE — ED NOTES
Nursing report ED to floor  Dina Michaels  89 y.o.  female    HPI:   Chief Complaint   Patient presents with    Failure To Thrive       Admitting doctor:   Zina Cope MD    Consulting provider(s):  Consults       No orders found from 1/5/2025 to 2/4/2025.             Admitting diagnosis:   The primary encounter diagnosis was Hypothermia, initial encounter. Diagnoses of Anemia, unspecified type, Hypotension, unspecified hypotension type, and Dementia, unspecified dementia severity, unspecified dementia type, unspecified whether behavioral, psychotic, or mood disturbance or anxiety were also pertinent to this visit.    Code status:   Current Code Status       Date Active Code Status Order ID Comments User Context       Prior            Allergies:   Amoxicillin, Clarithromycin, Nisoldipine er, and Sular [nisoldipine]    Intake and Output  No intake or output data in the 24 hours ending 02/03/25 1935    Weight:       02/03/25  1241   Weight: 64 kg (141 lb 1.5 oz)       Most recent vitals:   Vitals:    02/03/25 1819 02/03/25 1831 02/03/25 1841 02/03/25 1931   BP: (!) 75/43 (!) 78/49 (!) 78/49 117/85   Pulse: 76 81 87 101   Resp:   18 21   Temp:    96.2 °F (35.7 °C)   TempSrc:    Rectal   SpO2:  99% 100% 100%   Weight:       Height:         Oxygen Therapy: .    Active LDAs/IV Access:   Lines, Drains & Airways       Active LDAs       Name Placement date Placement time Site Days    Peripheral IV 02/03/25 1310 Distal;Posterior;Right Forearm 02/03/25  1310  Forearm  less than 1    Peripheral IV 02/03/25 1726 Anterior;Distal;Left Forearm 02/03/25  1726  Forearm  less than 1    Urethral Catheter 16 Fr. 02/03/25  1927  -- less than 1                    Labs (abnormal labs have a star):   Labs Reviewed   COMPREHENSIVE METABOLIC PANEL - Abnormal; Notable for the following components:       Result Value    Chloride 109 (*)     Albumin 3.1 (*)     Alkaline Phosphatase 126 (*)     All other components within normal limits     Narrative:     GFR Categories in Chronic Kidney Disease (CKD)      GFR Category          GFR (mL/min/1.73)    Interpretation  G1                     90 or greater         Normal or high (1)  G2                      60-89                Mild decrease (1)  G3a                   45-59                Mild to moderate decrease  G3b                   30-44                Moderate to severe decrease  G4                    15-29                Severe decrease  G5                    14 or less           Kidney failure          (1)In the absence of evidence of kidney disease, neither GFR category G1 or G2 fulfill the criteria for CKD.    eGFR calculation 2021 CKD-EPI creatinine equation, which does not include race as a factor   URINALYSIS W/ CULTURE IF INDICATED - Abnormal; Notable for the following components:    Color, UA Dark Yellow (*)     Appearance, UA Cloudy (*)     Ketones, UA Trace (*)     Bilirubin, UA Small (1+) (*)     Protein, UA Trace (*)     All other components within normal limits    Narrative:     In absence of clinical symptoms, the presence of pyuria, bacteria, and/or nitrites on the urinalysis result does not correlate with infection.  Urine microscopic not indicated.   TROPONIN - Abnormal; Notable for the following components:    HS Troponin T 29 (*)     All other components within normal limits    Narrative:     High Sensitive Troponin T Reference Range:  <14.0 ng/L- Negative Female for AMI  <22.0 ng/L- Negative Male for AMI  >=14 - Abnormal Female indicating possible myocardial injury.  >=22 - Abnormal Male indicating possible myocardial injury.   Clinicians would have to utilize clinical acumen, EKG, Troponin, and serial changes to determine if it is an Acute Myocardial Infarction or myocardial injury due to an underlying chronic condition.        MAGNESIUM - Abnormal; Notable for the following components:    Magnesium 2.5 (*)     All other components within normal limits   CBC WITH AUTO DIFFERENTIAL  - Abnormal; Notable for the following components:    RBC 2.58 (*)     Hemoglobin 6.4 (*)     Hematocrit 21.8 (*)     MCH 24.8 (*)     MCHC 29.4 (*)     RDW 18.4 (*)     RDW-SD 55.0 (*)     Neutrophil % 88.1 (*)     Lymphocyte % 5.0 (*)     Eosinophil % 0.2 (*)     Lymphocytes, Absolute 0.30 (*)     All other components within normal limits   HIGH SENSITIVITIY TROPONIN T 1HR - Abnormal; Notable for the following components:    HS Troponin T 28 (*)     All other components within normal limits    Narrative:     High Sensitive Troponin T Reference Range:  <14.0 ng/L- Negative Female for AMI  <22.0 ng/L- Negative Male for AMI  >=14 - Abnormal Female indicating possible myocardial injury.  >=22 - Abnormal Male indicating possible myocardial injury.   Clinicians would have to utilize clinical acumen, EKG, Troponin, and serial changes to determine if it is an Acute Myocardial Infarction or myocardial injury due to an underlying chronic condition.        TYPE AND SCREEN   PREPARE RBC   CBC AND DIFFERENTIAL    Narrative:     The following orders were created for panel order CBC & Differential.  Procedure                               Abnormality         Status                     ---------                               -----------         ------                     CBC Auto Differential[[id number]]        Abnormal            Final result                 Please view results for these tests on the individual orders.       Meds given in ED:   Medications   sodium chloride 0.9 % flush 10 mL (has no administration in time range)   norepinephrine (LEVOPHED) 8 mg in 250 mL NS infusion (premix) (0.08 mcg/kg/min × 64 kg Intravenous Rate/Dose Change 2/3/25 1852)   sodium chloride 0.9 % bolus 1,000 mL (0 mL Intravenous Stopped 2/3/25 1432)   sodium chloride 0.9 % bolus 1,000 mL (0 mL Intravenous Stopped 2/3/25 1650)   sodium chloride 0.9 % bolus 1,000 mL (0 mL Intravenous Stopped 2/3/25 1816)   sodium chloride 0.9 % bolus 1,000 mL  (1,000 mL Intravenous New Bag 2/3/25 7148)     norepinephrine, 0.02-0.3 mcg/kg/min, Last Rate: 0.08 mcg/kg/min (02/03/25 7969)         NIH Stroke Scale:       Isolation/Infection(s):  No active isolations   MDR Pseudomonas     COVID Testing  Collected .  Resulted .    Nursing report ED to floor:  Mental status: .  Ambulatory status: .  Precautions: .    ED nurse phone extentsion- ..

## 2025-02-04 NOTE — PROGRESS NOTES
[facility] Intensivist Services  INPATIENT PROGRESS NOTE    Patient Name: Dina Michaels  Date of Admission: 2/3/2025  Today's Date: 02/04/25  Length of Stay: 1  Primary Care Physician: Benedicto Hebert MD    Subjective   ICU Summary:  89-year-old female with Alzheimer's dementia, failure to thrive, anemia, admitted to the ICU overnight after being brought in by  unresponsive.  Patient was hypotensive, and hypovolemic shock.  Anemic.  Received 4 L crystalloid bolus.  Received 1 unit packed blood cells.  Ultimately, patient made comfort measures only by .  Palliative order set initiated overnight.  Patient currently appears comfortable, no distress.  Blood pressure and heart rate are stable.  On room air.    Review of Systems   All pertinent negatives and positives are as above. All other systems have been reviewed and are negative unless otherwise stated.     Objective    Temp:  [87.5 °F (30.8 °C)-96.2 °F (35.7 °C)] 96.2 °F (35.7 °C)  Heart Rate:  [] 79  Resp:  [16-21] 16  BP: ()/(41-85) 96/49  Physical Exam  Vitals and nursing note reviewed.   Constitutional:       Comments: Obtunded   HENT:      Head: Normocephalic.   Eyes:      Pupils: Pupils are equal, round, and reactive to light.   Cardiovascular:      Rate and Rhythm: Normal rate and regular rhythm.      Pulses: Normal pulses.   Pulmonary:      Effort: Pulmonary effort is normal.   Abdominal:      Palpations: Abdomen is soft.   Skin:     General: Skin is warm and dry.   Neurological:      Comments: Sedate, not following commands       Results Review:  Lab Results (last 24 hours)       Procedure Component Value Units Date/Time    High Sensitivity Troponin T 1Hr [[id number]]  (Abnormal) Collected: 02/03/25 1434    Specimen: Blood Updated: 02/03/25 1510     HS Troponin T 28 ng/L      Troponin T Numeric Delta -1 ng/L      Troponin T % Delta -3    Narrative:      High Sensitive Troponin T Reference  Range:  <14.0 ng/L- Negative Female for AMI  <22.0 ng/L- Negative Male for AMI  >=14 - Abnormal Female indicating possible myocardial injury.  >=22 - Abnormal Male indicating possible myocardial injury.   Clinicians would have to utilize clinical acumen, EKG, Troponin, and serial changes to determine if it is an Acute Myocardial Infarction or myocardial injury due to an underlying chronic condition.         Urinalysis With Culture If Indicated - Straight Cath [[id number]]  (Abnormal) Collected: 02/03/25 1442    Specimen: Urine from Straight Cath Updated: 02/03/25 1456     Color, UA Dark Yellow     Appearance, UA Cloudy     pH, UA <=5.0     Specific Gravity, UA 1.025     Glucose, UA Negative     Ketones, UA Trace     Bilirubin, UA Small (1+)     Blood, UA Negative     Protein, UA Trace     Leuk Esterase, UA Negative     Nitrite, UA Negative     Urobilinogen, UA 1.0 E.U./dL    Narrative:      In absence of clinical symptoms, the presence of pyuria, bacteria, and/or nitrites on the urinalysis result does not correlate with infection.  Urine microscopic not indicated.    Comprehensive Metabolic Panel [[id number]]  (Abnormal) Collected: 02/03/25 1310    Specimen: Blood Updated: 02/03/25 1347     Glucose 99 mg/dL      BUN 20 mg/dL      Creatinine 0.86 mg/dL      Sodium 144 mmol/L      Potassium 3.9 mmol/L      Chloride 109 mmol/L      CO2 25.0 mmol/L      Calcium 9.4 mg/dL      Total Protein 6.1 g/dL      Albumin 3.1 g/dL      ALT (SGPT) 14 U/L      AST (SGOT) 18 U/L      Alkaline Phosphatase 126 U/L      Total Bilirubin 0.2 mg/dL      Globulin 3.0 gm/dL      A/G Ratio 1.0 g/dL      BUN/Creatinine Ratio 23.3     Anion Gap 10.0 mmol/L      eGFR 64.3 mL/min/1.73     Narrative:      GFR Categories in Chronic Kidney Disease (CKD)      GFR Category          GFR (mL/min/1.73)    Interpretation  G1                     90 or greater         Normal or high (1)  G2                      60-89                Mild decrease (1)  G3a                    45-59                Mild to moderate decrease  G3b                   30-44                Moderate to severe decrease  G4                    15-29                Severe decrease  G5                    14 or less           Kidney failure          (1)In the absence of evidence of kidney disease, neither GFR category G1 or G2 fulfill the criteria for CKD.    eGFR calculation 2021 CKD-EPI creatinine equation, which does not include race as a factor    Magnesium [[id number]]  (Abnormal) Collected: 02/03/25 1310    Specimen: Blood Updated: 02/03/25 1347     Magnesium 2.5 mg/dL     High Sensitivity Troponin T [[id number]]  (Abnormal) Collected: 02/03/25 1310    Specimen: Blood Updated: 02/03/25 1344     HS Troponin T 29 ng/L     Narrative:      High Sensitive Troponin T Reference Range:  <14.0 ng/L- Negative Female for AMI  <22.0 ng/L- Negative Male for AMI  >=14 - Abnormal Female indicating possible myocardial injury.  >=22 - Abnormal Male indicating possible myocardial injury.   Clinicians would have to utilize clinical acumen, EKG, Troponin, and serial changes to determine if it is an Acute Myocardial Infarction or myocardial injury due to an underlying chronic condition.         CBC & Differential [[id number]]  (Abnormal) Collected: 02/03/25 1310    Specimen: Blood Updated: 02/03/25 1330    Narrative:      The following orders were created for panel order CBC & Differential.  Procedure                               Abnormality         Status                     ---------                               -----------         ------                     CBC Auto Differential[[id number]]        Abnormal            Final result                 Please view results for these tests on the individual orders.    CBC Auto Differential [[id number]]  (Abnormal) Collected: 02/03/25 1310    Specimen: Blood Updated: 02/03/25 1330     WBC 6.06 10*3/mm3      RBC 2.58 10*6/mm3      Hemoglobin 6.4 g/dL      Hematocrit 21.8 %      MCV  84.5 fL      MCH 24.8 pg      MCHC 29.4 g/dL      RDW 18.4 %      RDW-SD 55.0 fl      MPV 10.4 fL      Platelets 211 10*3/mm3      Neutrophil % 88.1 %      Lymphocyte % 5.0 %      Monocyte % 6.3 %      Eosinophil % 0.2 %      Basophil % 0.2 %      Immature Grans % 0.2 %      Neutrophils, Absolute 5.35 10*3/mm3      Lymphocytes, Absolute 0.30 10*3/mm3      Monocytes, Absolute 0.38 10*3/mm3      Eosinophils, Absolute 0.01 10*3/mm3      Basophils, Absolute 0.01 10*3/mm3      Immature Grans, Absolute 0.01 10*3/mm3      nRBC 0.0 /100 WBC              CT Head Without Contrast    Result Date: 2/3/2025  Impression:   No acute intracranial abnormality.  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  This report was signed and finalized on 2/3/2025 2:32 PM by Atruro Quiroz.      XR Chest 1 View    Result Date: 2/3/2025   No definite acute cardiopulmonary abnormality.    This report was signed and finalized on 2/3/2025 1:47 PM by Arturo Quiroz.       Result Review:  I have personally reviewed the results from the time of this admission to 2/4/2025 08:16 CST and agree with these findings:  [x]  Laboratory list / accordion  []  Microbiology  [x]  Radiology  [x]  EKG/Telemetry   []  Cardiology/Vascular   []  Pathology  []  Old records  []  Other:    I have reviewed the patient's current medications.     Assessment/Plan   Assessment  Active Hospital Problems    Diagnosis     **Hypothermia     Chronic anemia     Vascular dementia without behavioral disturbance    89-year-old female in CCU now made comfort measures.  Will consult palliative care today for hospice evaluation as patient is remaining stable.   expressed wishes that she be home around family ideally.      Disposition: Transfer to stepdown for comfort measures bed.  Place palliative consult for hospice evaluation.      Electronically signed by EDGARDO Olmstead on 2/4/2025 at 08:16 CST

## 2025-02-04 NOTE — H&P
[facility] Intensivist Services    Date of Admission: 2/3/2025  Date of Note: 02/03/25  Primary Care Physician: Benedicto Hebert MD    History   Mrs. Michaels is a 89-year-old female who presented to [facility] accompanied by her  with a chief complaint of lethargy, decreased p.o. intake, and altered mental status.  During ER course workup patient remained unresponsive, breathing with pulse.  Was profoundly hypotensive, administered 4 L normal saline bolus with pressure remaining low and was started on Levophed.  Additionally during ED course workup she was found to have a hemoglobin of 6.4 with a hematocrit of 21.8 with no obvious sign of bleeding.  She was transfused 1 unit PRBCs.  CT head showed no acute intracranial abnormality.  There was evidence for old left caudate head lacunar infarct and mild to moderate chronic small vessel ischemic changes.    Intensivist service was contacted for admission and further medical management.  Patient was accepted for admission under intensivist MD, Dr. Cope.  Upon patient's arrival to CCU bed to she remains unresponsive, does withdrawal to painful stimuli.  She does not open her eyes or respond to verbal stimuli.  She was able to be weaned off vasopressor support.  Vital signs remained stable    I made contact with the patient's , Mr. Nik Michaels to discuss patient's recent health and reason for admission.  Mr. Michaels explained to me that his wife has had significant decline in health over the last year becoming worse over the course of the last 2 months.  He explains known history of Alzheimer's dementia that has gotten much worse over the last 2 months with her frequently wandering the home and going without sleep for 3 days at a time.  He explains that she will then sleep a short period and then goes without sleep once again.  He explains that she frequently has hallucinations and becomes significantly  agitated.  He reports that she often eats and drinks very little.  He further explained that Friday she stopped talking, was unable to feed herself, and when he attempted to feed her she was unable to chew or swallow.  He explains that she seemed to be slumped to her left and was no longer ambulatory.  He stated to me that he believed she most likely had a stroke but knew that their was nothing she would want done or anything he believed could be done and wanted her to be home with family if she was going to pass. He explained that as time went on he thought she should be evaluated and brought her to the ER understanding that she was most likely going to pass.  After these details were explained to me I clarified CODE STATUS wishes and Mr. Michaels advised that he would want her to be DNR/DNI and would not want any further blood products or vasopressors. He explained that when it is her time he would want her to pass peacefully. I discussed comfort care with Mr. Michaels and he confirmed he would want her to receive medication if she showed any signs of being uncomfortable. Nurses updated and comfort orders placed.       Past Medical History     Active and Resolved Problems  Active Hospital Problems    Diagnosis  POA    **Hypothermia [T68.XXXA]  Yes      Resolved Hospital Problems   No resolved problems to display.       Past Medical History:   Past Medical History:   Diagnosis Date    Cancer     Dementia        Prior Surgeries: She  has a past surgical history that includes Esophagogastroduodenoscopy (N/A, 4/14/2023); Colonoscopy (N/A, 4/14/2023); and Hip hemiArthroplasty (Left, 11/5/2023).    Past Surgical History:   Past Surgical History:   Procedure Laterality Date    COLONOSCOPY N/A 4/14/2023    Procedure: COLONOSCOPY WITH ANESTHESIA;  Surgeon: Bubba Asher MD;  Location: [facility] ENDOSCOPY;  Service: Gastroenterology;  Laterality: N/A;  pre gi bleed  post diverticulosis  Dr. Hebert    ENDOSCOPY N/A 4/14/2023     Procedure: ESOPHAGOGASTRODUODENOSCOPY WITH ANESTHESIA;  Surgeon: Bubba Asher MD;  Location: [facility] ENDOSCOPY;  Service: Gastroenterology;  Laterality: N/A;  pre screen  post gastric bx   Anup    HIP HEMIARTHROPLASTY Left 11/5/2023    Procedure: HIP HEMIARTHROPLASTY;  Surgeon: Denzel De La Vega MD;  Location: [facility] OR;  Service: Orthopedics;  Laterality: Left;       Social and Family History     Family History:  family history is not on file.    Tobacco/Social History:  reports that she has never smoked. She has never used smokeless tobacco. She reports current alcohol use. She reports that she does not use drugs.    Allergies     Allergies:   She is allergic to amoxicillin, clarithromycin, nisoldipine er, and sular [nisoldipine].    Allergies   Allergen Reactions    Amoxicillin Hallucinations    Clarithromycin Hallucinations    Nisoldipine Er Other (See Comments)     Feelings of passing out, tired    Sular [Nisoldipine] Unknown - Low Severity       Labs     Basic Labs:  Common labs          12/19/2024    02:37 1/27/2025    20:19 2/3/2025    13:10   Common Labs   Glucose 108  123  99    BUN 17  23  20    Creatinine 0.90  0.96  0.86    Sodium 141  138  144    Potassium 3.9  4.4  3.9    Chloride 102  102  109    Calcium 9.0  8.8  9.4    Albumin  3.2  3.1    Total Bilirubin  0.3  0.2    Alkaline Phosphatase  146  126    AST (SGOT)  23  18    ALT (SGPT)  14  14    WBC 9.93  6.78  6.06    Hemoglobin 10.0  8.1  6.4    Hematocrit 32.2  27.0  21.8    Platelets 315  290  211        Diabetic:      Inpatient Medications     Medications: Scheduled Meds:   Continuous Infusions:norepinephrine, 0.02-0.3 mcg/kg/min, Last Rate: Stopped (02/03/25 2003)      PRN Meds:.  acetaminophen **OR** acetaminophen **OR** acetaminophen    diphenoxylate-atropine    HYDROmorphone **OR** HYDROmorphone    LORazepam **OR** LORazepam **OR** LORazepam **OR** LORazepam **OR** LORazepam **OR** LORazepam    LORazepam **OR** LORazepam **OR**  "LORazepam **OR** LORazepam **OR** LORazepam **OR** LORazepam    LORazepam **OR** LORazepam **OR** LORazepam **OR** LORazepam **OR** LORazepam **OR** LORazepam    Morphine    Morphine    ondansetron ODT **OR** ondansetron    Polyvinyl Alcohol-Povidone PF    Scopolamine    [COMPLETED] Insert Peripheral IV **AND** sodium chloride    I have reviewed the patient's current medications.   Outpatient Medications     Outpatient Medications:   No outpatient medications have been marked as taking for the 2/3/25 encounter (Hospital Encounter).       Current Antibiotics     This patient does not have an active medication from one of the medication groupers.    Exam     Vitals: Her  height is 160 cm (63\") and weight is 64 kg (141 lb 1.5 oz). Her temperature is 96.2 °F (35.7 °C). Her blood pressure is 155/68 and her pulse is 100. Her respiration is 19 and oxygen saturation is 100%.     GENERAL:  unresponsive, no acute distress.   SKIN:  Warm, dry  EYES:  Pupils equal, round and reactive to light.    HEAD:  Normocephalic.  NECK:  Supple   RESP:  Lungs clear to auscultation. Good airflow. Normal respiratory effort.   CARDIAC:  Regular rate and rhythm. Normal S1,S2. Lower extremity edema.  GI:  Soft, nontender, normal bowel sounds  MSK:  Normal muscle bulk, tone  NEUROLOGICAL:unresponsive   PSYCHIATRIC:  unresponsive     Results and Cultures Review     Result Review:  I have personally reviewed the results from the time of this admission to 2/3/2025 23:26 CST and agree with these findings:  [x]  Laboratory list / accordion  [x]  Microbiology  [x]  Radiology  [x]  EKG/Telemetry   []  Cardiology/Vascular   []  Pathology  []  Old records  []  Other:  Most notable findings include: per HPI      Culture Data:   No results found for: \"BLOODCX\", \"URINECX\", \"WOUNDCX\", \"MRSACX\", \"RESPCX\", \"STOOLCX\"    Assessment/Plan     Comfort measure orders placed            VTE Prophylaxis:    Mechanical VTE prophylaxis orders are present.        Total " critical care time: Approximately 50 minutes    Due to a high probability of clinically significant, life threatening deterioration, the patient required my highest level of preparedness to intervene emergently and I personally spent this critical care time directly and personally managing the patient.     This critical care time included obtaining a history; examining the patient; pulse oximetry; ordering and review of studies; arranging urgent treatment with development of a management plan; evaluation of patient's response to treatment; frequent reassessment; and, discussions with other providers.    This critical care time was performed to assess and manage the high probability of imminent, life-threatening deterioration that could result in multi-organ failure. It was exclusive of separately billable procedures and treating other patients and teaching time.    Please see MDM section and the rest of the note for further information on patient assessment and treatment.    Part of this note may be an electronic transcription/translation of spoken language to printed text using the Dragon Dictation System.    Electronically signed by EDGARDO Lopez on 2/3/2025 at 23:26 CST

## 2025-02-05 VITALS
TEMPERATURE: 93.2 F | WEIGHT: 141.09 LBS | HEIGHT: 63 IN | OXYGEN SATURATION: 95 % | BODY MASS INDEX: 25 KG/M2 | SYSTOLIC BLOOD PRESSURE: 110 MMHG | DIASTOLIC BLOOD PRESSURE: 51 MMHG | HEART RATE: 79 BPM | RESPIRATION RATE: 10 BRPM

## 2025-02-05 PROBLEM — R62.7 FAILURE TO THRIVE IN ADULT: Status: ACTIVE | Noted: 2025-02-05

## 2025-02-05 PROBLEM — R57.1 HYPOVOLEMIC SHOCK: Status: ACTIVE | Noted: 2025-02-05

## 2025-02-05 PROCEDURE — 25010000002 LORAZEPAM PER 2 MG

## 2025-02-05 PROCEDURE — 99233 SBSQ HOSP IP/OBS HIGH 50: CPT

## 2025-02-05 RX ORDER — MORPHINE SULFATE 20 MG/ML
5 SOLUTION ORAL
Status: DISCONTINUED | OUTPATIENT
Start: 2025-02-05 | End: 2025-02-05 | Stop reason: HOSPADM

## 2025-02-05 RX ORDER — MORPHINE SULFATE 20 MG/ML
20 SOLUTION ORAL
Status: DISCONTINUED | OUTPATIENT
Start: 2025-02-05 | End: 2025-02-05 | Stop reason: HOSPADM

## 2025-02-05 RX ORDER — MORPHINE SULFATE 20 MG/ML
10 SOLUTION ORAL
Status: DISCONTINUED | OUTPATIENT
Start: 2025-02-05 | End: 2025-02-05 | Stop reason: HOSPADM

## 2025-02-05 RX ADMIN — LORAZEPAM 1 MG: 2 INJECTION INTRAMUSCULAR; INTRAVENOUS at 15:35

## 2025-02-05 NOTE — CASE MANAGEMENT/SOCIAL WORK
Continued Stay Note  REMY Diallo     Patient Name: Dina CALVERT Link  MRN: [id number]  Today's Date: 2/5/2025    Admit Date: 2/3/2025    Plan: Home with hospice   Discharge Plan       Row Name 02/05/25 1049       Plan    Plan Home with hospice    Plan Comments Hospice consult received and provided to Renee Henriquez with [facility].  [facility] is the only provider in patient's county of residence.    Final Discharge Disposition Code 50 - home with hospice                   Discharge Codes    No documentation.                       KHALIF Salgado

## 2025-02-05 NOTE — PROGRESS NOTES
Nutrition Services    Patient Name:  Dina Michaels  YOB: 1934  MRN: [id number]  Admit Date:  2/3/2025    Inpatient nutrition services reviewed POC. Interventions align with the goal(s) to maintain comfort at this time. Inpatient nutrition services/RD available upon request.     Electronically signed by:  Courtney Aguilar RDN, LD  02/05/25 09:34 CST

## 2025-02-05 NOTE — PROGRESS NOTES
[facility] Palliative Care Services  Progress Note  Patient Name: Dina Michaels  Date of Admission: 2/3/2025  Today's Date: 02/05/25     Code Status and Medical Interventions: No CPR (Do Not Attempt to Resuscitate); Comfort Measures   Ordered at: 02/03/25 2306     Level Of Support Discussed With:    Health Care Surrogate     Code Status (Patient has no pulse and is not breathing):    No CPR (Do Not Attempt to Resuscitate)     Medical Interventions (Patient has pulse or is breathing):    Comfort Measures     Subjective   Chief complaint/Reason for Referral/Visit: Follow up on Hospice Referral/Discussion.    Medical record reviewed. Events noted.  Has not required any PRN medications in the last 24 hours per MAR.  She is lying in bed and in no apparent distress.  Spouse and granddaughters at bedside.  Discussed with nursing and intensivist EDGARDO.     Advance Care Planning   Advanced Directives: No advance directive on file.   Advance Care Planning Discussion: Mrs. Michaels is unable to demonstrate ability to make complex medical decisions at time of exam.  Spoke with spouse, Nik, and two granddaughters at bedside.   Nik shared after further thought and discussion he would like for her to return home with hospice services.   He is hopeful this can be arranged sooner than later.  Hospice and SW consult placed.  Briefly discussed details and expectations of hospice.   Also discussed MOST form.  Nik interested in completing.  Initiated to reflect wishes of no CPR and comfort measures.  Have requested assistance from nursing to request attending MD signature for completion.  Family appreciative of discussion.  Denied any questions/concerns.  Encouraged if arise.     The patient receives support from her spouse and extended family. Patient's spouse is her next of kin.    Due to the palliative care topics discussed including goals of care and hospice services we will establish an advance care plan.     Goals of care: Ongoing.    Review of Systems   Unable to perform ROS: Patient unresponsive       Pain Assessment  CPOT and PAINAD Scales: PAINAD (Pain Assessment in Advance Dementia Scale)  CPOT Facial Expression: 0-->relaxed, neutral  CPOT Body Movements: 0-->absence of movements  CPOT Muscle Tension: 0-->relaxed  Ventilator Compliance/Vocalization: 0-->talking in normal tone or no sound  CPOT Score: 0  PAINAD Breathin-->normal  PAINAD Negative Vocalization: 0-->none  PAINAD Facial Expression: 0-->smiling or inexpressive  PAINAD Body Language: 0-->relaxed  PAINAD Consolability: 0-->no need to console  PAINAD Score: 0  Objective   Diagnostics: Reviewed      Intake/Output Summary (Last 24 hours) at 2025 0825  Last data filed at 2025 0400  Gross per 24 hour   Intake --   Output 100 ml   Net -100 ml     Current Facility-Administered Medications   Medication Dose Route Frequency Provider Last Rate Last Admin    acetaminophen (TYLENOL) tablet 650 mg  650 mg Oral Q4H PRN Darrell Hall APRN        Or    acetaminophen (TYLENOL) 160 MG/5ML oral solution 650 mg  650 mg Oral Q4H PRN Darrell Hall APRN        Or    acetaminophen (TYLENOL) suppository 650 mg  650 mg Rectal Q4H PRN Darrell Hall APRN        diphenoxylate-atropine (LOMOTIL) 2.5-0.025 MG per tablet 1 tablet  1 tablet Oral Q2H PRN Darrell Hall APRN        LORazepam (ATIVAN) injection 0.5 mg  0.5 mg Intravenous Q1H PRN Darrell Hall APRN        Or    LORazepam (ATIVAN) 2 MG/ML concentrated solution 0.5 mg  0.5 mg Sublingual Q1H PRN Darrell Hall APRN        LORazepam (ATIVAN) injection 1 mg  1 mg Intravenous Q1H PRN Darrell Hall APRN   1 mg at 25 0449    Or    LORazepam (ATIVAN) 2 MG/ML concentrated solution 1 mg  1 mg Sublingual Q1H PRN Darrell Hall APRN        LORazepam (ATIVAN) injection 2 mg  2 mg Intravenous Q1H PRN Darrell Hall APRN        Or    LORazepam (ATIVAN) 2 MG/ML concentrated solution 2 mg  2 mg  Sublingual Q1H PRN Darrell Hall APRN        morphine concentrated solution 10 mg  10 mg Sublingual Q1H PRN Hyun Chen APRN        morphine concentrated solution 20 mg  20 mg Sublingual Q1H PRN Hyun Chen APRN        morphine concentrated solution 5 mg  5 mg Sublingual Q1H PRN Hyun Chen APRN        ondansetron ODT (ZOFRAN-ODT) disintegrating tablet 4 mg  4 mg Oral Q6H PRN Darrell Hall APRN        Or    ondansetron (ZOFRAN) injection 4 mg  4 mg Intravenous Q6H PRN Darrell Hall APRN        Polyvinyl Alcohol-Povidone PF (ARTIFICIAL TEARS) 1.4-0.6 % ophthalmic solution 1 drop  1 drop Both Eyes Q30 Min PRN Darrell Hall APRN        scopolamine patch 1 mg/72 hr  1 patch Transdermal Q72H PRN Darrell Hall APRN        sodium chloride 0.9 % flush 10 mL  10 mL Intravenous PRN Alvaro Nunez MD              acetaminophen **OR** acetaminophen **OR** acetaminophen    diphenoxylate-atropine    [DISCONTINUED] LORazepam **OR** LORazepam **OR** [DISCONTINUED] LORazepam **OR** [DISCONTINUED] LORazepam **OR** [DISCONTINUED] LORazepam **OR** LORazepam    [DISCONTINUED] LORazepam **OR** LORazepam **OR** [DISCONTINUED] LORazepam **OR** [DISCONTINUED] LORazepam **OR** [DISCONTINUED] LORazepam **OR** LORazepam    [DISCONTINUED] LORazepam **OR** LORazepam **OR** [DISCONTINUED] LORazepam **OR** [DISCONTINUED] LORazepam **OR** [DISCONTINUED] LORazepam **OR** LORazepam    morphine    morphine    morphine    ondansetron ODT **OR** ondansetron    Polyvinyl Alcohol-Povidone PF    Scopolamine    [COMPLETED] Insert Peripheral IV **AND** sodium chloride  Current medications patient is presently taking including all prescriptions, over-the-counter, herbals and vitamin/mineral/dietary (nutritional) supplements with reviewed including route, type, dose and frequency and are current per MAR at time of dictation.    Assessment:  Vital Signs: /44   Pulse 66   Temp 93.2 °F (34 °C) (Rectal)   Resp 10   Ht  "160 cm (63\")   Wt 64 kg (141 lb 1.5 oz)   SpO2 99%   BMI 24.99 kg/m²     Physical Exam  Vitals and nursing note reviewed.   Constitutional:       General: She is not in acute distress.  HENT:      Head: Normocephalic and atraumatic.   Eyes:      General: Lids are normal.   Neck:      Vascular: No JVD.      Trachea: Trachea normal.   Cardiovascular:      Rate and Rhythm: Normal rate.   Pulmonary:      Effort: Pulmonary effort is normal.   Musculoskeletal:      Cervical back: Neck supple.   Skin:     General: Skin is warm and dry.     Functional status: Palliative Performance Scale Score: Performance 10% based on the following measures: Ambulation: Totally bed bound, Activity and Evidence of Disease: Unable to do any work, extensive evidence of disease, Self-Care: Total care required,  Intake: Mouth care only, LOC: Drowsy or comatose.  Nutritional status: Albumin 3.1. Body mass index is 24.99 kg/m².  Patient status: Disease state: Actively dying.    Active Hospital Problems    Diagnosis     **Hypothermia     Chronic anemia     Vascular dementia without behavioral disturbance      Impression/Problem List:  Alzheimer's dementia (FAST 7D)  Hypothermia  Hypotension  Anemia      Hypoalbuminemia   Advanced age      Plan / Recommendations   Palliative Care Encounter   Mrs. Michaels is unable to demonstrate ability to make complex medical decisions at time of exam.    Spoke with spouse, Nik, and two granddaughters at bedside.   Details of discussion above.   Interested in returning home with hospice.  Hospice and SW consult placed.    MOST form initiated to reflect wishes of no CPR and comfort measures.    Have requested assistance from nursing to request attending MD signature for completion.    Family appreciative of discussion.  Denied any questions/concerns.  Encouraged if arise.      2.  Comfort Measures  Has not required any PRN medications in the last 24 hours.  Modified orders from PRN IV/SQ dilaudid to sublingual " morphine as needed as plans to return home with hospice.   Additional palliative/comfort adjuvants available per intensivist.     Thank you for allowing us to participate in patient's plan of care. Palliative Care Team will continue to follow patient.     Electronically signed by, EDGARDO Appiah, 02/05/25.

## 2025-02-05 NOTE — DISCHARGE SUMMARY
[facility] Intensivist Services  DISCHARGE SUMMARY       Date of Admission: 2/3/2025  Date of Discharge:  2/5/2025  Primary Care Physician: Benedicto Hebert MD    Presenting Problem/History of Present Illness:  Mrs. Michaels is a 89-year-old female who presented to [facility] accompanied by her  with a chief complaint of lethargy, decreased p.o. intake, and altered mental status.  During ER course workup patient remained unresponsive, breathing with pulse.  Was profoundly hypotensive, administered 4 L normal saline bolus with pressure remaining low and was started on Levophed.  Additionally during ED course workup she was found to have a hemoglobin of 6.4 with a hematocrit of 21.8 with no obvious sign of bleeding.  She was transfused 1 unit PRBCs.  CT head showed no acute intracranial abnormality.  There was evidence for old left caudate head lacunar infarct and mild to moderate chronic small vessel ischemic changes.     Intensivist service was contacted for admission and further medical management.  Patient was accepted for admission under intensivist MD, Dr. Cope.  Upon patient's arrival to CCU bed to she remains unresponsive, does withdrawal to painful stimuli.  She does not open her eyes or respond to verbal stimuli.  She was able to be weaned off vasopressor support.  Vital signs remained stable     I made contact with the patient's , Mr. Nik Michaels to discuss patient's recent health and reason for admission.  Mr. Michaels explained to me that his wife has had significant decline in health over the last year becoming worse over the course of the last 2 months.  He explains known history of Alzheimer's dementia that has gotten much worse over the last 2 months with her frequently wandering the home and going without sleep for 3 days at a time.  He explains that she will then sleep a short period and then goes without sleep once again.  He explains  that she frequently has hallucinations and becomes significantly agitated.  He reports that she often eats and drinks very little.  He further explained that Friday she stopped talking, was unable to feed herself, and when he attempted to feed her she was unable to chew or swallow.  He explains that she seemed to be slumped to her left and was no longer ambulatory.  He stated to me that he believed she most likely had a stroke but knew that their was nothing she would want done or anything he believed could be done and wanted her to be home with family if she was going to pass. He explained that as time went on he thought she should be evaluated and brought her to the ER understanding that she was most likely going to pass.  After these details were explained to me I clarified CODE STATUS wishes and Mr. Michaels advised that he would want her to be DNR/DNI and would not want any further blood products or vasopressors. He explained that when it is her time he would want her to pass peacefully. I discussed comfort care with Mr. Michaels and he confirmed he would want her to receive medication if she showed any signs of being uncomfortable. Nurses updated and comfort orders placed.     Final Discharge Diagnoses:  Active Hospital Problems    Diagnosis     **Hypovolemic shock     Failure to thrive in adult     Hypothermia     Chronic anemia     Vascular dementia without behavioral disturbance      Consults:     Palliative Care Medicine  Consults by Hyun Chen APRN (02/04/2025 09:20)       Hospital Course:   89-year-old female admitted to hospital on 2/3/2025 with failure to thrive in dementia patient, anemia, hypotension and hypothermia.  Patient had been relatively unresponsive since 1/30.  Had not eaten or drank any fluids since then.  She has baseline dementia.  She was brought to the ED with failure to thrive.  She was found to be anemic and received 1 unit packed red blood cells.  She is found to be hypovolemic with  hypotension.  She received 4 L crystalloid bolus.  Continue to have hypotension and was placed on vasopressor support.  Patient was admitted to the ICU on 2/3.  Goal of care discussion had with  at bedside.  Ultimately decided to pursue comfort measures only.  Patient remained stable with an adequate blood pressure, heart rate and oxygenation with palliative care order set.  Palliative care medicine consulted for hospice evaluation.  Hospice evaluated patient on 2/5.   agreed to take patient home with hospice admission.  Plan to discharge from hospital today, 2/5, admission to home hospice.  Supplies being delivered to patient's home.    Pertinent Test Results:   Results for orders placed during the hospital encounter of 05/14/23    Adult Transthoracic Echo Complete W/ Cont if Necessary Per Protocol    Interpretation Summary    Left ventricular systolic function is hyperdynamic (EF > 70%). Left ventricular ejection fraction appears to be greater than 70%.    Left ventricular diastolic dysfunction is noted.    No evidence of a patent foramen ovale. Saline test results are negative for right to left atrial level shunt.    Moderate mitral annular calcification is present. There is moderate calcification of the mitral valve posterior leaflet(s).      Imaging Results (All)       Procedure Component Value Units Date/Time    CT Head Without Contrast [[id number]] Collected: 02/03/25 1429     Updated: 02/03/25 1435    Narrative:      Exam: CT HEAD WO CONTRAST- 2/3/2025 1:15 PM     HISTORY: Altered mental status       DOSE LENGTH PRODUCT: 1153.3 mGy.cm mGy cm. Automated exposure control  was also utilized to decrease patient radiation dose.     Technique:  Helically acquired CT of the brain without IV contrast was performed.  Sagittal and coronal reformations are also provided for review. Soft  tissue and bone kernels are available for interpretation.     Comparison: 5/14/2023.     Findings:     Ventricles and  "extra-axial CSF spaces are normal in size.     No intraparenchymal or extra-axial hemorrhage.     Gray-white matter differentiation is preserved. Old left caudate head  lacunar infarct. Mild to moderate chronic small vessel ischemic changes.     Orbits are grossly unremarkable. Paranasal sinuses are grossly clear.  Mastoid air cells are grossly clear.     No suspicious calvarial or extracranial soft tissue abnormality.       Impression:      Impression:       No acute intracranial abnormality.     Old left caudate head lacunar infarct and mild to moderate chronic small  vessel ischemic changes.     This report was signed and finalized on 2/3/2025 2:32 PM by Arturo Quiroz.       XR Chest 1 View [[id number]] Collected: 02/03/25 1346     Updated: 02/03/25 1350    Narrative:      EXAM: XR CHEST 1 VW- 2/3/2025 12:25 PM     HISTORY: Altered mental status       COMPARISON: 11/4/2023.     TECHNIQUE: Single frontal radiograph of the chest was obtained.     FINDINGS:     Limited evaluation due to patient positioning.     Support Devices: None.     Cardiac and Mediastinal Silhouettes: Normal.     Lungs/Pleura: Chronic appearing interstitial lung changes without any  new consolidation. No sizable pleural effusion. No visible pneumothorax.     Osseous structures: No acute osseous finding.     Other: None.       Impression:         No definite acute cardiopulmonary abnormality.           This report was signed and finalized on 2/3/2025 1:47 PM by Arturo Quiroz.               Result Review    Result Review:  I have personally reviewed the results from the time of this admission to 2/5/2025 12:23 CST and agree with these findings:  [x]  Laboratory list / accordion  []  Microbiology  [x]  Radiology  [x]  EKG/Telemetry   []  Cardiology/Vascular   []  Pathology  [x]  Old records  []  Other:      Physical Exam on Discharge:  BP (!) 81/46   Pulse 72   Temp 93.2 °F (34 °C) (Rectal)   Resp 10   Ht 160 cm (63\")   Wt 64 kg (141 lb " 1.5 oz)   SpO2 92%   BMI 24.99 kg/m²   Physical Exam  Vitals reviewed.   Constitutional:       General: She is not in acute distress.     Comments: Obtunded   Cardiovascular:      Rate and Rhythm: Normal rate and regular rhythm.      Pulses: Normal pulses.   Pulmonary:      Effort: Pulmonary effort is normal.   Abdominal:      Palpations: Abdomen is soft.   Skin:     General: Skin is warm and dry.   Neurological:      Comments: Not responding to verbal or painful stimuli       Condition on Discharge: Able    Discharge Disposition:  Hospice/Home    Discharge Medications:     Discharge Medications        Stop These Medications      acetaminophen 325 MG tablet  Commonly known as: TYLENOL     alendronate 70 MG tablet  Commonly known as: FOSAMAX     aspirin 81 MG EC tablet     cephalexin 500 MG capsule  Commonly known as: KEFLEX     docusate sodium 100 MG capsule     HYDROcodone-acetaminophen 5-325 MG per tablet  Commonly known as: NORCO     hydrocortisone 25 MG suppository  Commonly known as: ANUSOL-HC     levothyroxine 100 MCG tablet  Commonly known as: SYNTHROID, LEVOTHROID     memantine 10 MG tablet  Commonly known as: NAMENDA     methocarbamol 750 MG tablet  Commonly known as: ROBAXIN     metoprolol succinate XL 25 MG 24 hr tablet  Commonly known as: TOPROL-XL     pantoprazole 20 MG EC tablet  Commonly known as: PROTONIX     Pramoxine HCl (Perianal) 1 % foam  Commonly known as: Proctofoam     vitamin B-12 100 MCG tablet  Commonly known as: CYANOCOBALAMIN              Discharge Diet: to patient's tolerance, request    Activity at Discharge: bedrest    Follow-up Appointments:   No future appointments.      Electronically signed by EDGARDO Olmstead on 2/5/2025 at 12:23 CST    Time: 25 minutes.

## 2025-02-05 NOTE — PLAN OF CARE
Goal Outcome Evaluation:  Plan of Care Reviewed With: patient, spouse              pt remains on comfort care. Family remains at bedside. Pt unresponsive. Temp 93.2 rectally. No s/s of pain or discomfort noted this shift. Bed alarm set, wheels locked, bed in lowest position.                            
[Negative] : Heme/Lymph

## (undated) DEVICE — POOLE SUCTION INSTRUMENT WITH REMOVABLE SHEATH: Brand: POOLE

## (undated) DEVICE — DRP SURG U/DRP INVISISHIELD PA 48X52IN

## (undated) DEVICE — SENSR O2 OXIMAX FNGR A/ 18IN NONSTR

## (undated) DEVICE — MASK,OXYGEN,MED CONC,ADLT,7' TUB, UC: Brand: PENDING

## (undated) DEVICE — BNDG ELAS W/CLIP 6IN 10YD LF STRL

## (undated) DEVICE — IMMOB KN 3PNL DLX CANVS 19IN BLU

## (undated) DEVICE — FRCP BIOP ENDO CAPTURA/PRO SERR SPK 2.8MM 230CM

## (undated) DEVICE — GLV SURG SENSICARE PI ORTHO SZ8 LF STRL

## (undated) DEVICE — CUFF,BP,DISP,1 TUBE,ADULT,HP: Brand: MEDLINE

## (undated) DEVICE — Device

## (undated) DEVICE — ADHS SKIN PREMIERPRO EXOFIN TOPICAL HI/VISC .5ML

## (undated) DEVICE — OPTIFOAM GENTLE SA, POSTOP, 4X12: Brand: MEDLINE

## (undated) DEVICE — YANKAUER,BULB TIP WITH VENT: Brand: ARGYLE

## (undated) DEVICE — CONMED SCOPE SAVER BITE BLOCK, 20X27 MM: Brand: SCOPE SAVER

## (undated) DEVICE — THE CHANNEL CLEANING BRUSH IS A NYLON FLEXI BRUSH ATTACHED TO A FLEXIBLE PLASTIC SHEATH DESIGNED TO SAFELY REMOVE DEBRIS FROM FLEXIBLE ENDOSCOPES.

## (undated) DEVICE — SPNG GZ STRL 2S 4X4 12PLY

## (undated) DEVICE — SUT VIC 0 CT1 CR8 27IN UD VCPP41D

## (undated) DEVICE — TOTAL TRAY, 16FR 10ML SIL FOLEY, URN: Brand: MEDLINE

## (undated) DEVICE — DUAL CUT SAGITTAL BLADE

## (undated) DEVICE — 4-PORT MANIFOLD: Brand: NEPTUNE 2

## (undated) DEVICE — Device: Brand: DEFENDO AIR/WATER/SUCTION AND BIOPSY VALVE

## (undated) DEVICE — ANTIBACTERIAL UNDYED BRAIDED (POLYGLACTIN 910), SYNTHETIC ABSORBABLE SUTURE: Brand: COATED VICRYL

## (undated) DEVICE — PK HIP TOTL 30

## (undated) DEVICE — CVR BRD ARM 13X30

## (undated) DEVICE — GLV SURG DERMASSURE GRN LF PF 8.0